# Patient Record
Sex: FEMALE | Race: WHITE | Employment: OTHER | URBAN - METROPOLITAN AREA
[De-identification: names, ages, dates, MRNs, and addresses within clinical notes are randomized per-mention and may not be internally consistent; named-entity substitution may affect disease eponyms.]

---

## 2018-07-11 ENCOUNTER — HOSPITAL ENCOUNTER (OUTPATIENT)
Dept: GENERAL RADIOLOGY | Age: 83
Discharge: HOME | End: 2018-07-11
Attending: FAMILY MEDICINE | Admitting: FAMILY MEDICINE

## 2018-07-11 DIAGNOSIS — M25.532 LEFT WRIST PAIN: ICD-10-CM

## 2018-07-20 ENCOUNTER — HOSPITAL ENCOUNTER (OUTPATIENT)
Dept: CT IMAGING | Age: 83
Discharge: HOME | End: 2018-07-20
Attending: FAMILY MEDICINE | Admitting: FAMILY MEDICINE

## 2018-07-20 ENCOUNTER — HOSPITAL ENCOUNTER (OUTPATIENT)
Dept: GENERAL RADIOLOGY | Age: 83
Discharge: HOME | End: 2018-07-20
Attending: INTERNAL MEDICINE | Admitting: INTERNAL MEDICINE

## 2018-07-20 DIAGNOSIS — R93.7 ABNORMAL FINDINGS ON DIAGNOSTIC IMAGING OF OTHER PARTS OF MUSCULOSKELETAL SYSTEM: ICD-10-CM

## 2018-07-20 DIAGNOSIS — R52 PAIN: ICD-10-CM

## 2018-07-20 LAB
ALBUMIN SERPL-MCNC: 4.3 GM/DL (ref 3.4–5)
ALP BLD-CCNC: 83 IU/L (ref 40–128)
ALT SERPL-CCNC: 18 U/L (ref 10–40)
ANION GAP SERPL CALCULATED.3IONS-SCNC: 16 MMOL/L (ref 4–16)
AST SERPL-CCNC: 22 IU/L (ref 15–37)
BASOPHILS ABSOLUTE: 0 K/CU MM
BASOPHILS RELATIVE PERCENT: 0.5 % (ref 0–1)
BILIRUB SERPL-MCNC: 0.4 MG/DL (ref 0–1)
BUN BLDV-MCNC: 19 MG/DL (ref 6–23)
CALCIUM SERPL-MCNC: 9.5 MG/DL (ref 8.3–10.6)
CHLORIDE BLD-SCNC: 101 MMOL/L (ref 99–110)
CO2: 29 MMOL/L (ref 21–32)
CREAT SERPL-MCNC: 1.1 MG/DL (ref 0.6–1.1)
DIFFERENTIAL TYPE: ABNORMAL
EOSINOPHILS ABSOLUTE: 0.1 K/CU MM
EOSINOPHILS RELATIVE PERCENT: 0.7 % (ref 0–3)
GFR AFRICAN AMERICAN: 57 ML/MIN/1.73M2
GFR NON-AFRICAN AMERICAN: 47 ML/MIN/1.73M2
GLUCOSE BLD-MCNC: 80 MG/DL (ref 70–99)
HCT VFR BLD CALC: 39.6 % (ref 37–47)
HEMOGLOBIN: 12.1 GM/DL (ref 12.5–16)
IMMATURE NEUTROPHIL %: 0.3 % (ref 0–0.43)
LYMPHOCYTES ABSOLUTE: 2.8 K/CU MM
LYMPHOCYTES RELATIVE PERCENT: 37.7 % (ref 24–44)
MCH RBC QN AUTO: 28 PG (ref 27–31)
MCHC RBC AUTO-ENTMCNC: 30.6 % (ref 32–36)
MCV RBC AUTO: 91.7 FL (ref 78–100)
MONOCYTES ABSOLUTE: 0.9 K/CU MM
MONOCYTES RELATIVE PERCENT: 11.9 % (ref 0–4)
NUCLEATED RBC %: 0 %
PDW BLD-RTO: 13.2 % (ref 11.7–14.9)
PLATELET # BLD: 198 K/CU MM (ref 140–440)
PMV BLD AUTO: 10.2 FL (ref 7.5–11.1)
POTASSIUM SERPL-SCNC: 4.3 MMOL/L (ref 3.5–5.1)
RBC # BLD: 4.32 M/CU MM (ref 4.2–5.4)
SEGMENTED NEUTROPHILS ABSOLUTE COUNT: 3.7 K/CU MM
SEGMENTED NEUTROPHILS RELATIVE PERCENT: 48.9 % (ref 36–66)
SODIUM BLD-SCNC: 146 MMOL/L (ref 135–145)
TOTAL IMMATURE NEUTOROPHIL: 0.02 K/CU MM
TOTAL NUCLEATED RBC: 0 K/CU MM
TOTAL PROTEIN: 6.9 GM/DL (ref 6.4–8.2)
TSH HIGH SENSITIVITY: 0.98 UIU/ML (ref 0.27–4.2)
WBC # BLD: 7.5 K/CU MM (ref 4–10.5)

## 2019-05-08 RX ORDER — LOSARTAN POTASSIUM 50 MG/1
50 TABLET ORAL DAILY
COMMUNITY
End: 2019-06-04

## 2019-05-08 RX ORDER — METOPROLOL TARTRATE 50 MG/1
50 TABLET, FILM COATED ORAL 2 TIMES DAILY
COMMUNITY
End: 2019-08-26 | Stop reason: SDUPTHER

## 2019-06-04 ENCOUNTER — OFFICE VISIT (OUTPATIENT)
Dept: FAMILY MEDICINE CLINIC | Age: 84
End: 2019-06-04
Payer: MEDICARE

## 2019-06-04 VITALS
OXYGEN SATURATION: 97 % | HEIGHT: 68 IN | DIASTOLIC BLOOD PRESSURE: 88 MMHG | BODY MASS INDEX: 27.74 KG/M2 | HEART RATE: 78 BPM | WEIGHT: 183 LBS | SYSTOLIC BLOOD PRESSURE: 138 MMHG | TEMPERATURE: 97.9 F

## 2019-06-04 DIAGNOSIS — R53.83 FATIGUE, UNSPECIFIED TYPE: ICD-10-CM

## 2019-06-04 DIAGNOSIS — E11.42 TYPE 2 DIABETES MELLITUS WITH DIABETIC POLYNEUROPATHY, WITHOUT LONG-TERM CURRENT USE OF INSULIN (HCC): ICD-10-CM

## 2019-06-04 DIAGNOSIS — I10 ESSENTIAL HYPERTENSION: ICD-10-CM

## 2019-06-04 DIAGNOSIS — E03.9 HYPOTHYROIDISM, UNSPECIFIED TYPE: ICD-10-CM

## 2019-06-04 DIAGNOSIS — E03.9 HYPOTHYROIDISM, UNSPECIFIED TYPE: Primary | ICD-10-CM

## 2019-06-04 DIAGNOSIS — J30.89 SEASONAL ALLERGIC RHINITIS DUE TO OTHER ALLERGIC TRIGGER: ICD-10-CM

## 2019-06-04 LAB
BASOPHILS ABSOLUTE: 0 K/UL (ref 0–0.2)
BASOPHILS RELATIVE PERCENT: 0.6 %
BILIRUBIN URINE: NEGATIVE
BLOOD, URINE: NEGATIVE
CLARITY: CLEAR
COLOR: YELLOW
EOSINOPHILS ABSOLUTE: 0.1 K/UL (ref 0–0.6)
EOSINOPHILS RELATIVE PERCENT: 1.4 %
EPITHELIAL CELLS, UA: 1 /HPF (ref 0–5)
GLUCOSE URINE: NEGATIVE MG/DL
HCT VFR BLD CALC: 37.7 % (ref 36–48)
HEMOGLOBIN: 12.4 G/DL (ref 12–16)
HYALINE CASTS: 1 /LPF (ref 0–8)
KETONES, URINE: NEGATIVE MG/DL
LEUKOCYTE ESTERASE, URINE: ABNORMAL
LYMPHOCYTES ABSOLUTE: 1.2 K/UL (ref 1–5.1)
LYMPHOCYTES RELATIVE PERCENT: 27.6 %
MCH RBC QN AUTO: 28.2 PG (ref 26–34)
MCHC RBC AUTO-ENTMCNC: 33 G/DL (ref 31–36)
MCV RBC AUTO: 85.6 FL (ref 80–100)
MICROSCOPIC EXAMINATION: YES
MONOCYTES ABSOLUTE: 0.6 K/UL (ref 0–1.3)
MONOCYTES RELATIVE PERCENT: 12.7 %
NEUTROPHILS ABSOLUTE: 2.5 K/UL (ref 1.7–7.7)
NEUTROPHILS RELATIVE PERCENT: 57.7 %
NITRITE, URINE: NEGATIVE
PDW BLD-RTO: 14.7 % (ref 12.4–15.4)
PH UA: 6 (ref 5–8)
PLATELET # BLD: 195 K/UL (ref 135–450)
PMV BLD AUTO: 8.2 FL (ref 5–10.5)
PROTEIN UA: NEGATIVE MG/DL
RBC # BLD: 4.41 M/UL (ref 4–5.2)
RBC UA: 1 /HPF (ref 0–4)
SPECIFIC GRAVITY UA: 1.02 (ref 1–1.03)
UROBILINOGEN, URINE: 0.2 E.U./DL
WBC # BLD: 4.4 K/UL (ref 4–11)
WBC UA: 3 /HPF (ref 0–5)

## 2019-06-04 PROCEDURE — 1036F TOBACCO NON-USER: CPT | Performed by: FAMILY MEDICINE

## 2019-06-04 PROCEDURE — 1090F PRES/ABSN URINE INCON ASSESS: CPT | Performed by: FAMILY MEDICINE

## 2019-06-04 PROCEDURE — 1123F ACP DISCUSS/DSCN MKR DOCD: CPT | Performed by: FAMILY MEDICINE

## 2019-06-04 PROCEDURE — G8427 DOCREV CUR MEDS BY ELIG CLIN: HCPCS | Performed by: FAMILY MEDICINE

## 2019-06-04 PROCEDURE — 4040F PNEUMOC VAC/ADMIN/RCVD: CPT | Performed by: FAMILY MEDICINE

## 2019-06-04 PROCEDURE — G8400 PT W/DXA NO RESULTS DOC: HCPCS | Performed by: FAMILY MEDICINE

## 2019-06-04 PROCEDURE — G8419 CALC BMI OUT NRM PARAM NOF/U: HCPCS | Performed by: FAMILY MEDICINE

## 2019-06-04 PROCEDURE — 99214 OFFICE O/P EST MOD 30 MIN: CPT | Performed by: FAMILY MEDICINE

## 2019-06-04 RX ORDER — LEVOTHYROXINE SODIUM 0.05 MG/1
50 TABLET ORAL DAILY
Qty: 30 TABLET | Refills: 1 | Status: SHIPPED | OUTPATIENT
Start: 2019-06-04 | End: 2019-08-22 | Stop reason: SDUPTHER

## 2019-06-04 ASSESSMENT — ENCOUNTER SYMPTOMS
RHINORRHEA: 1
SHORTNESS OF BREATH: 1
VOMITING: 0
DIARRHEA: 0
COUGH: 0
SINUS PAIN: 1
SORE THROAT: 0
SINUS PRESSURE: 1
TROUBLE SWALLOWING: 0
NAUSEA: 0

## 2019-06-04 ASSESSMENT — PATIENT HEALTH QUESTIONNAIRE - PHQ9
SUM OF ALL RESPONSES TO PHQ9 QUESTIONS 1 & 2: 2
2. FEELING DOWN, DEPRESSED OR HOPELESS: 1
SUM OF ALL RESPONSES TO PHQ QUESTIONS 1-9: 2
SUM OF ALL RESPONSES TO PHQ QUESTIONS 1-9: 2
1. LITTLE INTEREST OR PLEASURE IN DOING THINGS: 1

## 2019-06-04 NOTE — PROGRESS NOTES
6/4/2019     Jessa Bolivar is a 80 y.o. female who presents today with:  Chief Complaint   Patient presents with    Annual Exam     non fasting, pt states she has been sob x 5 days pt does have sinus pressure dry eyes she does have seasonal allergies not sure if her sob is caused by her allergies. pt states the sob comes and goes it isn't consistant pt did states she had body chills and sweats but hasn't taken her temp. synthroid is to be sent to current pharm nakul [facility]   .    /88 (Site: Left Upper Arm, Position: Sitting, Cuff Size: Medium Adult)   Pulse 78   Temp 97.9 °F (36.6 °C) (Oral)   Ht 5' 7.5\" (1.715 m)   Wt 183 lb (83 kg)   SpO2 97%   BMI 28.24 kg/m²     HPI  History was obtained from the pt. Took tylenol yesterday because she felt tired, and had sinus congestion. Intermittent SOB with exertion. She reports that she increased frequency and was sent to urology. She did not like her experience there as they wouldn't start her on medications due to her blood pressure. She reports that she has episodes of incontinence, and wears a depends, stating that all of a sudden she can just be going without even realizing it. She got tired of changing her clothes all attempts that she started wearing depends and notices that she can urinate without even realizing it. Will discuss this further with Dr. Al Quintanilla    Patient does report some increased fatigue, and we will evaluate her thyroid levels to assess if that is causing the increased fatigue. Patient also states she has issues with incontinence, and wears a depends and feels that this may be causing some of her systemic symptoms including subjective fever and chills, and increased fatigue. REVIEW OF SYMPTOMS    Review of Systems   Constitutional: Positive for chills (subjective), fatigue and fever (subjective). HENT: Positive for congestion, postnasal drip, rhinorrhea, sinus pressure, sinus pain and sneezing.  Negative for ear pain, Fear of current or ex partner: None     Emotionally abused: None     Physically abused: None     Forced sexual activity: None   Other Topics Concern    None   Social History Narrative    None        SURGICAL HISTORY  Past Surgical History:   Procedure Laterality Date    APPENDECTOMY      BLADDER SURGERY      CHOLECYSTECTOMY      COLONOSCOPY  11/4/14    normal, biopsy    EYE SURGERY      HYSTERECTOMY     Carrington L[facility]pez  2011    Dr. Nils Cruz. med - following MI       CURRENT MEDICATIONS  Current Outpatient Medications   Medication Sig Dispense Refill    levothyroxine (SYNTHROID) 50 MCG tablet Take 1 tablet by mouth Daily 30 tablet 1    metoprolol tartrate (LOPRESSOR) 50 MG tablet Take 50 mg by mouth 2 times daily      ipratropium (ATROVENT) 0.03 % nasal spray   11     No current facility-administered medications for this visit. ALLERGIES  Allergies   Allergen Reactions    Aspirin     Codeine     Elavil [Amitriptyline Hcl]     Ibuprofen     Plaquenil [Hydroxychloroquine Sulfate]     Theophyllines        PHYSICAL EXAM    Physical Exam   Constitutional: She is oriented to person, place, and time. She appears well-developed and well-nourished. No distress. HENT:   Head: Normocephalic and atraumatic. Left Ear: Tympanic membrane, external ear and ear canal normal.   Nose: Mucosal edema and rhinorrhea present. R ear exam was limited due to cerumen impaction of the R EAC   Cardiovascular: Normal rate, regular rhythm and normal heart sounds. Pulmonary/Chest: Effort normal and breath sounds normal.   Patient is speaking in full sentences. Lungs CTA bilaterally. Abdominal: Soft. Bowel sounds are normal. There is no tenderness (No suprapubic tenderness upon palpation. ). There is no CVA tenderness (No CVA tenderness bilaterally. ). Neurological: She is alert and oriented to person, place, and time. Skin: Skin is warm and dry. She is not diaphoretic.    Psychiatric: She has a normal mood and affect. Thought content normal.   Nursing note and vitals reviewed. ASSESSMENT & PLAN    1. Hypothyroidism, unspecified type  Patient is seated taking medications as prescribed and refills will be provided as necessary. Is to have lab work completed today and medication dosage may be adjusted based off of those results if necessary.  - levothyroxine (SYNTHROID) 50 MCG tablet; Take 1 tablet by mouth Daily  Dispense: 30 tablet; Refill: 1  - T4, Free; Future  - TSH without Reflex; Future    2. Type 2 diabetes mellitus with diabetic polyneuropathy, without long-term current use of insulin ([facility] [address]  Patient is seated taking medications as prescribed and refills will be provided as necessary. Is to have lab work completed today and medication dosage may be adjusted based off of those results if necessary.  - Lipid Panel; Future  - Hemoglobin A1C; Future    3. Essential hypertension  Patient is seated taking medications as prescribed and refills will be provided as necessary. Is to have lab work completed today and medication dosage may be adjusted based off of those results if necessary.  - CBC Auto Differential; Future  - Comprehensive Metabolic Panel; Future  - Lipid Panel; Future    4. Seasonal allergic rhinitis due to other allergic trigger  Patient is to use Flonase nasal spray 1-2+ for nostril daily, and was instructed to take a Claritin 10 mg 1 tablet by mouth daily for help with her allergic rhinitis symptoms. 5. Fatigue, unspecified type  Will complete a urinalysis with a urine culture to assess for possible infection as a source of her increased fatigue, subjective fever and subjective chills. Will notify patient of those results once completed, and we'll start her on an antibiotic if necessary.  - Urinalysis with Microscopic  - Urine Culture;  Future           Electronically signed by DEMIAN Mora on 6/4/2019      Please note that this chart was generated using dragon dictation software. Although every effort was made to ensure the accuracy of this automated transcription, some errors in transcription may have occurred.

## 2019-06-04 NOTE — PATIENT INSTRUCTIONS
Patient Education        loratadine  Pronunciation:  sudha AT a casey  Brand:  Alavert, Claritin, Claritin Reditab, Clear-Atadine, Dimetapp ND, ohm Allergy Relief, QlearQuil All Day & Night, Tavist ND, Shoshanaitin  What is the most important information I should know about loratadine? Follow all directions on your medicine label and package. Tell each of your healthcare providers about all your medical conditions, allergies, and all medicines you use. What is loratadine? Loratadine is an antihistamine that reduces the effects of natural chemical histamine in the body. Histamine can produce symptoms of sneezing, itching, watery eyes, and runny nose. Loratadine is used to treat sneezing, runny nose, watery eyes, hives, skin rash, itching, and other cold or allergy symptoms. Loratadine is also used to treat skin hives and itching in people with chronic skin reactions. Loratadine may also be used for purposes not listed in this medication guide. What should I discuss with my healthcare provider before taking loratadine? You should not take this medicine if you are allergic to loratadine or to desloratadine (Clarinex). Ask a doctor or pharmacist if it is safe for you to use this medicine if you have other medical conditions, especially:  · asthma;  · kidney disease; or  · liver disease. This medicine is not expected to harm an unborn baby. Tell your doctor if you are pregnant or plan to become pregnant. Loratadine can pass into breast milk and may harm a nursing baby. Tell your doctor if you are breast-feeding a baby. Some forms of loratadine may contain phenylalanine. Talk to your doctor before taking loratadine if you have phenylketonuria (PKU). Do not give this medicine to a child younger than 10years old without the advice of a doctor. How should I take loratadine? Use exactly as directed on the label, or as prescribed by your doctor. Do not use in larger or smaller amounts or for longer than recommended. Cold or allergy medicine is usually taken only for a short time until your symptoms clear up. Do not give this medicine to a child younger than 3years old. Always ask a doctor before giving a cough or cold medicine to a child. Death can occur from the misuse of cough and cold medicines in very young children. Loratadine is usually taken once per day. Follow your doctor's instructions. Do not crush, chew, or break the regular tablet. Swallow the pill whole. Measure liquid medicine with the dosing syringe provided, or with a special dose-measuring spoon or medicine cup. If you do not have a dose-measuring device, ask your pharmacist for one. The chewable tablet must be chewed before you swallow it. To take the orally disintegrating tablet (Claritin RediTab, Alavert):  · Keep the tablet in its blister pack until you are ready to take it. Open the package and peel back the foil. Do not push a tablet through the foil or you may damage the tablet. · Use dry hands to remove the tablet and place it in your mouth. · Do not swallow the tablet whole. Allow it to dissolve in your mouth without chewing. If desired, you may drink liquid to help swallow the dissolved tablet. Call your doctor if your symptoms do not improve, or if they get worse. Store at room temperature away from moisture and heat. What happens if I miss a dose? Take the missed dose as soon as you remember. Skip the missed dose if it is almost time for your next scheduled dose. Do not  take extra medicine to make up the missed dose. What happens if I overdose? Seek emergency medical attention or call the Poison Help line at 1-608.288.5172. Overdose symptoms may include headache, drowsiness, and fast or pounding heartbeat. What should I avoid while taking loratadine? Follow your doctor's instructions about any restrictions on food, beverages, or activity. What are the possible side effects of loratadine?   Get emergency medical help if you have not a substitute for, the expertise, skill, knowledge and judgment of healthcare practitioners. The absence of a warning for a given drug or drug combination in no way should be construed to indicate that the drug or drug combination is safe, effective or appropriate for any given patient. [facility] does not assume any responsibility for any aspect of healthcare administered with the aid of information [facility] provides. The information contained herein is not intended to cover all possible uses, directions, precautions, warnings, drug interactions, allergic reactions, or adverse effects. If you have questions about the drugs you are taking, check with your doctor, nurse or pharmacist.  Copyright 6772-5738 [address]. Version: 9.02. Revision date: 2/20/2015. Care instructions adapted under license by [facility] ([facility]). If you have questions about a medical condition or this instruction, always ask your healthcare professional. [place] [address] any warranty or liability for your use of this information. Patient Education        fluticasone nasal  Pronunciation:  floo TIEMILY thompson  Brand:  Flonase, Veramyst, Ronold Raspberry  What is the most important information I should know about fluticasone nasal?  Follow all directions on your medicine label and package. Tell each of your healthcare providers about all your medical conditions, allergies, and all medicines you use. What is fluticasone nasal?  Fluticasone is a steroid. It prevents the release of substances in the body that cause inflammation. Fluticasone nasal (for the nose) is used to treat nasal congestion, sneezing, runny nose, and itchy or watery eyes caused by seasonal or year-round allergies. The Ronold Raspberry brand of this medicine is for use only in adults. Veramyst may be used in children as young as 3years old. Flonase is for use in adults and children who are at least 3years old.   Fluticasone nasal may also be used for purposes not listed in this medication guide. What should I discuss with my healthcare provider before using fluticasone nasal?  You should not use fluticasone nasal if you are allergic to it. Fluticasone can weaken your immune system,  making it easier for you to get an infection or worsening an infection you already have or recently had. Tell your doctor about any illness or infection you have had within the past several weeks. To make sure fluticasone nasal is safe for you, tell your doctor if you have ever had:  · sores or ulcers inside your nose;  · injury of or surgery on your nose;  · tuberculosis or any other infection or illness;  · glaucoma or cataracts;  · liver disease;  · osteoporosis; or  · herpes simplex virus of your eyes. If you use fluticasone nasal without a prescription and you have any medical conditions, ask a doctor or pharmacist if this medicine is safe for you. Also tell your doctor if you have diabetes. Steroid medicines may increase the glucose (sugar) levels in your blood or urine. You may also need to adjust the dose of your diabetes medications. It is not known whether fluticasone nasal will harm an unborn baby. Ask a doctor before using this medicine if you are pregnant. It is not known whether fluticasone nasal passes into breast milk or if it could affect a nursing baby. Ask a doctor before using this medicine if you are breast-feeding. How should I use fluticasone nasal?  Use exactly as directed on the label, or as prescribed by your doctor. Do not use in larger or smaller amounts or for longer than recommended. Do not share this medicine with another person, even if they have the same symptoms you have. The usual dose of fluticasone nasal is 1 to 2 sprays into each nostril once or twice per day. Your dose will depend on the fluticasone brand or strength you use, and your dose may change once your symptoms improve. Follow all dosing instructions very carefully.   Jodi Parrot is not approved for use by anyone younger than 25years old. Do not use Flonase in a child younger than 3years old. Do not use Veramyst in a child younger than 3years old. Any child using fluticasone nasal should be supervised by an adult while using the nasal spray. This medicine comes with patient instructions for safe and effective use, and directions for priming the nasal spray device. Follow these directions carefully. Ask your doctor or pharmacist if you have any questions. Shake the nasal spray just before each use. If you switched to fluticasone from another steroid medicine, do not stop using the other steroid suddenly or you may have unpleasant withdrawal symptoms. Talk with your doctor about tapering your steroid dose before stopping completely. To be sure fluticasone nasal is not causing harmful effects on your nose or sinuses, your doctor may need to check your progress on a regular basis. It may take up to several days before your symptoms improve. Keep using the medication as directed and tell your doctor if your symptoms do not improve after a week of treatment. Store fluticasone nasal in an upright position at room temperature, away from moisture and heat. Throw the spray bottle away after you have used 120 sprays, even if there is still medicine left in the bottle. What happens if I miss a dose? Use the missed dose as soon as you remember. Skip the missed dose if it is almost time for your next scheduled dose. Do not use extra medicine to make up the missed dose. What happens if I overdose? Seek emergency medical attention or call the Poison Help line at 1-429.623.6065. An overdose of fluticasone nasal is not expected to produce life threatening symptoms.  However, long term use of high steroid doses can lead to symptoms such as thinning skin, easy bruising, changes in the shape or location of body fat (especially in your face, neck, back, and waist), increased acne or facial hair, menstrual problems, impotence, or loss of interest in sex. What should I avoid while using fluticasone nasal?  Avoid getting the spray in your eyes or mouth. If this does happen, rinse with water. Avoid being near people who are sick or have infections. Call your doctor for preventive treatment if you are exposed to chickenpox or measles. These conditions can be serious or even fatal in people who are using fluticasone nasal.  What are the possible side effects of fluticasone nasal?  Get emergency medical help if you have signs of an allergic reaction: hives, rash; feeling light-headed; difficult breathing; swelling of your face, lips, tongue, or throat. Call your doctor at once if you have:  · severe or ongoing nosebleeds;  · noisy breathing, runny nose, or crusting around your nostrils;  · redness, sores, or white patches in your mouth or throat;  · blurred vision, eye pain, or seeing halos around lights;  · any wound that will not heal; or  · signs of low adrenal gland hormones --flu-like symptoms, headache, depression, weakness, tiredness, diarrhea, vomiting, stomach pain, craving salty foods, and feeling light-headed. Steroid medicine can affect growth in children. Tell your doctor if your child is not growing at a normal rate while using this medicine. Common side effects may include:  · minor nosebleed, burning or itching in your nose;  · sores or white patches inside or around your nose;  · cough, trouble breathing;  · headache, back pain;  · sinus pain, sore throat, fever; or  · nausea, vomiting. This is not a complete list of side effects and others may occur. Call your doctor for medical advice about side effects. You may report side effects to FDA at 4-669-FDA-9169. What other drugs will affect fluticasone nasal?  Tell your doctor about all your current medicines and any you start or stop using, especially:  · antifungal medicine; or  · antiviral medicine to treat hepatis C or HIV/AIDS. This list is not complete. Other drugs may interact with fluticasone nasal, including prescription and over-the-counter medicines, vitamins, and herbal products. Not all possible interactions are listed in this medication guide. Where can I get more information? Your pharmacist can provide more information about fluticasone nasal.  Remember, keep this and all other medicines out of the reach of children, never share your medicines with others, and use this medication only for the indication prescribed. Every effort has been made to ensure that the information provided by [address] is accurate, up-to-date, and complete, but no guarantee is made to that effect. Drug information contained herein may be time sensitive. [facility] information has been compiled for use by healthcare practitioners and consumers in the [facility] and therefore [facility] does not warrant that uses outside of the [facility] are appropriate, unless specifically indicated otherwise. [facility]'s drug information does not endorse drugs, diagnose patients or recommend therapy. [facility]'s drug information is an informational resource designed to assist licensed healthcare practitioners in caring for their patients and/or to serve consumers viewing this service as a supplement to, and not a substitute for, the expertise, skill, knowledge and judgment of healthcare practitioners. The absence of a warning for a given drug or drug combination in no way should be construed to indicate that the drug or drug combination is safe, effective or appropriate for any given patient. [facility] does not assume any responsibility for any aspect of healthcare administered with the aid of information [facility] provides. The information contained herein is not intended to cover all possible uses, directions, precautions, warnings, drug interactions, allergic reactions, or adverse effects.  If you have questions about the drugs you are taking, check with your doctor, nurse or pharmacist.  Copyright 4623-1595 Hansa Valles. Version: 9.01. Revision date: 11/2/2017. Care instructions adapted under license by [facility] ([facility]). If you have questions about a medical condition or this instruction, always ask your healthcare professional. [address] any warranty or liability for your use of this information.

## 2019-06-05 ENCOUNTER — TELEPHONE (OUTPATIENT)
Dept: FAMILY MEDICINE CLINIC | Age: 84
End: 2019-06-05

## 2019-06-05 PROBLEM — E11.42 TYPE 2 DIABETES MELLITUS WITH DIABETIC POLYNEUROPATHY, WITHOUT LONG-TERM CURRENT USE OF INSULIN (HCC): Status: ACTIVE | Noted: 2019-06-05

## 2019-06-05 LAB
A/G RATIO: 1.6 (ref 1.1–2.2)
ALBUMIN SERPL-MCNC: 4.2 G/DL (ref 3.4–5)
ALP BLD-CCNC: 83 U/L (ref 40–129)
ALT SERPL-CCNC: 20 U/L (ref 10–40)
ANION GAP SERPL CALCULATED.3IONS-SCNC: 15 MMOL/L (ref 3–16)
AST SERPL-CCNC: 21 U/L (ref 15–37)
BILIRUB SERPL-MCNC: 0.4 MG/DL (ref 0–1)
BUN BLDV-MCNC: 20 MG/DL (ref 7–20)
CALCIUM SERPL-MCNC: 9.4 MG/DL (ref 8.3–10.6)
CHLORIDE BLD-SCNC: 103 MMOL/L (ref 99–110)
CHOLESTEROL, TOTAL: 166 MG/DL (ref 0–199)
CO2: 26 MMOL/L (ref 21–32)
CREAT SERPL-MCNC: 1.1 MG/DL (ref 0.6–1.2)
ESTIMATED AVERAGE GLUCOSE: 116.9 MG/DL
GFR AFRICAN AMERICAN: 57
GFR NON-AFRICAN AMERICAN: 47
GLOBULIN: 2.7 G/DL
GLUCOSE BLD-MCNC: 84 MG/DL (ref 70–99)
HBA1C MFR BLD: 5.7 %
HDLC SERPL-MCNC: 34 MG/DL (ref 40–60)
LDL CHOLESTEROL CALCULATED: 105 MG/DL
POTASSIUM SERPL-SCNC: 4.4 MMOL/L (ref 3.5–5.1)
SODIUM BLD-SCNC: 144 MMOL/L (ref 136–145)
T4 FREE: 0.9 NG/DL (ref 0.9–1.8)
TOTAL PROTEIN: 6.9 G/DL (ref 6.4–8.2)
TRIGL SERPL-MCNC: 133 MG/DL (ref 0–150)
TSH SERPL DL<=0.05 MIU/L-ACNC: 1 UIU/ML (ref 0.27–4.2)
VLDLC SERPL CALC-MCNC: 27 MG/DL

## 2019-06-05 NOTE — TELEPHONE ENCOUNTER
SPOKE WITH PT AT 1044 AM REGARD UA I INFORMED PT OF MESSAGE BELOW PER BUCK MARQUES PAC PT STATED WHEN WE DO GET THE RESULTS OF THE CULTURE IF SHE DOES NOT ANSWER IT IS OK TO LEAVE A MESSAGE. Electronically signed by Benji Burkett LPN on 5/9/8160 at 10:68 AM      ----- Message from Tracey Tucker [place] sent at 6/5/2019 10:02 AM EDT -----  There may be a trace of a urinary tract infection but we will wait until the culture comes back so we know we will treat it with an appropriate antibiotic and she was not having symptoms of a UTI.      Thanks, Yudith

## 2019-06-05 NOTE — TELEPHONE ENCOUNTER
SPOKE WITH PT  AM REGARD. BW RESULTS I INFORMED PT OF BW IN MESSAGE BELOW PER Karlee Harvey PAC . PT VOICED UNDERSTANDING. Electronically signed by Shahzad Parikh LPN on 9/3/8775 at 9:51 AM      ----- Message from Evelyn [place], [place] sent at 6/5/2019  9:44 AM EDT -----  Physical the patient know that all of her labs look okay. Her A1c looks great at 5.7. Her cholesterol looks okay, but should adhere to a lower cholesterol, lower fat diet. Her kidney function is stable at this time.     Thanks, Yudith

## 2019-06-06 ENCOUNTER — TELEPHONE (OUTPATIENT)
Dept: FAMILY MEDICINE CLINIC | Age: 84
End: 2019-06-06

## 2019-06-06 LAB
ORGANISM: ABNORMAL
URINE CULTURE, ROUTINE: ABNORMAL
URINE CULTURE, ROUTINE: ABNORMAL

## 2019-06-07 NOTE — TELEPHONE ENCOUNTER
Over the last time she was seen at Dr. Daniel Mora office, because if it is within the past 3 years we do not need to do a referral, and she will need to call them to make an appointment. We will  complete a referral if it has been greater than 3 years.
SPOKE WITH PT  PM REGARD MESSAGE BELOW PER BUCK MARQUES PAC PT VOICED UNDERSTANDING AND STATED SHE WOULD LIKE TO GO BACK TO  TO TRY TO RESOLVE HER SYMPT. SHE IS CURRENTLY HAVING REGARD. INCONTINENCE. Electronically signed by Ofelia Chavarria LPN on 9/8/0272 at 2:72 PM        ----- [facility] [place] sent at 6/6/2019  4:43 PM EDT -----  Nothing to treat in terms of a UTI after the culture. Can refer her back to Urology for Dr. [facility] is the only one in [place], if we do a referral it would have to be to another urologist but is out of town.     Thanks, Yudith
Spoke with pt at 1054 am, pt stated she did see Reji Worthington about two years ago she will be sure to call him and set up her appt.    Electronically signed by Anahy Montoya LPN on 5/7/7098 at 49:68 AM
General

## 2019-08-22 ENCOUNTER — TELEPHONE (OUTPATIENT)
Dept: FAMILY MEDICINE CLINIC | Age: 84
End: 2019-08-22

## 2019-08-22 DIAGNOSIS — E03.9 HYPOTHYROIDISM, UNSPECIFIED TYPE: ICD-10-CM

## 2019-08-22 RX ORDER — LEVOTHYROXINE SODIUM 0.05 MG/1
50 TABLET ORAL DAILY
Qty: 30 TABLET | Refills: 0 | Status: SHIPPED | OUTPATIENT
Start: 2019-08-22 | End: 2019-08-26 | Stop reason: SDUPTHER

## 2019-08-26 ENCOUNTER — OFFICE VISIT (OUTPATIENT)
Dept: FAMILY MEDICINE CLINIC | Age: 84
End: 2019-08-26
Payer: MEDICARE

## 2019-08-26 VITALS
HEART RATE: 106 BPM | SYSTOLIC BLOOD PRESSURE: 142 MMHG | HEIGHT: 68 IN | OXYGEN SATURATION: 97 % | DIASTOLIC BLOOD PRESSURE: 98 MMHG | BODY MASS INDEX: 28.04 KG/M2 | WEIGHT: 185 LBS

## 2019-08-26 DIAGNOSIS — E11.42 TYPE 2 DIABETES MELLITUS WITH DIABETIC POLYNEUROPATHY, WITHOUT LONG-TERM CURRENT USE OF INSULIN (HCC): Primary | ICD-10-CM

## 2019-08-26 DIAGNOSIS — I48.19 PERSISTENT ATRIAL FIBRILLATION (HCC): ICD-10-CM

## 2019-08-26 DIAGNOSIS — E03.9 HYPOTHYROIDISM, UNSPECIFIED TYPE: ICD-10-CM

## 2019-08-26 DIAGNOSIS — R10.9 BILATERAL FLANK PAIN: ICD-10-CM

## 2019-08-26 DIAGNOSIS — R55 NEAR SYNCOPE: ICD-10-CM

## 2019-08-26 PROBLEM — I48.91 A-FIB (HCC): Status: ACTIVE | Noted: 2019-08-26

## 2019-08-26 PROCEDURE — 4040F PNEUMOC VAC/ADMIN/RCVD: CPT | Performed by: FAMILY MEDICINE

## 2019-08-26 PROCEDURE — G8419 CALC BMI OUT NRM PARAM NOF/U: HCPCS | Performed by: FAMILY MEDICINE

## 2019-08-26 PROCEDURE — 1123F ACP DISCUSS/DSCN MKR DOCD: CPT | Performed by: FAMILY MEDICINE

## 2019-08-26 PROCEDURE — 99214 OFFICE O/P EST MOD 30 MIN: CPT | Performed by: FAMILY MEDICINE

## 2019-08-26 PROCEDURE — 1090F PRES/ABSN URINE INCON ASSESS: CPT | Performed by: FAMILY MEDICINE

## 2019-08-26 PROCEDURE — G8400 PT W/DXA NO RESULTS DOC: HCPCS | Performed by: FAMILY MEDICINE

## 2019-08-26 PROCEDURE — 1036F TOBACCO NON-USER: CPT | Performed by: FAMILY MEDICINE

## 2019-08-26 PROCEDURE — G8427 DOCREV CUR MEDS BY ELIG CLIN: HCPCS | Performed by: FAMILY MEDICINE

## 2019-08-26 RX ORDER — LEVOTHYROXINE SODIUM 0.05 MG/1
50 TABLET ORAL DAILY
Qty: 30 TABLET | Refills: 3 | Status: SHIPPED | OUTPATIENT
Start: 2019-08-26 | End: 2019-09-19 | Stop reason: SDUPTHER

## 2019-08-26 RX ORDER — FLUTICASONE PROPIONATE 50 MCG
2 SPRAY, SUSPENSION (ML) NASAL DAILY
COMMUNITY
End: 2022-10-07

## 2019-08-26 RX ORDER — METOPROLOL TARTRATE 50 MG/1
50 TABLET, FILM COATED ORAL 2 TIMES DAILY
Qty: 60 TABLET | Refills: 3 | Status: SHIPPED | OUTPATIENT
Start: 2019-08-26 | End: 2019-09-19 | Stop reason: SDUPTHER

## 2019-08-26 RX ORDER — DILTIAZEM HYDROCHLORIDE 120 MG/1
120 CAPSULE, COATED, EXTENDED RELEASE ORAL DAILY
Qty: 30 CAPSULE | Refills: 5 | Status: SHIPPED | OUTPATIENT
Start: 2019-08-26 | End: 2019-12-10 | Stop reason: ALTCHOICE

## 2019-08-26 ASSESSMENT — ENCOUNTER SYMPTOMS
CHEST TIGHTNESS: 0
CONSTIPATION: 0
SINUS PRESSURE: 0
COUGH: 0
DIARRHEA: 0
SORE THROAT: 0
SHORTNESS OF BREATH: 0
ABDOMINAL PAIN: 0
RHINORRHEA: 0

## 2019-08-27 DIAGNOSIS — R10.9 BILATERAL FLANK PAIN: ICD-10-CM

## 2019-08-27 LAB
EPITHELIAL CELLS, UA: NORMAL /HPF
RBC UA: NORMAL /HPF (ref 0–2)
WBC UA: NORMAL /HPF (ref 0–5)

## 2019-09-19 ENCOUNTER — TELEPHONE (OUTPATIENT)
Dept: FAMILY MEDICINE CLINIC | Age: 84
End: 2019-09-19

## 2019-09-19 DIAGNOSIS — E03.9 HYPOTHYROIDISM, UNSPECIFIED TYPE: ICD-10-CM

## 2019-09-19 RX ORDER — LEVOTHYROXINE SODIUM 0.05 MG/1
50 TABLET ORAL DAILY
Qty: 30 TABLET | Refills: 2 | Status: SHIPPED | OUTPATIENT
Start: 2019-09-19 | End: 2019-12-10 | Stop reason: SDUPTHER

## 2019-09-19 RX ORDER — METOPROLOL TARTRATE 50 MG/1
50 TABLET, FILM COATED ORAL 2 TIMES DAILY
Qty: 60 TABLET | Refills: 2 | Status: SHIPPED | OUTPATIENT
Start: 2019-09-19 | End: 2019-12-10 | Stop reason: SDUPTHER

## 2019-12-10 ENCOUNTER — OFFICE VISIT (OUTPATIENT)
Dept: FAMILY MEDICINE CLINIC | Age: 84
End: 2019-12-10
Payer: MEDICARE

## 2019-12-10 VITALS
DIASTOLIC BLOOD PRESSURE: 90 MMHG | SYSTOLIC BLOOD PRESSURE: 140 MMHG | BODY MASS INDEX: 26.48 KG/M2 | OXYGEN SATURATION: 95 % | HEIGHT: 70 IN | WEIGHT: 185 LBS | HEART RATE: 107 BPM

## 2019-12-10 DIAGNOSIS — I48.91 ATRIAL FIBRILLATION, UNSPECIFIED TYPE (HCC): ICD-10-CM

## 2019-12-10 DIAGNOSIS — M25.531 RIGHT WRIST PAIN: ICD-10-CM

## 2019-12-10 DIAGNOSIS — N32.81 OVERACTIVE BLADDER: ICD-10-CM

## 2019-12-10 DIAGNOSIS — E11.42 TYPE 2 DIABETES MELLITUS WITH DIABETIC POLYNEUROPATHY, WITHOUT LONG-TERM CURRENT USE OF INSULIN (HCC): Primary | ICD-10-CM

## 2019-12-10 DIAGNOSIS — E03.9 HYPOTHYROIDISM, UNSPECIFIED TYPE: ICD-10-CM

## 2019-12-10 DIAGNOSIS — R55 VASOVAGAL SYNCOPE: ICD-10-CM

## 2019-12-10 DIAGNOSIS — I49.9 CARDIAC ARRHYTHMIA, UNSPECIFIED CARDIAC ARRHYTHMIA TYPE: ICD-10-CM

## 2019-12-10 PROCEDURE — 99214 OFFICE O/P EST MOD 30 MIN: CPT | Performed by: FAMILY MEDICINE

## 2019-12-10 PROCEDURE — G8427 DOCREV CUR MEDS BY ELIG CLIN: HCPCS | Performed by: FAMILY MEDICINE

## 2019-12-10 PROCEDURE — G8484 FLU IMMUNIZE NO ADMIN: HCPCS | Performed by: FAMILY MEDICINE

## 2019-12-10 PROCEDURE — 1090F PRES/ABSN URINE INCON ASSESS: CPT | Performed by: FAMILY MEDICINE

## 2019-12-10 PROCEDURE — G8400 PT W/DXA NO RESULTS DOC: HCPCS | Performed by: FAMILY MEDICINE

## 2019-12-10 PROCEDURE — 1123F ACP DISCUSS/DSCN MKR DOCD: CPT | Performed by: FAMILY MEDICINE

## 2019-12-10 PROCEDURE — 1036F TOBACCO NON-USER: CPT | Performed by: FAMILY MEDICINE

## 2019-12-10 PROCEDURE — 4040F PNEUMOC VAC/ADMIN/RCVD: CPT | Performed by: FAMILY MEDICINE

## 2019-12-10 PROCEDURE — G8417 CALC BMI ABV UP PARAM F/U: HCPCS | Performed by: FAMILY MEDICINE

## 2019-12-10 RX ORDER — LEVOTHYROXINE SODIUM 0.05 MG/1
50 TABLET ORAL DAILY
Qty: 30 TABLET | Refills: 2 | Status: SHIPPED | OUTPATIENT
Start: 2019-12-10 | End: 2020-07-10

## 2019-12-10 RX ORDER — METOPROLOL TARTRATE 50 MG/1
50 TABLET, FILM COATED ORAL 2 TIMES DAILY
Qty: 60 TABLET | Refills: 2 | Status: SHIPPED | OUTPATIENT
Start: 2019-12-10 | End: 2020-09-08 | Stop reason: SDUPTHER

## 2019-12-10 ASSESSMENT — ENCOUNTER SYMPTOMS
RESPIRATORY NEGATIVE: 1
CHEST TIGHTNESS: 0
BACK PAIN: 1
COUGH: 0
WHEEZING: 0
EYES NEGATIVE: 1
ABDOMINAL PAIN: 0
SHORTNESS OF BREATH: 0

## 2019-12-12 DIAGNOSIS — E11.42 TYPE 2 DIABETES MELLITUS WITH DIABETIC POLYNEUROPATHY, WITHOUT LONG-TERM CURRENT USE OF INSULIN (HCC): ICD-10-CM

## 2019-12-12 DIAGNOSIS — E03.9 HYPOTHYROIDISM, UNSPECIFIED TYPE: ICD-10-CM

## 2019-12-12 LAB
A/G RATIO: 1.5 (ref 1.1–2.2)
ALBUMIN SERPL-MCNC: 4.5 G/DL (ref 3.4–5)
ALP BLD-CCNC: 78 U/L (ref 40–129)
ALT SERPL-CCNC: 18 U/L (ref 10–40)
ANION GAP SERPL CALCULATED.3IONS-SCNC: 15 MMOL/L (ref 3–16)
AST SERPL-CCNC: 21 U/L (ref 15–37)
BILIRUB SERPL-MCNC: 0.3 MG/DL (ref 0–1)
BUN BLDV-MCNC: 26 MG/DL (ref 7–20)
CALCIUM SERPL-MCNC: 9.6 MG/DL (ref 8.3–10.6)
CHLORIDE BLD-SCNC: 101 MMOL/L (ref 99–110)
CO2: 26 MMOL/L (ref 21–32)
CREAT SERPL-MCNC: 1.1 MG/DL (ref 0.6–1.2)
GFR AFRICAN AMERICAN: 57
GFR NON-AFRICAN AMERICAN: 47
GLOBULIN: 3 G/DL
GLUCOSE BLD-MCNC: 101 MG/DL (ref 70–99)
POTASSIUM SERPL-SCNC: 4.6 MMOL/L (ref 3.5–5.1)
SODIUM BLD-SCNC: 142 MMOL/L (ref 136–145)
T4 FREE: 1 NG/DL (ref 0.9–1.8)
TOTAL PROTEIN: 7.5 G/DL (ref 6.4–8.2)
TSH REFLEX FT4: 1.71 UIU/ML (ref 0.27–4.2)

## 2019-12-13 LAB
ESTIMATED AVERAGE GLUCOSE: 111.2 MG/DL
HBA1C MFR BLD: 5.5 %

## 2019-12-26 ENCOUNTER — HOSPITAL ENCOUNTER (OUTPATIENT)
Dept: GENERAL RADIOLOGY | Age: 84
Discharge: HOME | End: 2019-12-26
Payer: MEDICARE

## 2019-12-26 ENCOUNTER — HOSPITAL ENCOUNTER (OUTPATIENT)
Age: 84
Discharge: HOME | End: 2019-12-26
Payer: MEDICARE

## 2019-12-26 DIAGNOSIS — M25.531 RIGHT WRIST PAIN: ICD-10-CM

## 2019-12-26 PROCEDURE — 73110 X-RAY EXAM OF WRIST: CPT

## 2019-12-30 ENCOUNTER — TELEPHONE (OUTPATIENT)
Dept: FAMILY MEDICINE CLINIC | Age: 84
End: 2019-12-30

## 2020-01-31 ENCOUNTER — INITIAL CONSULT (OUTPATIENT)
Dept: CARDIOLOGY CLINIC | Age: 85
End: 2020-01-31
Payer: MEDICARE

## 2020-01-31 VITALS
HEIGHT: 68 IN | SYSTOLIC BLOOD PRESSURE: 152 MMHG | HEART RATE: 83 BPM | DIASTOLIC BLOOD PRESSURE: 84 MMHG | WEIGHT: 185.8 LBS | OXYGEN SATURATION: 98 % | BODY MASS INDEX: 28.16 KG/M2 | RESPIRATION RATE: 14 BRPM

## 2020-01-31 PROCEDURE — G8484 FLU IMMUNIZE NO ADMIN: HCPCS | Performed by: INTERNAL MEDICINE

## 2020-01-31 PROCEDURE — G8417 CALC BMI ABV UP PARAM F/U: HCPCS | Performed by: INTERNAL MEDICINE

## 2020-01-31 PROCEDURE — 1090F PRES/ABSN URINE INCON ASSESS: CPT | Performed by: INTERNAL MEDICINE

## 2020-01-31 PROCEDURE — G8427 DOCREV CUR MEDS BY ELIG CLIN: HCPCS | Performed by: INTERNAL MEDICINE

## 2020-01-31 PROCEDURE — G8400 PT W/DXA NO RESULTS DOC: HCPCS | Performed by: INTERNAL MEDICINE

## 2020-01-31 PROCEDURE — 1123F ACP DISCUSS/DSCN MKR DOCD: CPT | Performed by: INTERNAL MEDICINE

## 2020-01-31 PROCEDURE — 4040F PNEUMOC VAC/ADMIN/RCVD: CPT | Performed by: INTERNAL MEDICINE

## 2020-01-31 PROCEDURE — 99204 OFFICE O/P NEW MOD 45 MIN: CPT | Performed by: INTERNAL MEDICINE

## 2020-01-31 PROCEDURE — 93000 ELECTROCARDIOGRAM COMPLETE: CPT | Performed by: INTERNAL MEDICINE

## 2020-01-31 PROCEDURE — 1036F TOBACCO NON-USER: CPT | Performed by: INTERNAL MEDICINE

## 2020-01-31 NOTE — PROGRESS NOTES
Electrophysiology Consult Note      Reason for consultation: cardiomyopathy    Chief complaint : Shortness of breath    Referring physician: DR. Shoemaker      Primary care physician: Dale Leyden, MD      History of Present Illness:     Patient is an 80yr old female with hx of complete heart block sp pacemaker, cad, Cardiomyopathy referred by [facility] for cardiomyopathy. Patient with shortness of breath with mild to moderate exertion, which relieves with rest, gradually worsening over months, with feeling of tiredness and weakness. Patient denies palpitations, chest pain, syncope or dizziness. Patient was told that she may need another pacemaker to help her heart function    Patient reports 2 falls in last 2 years and last fall she fell on lateral side of the chest and has some pain on left lateral wall. 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  Thyroid disease     Ulcer in the past    Gastric       Surgical history :   Past Surgical History:   Procedure Laterality Date    APPENDECTOMY      BLADDER SURGERY      CHOLECYSTECTOMY      COLONOSCOPY  11/4/14    normal, biopsy    EYE SURGERY      HYSTERECTOMY     [facility]  2011    Dr. Diann Anderson. [facility] - following MI       Family history: No sudden cardiac death    Social history :  reports that she quit smoking about 43 years ago. She has never used smokeless tobacco. She reports that she does not drink alcohol or use drugs.     Allergies   Allergen Reactions    Aspirin     Codeine    

## 2020-01-31 NOTE — LETTER
Physician Signature:_______________________Date:__________Time:_________                                             [facility] ([facility]) Informed Consent for Anesthesia/Sedation, Surgery, Invasive Procedures, and other High-risk Interventions and Medication use      *This consent is applicable for 30 days following patient signature*    Procedure(s)   IChris authorize, Dr. Chintan Aguilar    and the associate(s) or assistant(s) of his/her choice, to perform the following procedure(s): Biventricular  Pacemaker Implant    I know that unexpected conditions may require additional or different procedures than those above. I authorize the above named practitioner(s) perform these as necessary and desirable. This is based on the practitioners professional judgment. The above named practitioner has discussed the above procedure(s) with me, including:  ? Potential benefits, including likelihood of success of the procedure(s) goals  ? Risks  ? Side effects, risk of death, and risk of infection  ? Any potential problems that might occur during recuperation or healing post-procedure  ? Reasonable alternatives  ? Risks of NOT performing the procedure(s)    I acknowledge that no warranty or guarantee has been made to the results the procedure(s). I consent to the above named practitioner(s) providing additional services to me as deemed reasonable and necessary, including but not limited to:    ? Use of medications for anesthesia or sedation. ? All anesthesia and sedation carry risks. My practitioner has discussed my anticipated anesthesia and/or sedation and the risks of using, risk of not using, benefits, side effects, and alternatives. ? Use of pathology  ? I authorize [facility]) to dispose of tissues, specimens or organs when pathology is complete. ? Use of radiology  ? A contrast agent may be required for radiology procedures.   My practitioner has advised me of the risks of using, risks of not using, benefits, side effects, and alternatives. ? Observers or use of photography, video/audio recording, or televising of the procedure(s). This is for medical, scientific, or educational purposes. This includes appropriate portions of my body. My identity will not be revealed. ? I consent to release of my social security number and other identifying information to [employer] (FDA), and the supplier/, if I receive tissue, a device, or implant. This is to track the tissue, device, or implant for defect, recall, infection, etc.     ? Use of blood and/or blood products, if needed, through my hospital stay. My practitioner has advised me of the risks of using, risks of not using, benefits, side effects, and alternatives. ___ I do NOT want Blood or Blood products given. (Complete separate  refusal form)    Code Status (ariana one):  ___ I do NOT HAVE a DNR order. I am a Full-code.   I will receive CPR, intubation,  chest compressions, medications, and/or other life saving measures if I have a  cardiac or respiratory arrest.    ___ I have a Do Not Resuscitate (DNR)order.   (ariana one below)  ___  I rescind my DNR for surgery and immediate post-operative period through Phase 2 recovery. This means, for that time period, I will be a Full-code and receive CPR, intubation, chest compressions, medications, and/or other life saving measures, if I have a cardiac or respiratory arrest.    ___ I WANT to keep my DNR in effect during my procedure(s) and immediate post-operative recovery period through Phase 2 recovery. (Complete separate refusal form)     This form has been fully explained to me. I understand its contents.       Patients Signature: ___________________________Date: ________  Time: ________    If patient unable to sign, has engaged the [address] [place] [address] Allergies   Allergen Reactions    Aspirin     Codeine     Elavil [Amitriptyline Hcl]     Ibuprofen     Plaquenil [Hydroxychloroquine Sulfate]     Theophyllines         o Call [facility] scheduling ([phone]) or Instant Message  o CONFIRMED WITH:__________________________PHONE      OR INSTANT MESSAGE    o PREAUTHORIZATION NUMBER:_________________ Spoke to:____________________  o From date:_________________ expiration date:____________________                    Rollen Fothergill             PATIENT INFORMATION ON PACEMAKER, ICD OR REVEAL LINQ     You will receive a monitor in the mail or at implant to do home checks on your device. You will have a site check 10 days after implant and then a 1 month office visit with device check. You will then receive a schedule in the mail to do your home checks with your monitor. These checks are scheduled every 3 months. The checks are scheduled on a Sunday and can be done at any time during the day. Your insurance is billed for these checks. You will have 2 charges. One will be for the remote check and the other is for the doctor reading the report. If you have a Reveal Linq Recorder, your insurance will be billed every 45 days for the report. This also has 2 charges, one for the remote check and   one for the doctor reading the report. This office has no idea what each patients insurance will pay for these charges as everyone has different insurance companies and different deductibles to meet. Please feel free to check with your insurance company concerning your out of pocket expense. If you have these checks done at the office, you will still have the charges for the check and one for the doctor reading the report. If any other questions concerning the devices or how the checks will work. Please call [phone] ask for ROSHAN HURTADO.

## 2020-01-31 NOTE — PROGRESS NOTES
Patient here in office and educated on 1/31/20, schedule for BIV pacemaker implant 2/27/20 @ 8am, with arrival @ 6am, @ [facility]; risk explained; and consents signed. Also copy of orders given for labs and CXR due 2/25/20 at [facility]. Instruction given to patient to :  NPO after midnight the night before procedure; call hospital at [phone] to pre-register. May take rest of morning meds of procedure. Patient voiced understanding. Copies of consent & info scanned in chart.

## 2020-02-09 ASSESSMENT — ENCOUNTER SYMPTOMS
COUGH: 0
CHEST TIGHTNESS: 0
ABDOMINAL PAIN: 0
BLOOD IN STOOL: 0
SHORTNESS OF BREATH: 1
BACK PAIN: 0
WHEEZING: 0
NAUSEA: 0
COLOR CHANGE: 0
VOMITING: 0
PHOTOPHOBIA: 0
DIARRHEA: 0
CONSTIPATION: 0
EYE PAIN: 0

## 2020-02-25 ENCOUNTER — HOSPITAL ENCOUNTER (OUTPATIENT)
Age: 85
Discharge: HOME | End: 2020-02-25
Payer: MEDICARE

## 2020-02-25 ENCOUNTER — TELEPHONE (OUTPATIENT)
Dept: CARDIOLOGY CLINIC | Age: 85
End: 2020-02-25

## 2020-02-25 ENCOUNTER — HOSPITAL ENCOUNTER (OUTPATIENT)
Dept: GENERAL RADIOLOGY | Age: 85
Discharge: HOME | End: 2020-02-25
Payer: MEDICARE

## 2020-02-25 LAB
ANION GAP SERPL CALCULATED.3IONS-SCNC: 11 MMOL/L (ref 4–16)
APTT: 32.8 SECONDS (ref 25.1–37.1)
BUN BLDV-MCNC: 25 MG/DL (ref 6–23)
CALCIUM SERPL-MCNC: 9.1 MG/DL (ref 8.3–10.6)
CHLORIDE BLD-SCNC: 102 MMOL/L (ref 99–110)
CO2: 29 MMOL/L (ref 21–32)
CREAT SERPL-MCNC: 1.2 MG/DL (ref 0.6–1.1)
GFR AFRICAN AMERICAN: 52 ML/MIN/1.73M2
GFR NON-AFRICAN AMERICAN: 43 ML/MIN/1.73M2
GLUCOSE BLD-MCNC: 97 MG/DL (ref 70–99)
HCT VFR BLD CALC: 40.7 % (ref 37–47)
HEMOGLOBIN: 12.6 GM/DL (ref 12.5–16)
INR BLD: 1.02 INDEX
MAGNESIUM: 2.2 MG/DL (ref 1.8–2.4)
MCH RBC QN AUTO: 27.7 PG (ref 27–31)
MCHC RBC AUTO-ENTMCNC: 31 % (ref 32–36)
MCV RBC AUTO: 89.5 FL (ref 78–100)
PDW BLD-RTO: 13.6 % (ref 11.7–14.9)
PHOSPHORUS: 3.8 MG/DL (ref 2.5–4.9)
PLATELET # BLD: 203 K/CU MM (ref 140–440)
PMV BLD AUTO: 9.8 FL (ref 7.5–11.1)
POTASSIUM SERPL-SCNC: 4.2 MMOL/L (ref 3.5–5.1)
PROTHROMBIN TIME: 12.3 SECONDS (ref 11.7–14.5)
RBC # BLD: 4.55 M/CU MM (ref 4.2–5.4)
SODIUM BLD-SCNC: 142 MMOL/L (ref 135–145)
WBC # BLD: 5.5 K/CU MM (ref 4–10.5)

## 2020-02-25 PROCEDURE — 36415 COLL VENOUS BLD VENIPUNCTURE: CPT

## 2020-02-25 PROCEDURE — 71046 X-RAY EXAM CHEST 2 VIEWS: CPT

## 2020-02-25 PROCEDURE — 85730 THROMBOPLASTIN TIME PARTIAL: CPT

## 2020-02-25 PROCEDURE — 80048 BASIC METABOLIC PNL TOTAL CA: CPT

## 2020-02-25 PROCEDURE — 85610 PROTHROMBIN TIME: CPT

## 2020-02-25 PROCEDURE — 85027 COMPLETE CBC AUTOMATED: CPT

## 2020-02-25 PROCEDURE — 83735 ASSAY OF MAGNESIUM: CPT

## 2020-02-25 PROCEDURE — 84100 ASSAY OF PHOSPHORUS: CPT

## 2020-02-25 NOTE — TELEPHONE ENCOUNTER
Spoke to patient regarding medications. Stated she could take them with a sip of water the morning of her procedure. Pt voiced understanding.

## 2020-02-25 NOTE — TELEPHONE ENCOUNTER
Pt is having a PPM done on 2/27/20. hosp told pt to call to see what meds she needs to stop. Please advise.

## 2020-02-27 ENCOUNTER — HOSPITAL ENCOUNTER (OUTPATIENT)
Dept: CARDIAC CATH/INVASIVE PROCEDURES | Age: 85
Discharge: HOME | End: 2020-02-28
Attending: INTERNAL MEDICINE | Admitting: INTERNAL MEDICINE
Payer: MEDICARE

## 2020-02-27 ENCOUNTER — APPOINTMENT (OUTPATIENT)
Dept: GENERAL RADIOLOGY | Age: 85
End: 2020-02-27
Attending: INTERNAL MEDICINE
Payer: MEDICARE

## 2020-02-27 PROBLEM — Z95.0 S/P BIVENTRICULAR CARDIAC PACEMAKER PROCEDURE: Status: ACTIVE | Noted: 2020-02-27

## 2020-02-27 LAB
ABO/RH: NORMAL
ANTIBODY SCREEN: NEGATIVE

## 2020-02-27 PROCEDURE — C2621 PMKR, OTHER THAN SING/DUAL: HCPCS

## 2020-02-27 PROCEDURE — C1887 CATHETER, GUIDING: HCPCS

## 2020-02-27 PROCEDURE — 86850 RBC ANTIBODY SCREEN: CPT

## 2020-02-27 PROCEDURE — C1769 GUIDE WIRE: HCPCS

## 2020-02-27 PROCEDURE — 33229 REMV&REPLC PM GEN MULT LEADS: CPT | Performed by: INTERNAL MEDICINE

## 2020-02-27 PROCEDURE — 2709999900 HC NON-CHARGEABLE SUPPLY

## 2020-02-27 PROCEDURE — 2580000003 HC RX 258: Performed by: INTERNAL MEDICINE

## 2020-02-27 PROCEDURE — 6370000000 HC RX 637 (ALT 250 FOR IP): Performed by: INTERNAL MEDICINE

## 2020-02-27 PROCEDURE — 33229 REMV&REPLC PM GEN MULT LEADS: CPT

## 2020-02-27 PROCEDURE — 71045 X-RAY EXAM CHEST 1 VIEW: CPT

## 2020-02-27 PROCEDURE — 86900 BLOOD TYPING SEROLOGIC ABO: CPT

## 2020-02-27 PROCEDURE — 6360000002 HC RX W HCPCS

## 2020-02-27 PROCEDURE — 2500000003 HC RX 250 WO HCPCS

## 2020-02-27 PROCEDURE — 33225 L VENTRIC PACING LEAD ADD-ON: CPT

## 2020-02-27 PROCEDURE — 6360000002 HC RX W HCPCS: Performed by: INTERNAL MEDICINE

## 2020-02-27 PROCEDURE — 33225 L VENTRIC PACING LEAD ADD-ON: CPT | Performed by: INTERNAL MEDICINE

## 2020-02-27 PROCEDURE — C1894 INTRO/SHEATH, NON-LASER: HCPCS

## 2020-02-27 PROCEDURE — C1900 LEAD, CORONARY VENOUS: HCPCS

## 2020-02-27 PROCEDURE — 86901 BLOOD TYPING SEROLOGIC RH(D): CPT

## 2020-02-27 PROCEDURE — 94761 N-INVAS EAR/PLS OXIMETRY MLT: CPT

## 2020-02-27 PROCEDURE — 6360000004 HC RX CONTRAST MEDICATION

## 2020-02-27 RX ORDER — FLUTICASONE PROPIONATE 50 MCG
2 SPRAY, SUSPENSION (ML) NASAL DAILY
Status: DISCONTINUED | OUTPATIENT
Start: 2020-02-27 | End: 2020-02-28 | Stop reason: HOSPADM

## 2020-02-27 RX ORDER — ACETAMINOPHEN 325 MG/1
650 TABLET ORAL EVERY 6 HOURS PRN
Status: DISCONTINUED | OUTPATIENT
Start: 2020-02-27 | End: 2020-02-28 | Stop reason: HOSPADM

## 2020-02-27 RX ORDER — CEFAZOLIN SODIUM 1 G/50ML
1 INJECTION, SOLUTION INTRAVENOUS EVERY 8 HOURS
Status: COMPLETED | OUTPATIENT
Start: 2020-02-27 | End: 2020-02-28

## 2020-02-27 RX ORDER — SODIUM CHLORIDE 0.9 % (FLUSH) 0.9 %
10 SYRINGE (ML) INJECTION PRN
Status: DISCONTINUED | OUTPATIENT
Start: 2020-02-27 | End: 2020-02-28 | Stop reason: HOSPADM

## 2020-02-27 RX ORDER — METOPROLOL TARTRATE 50 MG/1
50 TABLET, FILM COATED ORAL 2 TIMES DAILY
Status: DISCONTINUED | OUTPATIENT
Start: 2020-02-27 | End: 2020-02-28 | Stop reason: HOSPADM

## 2020-02-27 RX ORDER — SODIUM CHLORIDE 0.9 % (FLUSH) 0.9 %
10 SYRINGE (ML) INJECTION EVERY 12 HOURS SCHEDULED
Status: DISCONTINUED | OUTPATIENT
Start: 2020-02-27 | End: 2020-02-28 | Stop reason: HOSPADM

## 2020-02-27 RX ORDER — LEVOTHYROXINE SODIUM 0.05 MG/1
50 TABLET ORAL DAILY
Status: DISCONTINUED | OUTPATIENT
Start: 2020-02-28 | End: 2020-02-28 | Stop reason: HOSPADM

## 2020-02-27 RX ADMIN — SODIUM CHLORIDE, PRESERVATIVE FREE 10 ML: 5 INJECTION INTRAVENOUS at 20:27

## 2020-02-27 RX ADMIN — CEFAZOLIN SODIUM 1 G: 1 INJECTION, SOLUTION INTRAVENOUS at 17:11

## 2020-02-27 RX ADMIN — METOPROLOL TARTRATE 50 MG: 50 TABLET, FILM COATED ORAL at 20:27

## 2020-02-27 RX ADMIN — SODIUM CHLORIDE, PRESERVATIVE FREE 10 ML: 5 INJECTION INTRAVENOUS at 14:36

## 2020-02-27 RX ADMIN — ACETAMINOPHEN 650 MG: 325 TABLET ORAL at 17:01

## 2020-02-27 ASSESSMENT — PAIN SCALES - GENERAL
PAINLEVEL_OUTOF10: 0
PAINLEVEL_OUTOF10: 7
PAINLEVEL_OUTOF10: 4

## 2020-02-27 NOTE — FLOWSHEET NOTE
Patient admitted to room 3013 from the cath lab. Pacer per Dr Dave Flanagan. Patient alert and oriented. No c/o pain. Skin assessment complete. No skin issues noted.

## 2020-02-27 NOTE — H&P
tobacco. She reports that she does not drink alcohol or use drugs. Allergies   Allergen Reactions    Aspirin     Codeine     Elavil [Amitriptyline Hcl]     Ibuprofen     Plaquenil [Hydroxychloroquine Sulfate]     Theophyllines        No current facility-administered medications on file prior to encounter. Current Outpatient Medications on File Prior to Encounter   Medication Sig Dispense Refill    Acetaminophen (TYLENOL ARTHRITIS PAIN PO) Take by mouth      levothyroxine (SYNTHROID) 50 MCG tablet Take 1 tablet by mouth Daily 30 tablet 2    metoprolol tartrate (LOPRESSOR) 50 MG tablet Take 1 tablet by mouth 2 times daily 60 tablet 2    fluticasone (FLONASE) 50 MCG/ACT nasal spray 2 sprays by Each Nostril route daily         Review of Systems:   Review of Systems   Constitutional: Positive for fatigue. Negative for activity change, chills and fever. HENT: Negative for congestion, ear pain and tinnitus. Eyes: Negative for photophobia, pain and visual disturbance. Respiratory: Positive for shortness of breath. Negative for cough, chest tightness and wheezing. Cardiovascular: Negative for chest pain, palpitations and leg swelling. Gastrointestinal: Negative for abdominal pain, blood in stool, constipation, diarrhea, nausea and vomiting. Endocrine: Negative for cold intolerance and heat intolerance. Genitourinary: Negative for dysuria, flank pain and hematuria. Musculoskeletal: Positive for arthralgias. Negative for back pain, myalgias and neck stiffness. Skin: Negative for color change and rash. Allergic/Immunologic: Negative for food allergies. Neurological: Negative for dizziness, light-headedness, numbness and headaches. Hematological: Does not bruise/bleed easily. Psychiatric/Behavioral: Negative for agitation, behavioral problems and confusion.            Examination:      Vitals:    02/27/20 0649   BP: (!) 151/99   Pulse: 68   Resp: 26   Temp: 98.8 °F (37.1 °C) TempSrc: Temporal   Weight: 185 lb (83.9 kg)   Height: 5' 8\" (1.727 m)        Body mass index is 28.13 kg/m². Physical Exam  Constitutional:       Appearance: She is well-developed. HENT:      Head: Normocephalic and atraumatic. Nose: No congestion. Mouth/Throat:      Mouth: Mucous membranes are moist.   Eyes:      Conjunctiva/sclera: Conjunctivae normal.      Pupils: Pupils are equal, round, and reactive to light. Neck:      Musculoskeletal: Normal range of motion. Thyroid: No thyromegaly. Vascular: No JVD. Cardiovascular:      Rate and Rhythm: Normal rate and regular rhythm. Heart sounds: Normal heart sounds. No murmur. No friction rub. Pulmonary:      Effort: Pulmonary effort is normal. No respiratory distress. Breath sounds: Normal breath sounds. No stridor. No wheezing. Abdominal:      General: Bowel sounds are normal. There is no distension. Palpations: Abdomen is soft. Tenderness: There is no abdominal tenderness. Musculoskeletal: Normal range of motion. General: No tenderness. Skin:     General: Skin is warm and dry. Findings: No erythema. Neurological:      General: No focal deficit present. Mental Status: She is alert and oriented to person, place, and time. Cranial Nerves: No cranial nerve deficit.    Psychiatric:         Behavior: Behavior normal.               CBC:   Lab Results   Component Value Date    WBC 5.5 02/25/2020    HGB 12.6 02/25/2020    HCT 40.7 02/25/2020     02/25/2020     Lipids:  Lab Results   Component Value Date    CHOL 166 06/04/2019    TRIG 133 06/04/2019    HDL 34 (L) 06/04/2019    LDLCALC 105 (H) 06/04/2019    LDLDIRECT 83 01/09/2011     PT/INR:   Lab Results   Component Value Date    INR 1.02 02/25/2020        BMP:    Lab Results   Component Value Date     02/25/2020    K 4.2 02/25/2020     02/25/2020    CO2 29 02/25/2020    BUN 25 (H) 02/25/2020     CMP:   Lab Results

## 2020-02-28 ENCOUNTER — TELEPHONE (OUTPATIENT)
Dept: FAMILY MEDICINE CLINIC | Age: 85
End: 2020-02-28

## 2020-02-28 ENCOUNTER — TELEPHONE (OUTPATIENT)
Dept: CARDIOLOGY CLINIC | Age: 85
End: 2020-02-28

## 2020-02-28 VITALS
DIASTOLIC BLOOD PRESSURE: 87 MMHG | TEMPERATURE: 98.1 F | OXYGEN SATURATION: 98 % | WEIGHT: 182.6 LBS | HEART RATE: 64 BPM | SYSTOLIC BLOOD PRESSURE: 179 MMHG | HEIGHT: 68 IN | RESPIRATION RATE: 16 BRPM | BODY MASS INDEX: 27.68 KG/M2

## 2020-02-28 PROCEDURE — 93280 PM DEVICE PROGR EVAL DUAL: CPT | Performed by: INTERNAL MEDICINE

## 2020-02-28 PROCEDURE — 6360000002 HC RX W HCPCS: Performed by: INTERNAL MEDICINE

## 2020-02-28 PROCEDURE — 94761 N-INVAS EAR/PLS OXIMETRY MLT: CPT

## 2020-02-28 PROCEDURE — 6370000000 HC RX 637 (ALT 250 FOR IP): Performed by: INTERNAL MEDICINE

## 2020-02-28 PROCEDURE — 2580000003 HC RX 258: Performed by: INTERNAL MEDICINE

## 2020-02-28 PROCEDURE — 99217 PR OBSERVATION CARE DISCHARGE MANAGEMENT: CPT | Performed by: NURSE PRACTITIONER

## 2020-02-28 RX ORDER — LISINOPRIL 5 MG/1
5 TABLET ORAL DAILY
Qty: 30 TABLET | Refills: 0 | Status: SHIPPED
Start: 2020-02-28 | End: 2020-02-28 | Stop reason: HOSPADM

## 2020-02-28 RX ORDER — TRAMADOL HYDROCHLORIDE 50 MG/1
50 TABLET ORAL EVERY 4 HOURS PRN
Qty: 15 TABLET | Refills: 0 | Status: SHIPPED | OUTPATIENT
Start: 2020-02-28 | End: 2020-03-04

## 2020-02-28 RX ORDER — LISINOPRIL 5 MG/1
5 TABLET ORAL DAILY
Status: DISCONTINUED | OUTPATIENT
Start: 2020-02-28 | End: 2020-02-28 | Stop reason: HOSPADM

## 2020-02-28 RX ADMIN — FLUTICASONE PROPIONATE 2 SPRAY: 50 SPRAY, METERED NASAL at 08:53

## 2020-02-28 RX ADMIN — SODIUM CHLORIDE, PRESERVATIVE FREE 10 ML: 5 INJECTION INTRAVENOUS at 08:53

## 2020-02-28 RX ADMIN — CEFAZOLIN SODIUM 1 G: 1 INJECTION, SOLUTION INTRAVENOUS at 00:28

## 2020-02-28 RX ADMIN — ACETAMINOPHEN 650 MG: 325 TABLET ORAL at 00:28

## 2020-02-28 RX ADMIN — LEVOTHYROXINE SODIUM 50 MCG: 0.05 TABLET ORAL at 06:43

## 2020-02-28 RX ADMIN — METOPROLOL TARTRATE 50 MG: 50 TABLET, FILM COATED ORAL at 08:53

## 2020-02-28 RX ADMIN — ACETAMINOPHEN 650 MG: 325 TABLET ORAL at 10:45

## 2020-02-28 ASSESSMENT — PAIN SCALES - GENERAL
PAINLEVEL_OUTOF10: 2
PAINLEVEL_OUTOF10: 6
PAINLEVEL_OUTOF10: 3
PAINLEVEL_OUTOF10: 4

## 2020-02-28 NOTE — DISCHARGE SUMMARY
Refills: 2      fluticasone (FLONASE) 50 MCG/ACT nasal spray 2 sprays by Each Nostril route daily             Consults:  none    Discharge Exam:  BP (!) 179/87   Pulse 64   Temp 98.1 °F (36.7 °C) (Oral)   Resp 16   Ht 5' 8\" (1.727 m)   Wt 182 lb 9.6 oz (82.8 kg)   SpO2 98%   BMI 27.76 kg/m²   General appearance: alert, appears stated age and cooperative  Head: Normocephalic, without obvious abnormality, atraumatic  Lungs: clear to auscultation bilaterally  Heart: regular rate and rhythm, S1, S2 normal, no murmur, click, rub or gallop  Extremities: extremities normal, atraumatic, no cyanosis or edema  Pulses: 2+ and symmetric  Skin: Skin color, texture, turgor normal. No rashes or lesions. Left upper chest dressing clean dry intact. No redness. Minimal swelling. No bruising  Neurologic: Grossly normal    Disposition:   home    Signed:  Yvonne Andres  2/28/2020, 10:59 AM     Device Assessment:      The device is Medtronic pacemaker - Dual Chamber chamber      MRI Compatible : yes    Device interrogation was performed. Mode: AAIr --- DDDr     Sensing is normal. Impedence is normal.  Threshold is normal.     There has not been interval changes. Estimated battery life is new     The underlying rhythm is atrial fibrillation, VS.    Atrial Arrhythmia : atrial fibrillation    Non sustained VT episodes : No    Sustained VT episodes : No    The patient is PARTIAL pacemaker dependent.           Interpreted by    Shea Wisdom M.D

## 2020-02-28 NOTE — PLAN OF CARE
Problem: Cardiac:  Goal: Ability to maintain vital signs within normal range will improve  Description  Ability to maintain vital signs within normal range will improve  Outcome: Ongoing  Goal: Cardiovascular alteration will improve  Description  Cardiovascular alteration will improve  Outcome: Ongoing  Goal: Hemodynamic stability will improve  Description  Hemodynamic stability will improve  Outcome: Ongoing  Goal: Ability to maintain an adequate cardiac output will improve  Description  Ability to maintain an adequate cardiac output will improve  Outcome: Ongoing     Problem: SAFETY  Goal: Free from accidental physical injury  Outcome: Ongoing  Goal: Free from intentional harm  Outcome: Ongoing     Problem: DAILY CARE  Goal: Daily care needs are met  Outcome: Ongoing     Problem: PAIN  Goal: Patient's pain/discomfort is manageable  Outcome: Ongoing     Problem: SKIN INTEGRITY  Goal: Skin integrity is maintained or improved  Outcome: Ongoing     Problem: KNOWLEDGE DEFICIT  Goal: Patient/S.O. demonstrates understanding of disease process, treatment plan, medications, and discharge instructions. Outcome: Ongoing     Problem: DISCHARGE BARRIERS  Goal: Patient's continuum of care needs are met  Outcome: Ongoing     Problem: Pain:  Description  Pain management should include both nonpharmacologic and pharmacologic interventions.   Goal: Pain level will decrease  Description  Pain level will decrease  Outcome: Ongoing  Goal: Control of acute pain  Description  Control of acute pain  Outcome: Ongoing  Goal: Control of chronic pain  Description  Control of chronic pain  Outcome: Ongoing     Problem: Falls - Risk of:  Goal: Will remain free from falls  Description  Will remain free from falls  Outcome: Ongoing  Goal: Absence of physical injury  Description  Absence of physical injury  Outcome: Ongoing     Problem: Discharge Planning:  Goal: Discharged to appropriate level of care  Description  Discharged to appropriate level of care  Outcome: Ongoing     Problem:  Activity:  Goal: Capacity to carry out activities will improve  Description  Capacity to carry out activities will improve  Outcome: Ongoing  Goal: Will verbalize the importance of balancing activity with adequate rest periods  Description  Will verbalize the importance of balancing activity with adequate rest periods  Outcome: Ongoing     Problem: Coping:  Goal: Verbalizations of decreased anxiety will decrease  Description  Verbalizations of decreased anxiety will decrease  Outcome: Ongoing     Problem: Fluid Volume:  Goal: Risk for excess fluid volume will decrease  Description  Risk for excess fluid volume will decrease  Outcome: Ongoing  Goal: Maintenance of adequate hydration will improve  Description  Maintenance of adequate hydration will improve  Outcome: Ongoing  Goal: Will show no signs and symptoms of electrolyte imbalance  Description  Will show no signs and symptoms of electrolyte imbalance  Outcome: Ongoing     Problem: Health Behavior:  Goal: Ability to manage health-related needs will improve  Description  Ability to manage health-related needs will improve  Outcome: Ongoing  Goal: Ability to seek appropriate health care will improve  Description  Ability to seek appropriate health care will improve  Outcome: Ongoing     Problem: Nutritional:  Goal: Maintenance of adequate nutrition will improve  Description  Maintenance of adequate nutrition will improve  Outcome: Ongoing     Problem: Physical Regulation:  Goal: Complications related to the disease process, condition or treatment will be avoided or minimized  Description  Complications related to the disease process, condition or treatment will be avoided or minimized  Outcome: Ongoing     Problem: Respiratory:  Goal: Ability to maintain adequate ventilation will improve  Description  Ability to maintain adequate ventilation will improve  Outcome: Ongoing  Goal: Respiratory status will

## 2020-02-28 NOTE — CARE COORDINATION
CM reviewed chart and saw pt. Pt lives at home alone in 1 story home. Pt states that she is independent. However, with left arm restristion will have difficulties w/ ADL's. Pt states that she does have family, but they live aways away. Pt also expressed medications changes and how this will effect her VS.  Pt states she has had [address] [place] [address] Rd is the and would like to have them again. Pt declined Right to Information. CM notified JORDYN Blum liaison for [address] [place] [address] Rd requesting [address] for VS, monitoring of incision site and ADL's. Dr. Kristan Canchola notified. Pt states d/t syncopal espisode, her DrCarmen Has placed her on driving restrictions. Pt states her family and friends could help some. CM educated on senior citizen resources. Pt verbalized understanding and received copy of resources. CM with con't to monitor. CM faxed H&P, facesheet sheet, order [address], and progress notes to [address] [place] [address] Rd and notified of d/c.

## 2020-02-28 NOTE — PLAN OF CARE
Problem: Cardiac:  Goal: Ability to maintain vital signs within normal range will improve  Description  Ability to maintain vital signs within normal range will improve  2/28/2020 0849 by Vin Galan LPN  Outcome: Ongoing  2/28/2020 0207 by Nicola Saravia RN  Outcome: Ongoing  Goal: Cardiovascular alteration will improve  Description  Cardiovascular alteration will improve  2/28/2020 0849 by Vin Galan LPN  Outcome: Ongoing  2/28/2020 0207 by Nicola Saravia RN  Outcome: Ongoing  Goal: Hemodynamic stability will improve  Description  Hemodynamic stability will improve  2/28/2020 0849 by Vin Galan LPN  Outcome: Ongoing  2/28/2020 0207 by Nicola Saravia RN  Outcome: Ongoing  Goal: Ability to maintain an adequate cardiac output will improve  Description  Ability to maintain an adequate cardiac output will improve  2/28/2020 0849 by Vin Galan LPN  Outcome: Ongoing  2/28/2020 0207 by Nicola Saravia RN  Outcome: Ongoing     Problem: SAFETY  Goal: Free from accidental physical injury  2/28/2020 0849 by Vin Galan LPN  Outcome: Ongoing  2/28/2020 0207 by Nicola Saravia RN  Outcome: Ongoing  Goal: Free from intentional harm  2/28/2020 0849 by Vin Galan LPN  Outcome: Ongoing  2/28/2020 0207 by Nicola Saravia RN  Outcome: Ongoing     Problem: DAILY CARE  Goal: Daily care needs are met  2/28/2020 0849 by Vin Galan LPN  Outcome: Ongoing  2/28/2020 0207 by Nicola Saravia RN  Outcome: Ongoing     Problem: PAIN  Goal: Patient's pain/discomfort is manageable  2/28/2020 0849 by Vin Galan LPN  Outcome: Ongoing  2/28/2020 0207 by Nicola Saravia RN  Outcome: Ongoing     Problem: SKIN INTEGRITY  Goal: Skin integrity is maintained or improved  2/28/2020 0849 by Vin Galan LPN  Outcome: Ongoing  2/28/2020 0207 by Nicola Saravia RN  Outcome: Ongoing     Problem: KNOWLEDGE DEFICIT  Goal: Patient/S.O. demonstrates understanding of disease process, treatment plan, medications, and disease process, condition or treatment will be avoided or minimized  Description  Complications related to the disease process, condition or treatment will be avoided or minimized  2/28/2020 0849 by Lincoln Norris LPN  Outcome: Ongoing  2/28/2020 0207 by Angella Terry RN  Outcome: Ongoing     Problem: Respiratory:  Goal: Ability to maintain adequate ventilation will improve  Description  Ability to maintain adequate ventilation will improve  2/28/2020 0849 by Lincoln Norris LPN  Outcome: Ongoing  2/28/2020 0207 by Angella Terry RN  Outcome: Ongoing  Goal: Respiratory status will improve  Description  Respiratory status will improve  2/28/2020 0849 by Lincoln Norris LPN  Outcome: Ongoing  2/28/2020 0207 by Angella Terry RN  Outcome: Ongoing

## 2020-03-09 ENCOUNTER — NURSE ONLY (OUTPATIENT)
Dept: CARDIOLOGY CLINIC | Age: 85
End: 2020-03-09

## 2020-03-09 VITALS — TEMPERATURE: 98.4 F

## 2020-03-09 PROCEDURE — 99024 POSTOP FOLLOW-UP VISIT: CPT | Performed by: INTERNAL MEDICINE

## 2020-03-10 ENCOUNTER — TELEPHONE (OUTPATIENT)
Dept: FAMILY MEDICINE CLINIC | Age: 85
End: 2020-03-10

## 2020-03-23 ENCOUNTER — TELEPHONE (OUTPATIENT)
Dept: FAMILY MEDICINE CLINIC | Age: 85
End: 2020-03-23

## 2020-03-24 ENCOUNTER — APPOINTMENT (OUTPATIENT)
Dept: CT IMAGING | Age: 85
End: 2020-03-24
Payer: MEDICARE

## 2020-03-24 ENCOUNTER — HOSPITAL ENCOUNTER (EMERGENCY)
Age: 85
Discharge: HOME | End: 2020-03-24
Attending: EMERGENCY MEDICINE
Payer: MEDICARE

## 2020-03-24 ENCOUNTER — APPOINTMENT (OUTPATIENT)
Dept: GENERAL RADIOLOGY | Age: 85
End: 2020-03-24
Payer: MEDICARE

## 2020-03-24 VITALS
HEIGHT: 68 IN | RESPIRATION RATE: 18 BRPM | DIASTOLIC BLOOD PRESSURE: 97 MMHG | BODY MASS INDEX: 27.58 KG/M2 | HEART RATE: 67 BPM | WEIGHT: 182 LBS | SYSTOLIC BLOOD PRESSURE: 202 MMHG | TEMPERATURE: 98.3 F | OXYGEN SATURATION: 98 %

## 2020-03-24 LAB
ALBUMIN SERPL-MCNC: 3.9 GM/DL (ref 3.4–5)
ALP BLD-CCNC: 77 IU/L (ref 40–128)
ALT SERPL-CCNC: 12 U/L (ref 10–40)
ANION GAP SERPL CALCULATED.3IONS-SCNC: 11 MMOL/L (ref 4–16)
AST SERPL-CCNC: 14 IU/L (ref 15–37)
BACTERIA: NEGATIVE /HPF
BASOPHILS ABSOLUTE: 0 K/CU MM
BASOPHILS RELATIVE PERCENT: 0.7 % (ref 0–1)
BILIRUB SERPL-MCNC: 0.2 MG/DL (ref 0–1)
BILIRUBIN URINE: NEGATIVE MG/DL
BLOOD, URINE: ABNORMAL
BUN BLDV-MCNC: 22 MG/DL (ref 6–23)
CALCIUM SERPL-MCNC: 9.2 MG/DL (ref 8.3–10.6)
CHLORIDE BLD-SCNC: 104 MMOL/L (ref 99–110)
CLARITY: CLEAR
CO2: 27 MMOL/L (ref 21–32)
COLOR: ABNORMAL
CREAT SERPL-MCNC: 1 MG/DL (ref 0.6–1.1)
DIFFERENTIAL TYPE: ABNORMAL
EOSINOPHILS ABSOLUTE: 0 K/CU MM
EOSINOPHILS RELATIVE PERCENT: 0.7 % (ref 0–3)
GFR AFRICAN AMERICAN: >60 ML/MIN/1.73M2
GFR NON-AFRICAN AMERICAN: 53 ML/MIN/1.73M2
GLUCOSE BLD-MCNC: 125 MG/DL (ref 70–99)
GLUCOSE, URINE: NEGATIVE MG/DL
HCT VFR BLD CALC: 38.1 % (ref 37–47)
HEMOGLOBIN: 11.9 GM/DL (ref 12.5–16)
IMMATURE NEUTROPHIL %: 0.4 % (ref 0–0.43)
KETONES, URINE: NEGATIVE MG/DL
LEUKOCYTE ESTERASE, URINE: NEGATIVE
LIPASE: 45 IU/L (ref 13–60)
LYMPHOCYTES ABSOLUTE: 1.5 K/CU MM
LYMPHOCYTES RELATIVE PERCENT: 25.6 % (ref 24–44)
MCH RBC QN AUTO: 27.8 PG (ref 27–31)
MCHC RBC AUTO-ENTMCNC: 31.2 % (ref 32–36)
MCV RBC AUTO: 89 FL (ref 78–100)
MONOCYTES ABSOLUTE: 0.7 K/CU MM
MONOCYTES RELATIVE PERCENT: 11.7 % (ref 0–4)
NITRITE URINE, QUANTITATIVE: NEGATIVE
NUCLEATED RBC %: 0 %
PDW BLD-RTO: 13.5 % (ref 11.7–14.9)
PH, URINE: 6 (ref 5–8)
PLATELET # BLD: 183 K/CU MM (ref 140–440)
PMV BLD AUTO: 9.8 FL (ref 7.5–11.1)
POTASSIUM SERPL-SCNC: 3.9 MMOL/L (ref 3.5–5.1)
PRO-BNP: 1310 PG/ML
PROTEIN UA: NEGATIVE MG/DL
RBC # BLD: 4.28 M/CU MM (ref 4.2–5.4)
RBC URINE: <1 /HPF (ref 0–6)
SEGMENTED NEUTROPHILS ABSOLUTE COUNT: 3.5 K/CU MM
SEGMENTED NEUTROPHILS RELATIVE PERCENT: 60.9 % (ref 36–66)
SODIUM BLD-SCNC: 142 MMOL/L (ref 135–145)
SPECIFIC GRAVITY UA: 1 (ref 1–1.03)
TOTAL IMMATURE NEUTOROPHIL: 0.02 K/CU MM
TOTAL NUCLEATED RBC: 0 K/CU MM
TOTAL PROTEIN: 6.6 GM/DL (ref 6.4–8.2)
TRICHOMONAS: ABNORMAL /HPF
TROPONIN T: <0.01 NG/ML
UROBILINOGEN, URINE: NORMAL MG/DL (ref 0.2–1)
WBC # BLD: 5.7 K/CU MM (ref 4–10.5)
WBC UA: 2 /HPF (ref 0–5)

## 2020-03-24 PROCEDURE — 71045 X-RAY EXAM CHEST 1 VIEW: CPT

## 2020-03-24 PROCEDURE — 99285 EMERGENCY DEPT VISIT HI MDM: CPT

## 2020-03-24 PROCEDURE — 85025 COMPLETE CBC W/AUTO DIFF WBC: CPT

## 2020-03-24 PROCEDURE — 6370000000 HC RX 637 (ALT 250 FOR IP): Performed by: PHYSICIAN ASSISTANT

## 2020-03-24 PROCEDURE — 83690 ASSAY OF LIPASE: CPT

## 2020-03-24 PROCEDURE — 93005 ELECTROCARDIOGRAM TRACING: CPT | Performed by: PHYSICIAN ASSISTANT

## 2020-03-24 PROCEDURE — 84484 ASSAY OF TROPONIN QUANT: CPT

## 2020-03-24 PROCEDURE — 36415 COLL VENOUS BLD VENIPUNCTURE: CPT

## 2020-03-24 PROCEDURE — 80053 COMPREHEN METABOLIC PANEL: CPT

## 2020-03-24 PROCEDURE — 83880 ASSAY OF NATRIURETIC PEPTIDE: CPT

## 2020-03-24 PROCEDURE — 81001 URINALYSIS AUTO W/SCOPE: CPT

## 2020-03-24 PROCEDURE — 70450 CT HEAD/BRAIN W/O DYE: CPT

## 2020-03-24 RX ORDER — LISINOPRIL 10 MG/1
10 TABLET ORAL ONCE
Status: COMPLETED | OUTPATIENT
Start: 2020-03-24 | End: 2020-03-24

## 2020-03-24 RX ORDER — METOPROLOL TARTRATE 50 MG/1
50 TABLET, FILM COATED ORAL ONCE
Status: DISCONTINUED | OUTPATIENT
Start: 2020-03-24 | End: 2020-03-24

## 2020-03-24 RX ADMIN — LISINOPRIL 10 MG: 10 TABLET ORAL at 20:08

## 2020-03-24 NOTE — ED PROVIDER NOTES
Triage Chief Complaint:   Loss of Consciousness    [place]:  Today in the ED I had the pleasure of caring for Mercedes Caldwell who is a 80 y.o. female that presents  Today to the ED for syncope. Pt woke up today feeling well, showered ate breakfast and felt baseline all day. Pt endorses being easily fatigue ever since her pacemaker placement 02/27/2020 placed by Dr. Marcela Diaz. Pt states she has had a small cough/sneeze for about 2 days off and on. Pt states she went and sat down in a josiane and she was going to read a magazine, and all of a sudden she felt tired, wanted to take a nap. She closed her eyes, she states when she woke up she was scared \"I didn't know what was going on\". She denies pain, she states she felt a bit lightheaded. She then got up went to the kitchen got a glass of water. She called her daughter who called ems to bring her here. 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  Thyroid disease     Ulcer in the past    Gastric     Past Surgical History:   Procedure Laterality Date    APPENDECTOMY      BLADDER SURGERY      CHOLECYSTECTOMY      COLONOSCOPY  11/4/14    normal, biopsy    EYE SURGERY      HYSTERECTOMY      PACEMAKER PLACEMENT Left 02/20/2020    bivi pacer upgrade-Medtronic Percepta Quad CRT-P MRI Carter Ojeda  2011    Dr. Monique Quevedo. [facility] - following MI     History reviewed.  No pertinent family contours are stable. A left pacemaker is noted. The lungs are clear. There is no focal consolidation, pleural effusion or pneumothorax evident. Blunting of the left costophrenic angle is unchanged and likely related to pleural scarring. Multilevel osteophyte formation is present within the thoracic spine. Stable appearance of the chest without acute cardiopulmonary process identified. Xr Chest Portable    Result Date: 2/27/2020  EXAMINATION: ONE XRAY VIEW OF THE CHEST 2/27/2020 10:39 am COMPARISON: 02/25/2020 HISTORY: ORDERING SYSTEM PROVIDED HISTORY: Pacemaker placement and rule out pneumothorax TECHNOLOGIST PROVIDED HISTORY: Reason for exam:->Pacemaker placement and rule out pneumothorax Reason for Exam: Pacemaker placement and rule out pneumothorax Acuity: Unknown Type of Exam: Unknown FINDINGS: Pacemaker wires. Heart size at the upper limits of normal.  No pleural effusion or pneumothorax. No consolidations. No pneumothorax. MDM:   Hemodynamically stable neurologically intact patient presents today to the emergency department complaining of feeling right when she woke up. She is no cardiac/pulmonary/neurologic related complaints here in the emergency department her work-up is essentially negative here including negative CBC metabolic panel troponin EKG CT scan of head chest x-ray, other. Patient is noted to be a bit hypertensive here in the ED she did not take her evening dose of her antihypertensive medications. Did provide her with dose of lisinopril here in the emergency department. Patient is adamant that she wishes to go home. I do believe this to be reasonable as her work-up is negative here. And her symptoms/presentation is very vague, non-concerning.      I did manage patient in conjunction with attending physician Dr. Julio Cesar Toth      BP (!) 202/97   Pulse 67   Temp 98.3 °F (36.8 °C) (Oral)   Resp 18   Ht 5' 8\" (1.727 m)   Wt 182 lb (82.6 kg)   SpO2 98%   BMI 27.67

## 2020-03-25 PROCEDURE — 93010 ELECTROCARDIOGRAM REPORT: CPT | Performed by: INTERNAL MEDICINE

## 2020-03-25 NOTE — ED PROVIDER NOTES
tingling I do not see signs of stroke or trauma. Patient wants to go home she states she has some back pain but it is chronic. This is not new. She does have arthritis she states. Patient was to go home and take Tylenol she has good pulses throughout she has no pulsatile mass she has no weakness anywhere numbness or tingling her facial nerves are intact, cranial nerves intact patient otherwise well-appearing will discharge home given return precautions follow-up information unclear what her symptoms are she states she fell asleep for about 15 minutes and woke up feeling funny. She had no headache that woke her up etc.  She denies other question concerns patient discharged. 12 lead EKG per my interpretation:  Normal Sinus Rhythm 74 Paced  Axis is   Left axis deviation  QTc is  488  There is specific T wave changes appreciated. Inverted T wave AVL, I  There is no specific ST wave changes appreciated. Prior EKG to compare with was not available       All diagnostic, treatment, and disposition decisions were made by myself in conjunction with the Advanced Practice Provider. For all further details of the patient's emergency department visit, please see the Advanced Practice Provider's documentation.        Marina Del Castillo DO  03/24/20 [id number]

## 2020-03-31 LAB
EKG ATRIAL RATE: 74 BPM
EKG DIAGNOSIS: NORMAL
EKG P AXIS: 79 DEGREES
EKG P-R INTERVAL: 124 MS
EKG Q-T INTERVAL: 440 MS
EKG QRS DURATION: 148 MS
EKG QTC CALCULATION (BAZETT): 488 MS
EKG R AXIS: -62 DEGREES
EKG T AXIS: 109 DEGREES
EKG VENTRICULAR RATE: 74 BPM

## 2020-04-09 ENCOUNTER — TELEPHONE (OUTPATIENT)
Dept: CARDIOLOGY CLINIC | Age: 85
End: 2020-04-09

## 2020-04-09 NOTE — TELEPHONE ENCOUNTER
Attempted to call patient back no answer. No VM, phone just rang. Will try patient back later. * Patient was to discontinue Lisinopril after discharge from [facility], also patient may lay on left side now.

## 2020-04-09 NOTE — TELEPHONE ENCOUNTER
Pt calling to see if she needs to continue lisinipril. If she does , she will need a new script. also she had pacer put in 2/27/20 and she wants to see when she can start sleeping on her left side. Her appt was moved from 4/10 to 4/23 per pts request;pt is sick. Please call pt and advise.

## 2020-06-08 ENCOUNTER — VIRTUAL VISIT (OUTPATIENT)
Dept: FAMILY MEDICINE CLINIC | Age: 85
End: 2020-06-08
Payer: MEDICARE

## 2020-06-08 ENCOUNTER — OFFICE VISIT (OUTPATIENT)
Dept: FAMILY MEDICINE CLINIC | Age: 85
End: 2020-06-08
Payer: MEDICARE

## 2020-06-08 VITALS
OXYGEN SATURATION: 95 % | HEART RATE: 74 BPM | SYSTOLIC BLOOD PRESSURE: 118 MMHG | WEIGHT: 184.4 LBS | HEIGHT: 70 IN | BODY MASS INDEX: 26.4 KG/M2 | TEMPERATURE: 98.1 F | DIASTOLIC BLOOD PRESSURE: 72 MMHG

## 2020-06-08 VITALS
SYSTOLIC BLOOD PRESSURE: 118 MMHG | DIASTOLIC BLOOD PRESSURE: 72 MMHG | BODY MASS INDEX: 26.39 KG/M2 | HEIGHT: 70 IN | WEIGHT: 184.3 LBS

## 2020-06-08 PROBLEM — R55 SYNCOPE: Status: RESOLVED | Noted: 2019-12-10 | Resolved: 2020-06-08

## 2020-06-08 PROBLEM — N32.81 OAB (OVERACTIVE BLADDER): Chronic | Status: ACTIVE | Noted: 2020-06-08

## 2020-06-08 PROCEDURE — G0438 PPPS, INITIAL VISIT: HCPCS | Performed by: FAMILY MEDICINE

## 2020-06-08 PROCEDURE — G8400 PT W/DXA NO RESULTS DOC: HCPCS | Performed by: FAMILY MEDICINE

## 2020-06-08 PROCEDURE — G8417 CALC BMI ABV UP PARAM F/U: HCPCS | Performed by: FAMILY MEDICINE

## 2020-06-08 PROCEDURE — 4040F PNEUMOC VAC/ADMIN/RCVD: CPT | Performed by: FAMILY MEDICINE

## 2020-06-08 PROCEDURE — 83036 HEMOGLOBIN GLYCOSYLATED A1C: CPT | Performed by: FAMILY MEDICINE

## 2020-06-08 PROCEDURE — 1036F TOBACCO NON-USER: CPT | Performed by: FAMILY MEDICINE

## 2020-06-08 PROCEDURE — 99214 OFFICE O/P EST MOD 30 MIN: CPT | Performed by: FAMILY MEDICINE

## 2020-06-08 PROCEDURE — 1123F ACP DISCUSS/DSCN MKR DOCD: CPT | Performed by: FAMILY MEDICINE

## 2020-06-08 PROCEDURE — G8427 DOCREV CUR MEDS BY ELIG CLIN: HCPCS | Performed by: FAMILY MEDICINE

## 2020-06-08 PROCEDURE — 1090F PRES/ABSN URINE INCON ASSESS: CPT | Performed by: FAMILY MEDICINE

## 2020-06-08 ASSESSMENT — ENCOUNTER SYMPTOMS
SHORTNESS OF BREATH: 0
DIARRHEA: 0
COUGH: 0
RHINORRHEA: 0
SORE THROAT: 0
BACK PAIN: 1

## 2020-06-08 ASSESSMENT — PATIENT HEALTH QUESTIONNAIRE - PHQ9
SUM OF ALL RESPONSES TO PHQ QUESTIONS 1-9: 0
SUM OF ALL RESPONSES TO PHQ9 QUESTIONS 1 & 2: 0
2. FEELING DOWN, DEPRESSED OR HOPELESS: 0
1. LITTLE INTEREST OR PLEASURE IN DOING THINGS: 0
SUM OF ALL RESPONSES TO PHQ QUESTIONS 1-9: 0

## 2020-06-08 ASSESSMENT — LIFESTYLE VARIABLES: HOW OFTEN DO YOU HAVE A DRINK CONTAINING ALCOHOL: 0

## 2020-06-08 NOTE — PROGRESS NOTES
sore throat. Chronic nasal congestion  Has been using Flonase in the past but was not using it correctly  There is no purulent nasal drainage   Eyes:        Has a history of macular degeneration and possibly other retinal disease  She is lost a lot of vision in her left eye  She still allowed to drive she is under the care of an ophthalmologist no recent change in her vision   Respiratory: Negative for cough and shortness of breath. Cardiovascular: Negative for chest pain and palpitations. Patient has a permanent pacemaker  She just had a revision of this within the past 3 months  History of paroxysmal atrial fibrillation but not on anticoagulation now  She follows up with 2 cardiologist   Gastrointestinal: Negative for diarrhea. Endocrine:        Type 2 diabetes  She is not on any treatment but diet  She does not check her blood sugar at home  Hypothyroidism on appropriate replacement   Genitourinary:        Long history of overactive bladder  She had multiple bladder procedures  Continues to have symptoms  She is not interested in going back to urology   Musculoskeletal: Positive for arthralgias, back pain and myalgias. Patient has a longstanding history of fibromyalgia  Was tentatively diagnosed with lupus by a rheumatologist  She did not tolerate any of the interventional treatments and is not on steroids or any treatment at this time   Neurological: Negative for dizziness, syncope and light-headedness. Psychiatric/Behavioral: Positive for dysphoric mood and sleep disturbance. The patient is nervous/anxious.          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  Thyroid disease     Ulcer in the past    Gastric       FAMILY HISTORY  No family history on file. SOCIAL HISTORY  Social History     Socioeconomic History    Marital status:       Spouse name: None    Number of children: None    Years of education: None    Highest education level: None   Occupational History    None   Social Needs    Financial resource strain: None    Food insecurity     Worry: None     Inability: None    Transportation needs     Medical: None     Non-medical: None   Tobacco Use    Smoking status: Former Smoker     Last attempt to quit: 1977     Years since quittin.4    Smokeless tobacco: Never Used   Substance and Sexual Activity    Alcohol use: No    Drug use: No    Sexual activity: None   Lifestyle    Physical activity     Days per week: None     Minutes per session: None    Stress: None   Relationships    Social connections     Talks on phone: None     Gets together: None     Attends Baptism service: None     Active member of club or organization: None     Attends meetings of clubs or organizations: None     Relationship status: None    Intimate partner violence     Fear of current or ex partner: None     Emotionally abused: None     Physically abused: None     Forced sexual activity: None   Other Topics Concern    None   Social History Narrative    None        SURGICAL HISTORY  Past Surgical History:   Procedure Laterality Date    APPENDECTOMY      BLADDER SURGERY      CHOLECYSTECTOMY      COLONOSCOPY  14    normal, biopsy    EYE SURGERY      HYSTERECTOMY      PACEMAKER PLACEMENT Left 2020    bivi pacer upgrade-Medtronic Percepta Quad CRT-P MRI SureScan     VENTRICULAR CARDIAC PACEMAKER spell of weakness but nothing significant was found    ASSESSMENT and PLAN    Markie Barragan was seen today for fatigue, nasal congestion, shortness of breath, insomnia and medication problem. Diagnoses and all orders for this visit:    Atrial fibrillation, unspecified type ([address]    Post-menopausal    Need for prophylactic vaccination and inoculation against varicella  -     zoster recombinant adjuvanted vaccine [facility]) 50 MCG/0.5ML SUSR injection; Inject 0.5 mLs into the muscle once for 1 dose 50 MCG IM then repeat 2-6 months. S/P biventricular cardiac pacemaker procedure    Type 2 diabetes mellitus with diabetic polyneuropathy, without long-term current use of insulin (HCC)  -     POCT glycosylated hemoglobin (Hb A1C)    OAB (overactive bladder)    Moderate episode of recurrent major depressive disorder (HCC)    Lupus ([facility] [address]    Hypothyroidism, unspecified type    Allergic rhinitis due to pollen, unspecified seasonality      The Flonase on a daily basis  Continue her same medications  Should be noted she is no longer on lisinopril      Return in about 3 months (around 9/8/2020). Electronically signed by Keli Aleman MD on 6/8/2020    Please note that this chart was generated using dragon dictation software. Although every effort was made to ensure the accuracy of this automated transcription, some errors in transcription may have occurred.

## 2020-06-08 NOTE — PROGRESS NOTES
Medicare Annual Wellness Visit  Name: Kathi Escalona Date: 2020   MRN: [id number] Sex: Female   Age: 80 y.o. Ethnicity: Non-/Non    : 1935 Race: Francine Berrios is here for Medicare AWV    Screenings for behavioral, psychosocial and functional/safety risks, and cognitive dysfunction are all negative except as indicated below. These results, as well as other patient data from the [address] form, are documented in Flowsheets linked to this Encounter. Allergies   Allergen Reactions    Amitriptyline     Aspirin     Codeine     Elavil [Amitriptyline Hcl]     Hydroxychloroquine     Ibuprofen     Plaquenil [Hydroxychloroquine Sulfate]     Theophyllines        Prior to Visit Medications    Medication Sig Taking? Authorizing Provider   zoster recombinant adjuvanted vaccine [facility]) 50 MCG/0.5ML SUSR injection Inject 0.5 mLs into the muscle once for 1 dose 50 MCG IM then repeat 2-6 months. Yes Molina Martinez MD   Acetaminophen (TYLENOL ARTHRITIS PAIN PO) Take by mouth Yes Historical Provider, MD   levothyroxine (SYNTHROID) 50 MCG tablet Take 1 tablet by mouth Daily Yes Molina Martinez MD   metoprolol tartrate (LOPRESSOR) 50 MG tablet Take 1 tablet by mouth 2 times daily Yes Molina Martinez MD   fluticasone (FLONASE) 50 MCG/ACT nasal spray 2 sprays by Each Nostril route daily Yes Historical Provider, MD       Past Medical History:   Diagnosis Date    [facility]fib ([facility] [address]     Arthritis     CAD (coronary artery disease)     Eye abnormality     Left Eye - Hx torn Retina and Cyst; 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     Thyroid disease     Ulcer in the past    Gastric       Past Surgical History:   Procedure Laterality Date    APPENDECTOMY      schedule for the next 5-10 years is provided to the patient in written form: see Patient Instructions/AVS.    Monet Medina LPN, 0/4/9551, performed the documented evaluation under the direct supervision of the attending physician. Alan Conley is a 80 y.o. female being evaluated by a Virtual Visit (audio visit) encounter to address concerns as mentioned above. A caregiver was present when appropriate. Due to this being a TeleHealth encounter (During YVQFW-40 public [facility] emergency), evaluation of the following organ systems was limited: Vitals/Constitutional/EENT/Resp/CV/GI//MS/Neuro/Skin/Heme-Lymph-Imm. Pursuant to the emergency declaration under the [address] [place] [address] authority and the [employer] Act, this Virtual Visit was conducted with patient's (and/or legal guardian's) consent, to reduce the patient's risk of exposure to COVID-19 and provide necessary medical care. The patient (and/or legal guardian) has also been advised to contact this office for worsening conditions or problems, and seek emergency medical treatment and/or call 911 if deemed necessary. Patient identification was verified at the start of the visit: Yes    Total time spent for this encounter: 15 minutes    Services were provided through audio synchronous discussion virtually to substitute for in-person clinic visit. Patient and provider were located at their individual homes. This encounter was performed under Jonathan gabriel MDs, direct supervision, 6/8/2020.    --Janette Norton LPN on 2/1/3239 at 8:23 PM    An electronic signature was used to authenticate this note.

## 2020-07-10 RX ORDER — LEVOTHYROXINE SODIUM 0.05 MG/1
TABLET ORAL
Qty: 30 TABLET | Refills: 1 | Status: SHIPPED | OUTPATIENT
Start: 2020-07-10 | End: 2020-09-16 | Stop reason: SDUPTHER

## 2020-09-08 ENCOUNTER — OFFICE VISIT (OUTPATIENT)
Dept: FAMILY MEDICINE CLINIC | Age: 85
End: 2020-09-08
Payer: MEDICARE

## 2020-09-08 VITALS
BODY MASS INDEX: 26 KG/M2 | SYSTOLIC BLOOD PRESSURE: 124 MMHG | HEIGHT: 70 IN | DIASTOLIC BLOOD PRESSURE: 70 MMHG | TEMPERATURE: 97 F | OXYGEN SATURATION: 98 % | HEART RATE: 74 BPM | WEIGHT: 181.6 LBS

## 2020-09-08 PROBLEM — R06.02 SOB (SHORTNESS OF BREATH): Status: ACTIVE | Noted: 2020-09-08

## 2020-09-08 PROBLEM — E03.8 OTHER SPECIFIED HYPOTHYROIDISM: Chronic | Status: ACTIVE | Noted: 2020-09-08

## 2020-09-08 PROCEDURE — G8400 PT W/DXA NO RESULTS DOC: HCPCS | Performed by: FAMILY MEDICINE

## 2020-09-08 PROCEDURE — G8427 DOCREV CUR MEDS BY ELIG CLIN: HCPCS | Performed by: FAMILY MEDICINE

## 2020-09-08 PROCEDURE — 1123F ACP DISCUSS/DSCN MKR DOCD: CPT | Performed by: FAMILY MEDICINE

## 2020-09-08 PROCEDURE — 1090F PRES/ABSN URINE INCON ASSESS: CPT | Performed by: FAMILY MEDICINE

## 2020-09-08 PROCEDURE — 1036F TOBACCO NON-USER: CPT | Performed by: FAMILY MEDICINE

## 2020-09-08 PROCEDURE — G8417 CALC BMI ABV UP PARAM F/U: HCPCS | Performed by: FAMILY MEDICINE

## 2020-09-08 PROCEDURE — 99214 OFFICE O/P EST MOD 30 MIN: CPT | Performed by: FAMILY MEDICINE

## 2020-09-08 PROCEDURE — 4040F PNEUMOC VAC/ADMIN/RCVD: CPT | Performed by: FAMILY MEDICINE

## 2020-09-08 RX ORDER — METOPROLOL TARTRATE 50 MG/1
50 TABLET, FILM COATED ORAL 2 TIMES DAILY
Qty: 180 TABLET | Refills: 1 | Status: SHIPPED | OUTPATIENT
Start: 2020-09-08 | End: 2021-07-06 | Stop reason: SDUPTHER

## 2020-09-08 ASSESSMENT — ENCOUNTER SYMPTOMS
COUGH: 0
SHORTNESS OF BREATH: 1
SORE THROAT: 0

## 2020-09-08 NOTE — PROGRESS NOTES
complaints  Some of it is overactive bladder she also has some incontinence  She has had an exhaustive urologic work-up   Musculoskeletal:        Still complaining about pain in her lower leg and some swelling where she had a fall about 3 months ago and had a large hematoma with which is now resolved   Neurological: Negative for dizziness. Psychiatric/Behavioral: Positive for dysphoric mood. 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  Thyroid disease     Ulcer in the past    Gastric       FAMILY HISTORY  History reviewed. No pertinent family history. SOCIAL HISTORY  Social History     Socioeconomic History    Marital status:       Spouse name: None    Number of children: None    Years of education: None    Highest education level: None   Occupational History    None   Social Needs    Financial resource strain: None    Food insecurity     Worry: None     Inability: None    Transportation needs     Medical: None     Non-medical: None   Tobacco Use    Smoking status: Former Smoker     Last attempt to quit: 1977     Years since quittin.7    Smokeless tobacco: Never Used   Substance and Sexual Activity    Alcohol use: No    Drug use: No    Sexual activity: None   Lifestyle    Physical activity     Days per week: None     Minutes per session: None    Stress: None   Relationships    Social connections     Talks on
minimal assistance

## 2020-09-18 RX ORDER — LEVOTHYROXINE SODIUM 0.05 MG/1
TABLET ORAL
Qty: 30 TABLET | Refills: 1 | Status: SHIPPED | OUTPATIENT
Start: 2020-09-18 | End: 2021-02-26 | Stop reason: SDUPTHER

## 2021-01-04 ENCOUNTER — OFFICE VISIT (OUTPATIENT)
Dept: FAMILY MEDICINE CLINIC | Age: 86
End: 2021-01-04
Payer: MEDICARE

## 2021-01-04 VITALS
BODY MASS INDEX: 27.29 KG/M2 | DIASTOLIC BLOOD PRESSURE: 88 MMHG | WEIGHT: 190.6 LBS | OXYGEN SATURATION: 97 % | HEIGHT: 70 IN | HEART RATE: 86 BPM | SYSTOLIC BLOOD PRESSURE: 148 MMHG

## 2021-01-04 DIAGNOSIS — E03.8 OTHER SPECIFIED HYPOTHYROIDISM: Chronic | ICD-10-CM

## 2021-01-04 DIAGNOSIS — M17.12 PRIMARY OSTEOARTHRITIS OF LEFT KNEE: ICD-10-CM

## 2021-01-04 DIAGNOSIS — R06.02 SOB (SHORTNESS OF BREATH): ICD-10-CM

## 2021-01-04 DIAGNOSIS — Z95.0 S/P BIVENTRICULAR CARDIAC PACEMAKER PROCEDURE: ICD-10-CM

## 2021-01-04 DIAGNOSIS — M32.9 LUPUS (HCC): ICD-10-CM

## 2021-01-04 DIAGNOSIS — I48.91 ATRIAL FIBRILLATION, UNSPECIFIED TYPE (HCC): ICD-10-CM

## 2021-01-04 DIAGNOSIS — M19.90 ARTHRITIS: ICD-10-CM

## 2021-01-04 DIAGNOSIS — N32.81 OAB (OVERACTIVE BLADDER): Chronic | ICD-10-CM

## 2021-01-04 DIAGNOSIS — E11.42 TYPE 2 DIABETES MELLITUS WITH DIABETIC POLYNEUROPATHY, WITHOUT LONG-TERM CURRENT USE OF INSULIN (HCC): Primary | ICD-10-CM

## 2021-01-04 PROCEDURE — 99214 OFFICE O/P EST MOD 30 MIN: CPT | Performed by: FAMILY MEDICINE

## 2021-01-04 PROCEDURE — 1036F TOBACCO NON-USER: CPT | Performed by: FAMILY MEDICINE

## 2021-01-04 PROCEDURE — 1090F PRES/ABSN URINE INCON ASSESS: CPT | Performed by: FAMILY MEDICINE

## 2021-01-04 PROCEDURE — G8484 FLU IMMUNIZE NO ADMIN: HCPCS | Performed by: FAMILY MEDICINE

## 2021-01-04 PROCEDURE — 1123F ACP DISCUSS/DSCN MKR DOCD: CPT | Performed by: FAMILY MEDICINE

## 2021-01-04 PROCEDURE — 4040F PNEUMOC VAC/ADMIN/RCVD: CPT | Performed by: FAMILY MEDICINE

## 2021-01-04 PROCEDURE — G8417 CALC BMI ABV UP PARAM F/U: HCPCS | Performed by: FAMILY MEDICINE

## 2021-01-04 PROCEDURE — G8400 PT W/DXA NO RESULTS DOC: HCPCS | Performed by: FAMILY MEDICINE

## 2021-01-04 PROCEDURE — 20610 DRAIN/INJ JOINT/BURSA W/O US: CPT | Performed by: FAMILY MEDICINE

## 2021-01-04 PROCEDURE — G8427 DOCREV CUR MEDS BY ELIG CLIN: HCPCS | Performed by: FAMILY MEDICINE

## 2021-01-04 RX ORDER — METHYLPREDNISOLONE ACETATE 40 MG/ML
40 INJECTION, SUSPENSION INTRA-ARTICULAR; INTRALESIONAL; INTRAMUSCULAR; SOFT TISSUE ONCE
Status: COMPLETED | OUTPATIENT
Start: 2021-01-04 | End: 2021-01-04

## 2021-01-04 RX ORDER — ALBUTEROL SULFATE 90 UG/1
2 AEROSOL, METERED RESPIRATORY (INHALATION) 4 TIMES DAILY PRN
Qty: 3 INHALER | Refills: 1 | Status: SHIPPED | OUTPATIENT
Start: 2021-01-04 | End: 2022-10-24 | Stop reason: SDUPTHER

## 2021-01-04 RX ADMIN — METHYLPREDNISOLONE ACETATE 40 MG: 40 INJECTION, SUSPENSION INTRA-ARTICULAR; INTRALESIONAL; INTRAMUSCULAR; SOFT TISSUE at 15:59

## 2021-01-04 ASSESSMENT — PATIENT HEALTH QUESTIONNAIRE - PHQ9
2. FEELING DOWN, DEPRESSED OR HOPELESS: 0
SUM OF ALL RESPONSES TO PHQ QUESTIONS 1-9: 0
SUM OF ALL RESPONSES TO PHQ QUESTIONS 1-9: 0

## 2021-01-04 NOTE — PROGRESS NOTES
2021     Jimena Austin      Chief Complaint   Patient presents with    3 Month Follow-Up    Shortness of Breath     States she hasn't felt right since her pacer was placed Fe.    Other     Bladder leakage is worse, has to wear a pad     Insomnia     Goes days without sleeping       HPI      Deacon So is a 80 y.o. female who presents today with the followin. Type 2 diabetes mellitus with diabetic polyneuropathy, without long-term current use of insulin ([address]    2. OAB (overactive bladder)    3. Atrial fibrillation, unspecified type ([address]    4. Other specified hypothyroidism    5. S/P biventricular cardiac pacemaker procedure    6. SOB (shortness of breath)    7. Arthritis    8. Primary osteoarthritis of left knee    9.  Lupus ([address]      Deacon So has a litany of complaints  She still very distressed over her almost complete urinary incontinence  She is exhausted urologic treatment here in [facility] she does not drive very far she has transportation problems  Told her to consider to [place] but right now that would not be feasible  REVIEW OF SYMPTOMS    Review of Systems   Respiratory:        Shortness of breath and occasional spells of wheezing  Smoked in the distant past   Cardiovascular:        Recent redo of a pacemaker  She is complaining about itching and pain at the site of the incision but there is been no drainage or redness   Endocrine:        Type 2 diabetes which is under excellent control   Musculoskeletal:        Had a fall recently injured her right lower leg  She is complained about ongoing pain and swelling in her left knee  She had a previous resection of the medial meniscus   Allergic/Immunologic:        Patient was told in the past she had rheumatoid arthritis and/or lupus  She wants to be retested   Psychiatric/Behavioral:        Ongoing issues with insomnia anxiety depression unchanged       PAST MEDICAL HISTORY  Past Medical History:   Diagnosis Date    A-fib [facility])     Arthritis  CAD (coronary artery disease)     Eye abnormality     Left Eye - Hx torn Retina and Cyst; 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  Thyroid disease     Ulcer in the past    Gastric       FAMILY HISTORY  History reviewed. No pertinent family history. SOCIAL HISTORY  Social History     Socioeconomic History    Marital status:       Spouse name: None    Number of children: None    Years of education: None    Highest education level: None   Occupational History    None   Social Needs    Financial resource strain: None    Food insecurity     Worry: None     Inability: None    Transportation needs     Medical: None     Non-medical: None   Tobacco Use    Smoking status: Former Smoker     Quit date: 1977     Years since quittin.0    Smokeless tobacco: Never Used   Substance and Sexual Activity    Alcohol use: No    Drug use: No    Sexual activity: None   Lifestyle    Physical activity     Days per week: None     Minutes per session: None    Stress: None   Relationships    Social connections     Talks on phone: None     Gets together: None     Attends [employer] service: None     Active member of club or organization: None     Attends meetings of clubs or organizations: None     Relationship status: None    Intimate partner violence     Fear of current or ex partner: None     Emotionally abused: None     Physically abused: None     Forced sexual activity: None   Other Topics Concern    None   Social History Narrative    None        SURGICAL HISTORY  Past Surgical History:   Procedure Laterality Date    APPENDECTOMY      BLADDER SURGERY      CHOLECYSTECTOMY      COLONOSCOPY  14    normal, biopsy    EYE SURGERY      HYSTERECTOMY      PACEMAKER PLACEMENT Left 02/20/2020    bivi pacer upgrade-Medtronic Percepta Quad CRT-P MRI Carter Florez Maik  2011    Dr. Buddy Vargas. Ahmed - following MI       CURRENT MEDICATIONS  Current Outpatient Medications   Medication Sig Dispense Refill    albuterol sulfate HFA (VENTOLIN HFA) 108 (90 Base) MCG/ACT inhaler Inhale 2 puffs into the lungs 4 times daily as needed for Wheezing 3 Inhaler 1    levothyroxine (SYNTHROID) 50 MCG tablet TAKE ONE TABLET BY MOUTH DAILY 30 tablet 1    metoprolol tartrate (LOPRESSOR) 50 MG tablet Take 1 tablet by mouth 2 times daily 180 tablet 1    Acetaminophen (TYLENOL ARTHRITIS PAIN PO) Take by mouth      fluticasone (FLONASE) 50 MCG/ACT nasal spray 2 sprays by Each Nostril route daily       Current Facility-Administered Medications   Medication Dose Route Frequency Provider Last Rate Last Admin    methylPREDNISolone acetate (DEPO-MEDROL) injection 40 mg  40 mg Intramuscular Once Almita Louie MD           ALLERGIES  Allergies   Allergen Reactions    Amitriptyline     Aspirin     Codeine     Elavil [Amitriptyline Hcl]     Hydroxychloroquine     Ibuprofen     Plaquenil [Hydroxychloroquine Sulfate]     Theophyllines        PHYSICAL EXAM    BP (!) 148/88 (Site: Left Upper Arm, Position: Sitting, Cuff Size: Medium Adult)   Pulse 86   Ht 5' 10\" (1.778 m)   Wt 190 lb 9.6 oz (86.5 kg)   SpO2 97%   BMI 27.35 kg/m²     Physical Exam  Vitals signs and nursing note reviewed. Constitutional:       Appearance: Normal appearance. Eyes:      Conjunctiva/sclera: Conjunctivae normal.   Cardiovascular:      Rate and Rhythm: Normal rate. Pulmonary:      Effort: Pulmonary effort is normal.   Skin:     Comments: Pacemaker incision looks good the pacer is in correct position   Neurological:      General: No focal deficit present. Mental Status: She is alert.    Psychiatric:         Mood and Affect: Mood normal.         ASSESSMENT and PLAN    Andra Scales was seen today for 3

## 2021-01-05 LAB
ANTI-NUCLEAR ANTIBODY (ANA): NEGATIVE
SEDIMENTATION RATE, ERYTHROCYTE: 13 MM/HR (ref 0–30)

## 2021-02-24 DIAGNOSIS — E03.9 HYPOTHYROIDISM, UNSPECIFIED TYPE: ICD-10-CM

## 2021-02-26 RX ORDER — LEVOTHYROXINE SODIUM 0.05 MG/1
TABLET ORAL
Qty: 30 TABLET | Refills: 1 | Status: SHIPPED | OUTPATIENT
Start: 2021-02-26 | End: 2021-07-06 | Stop reason: SDUPTHER

## 2021-04-05 ENCOUNTER — OFFICE VISIT (OUTPATIENT)
Dept: FAMILY MEDICINE CLINIC | Age: 86
End: 2021-04-05
Payer: MEDICARE

## 2021-04-05 VITALS
WEIGHT: 187.9 LBS | HEART RATE: 117 BPM | HEIGHT: 70 IN | OXYGEN SATURATION: 97 % | TEMPERATURE: 97.2 F | BODY MASS INDEX: 26.9 KG/M2 | SYSTOLIC BLOOD PRESSURE: 140 MMHG | DIASTOLIC BLOOD PRESSURE: 84 MMHG

## 2021-04-05 DIAGNOSIS — M12.522: ICD-10-CM

## 2021-04-05 DIAGNOSIS — R25.2 LEG CRAMPS: ICD-10-CM

## 2021-04-05 DIAGNOSIS — E11.42 TYPE 2 DIABETES MELLITUS WITH DIABETIC POLYNEUROPATHY, WITHOUT LONG-TERM CURRENT USE OF INSULIN (HCC): Primary | ICD-10-CM

## 2021-04-05 DIAGNOSIS — E03.8 OTHER SPECIFIED HYPOTHYROIDISM: Chronic | ICD-10-CM

## 2021-04-05 DIAGNOSIS — M79.7 FIBROMYALGIA: ICD-10-CM

## 2021-04-05 DIAGNOSIS — F33.1 MODERATE EPISODE OF RECURRENT MAJOR DEPRESSIVE DISORDER (HCC): Chronic | ICD-10-CM

## 2021-04-05 PROBLEM — F33.9 EPISODE OF RECURRENT MAJOR DEPRESSIVE DISORDER (HCC): Chronic | Status: ACTIVE | Noted: 2021-04-05

## 2021-04-05 LAB
A/G RATIO: 1.3 (ref 1.1–2.2)
ALBUMIN SERPL-MCNC: 4.1 G/DL (ref 3.4–5)
ALP BLD-CCNC: 83 U/L (ref 40–129)
ALT SERPL-CCNC: 21 U/L (ref 10–40)
ANION GAP SERPL CALCULATED.3IONS-SCNC: 11 MMOL/L (ref 3–16)
AST SERPL-CCNC: 26 U/L (ref 15–37)
BILIRUB SERPL-MCNC: 0.3 MG/DL (ref 0–1)
BUN BLDV-MCNC: 22 MG/DL (ref 7–20)
CALCIUM SERPL-MCNC: 8.9 MG/DL (ref 8.3–10.6)
CHLORIDE BLD-SCNC: 103 MMOL/L (ref 99–110)
CO2: 27 MMOL/L (ref 21–32)
CREAT SERPL-MCNC: 1.1 MG/DL (ref 0.6–1.2)
GFR AFRICAN AMERICAN: 57
GFR NON-AFRICAN AMERICAN: 47
GLOBULIN: 3.2 G/DL
GLUCOSE BLD-MCNC: 104 MG/DL (ref 70–99)
MAGNESIUM: 2.3 MG/DL (ref 1.8–2.4)
POTASSIUM SERPL-SCNC: 3.8 MMOL/L (ref 3.5–5.1)
SODIUM BLD-SCNC: 141 MMOL/L (ref 136–145)
T4 FREE: 1 NG/DL (ref 0.9–1.8)
TOTAL PROTEIN: 7.3 G/DL (ref 6.4–8.2)
TSH SERPL DL<=0.05 MIU/L-ACNC: 1.13 UIU/ML (ref 0.27–4.2)

## 2021-04-05 PROCEDURE — 1123F ACP DISCUSS/DSCN MKR DOCD: CPT | Performed by: FAMILY MEDICINE

## 2021-04-05 PROCEDURE — 4040F PNEUMOC VAC/ADMIN/RCVD: CPT | Performed by: FAMILY MEDICINE

## 2021-04-05 PROCEDURE — 99214 OFFICE O/P EST MOD 30 MIN: CPT | Performed by: FAMILY MEDICINE

## 2021-04-05 PROCEDURE — 1036F TOBACCO NON-USER: CPT | Performed by: FAMILY MEDICINE

## 2021-04-05 PROCEDURE — 1090F PRES/ABSN URINE INCON ASSESS: CPT | Performed by: FAMILY MEDICINE

## 2021-04-05 PROCEDURE — G8427 DOCREV CUR MEDS BY ELIG CLIN: HCPCS | Performed by: FAMILY MEDICINE

## 2021-04-05 PROCEDURE — G8417 CALC BMI ABV UP PARAM F/U: HCPCS | Performed by: FAMILY MEDICINE

## 2021-04-05 RX ORDER — DULOXETIN HYDROCHLORIDE 30 MG/1
30 CAPSULE, DELAYED RELEASE ORAL DAILY
Qty: 30 CAPSULE | Refills: 0 | Status: SHIPPED | OUTPATIENT
Start: 2021-04-05 | End: 2021-07-13 | Stop reason: SDUPTHER

## 2021-04-05 NOTE — PROGRESS NOTES
2021     Cooper Anderson      Chief Complaint   Patient presents with    3 Month Follow-Up     pt states that she is getting shortness of breath. She said her left elbow swells and the pain goes up her left arm.  Toe Pain     pt has been having alot of cramps in her toes . She said she is walking with a cane around the house . She said she has a hard time getting herself out of the car .  Fall     pt said she has had several falls and can not get herself up when she falls        HPI      Richmond Adair is a 80 y.o. female who presents today with the followin. Type 2 diabetes mellitus with diabetic polyneuropathy, without long-term current use of insulin ([facility]    2. Fibromyalgia    3. Moderate episode of recurrent major depressive disorder ([facility]    4. Leg cramps    5. Other specified hypothyroidism    6. Traumatic arthropathy, left elbow    Has a multitude of complaints  She just generally feels bad  She multiple medical problems  She is suffering from chronic depression she has a lot of family issues  She would one time was told she had lupus or rheumatoid arthritis but recent labs did not support that diagnosis  It is known that she has fairly significant fibromyalgia    REVIEW OF SYMPTOMS    Review of Systems   Cardiovascular:        Sees cardiology for previous history of atrial fibrillation she has a pacemaker she is on a beta-blocker   Musculoskeletal: Positive for arthralgias and myalgias. Complains of various sites of pain most of it is muscular its in her legs is in her back shoulders she had an injury to her left elbow and is had some swelling over the olecranon  Was about a year ago but was never x-rayed   Psychiatric/Behavioral: Positive for dysphoric mood and sleep disturbance. The patient is nervous/anxious.         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  Thyroid disease     Ulcer in the past    Gastric       FAMILY HISTORY  No family history on file. SOCIAL HISTORY  Social History     Socioeconomic History    Marital status:       Spouse name: None    Number of children: None    Years of education: None    Highest education level: None   Occupational History    None   Social Needs    Financial resource strain: None    Food insecurity     Worry: None     Inability: None    Transportation needs     Medical: None     Non-medical: None   Tobacco Use    Smoking status: Former Smoker     Quit date: 1977     Years since quittin.2    Smokeless tobacco: Never Used   Substance and Sexual Activity    Alcohol use: No    Drug use: No    Sexual activity: None   Lifestyle    Physical activity     Days per week: None     Minutes per session: None    Stress: None   Relationships    Social connections     Talks on phone: None     Gets together: None     Attends Congregational service: None     Active member of club or organization: None     Attends meetings of clubs or organizations: None     Relationship status: None    Intimate partner violence     Fear of current or ex partner: None     Emotionally abused: None     Physically abused: None     Forced sexual activity: None   Other Topics Concern    None   Social History Narrative    None        SURGICAL HISTORY  Past Surgical History:   Procedure Laterality Date    APPENDECTOMY      BLADDER SURGERY      CHOLECYSTECTOMY      COLONOSCOPY  14    normal, biopsy    EYE SURGERY      HYSTERECTOMY      PACEMAKER PLACEMENT Left 2020    bivi pacer upgrade-Medtronic Percepta Quad CRT-P MRI SureScan     VENTRICULAR CARDIAC PACEMAKER Omid Ryan. Ahmed - following MI       CURRENT MEDICATIONS  Current Outpatient Medications   Medication Sig Dispense Refill    DULoxetine (CYMBALTA) 30 MG extended release capsule Take 1 capsule by mouth daily 30 capsule 0    levothyroxine (SYNTHROID) 50 MCG tablet TAKE ONE TABLET BY MOUTH DAILY 30 tablet 1    metoprolol tartrate (LOPRESSOR) 50 MG tablet Take 1 tablet by mouth 2 times daily 180 tablet 1    Acetaminophen (TYLENOL ARTHRITIS PAIN PO) Take by mouth      fluticasone (FLONASE) 50 MCG/ACT nasal spray 2 sprays by Each Nostril route daily      albuterol sulfate HFA (VENTOLIN HFA) 108 (90 Base) MCG/ACT inhaler Inhale 2 puffs into the lungs 4 times daily as needed for Wheezing (Patient not taking: Reported on 4/5/2021) 3 Inhaler 1     No current facility-administered medications for this visit. ALLERGIES  Allergies   Allergen Reactions    Amitriptyline     Aspirin     Codeine     Elavil [Amitriptyline Hcl]     Hydroxychloroquine     Ibuprofen     Plaquenil [Hydroxychloroquine Sulfate]     Theophyllines        PHYSICAL EXAM    BP (!) 140/84   Pulse 117   Temp 97.2 °F (36.2 °C)   Ht 5' 10\" (1.778 m)   Wt 187 lb 14.4 oz (85.2 kg)   SpO2 97%   BMI 26.96 kg/m²     Physical Exam  Vitals signs and nursing note reviewed. Constitutional:       Appearance: Normal appearance. Cardiovascular:      Rate and Rhythm: Normal rate. Pulmonary:      Effort: Pulmonary effort is normal.   Musculoskeletal:      Comments: Nontender fluctuant swelling over the left olecranon   Neurological:      General: No focal deficit present. Reviewed immunizations  Reviewed most recent lab      ASSESSMENT and PLAN    Terrence Spann was seen today for 3 month follow-up, toe pain and fall.     Diagnoses and all orders for this visit:    Type 2 diabetes mellitus with diabetic polyneuropathy, without long-term current use of insulin (HCC)    Fibromyalgia    Moderate episode of recurrent major depressive disorder (HCC)    Leg cramps  -     Comprehensive Metabolic Panel; Future  -     Magnesium; Future    Other specified hypothyroidism  -     TSH without Reflex; Future  -     T4, Free; Future    Traumatic arthropathy, left elbow  -     XR ELBOW LEFT (MIN 3 VIEWS); Future    Other orders  -     DULoxetine (CYMBALTA) 30 MG extended release capsule; Take 1 capsule by mouth daily    I will start the patient on duloxetine 30 mg daily for both depression and fibromyalgia  Get an x-ray of the left elbow  Check labs as she was having some leg cramps    Return in about 3 months (around 7/5/2021). Electronically signed by Bridger Gustafson MD on 4/5/2021    Please note that this chart was generated using dragon dictation software. Although every effort was made to ensure the accuracy of this automated transcription, some errors in transcription may have occurred.

## 2021-04-06 ENCOUNTER — NURSE ONLY (OUTPATIENT)
Dept: FAMILY MEDICINE CLINIC | Age: 86
End: 2021-04-06
Payer: MEDICARE

## 2021-04-06 DIAGNOSIS — I48.91 ATRIAL FIBRILLATION, UNSPECIFIED TYPE (HCC): Primary | ICD-10-CM

## 2021-04-06 DIAGNOSIS — R00.0 TACHYCARDIA: Primary | ICD-10-CM

## 2021-04-06 PROCEDURE — 93000 ELECTROCARDIOGRAM COMPLETE: CPT | Performed by: FAMILY MEDICINE

## 2021-07-06 ENCOUNTER — OFFICE VISIT (OUTPATIENT)
Dept: FAMILY MEDICINE CLINIC | Age: 86
End: 2021-07-06
Payer: MEDICARE

## 2021-07-06 VITALS
OXYGEN SATURATION: 97 % | DIASTOLIC BLOOD PRESSURE: 82 MMHG | BODY MASS INDEX: 27.39 KG/M2 | HEIGHT: 70 IN | SYSTOLIC BLOOD PRESSURE: 130 MMHG | HEART RATE: 63 BPM | WEIGHT: 191.3 LBS

## 2021-07-06 DIAGNOSIS — J30.2 SEASONAL ALLERGIES: ICD-10-CM

## 2021-07-06 DIAGNOSIS — E11.42 TYPE 2 DIABETES MELLITUS WITH DIABETIC POLYNEUROPATHY, WITHOUT LONG-TERM CURRENT USE OF INSULIN (HCC): ICD-10-CM

## 2021-07-06 DIAGNOSIS — E03.9 HYPOTHYROIDISM, UNSPECIFIED TYPE: Primary | ICD-10-CM

## 2021-07-06 DIAGNOSIS — I42.8 OTHER CARDIOMYOPATHIES (HCC): ICD-10-CM

## 2021-07-06 DIAGNOSIS — I48.91 ATRIAL FIBRILLATION, UNSPECIFIED TYPE (HCC): ICD-10-CM

## 2021-07-06 DIAGNOSIS — M25.562 CHRONIC PAIN OF BOTH KNEES: ICD-10-CM

## 2021-07-06 DIAGNOSIS — M54.50 ACUTE MIDLINE LOW BACK PAIN WITHOUT SCIATICA: ICD-10-CM

## 2021-07-06 DIAGNOSIS — M25.561 CHRONIC PAIN OF BOTH KNEES: ICD-10-CM

## 2021-07-06 DIAGNOSIS — Z23 NEED FOR VACCINATION AGAINST STREPTOCOCCUS PNEUMONIAE: ICD-10-CM

## 2021-07-06 DIAGNOSIS — G89.29 CHRONIC PAIN OF BOTH KNEES: ICD-10-CM

## 2021-07-06 DIAGNOSIS — M79.7 FIBROMYALGIA: ICD-10-CM

## 2021-07-06 DIAGNOSIS — F33.1 MODERATE EPISODE OF RECURRENT MAJOR DEPRESSIVE DISORDER (HCC): ICD-10-CM

## 2021-07-06 PROCEDURE — 99214 OFFICE O/P EST MOD 30 MIN: CPT | Performed by: PHYSICIAN ASSISTANT

## 2021-07-06 PROCEDURE — G0009 ADMIN PNEUMOCOCCAL VACCINE: HCPCS | Performed by: PHYSICIAN ASSISTANT

## 2021-07-06 PROCEDURE — G8417 CALC BMI ABV UP PARAM F/U: HCPCS | Performed by: PHYSICIAN ASSISTANT

## 2021-07-06 PROCEDURE — 83036 HEMOGLOBIN GLYCOSYLATED A1C: CPT | Performed by: PHYSICIAN ASSISTANT

## 2021-07-06 PROCEDURE — 1090F PRES/ABSN URINE INCON ASSESS: CPT | Performed by: PHYSICIAN ASSISTANT

## 2021-07-06 PROCEDURE — 90670 PCV13 VACCINE IM: CPT | Performed by: PHYSICIAN ASSISTANT

## 2021-07-06 PROCEDURE — 1036F TOBACCO NON-USER: CPT | Performed by: PHYSICIAN ASSISTANT

## 2021-07-06 PROCEDURE — 4040F PNEUMOC VAC/ADMIN/RCVD: CPT | Performed by: PHYSICIAN ASSISTANT

## 2021-07-06 PROCEDURE — 1123F ACP DISCUSS/DSCN MKR DOCD: CPT | Performed by: PHYSICIAN ASSISTANT

## 2021-07-06 PROCEDURE — G8427 DOCREV CUR MEDS BY ELIG CLIN: HCPCS | Performed by: PHYSICIAN ASSISTANT

## 2021-07-06 RX ORDER — LEVOTHYROXINE SODIUM 0.05 MG/1
50 TABLET ORAL
Qty: 90 TABLET | Refills: 1 | Status: SHIPPED | OUTPATIENT
Start: 2021-07-06 | End: 2022-04-18 | Stop reason: SDUPTHER

## 2021-07-06 RX ORDER — METOPROLOL TARTRATE 50 MG/1
50 TABLET, FILM COATED ORAL 2 TIMES DAILY
Qty: 180 TABLET | Refills: 1 | Status: SHIPPED | OUTPATIENT
Start: 2021-07-06 | End: 2022-04-18 | Stop reason: SDUPTHER

## 2021-07-06 NOTE — PROGRESS NOTES
2021    Hunter Ram    Chief Complaint   Patient presents with    3 Month Follow-Up     pt states that she was here last time for her elbow and left leg. Pt states that she fell recently off the ladder and hurt her middle and lower back .  Other     pt states that she has been a little forgertful lately and SOB sometimes        HPI  History obtained from the patient. Winston is a 80 y.o. female who presents today for 3 month follow up and multiple complaints. Type 2 DM - Patient is not currently on medication for this. She cannot remember when her last A1C was. Fibromyalgia - Patient states she has not been taking Cymbalta. MDD - Patient states that her  passed away in 2006 and then three months later, her daughter , and then her grandson committed suicide. Her son also passed away 3 years ago. Patient notes that sometimes she cannot fall asleep until 2-3am.     Hypothyroid - Compliant with Levothyroxine 50 mcg daily    A Fib - Patient follows with cardiology, Dr Vimal Franklin. Compliant with Metoprolol 50 mg BID    Recent Falls - Patient was cleaning on a two step ladder, and she fell down from the first step. She has been having pain in her back since. Patient fell on a different occasion, injuring her left lower leg. States she has bilateral knee pain. She takes Tylenol without relief. She cannot tolerate NSAIDs due to history of gastric ulcers. She uses a cane at home. Seasonal Allergies - Patient complains of \"I feel like I'm always on the edge of getting a sinus infection. \" States she has Flonase at home but doesn't take this regular. Health Maintenance - Patient's care gaps include pneumonia vaccine, AWV, Shingles vaccine, COVID vaccine. REVIEW OF SYMPTOMS  Review of Systems   Constitutional: Negative for chills and fever. Respiratory: Negative for cough and shortness of breath. Gastrointestinal: Negative for diarrhea and vomiting.      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  Thyroid disease     Ulcer in the past    Gastric       FAMILY HISTORY  No family history on file. SOCIAL HISTORY  Social History     Socioeconomic History    Marital status:      Spouse name: None    Number of children: None    Years of education: None    Highest education level: None   Occupational History    None   Tobacco Use    Smoking status: Former Smoker     Quit date: 1977     Years since quittin.5    Smokeless tobacco: Never Used   Substance and Sexual Activity    Alcohol use: No    Drug use: No    Sexual activity: None   Other Topics Concern    None   Social History Narrative    None     Social Determinants of Health     Financial Resource Strain:     Difficulty of Paying Living Expenses:    Food Insecurity:     Worried About Running Out of Food in the Last Year:     Ran Out of Food in the Last Year:    Transportation Needs:     Lack of Transportation (Medical):      Lack of Transportation (Non-Medical):    Physical Activity:     Days of Exercise per Week:     Minutes of Exercise per Session:    Stress:     Feeling of Stress :    Social Connections:     Frequency of Communication with Friends and Family:     Frequency of Social Gatherings with Friends and Family:     Attends Mosque Services:     Active Member of Clubs or Organizations:     Attends Club or Organization Meetings:     Marital Status:    Intimate Partner Violence:     Fear of Current or Ex-Partner:     Emotionally Abused:     Physically Abused:     Sexually Abused: SURGICAL HISTORY  Past Surgical History:   Procedure Laterality Date    APPENDECTOMY      BLADDER SURGERY      CHOLECYSTECTOMY      COLONOSCOPY  11/4/14    normal, biopsy    EYE SURGERY      HYSTERECTOMY      PACEMAKER PLACEMENT Left 02/20/2020    bivi pacer upgrade-Medtronic Percepta Quad CRT-P MRI SureSebastián Carl  2011    Dr. Curtis Lunsford. Ahmed - following MI       CURRENT MEDICATIONS  Current Outpatient Medications   Medication Sig Dispense Refill    metoprolol tartrate (LOPRESSOR) 50 MG tablet Take 1 tablet by mouth 2 times daily 180 tablet 1    levothyroxine (SYNTHROID) 50 MCG tablet Take 1 tablet by mouth every morning (before breakfast) TAKE ONE TABLET BY MOUTH DAILY 90 tablet 1    DULoxetine (CYMBALTA) 30 MG extended release capsule Take 1 capsule by mouth daily 30 capsule 0    Acetaminophen (TYLENOL ARTHRITIS PAIN PO) Take by mouth      fluticasone (FLONASE) 50 MCG/ACT nasal spray 2 sprays by Each Nostril route daily      albuterol sulfate HFA (VENTOLIN HFA) 108 (90 Base) MCG/ACT inhaler Inhale 2 puffs into the lungs 4 times daily as needed for Wheezing (Patient not taking: Reported on 4/5/2021) 3 Inhaler 1     No current facility-administered medications for this visit.        ALLERGIES  Allergies   Allergen Reactions    Amitriptyline     Aspirin     Codeine     Elavil [Amitriptyline Hcl]     Hydroxychloroquine     Ibuprofen     Plaquenil [Hydroxychloroquine Sulfate]     Theophyllines        RECENT LABS    Lab Results   Component Value Date    LABA1C 5.5 12/12/2019     Lab Results   Component Value Date    .2 12/12/2019       Lab Results   Component Value Date    CHOL 166 06/04/2019    CHOL 116 01/09/2011     Lab Results   Component Value Date    LDLCALC 105 (H) 06/04/2019       Lab Results   Component Value Date    WBC 5.7 03/24/2020    HGB 11.9 (L) 03/24/2020    HCT 38.1 03/24/2020    MCV 89.0 03/24/2020     03/24/2020       PHYSICAL EXAM  /82   Pulse 63   Ht 5' 10\" (1.778 m)   Wt 191 lb 4.8 oz (86.8 kg)   SpO2 97%   BMI 27.45 kg/m²     Physical Exam  Constitutional:       Appearance: Normal appearance. HENT:      Head: Normocephalic and atraumatic. Eyes:      Comments: EOM grossly intact. Cardiovascular:      Rate and Rhythm: Normal rate and regular rhythm. Heart sounds: No murmur heard. No friction rub. No gallop. Pulmonary:      Effort: Pulmonary effort is normal.      Breath sounds: Normal breath sounds. No wheezing, rhonchi or rales. Skin:     General: Skin is warm and dry. Neurological:      Mental Status: She is alert and oriented to person, place, and time. Comments: Cranial nerves II-XII grossly intact   Psychiatric:         Mood and Affect: Mood normal.         Behavior: Behavior normal.         ASSESSMENT & PLAN  1. Hypothyroidism, unspecified type  Well-controlled. Refilled medication.  - levothyroxine (SYNTHROID) 50 MCG tablet; Take 1 tablet by mouth every morning (before breakfast) TAKE ONE TABLET BY MOUTH DAILY  Dispense: 90 tablet; Refill: 1    2. Need for vaccination against Streptococcus pneumoniae  Gabbie GONZALES, administered this today in the office. Instructed the patient to get her Pneumovax 23 in 8 weeks. She can schedule a nursing visit for this. - PREVNAR 13 IM (Pneumococcal conjugate vaccine 13-valent)    3. Atrial fibrillation, unspecified type [facility])  Patient follows with cardiology. Well-controlled. Refilled medication.  - metoprolol tartrate (LOPRESSOR) 50 MG tablet; Take 1 tablet by mouth 2 times daily  Dispense: 180 tablet; Refill: 1    4. Type 2 diabetes mellitus with diabetic polyneuropathy, without long-term current use of insulin ([facility])  A1c is 5.6%. No intervention needed. - POCT glycosylated hemoglobin (Hb A1C)    5. Fibromyalgia  Well-controlled. Patient does not think she has been taking her Cymbalta, but has been doing okay without this.   Does note various aches and pains, though these may be osteoarthritis related. Will check x-rays and advise based on findings. 6. Moderate episode of recurrent major depressive disorder ([address]  Patient has not been taking Cymbalta but feels she has been doing fine without this. 7. Chronic pain of both knees  Will check knee x-rays and advise based on findings. - XR KNEE LEFT (3 VIEWS); Future  - XR KNEE RIGHT (3 VIEWS); Future    8. Acute midline low back pain without sciatica  We will check spine x-rays and advise based on findings. - XR LUMBAR SPINE (2-3 VIEWS); Future  - XR THORACIC SPINE (3 VIEWS); Future    9. Other cardiomyopathies [facility])  Patient follows with cardiology. Well-controlled. 10. Seasonal allergies  Recommended taking Flonase daily. May add Claritin OTC daily and Mucinex as needed. Return in about 3 months (around 10/6/2021).             Electronically signed by Ni Griffin PA-C on 7/6/2021

## 2021-07-13 ENCOUNTER — OFFICE VISIT (OUTPATIENT)
Dept: FAMILY MEDICINE CLINIC | Age: 86
End: 2021-07-13
Payer: MEDICARE

## 2021-07-13 VITALS
HEART RATE: 92 BPM | HEIGHT: 70 IN | DIASTOLIC BLOOD PRESSURE: 70 MMHG | BODY MASS INDEX: 26.92 KG/M2 | OXYGEN SATURATION: 93 % | SYSTOLIC BLOOD PRESSURE: 128 MMHG | RESPIRATION RATE: 16 BRPM | WEIGHT: 188 LBS

## 2021-07-13 DIAGNOSIS — L03.114 CELLULITIS OF LEFT UPPER EXTREMITY: Primary | ICD-10-CM

## 2021-07-13 DIAGNOSIS — M79.7 FIBROMYALGIA: ICD-10-CM

## 2021-07-13 PROCEDURE — 99213 OFFICE O/P EST LOW 20 MIN: CPT | Performed by: STUDENT IN AN ORGANIZED HEALTH CARE EDUCATION/TRAINING PROGRAM

## 2021-07-13 RX ORDER — SULFAMETHOXAZOLE AND TRIMETHOPRIM 800; 160 MG/1; MG/1
1 TABLET ORAL 2 TIMES DAILY
Qty: 14 TABLET | Refills: 0 | Status: SHIPPED | OUTPATIENT
Start: 2021-07-13 | End: 2021-07-20

## 2021-07-13 RX ORDER — DULOXETIN HYDROCHLORIDE 30 MG/1
30 CAPSULE, DELAYED RELEASE ORAL DAILY
Qty: 30 CAPSULE | Refills: 2 | Status: SHIPPED | OUTPATIENT
Start: 2021-07-13 | End: 2022-10-07 | Stop reason: SDUPTHER

## 2021-07-13 RX ORDER — PREDNISONE 20 MG/1
20 TABLET ORAL DAILY
Qty: 5 TABLET | Refills: 0 | Status: SHIPPED | OUTPATIENT
Start: 2021-07-13 | End: 2021-07-18

## 2021-07-13 RX ORDER — DULOXETIN HYDROCHLORIDE 30 MG/1
30 CAPSULE, DELAYED RELEASE ORAL DAILY
Qty: 30 CAPSULE | Refills: 0 | Status: SHIPPED | OUTPATIENT
Start: 2021-07-13 | End: 2021-07-13

## 2021-07-13 ASSESSMENT — ENCOUNTER SYMPTOMS
SHORTNESS OF BREATH: 0
SORE THROAT: 0
WHEEZING: 0
NAUSEA: 0
ABDOMINAL PAIN: 0

## 2021-07-13 NOTE — PROGRESS NOTES
7/13/2021    [facility]    Chief Complaint   Patient presents with    Rash     Presenting for red rash. Warm to touch at times. It has started to spread. She had Prevnar 13 on 07/6/21. HPI  History was obtained from patient. Abdirahman Spears is a 80 y.o. female with a PMHx as listed below who presents today for reaction to prevnar 13. Patient was seen in office 7/6/21 was given vaccination left upper arm. Since then swelling started 7/8/21 since starting it has been slowly decreasing in size. No history of MRSA  Last Tdap in 2014. Patient denies any reactions to immunizations in the past.   Cat at home denies any scratches      1. Cellulitis of left upper extremity    2. Fibromyalgia             REVIEW OF SYMPTOMS    Review of Systems   Constitutional: Negative for chills, fatigue and fever. HENT: Negative for congestion and sore throat. Respiratory: Negative for shortness of breath and wheezing. Cardiovascular: Negative for chest pain and palpitations. Gastrointestinal: Negative for abdominal pain and nausea. Genitourinary: Negative for frequency and urgency. Skin: Positive for rash (left upper arm). Neurological: Negative for light-headedness. 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  Thyroid disease     Ulcer in the past    Gastric       FAMILY HISTORY  History reviewed. No pertinent family history. SOCIAL HISTORY  Social History     Socioeconomic History    Marital status:       Spouse name: None    Number of children: None    Years of education: None    Highest education level: None   Occupational History    None   Tobacco Use    Smoking status: Former Smoker     Quit date: 1977     Years since quittin.5    Smokeless tobacco: Never Used   Substance and Sexual Activity    Alcohol use: No    Drug use: No    Sexual activity: None   Other Topics Concern    None   Social History Narrative    None     Social Determinants of Health     Financial Resource Strain:     Difficulty of Paying Living Expenses:    Food Insecurity:     Worried About Running Out of Food in the Last Year:     Ran Out of Food in the Last Year:    Transportation Needs:     Lack of Transportation (Medical):  Lack of Transportation (Non-Medical):    Physical Activity:     Days of Exercise per Week:     Minutes of Exercise per Session:    Stress:     Feeling of Stress :    Social Connections:     Frequency of Communication with Friends and Family:     Frequency of Social Gatherings with Friends and Family:     Attends Holiness Services:     Active Member of Clubs or Organizations:     Attends Club or Organization Meetings:     Marital Status:    Intimate Partner Violence:     Fear of Current or Ex-Partner:     Emotionally Abused:     Physically Abused:     Sexually Abused:         SURGICAL HISTORY  Past Surgical History:   Procedure Laterality Date    APPENDECTOMY      BLADDER SURGERY      CHOLECYSTECTOMY      COLONOSCOPY  14    normal, biopsy    EYE SURGERY      HYSTERECTOMY      PACEMAKER PLACEMENT Left 2020    bivi pacer upgrade-Medtronic Percepta Quad CRT-P MRI Natalie Ames      Dr. Lyla Mercado.  med - following MI                 CURRENT MEDICATIONS  Current Outpatient Medications   Medication Sig Dispense Refill    sulfamethoxazole-trimethoprim (BACTRIM DS;SEPTRA DS) 800-160 MG per tablet Take 1 tablet by mouth 2 times daily for 7 days 14 tablet 0    predniSONE (DELTASONE) 20 MG tablet Take 1 tablet by mouth daily for 5 days 5 tablet 0    DULoxetine (CYMBALTA) 30 MG extended release capsule Take 1 capsule by mouth daily 30 capsule 2    metoprolol tartrate (LOPRESSOR) 50 MG tablet Take 1 tablet by mouth 2 times daily 180 tablet 1    levothyroxine (SYNTHROID) 50 MCG tablet Take 1 tablet by mouth every morning (before breakfast) TAKE ONE TABLET BY MOUTH DAILY 90 tablet 1    Acetaminophen (TYLENOL ARTHRITIS PAIN PO) Take by mouth      fluticasone (FLONASE) 50 MCG/ACT nasal spray 2 sprays by Each Nostril route daily      albuterol sulfate HFA (VENTOLIN HFA) 108 (90 Base) MCG/ACT inhaler Inhale 2 puffs into the lungs 4 times daily as needed for Wheezing (Patient not taking: Reported on 4/5/2021) 3 Inhaler 1     No current facility-administered medications for this visit. ALLERGIES  Allergies   Allergen Reactions    Amitriptyline     Aspirin     Codeine     Elavil [Amitriptyline Hcl]     Hydroxychloroquine     Ibuprofen     Plaquenil [Hydroxychloroquine Sulfate]     Theophyllines        PHYSICAL EXAM    /70   Pulse 92   Resp 16   Ht 5' 10\" (1.778 m)   Wt 188 lb (85.3 kg)   SpO2 93%   BMI 26.98 kg/m²     Physical Exam  Constitutional:       Appearance: Normal appearance. HENT:      Head: Normocephalic and atraumatic. Eyes:      Extraocular Movements: Extraocular movements intact. Pupils: Pupils are equal, round, and reactive to light. Cardiovascular:      Rate and Rhythm: Normal rate and regular rhythm. Pulses: Normal pulses. Heart sounds: No murmur heard. No friction rub. No gallop. Pulmonary:      Effort: Pulmonary effort is normal.      Breath sounds: Normal breath sounds. Skin:     General: Skin is warm and dry. Comments: Large macular rash upper arm, warmth   Neurological:      General: No focal deficit present. Mental Status: She is alert.    Psychiatric:         Mood and Affect: Mood normal.         Behavior: Behavior normal.          Media Information ASSESSMENT & PLAN    1. Cellulitis of left upper extremity  -start bactrim, will hold on Tdap given reaction with Prevnar  - sulfamethoxazole-trimethoprim (BACTRIM DS;SEPTRA DS) 800-160 MG per tablet; Take 1 tablet by mouth 2 times daily for 7 days  Dispense: 14 tablet; Refill: 0  - predniSONE (DELTASONE) 20 MG tablet; Take 1 tablet by mouth daily for 5 days  Dispense: 5 tablet; Refill: 0    2. Fibromyalgia  -stable  - DULoxetine (CYMBALTA) 30 MG extended release capsule; Take 1 capsule by mouth daily  Dispense: 30 capsule; Refill: 2      Return for as scheduled.          Electronically signed by Render Osler, DO on 7/13/2021

## 2021-07-20 LAB — DIABETIC RETINOPATHY: NEGATIVE

## 2021-09-17 ENCOUNTER — APPOINTMENT (OUTPATIENT)
Dept: CT IMAGING | Age: 86
End: 2021-09-17
Payer: MEDICARE

## 2021-09-17 ENCOUNTER — APPOINTMENT (OUTPATIENT)
Dept: GENERAL RADIOLOGY | Age: 86
End: 2021-09-17
Payer: MEDICARE

## 2021-09-17 ENCOUNTER — HOSPITAL ENCOUNTER (OUTPATIENT)
Age: 86
Setting detail: OBSERVATION
Discharge: HOME | End: 2021-09-20
Attending: EMERGENCY MEDICINE | Admitting: INTERNAL MEDICINE
Payer: MEDICARE

## 2021-09-17 DIAGNOSIS — R55 SYNCOPE AND COLLAPSE: Primary | ICD-10-CM

## 2021-09-17 DIAGNOSIS — R06.02 SHORTNESS OF BREATH: ICD-10-CM

## 2021-09-17 DIAGNOSIS — R91.1 PULMONARY NODULE: ICD-10-CM

## 2021-09-17 LAB
ALBUMIN SERPL-MCNC: 4.2 GM/DL (ref 3.4–5)
ALP BLD-CCNC: 87 IU/L (ref 40–129)
ALT SERPL-CCNC: 15 U/L (ref 10–40)
ANION GAP SERPL CALCULATED.3IONS-SCNC: 13 MMOL/L (ref 4–16)
AST SERPL-CCNC: 18 IU/L (ref 15–37)
BASOPHILS ABSOLUTE: 0 K/CU MM
BASOPHILS RELATIVE PERCENT: 0.5 % (ref 0–1)
BILIRUB SERPL-MCNC: 0.3 MG/DL (ref 0–1)
BUN BLDV-MCNC: 19 MG/DL (ref 6–23)
CALCIUM SERPL-MCNC: 8.5 MG/DL (ref 8.3–10.6)
CHLORIDE BLD-SCNC: 104 MMOL/L (ref 99–110)
CO2: 26 MMOL/L (ref 21–32)
CREAT SERPL-MCNC: 1 MG/DL (ref 0.6–1.1)
D DIMER: 275 NG/ML(DDU)
DIFFERENTIAL TYPE: ABNORMAL
EOSINOPHILS ABSOLUTE: 0 K/CU MM
EOSINOPHILS RELATIVE PERCENT: 0.3 % (ref 0–3)
GFR AFRICAN AMERICAN: >60 ML/MIN/1.73M2
GFR NON-AFRICAN AMERICAN: 53 ML/MIN/1.73M2
GLUCOSE BLD-MCNC: 120 MG/DL (ref 70–99)
HCT VFR BLD CALC: 38.6 % (ref 37–47)
HEMOGLOBIN: 12 GM/DL (ref 12.5–16)
IMMATURE NEUTROPHIL %: 0.3 % (ref 0–0.43)
LYMPHOCYTES ABSOLUTE: 1.3 K/CU MM
LYMPHOCYTES RELATIVE PERCENT: 20.2 % (ref 24–44)
MAGNESIUM: 2 MG/DL (ref 1.8–2.4)
MCH RBC QN AUTO: 27.9 PG (ref 27–31)
MCHC RBC AUTO-ENTMCNC: 31.1 % (ref 32–36)
MCV RBC AUTO: 89.8 FL (ref 78–100)
MONOCYTES ABSOLUTE: 0.7 K/CU MM
MONOCYTES RELATIVE PERCENT: 11.1 % (ref 0–4)
NUCLEATED RBC %: 0 %
PDW BLD-RTO: 13.4 % (ref 11.7–14.9)
PLATELET # BLD: 205 K/CU MM (ref 140–440)
PMV BLD AUTO: 9.9 FL (ref 7.5–11.1)
POTASSIUM SERPL-SCNC: 3.5 MMOL/L (ref 3.5–5.1)
PRO-BNP: 1158 PG/ML
RBC # BLD: 4.3 M/CU MM (ref 4.2–5.4)
SARS-COV-2, NAAT: NOT DETECTED
SEGMENTED NEUTROPHILS ABSOLUTE COUNT: 4.2 K/CU MM
SEGMENTED NEUTROPHILS RELATIVE PERCENT: 67.6 % (ref 36–66)
SODIUM BLD-SCNC: 143 MMOL/L (ref 135–145)
SOURCE: NORMAL
TOTAL IMMATURE NEUTOROPHIL: 0.02 K/CU MM
TOTAL NUCLEATED RBC: 0 K/CU MM
TOTAL PROTEIN: 6.2 GM/DL (ref 6.4–8.2)
TROPONIN T: <0.01 NG/ML
WBC # BLD: 6.3 K/CU MM (ref 4–10.5)

## 2021-09-17 PROCEDURE — 71045 X-RAY EXAM CHEST 1 VIEW: CPT

## 2021-09-17 PROCEDURE — 80053 COMPREHEN METABOLIC PANEL: CPT

## 2021-09-17 PROCEDURE — 70450 CT HEAD/BRAIN W/O DYE: CPT

## 2021-09-17 PROCEDURE — 83880 ASSAY OF NATRIURETIC PEPTIDE: CPT

## 2021-09-17 PROCEDURE — 99284 EMERGENCY DEPT VISIT MOD MDM: CPT

## 2021-09-17 PROCEDURE — 87635 SARS-COV-2 COVID-19 AMP PRB: CPT

## 2021-09-17 PROCEDURE — 72125 CT NECK SPINE W/O DYE: CPT

## 2021-09-17 PROCEDURE — 84484 ASSAY OF TROPONIN QUANT: CPT

## 2021-09-17 PROCEDURE — 85025 COMPLETE CBC W/AUTO DIFF WBC: CPT

## 2021-09-17 PROCEDURE — 6360000004 HC RX CONTRAST MEDICATION: Performed by: EMERGENCY MEDICINE

## 2021-09-17 PROCEDURE — 93005 ELECTROCARDIOGRAM TRACING: CPT | Performed by: EMERGENCY MEDICINE

## 2021-09-17 PROCEDURE — 83735 ASSAY OF MAGNESIUM: CPT

## 2021-09-17 PROCEDURE — 71275 CT ANGIOGRAPHY CHEST: CPT

## 2021-09-17 PROCEDURE — 85379 FIBRIN DEGRADATION QUANT: CPT

## 2021-09-17 RX ADMIN — IOPAMIDOL 80 ML: 755 INJECTION, SOLUTION INTRAVENOUS at 20:47

## 2021-09-17 ASSESSMENT — HEART SCORE: ECG: 0

## 2021-09-17 NOTE — ED PROVIDER NOTES
EMERGENCY DEPARTMENT ENCOUNTER    Patient: Comer Goltz  MRN: [id number]  : 1935  Date of Evaluation: 2021  ED Provider:  Batsheva Jewell MD    CHIEF COMPLAINT  Chief Complaint   Patient presents with    Shortness of Breath       HPI  Comer Goltz is a 80 y.o. female who presents for evaluation after reported syncopal episode which occurred shortly before arrival to the emergency department. She had taken her cat to the vet's office and was outside of this office when she supposedly passed out and was found by bystanders outside. It is believed that she fell to the ground but she denies any pain or any known injuries from this fall. She was reported to have not been unconscious for very long and there were no reports of convulsions. She denies any chest pain or palpitations at any time. She does report she has been having some shortness of breath and intermittent central chest pressure and fatigue for the last several weeks which has been worsening since that time. Does seem to worsen with exertion but has had some at rest as well. Denies any coughing or wheezing. Denies fever. Denies any other associated symptoms or complaints or concerns.       REVIEW OF SYSTEMS    Constitutional: negative for fever, chills  Neurological: negative for HA, focal weakness, loss of sensation  Ophthalmic: negative for vision change  ENT: negative for congestion, rhinorrhea  Cardiovascular: negative for chest pain, palpitations, edema  Respiratory: negative for cough, wheezing  GI: negative for abdominal pain, nausea, vomiting, diarrhea, constipation  : negative for dysuria, hematuria  Musculoskeletal: negative for myalgias, decreased ROM, joint swelling  Dermatological: negative for rash, wounds  Heme: Negative for bleeding, bruising      PAST MEDICAL HISTORY  Past Medical History:   Diagnosis Date    A[facility]fib ([facility] [address]     Arthritis     CAD (coronary artery disease)     Eye abnormality     Left Eye - Hx torn Retina and Cyst; 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  Thyroid disease     Ulcer in the past    Gastric       CURRENT MEDICATIONS  [unfilled]    ALLERGIES  Allergies   Allergen Reactions    Amitriptyline     Aspirin     Codeine     Elavil [Amitriptyline Hcl]     Hydroxychloroquine     Ibuprofen     Plaquenil [Hydroxychloroquine Sulfate]     Theophyllines        SURGICAL HISTORY  Past Surgical History:   Procedure Laterality Date    APPENDECTOMY      BLADDER SURGERY      CHOLECYSTECTOMY      COLONOSCOPY  14    normal, biopsy    EYE SURGERY      HYSTERECTOMY      PACEMAKER PLACEMENT Left 2020    bivi pacer upgrade-Medtronic Percepta Quad CRT-P MRI [facility] [place]      Dr. Moisés Adame. [facility] - following MI       FAMILY HISTORY  History reviewed. No pertinent family history. SOCIAL HISTORY  Social History     Socioeconomic History    Marital status:      Spouse name: None    Number of children: None    Years of education: None    Highest education level: None   Occupational History    None   Tobacco Use    Smoking status: Former Smoker     Quit date: 1977     Years since quittin.7    Smokeless tobacco: Never Used   Substance and Sexual Activity    Alcohol use: No    Drug use: No    Sexual activity: None   Other Topics Concern    None   Social History Narrative    None     Social Determinants of Health     Financial Resource Strain:     Difficulty of Paying Living Expenses:    Food Insecurity:     Worried About Running Out of Food in the Last Year:     Ran Out of Food in the Last Year:    Transportation Needs:     Lack of Transportation (Medical):      Lack of Transportation (Non-Medical):    Physical Activity:  Days of Exercise per Week:     Minutes of Exercise per Session:    Stress:     Feeling of Stress :    Social Connections:     Frequency of Communication with Friends and Family:     Frequency of Social Gatherings with Friends and Family:     Attends Tenriism Services:     Active Member of Clubs or Organizations:     Attends Club or Organization Meetings:     Marital Status:    Intimate Partner Violence:     Fear of Current or Ex-Partner:     Emotionally Abused:     Physically Abused:     Sexually Abused:          **Past medical, family and social histories, and nursing notes reviewed and verified by me**      PHYSICAL EXAM  VITAL SIGNS:   ED Triage Vitals   Enc Vitals Group      BP       Pulse       Resp       Temp       Temp src       SpO2       Weight       Height       Head Circumference       Peak Flow       Pain Score       Pain Loc       Pain Edu? Excl. in [address]? Vitals during ED course were reviewed and are as charted. Constitutional: Minimal distress, Non-toxic appearance  Eyes: Conjunctiva normal, No discharge, PERRL, EOMI  HENT: Normocephalic, Atraumatic, posterior oropharynx is nonerythematous and without exudate, uvula is midline, oropharynx moist  Neck: Supple, no stridor, no grossly visible or palpable masses  Cardiovascular: Regular rate and rhythm, No murmurs, No rubs, No gallops, no JVD  Pulmonary/Chest: Normal breath sounds, No respiratory distress or accessory muscle use, No wheezing, crackles or rhonchi. Abdomen: Soft, nondistended and nonrigid, No tenderness or peritoneal signs, No masses, normal bowel sounds  Back: No midline point tenderness, No paraspinous muscle tenderness.  No CVA tenderness  Extremities: No gross deformities, no edema, no tenderness  Neurologic: GCS 15, cranial nerves II through XII grossly intact as tested, normal motor function, Normal sensory function, No focal deficits  Skin: Warm, Dry, No erythema, No rash, No cyanosis, No mottling  Lymphatic: No lymphadenopathy in the following location(s): cervical  Psychiatric: Alert and oriented x3, Affect normal          EKG Interpretation    Interpreted by emergency department physician    Rhythm: Ventricular paced rhythm  Rate: normal  Axis: left  Ectopy: none  Conduction: normal  ST Segments: normal  T Waves: normal  Q Waves: none    Clinical Impression: Ventricular paced rhythm        RADIOLOGY/PROCEDURES/LABS/MEDICATIONS ADMINISTERED:    I have reviewed and interpreted all of the currently available lab results from this visit (if applicable):  Results for orders placed or performed during the hospital encounter of 09/17/21   COVID-19, Rapid    Specimen: Nasopharyngeal   Result Value Ref Range    Source THROAT     SARS-CoV-2, NAAT NOT DETECTED NOT DETECTED   CBC Auto Differential   Result Value Ref Range    WBC 6.3 4.0 - 10.5 K/CU MM    RBC 4.30 4.2 - 5.4 M/CU MM    Hemoglobin 12.0 (L) 12.5 - 16.0 GM/DL    Hematocrit 38.6 37 - 47 %    MCV 89.8 78 - 100 FL    MCH 27.9 27 - 31 PG    MCHC 31.1 (L) 32.0 - 36.0 %    RDW 13.4 11.7 - 14.9 %    Platelets 768 825 - 997 K/CU MM    MPV 9.9 7.5 - 11.1 FL    Differential Type AUTOMATED DIFFERENTIAL     Segs Relative 67.6 (H) 36 - 66 %    Lymphocytes % 20.2 (L) 24 - 44 %    Monocytes % 11.1 (H) 0 - 4 %    Eosinophils % 0.3 0 - 3 %    Basophils % 0.5 0 - 1 %    Segs Absolute 4.2 K/CU MM    Lymphocytes Absolute 1.3 K/CU MM    Monocytes Absolute 0.7 K/CU MM    Eosinophils Absolute 0.0 K/CU MM    Basophils Absolute 0.0 K/CU MM    Nucleated RBC % 0.0 %    Total Nucleated RBC 0.0 K/CU MM    Total Immature Neutrophil 0.02 K/CU MM    Immature Neutrophil % 0.3 0 - 0.43 %   Comprehensive Metabolic Panel w/ Reflex to MG   Result Value Ref Range    Sodium 143 135 - 145 MMOL/L    Potassium 3.5 3.5 - 5.1 MMOL/L    Chloride 104 99 - 110 mMol/L    CO2 26 21 - 32 MMOL/L    BUN 19 6 - 23 MG/DL    CREATININE 1.0 0.6 - 1.1 MG/DL    Glucose 120 (H) 70 - 99 MG/DL    Calcium 8.5 8.3 - 10.6 MG/DL    Albumin 4.2 3.4 - 5.0 GM/DL    Total Protein 6.2 (L) 6.4 - 8.2 GM/DL    Total Bilirubin 0.3 0.0 - 1.0 MG/DL    ALT 15 10 - 40 U/L    AST 18 15 - 37 IU/L    Alkaline Phosphatase 87 40 - 129 IU/L    GFR Non- 53 (L) >60 mL/min/1.73m2    GFR African American >60 >60 mL/min/1.73m2    Anion Gap 13 4 - 16   Troponin   Result Value Ref Range    Troponin T <0.010 <0.01 NG/ML   Brain Natriuretic Peptide   Result Value Ref Range    Pro-BNP 1,158 (H) <300 PG/ML   D-Dimer, Quantitative   Result Value Ref Range    D-Dimer, Quant 275 (H) <230 NG/mL(DDU)   Magnesium   Result Value Ref Range    Magnesium 2.0 1.8 - 2.4 mg/dl          ABNORMAL LABS:  Labs Reviewed   CBC WITH AUTO DIFFERENTIAL - Abnormal; Notable for the following components:       Result Value    Hemoglobin 12.0 (*)     MCHC 31.1 (*)     Segs Relative 67.6 (*)     Lymphocytes % 20.2 (*)     Monocytes % 11.1 (*)     All other components within normal limits   COMPREHENSIVE METABOLIC PANEL W/ REFLEX TO MG FOR LOW K - Abnormal; Notable for the following components:    Glucose 120 (*)     Total Protein 6.2 (*)     GFR Non- 53 (*)     All other components within normal limits   BRAIN NATRIURETIC PEPTIDE - Abnormal; Notable for the following components:    Pro-BNP 1,158 (*)     All other components within normal limits   D-DIMER, QUANTITATIVE - Abnormal; Notable for the following components:    D-Dimer, Quant 275 (*)     All other components within normal limits   COVID-19, RAPID   TROPONIN   MAGNESIUM         IMAGING STUDIES ORDERED:  XR CHEST PORTABLE  CT HEAD WO CONTRAST  CT CERVICAL SPINE WO CONTRAST  CTA PULMONARY W CONTRAST  CHECK TEMPERATURE  TELEMETRY MONITORING  ED NURSING COMMUNICATION  IP CONSULT TO HOSPITALIST    I have personally viewed the imaging studies. The radiologist interpretation is:   CTA PULMONARY W CONTRAST   Final Result   No evidence of central to segmental pulmonary emboli.       Borderline size of the main pulmonary artery. Right lower lobe nodule 4 mm in size. No routine follow-up imaging is   recommended. These guidelines do not apply to immunocompromised patients and   patients with cancer. Follow up in patients with significant comorbidities as   clinically warranted. For lung cancer screening, adhere to Lung-RADS   guidelines. Reference: Radiology. 2017; 284(1):228-43. CT CERVICAL SPINE WO CONTRAST   Final Result   No acute abnormality of the cervical spine. CT HEAD WO CONTRAST   Final Result   No acute intracranial abnormality. Chronic microvascular disease. XR CHEST PORTABLE   Final Result   Graphic evidence of acute pulmonary abnormality seen. Overall, no   significant changes from the prior study are identified. MEDICATIONS ADMINISTERED:  Medications   iopamidol (ISOVUE-370) 76 % injection 80 mL (80 mLs IntraVENous Given 9/17/21 2047)         COURSE & MEDICAL DECISION MAKING  Last vitals: BP (!) 208/94   Pulse 63   Resp 19   [id number]     80year-old female with syncopal episode has been having intermittent chest pressure and shortness of breath for the last 2 weeks. No evidence for dysrhythmia or ectopy here in the emergency department. No evidence for acute MI at present time however cannot exclude possibility of acute coronary syndrome. Differential diagnoses considered included, but were not limited to, acute bronchospasm, allergic reaction/anaphylaxis, acute respiratory infection, pneumonia, acute coronary syndrome, dysrhythmia, congestive heart failure, non-CHF-related pulmonary edema, pneumothorax, pulmonary embolism, and anemia. She does have a pacemaker and I have requested that this be interrogated. This is still pending. Additional workup and treatment in the ED as documented above. Pt will be admitted for further evaluation and treatment. I discussed the case with Dr. Miladis Urbina, who agreed to admit the patient.  Plan discussed with pt and/or family who expressed understanding and agreement with plan. Admitted in stable condition. Clinical Impression:  1. Syncope and collapse    2. Shortness of breath        Disposition referral (if applicable):  No follow-up provider specified. Disposition medications (if applicable):  New Prescriptions    No medications on file       ED Provider Disposition Time  DISPOSITION            Electronically signed by: Nancy Gonzales M.D., 9/17/2021 11:14 PM      This dictation was created with voice recognition software. While attempts have been made to review the dictation as it is transcribed, on occasion the spoken word can be misinterpreted by the technology leading to omissions or inappropriate words, phrases or sentences.         Hannah Rogers MD  09/18/21 [id number]

## 2021-09-18 ENCOUNTER — APPOINTMENT (OUTPATIENT)
Dept: ULTRASOUND IMAGING | Age: 86
End: 2021-09-18
Payer: MEDICARE

## 2021-09-18 PROBLEM — R55 SYNCOPE AND COLLAPSE: Status: ACTIVE | Noted: 2021-09-18

## 2021-09-18 LAB
TROPONIN T: <0.01 NG/ML
TROPONIN T: <0.01 NG/ML

## 2021-09-18 PROCEDURE — 6370000000 HC RX 637 (ALT 250 FOR IP): Performed by: INTERNAL MEDICINE

## 2021-09-18 PROCEDURE — G0378 HOSPITAL OBSERVATION PER HR: HCPCS

## 2021-09-18 PROCEDURE — 6360000002 HC RX W HCPCS: Performed by: INTERNAL MEDICINE

## 2021-09-18 PROCEDURE — 6370000000 HC RX 637 (ALT 250 FOR IP): Performed by: NURSE PRACTITIONER

## 2021-09-18 PROCEDURE — 96372 THER/PROPH/DIAG INJ SC/IM: CPT

## 2021-09-18 PROCEDURE — 84484 ASSAY OF TROPONIN QUANT: CPT

## 2021-09-18 PROCEDURE — 2580000003 HC RX 258: Performed by: INTERNAL MEDICINE

## 2021-09-18 PROCEDURE — 36415 COLL VENOUS BLD VENIPUNCTURE: CPT

## 2021-09-18 PROCEDURE — 93880 EXTRACRANIAL BILAT STUDY: CPT

## 2021-09-18 RX ORDER — ACETAMINOPHEN 650 MG/1
650 SUPPOSITORY RECTAL EVERY 6 HOURS PRN
Status: DISCONTINUED | OUTPATIENT
Start: 2021-09-18 | End: 2021-09-20 | Stop reason: HOSPADM

## 2021-09-18 RX ORDER — ONDANSETRON 4 MG/1
4 TABLET, ORALLY DISINTEGRATING ORAL EVERY 8 HOURS PRN
Status: DISCONTINUED | OUTPATIENT
Start: 2021-09-18 | End: 2021-09-20 | Stop reason: HOSPADM

## 2021-09-18 RX ORDER — METOPROLOL TARTRATE 50 MG/1
50 TABLET, FILM COATED ORAL 2 TIMES DAILY
Status: DISCONTINUED | OUTPATIENT
Start: 2021-09-18 | End: 2021-09-18

## 2021-09-18 RX ORDER — LEVOTHYROXINE SODIUM 0.05 MG/1
50 TABLET ORAL
Status: DISCONTINUED | OUTPATIENT
Start: 2021-09-18 | End: 2021-09-20 | Stop reason: HOSPADM

## 2021-09-18 RX ORDER — SODIUM CHLORIDE 9 MG/ML
25 INJECTION, SOLUTION INTRAVENOUS PRN
Status: DISCONTINUED | OUTPATIENT
Start: 2021-09-18 | End: 2021-09-20 | Stop reason: HOSPADM

## 2021-09-18 RX ORDER — ACETAMINOPHEN 325 MG/1
650 TABLET ORAL EVERY 6 HOURS PRN
Status: DISCONTINUED | OUTPATIENT
Start: 2021-09-18 | End: 2021-09-20 | Stop reason: HOSPADM

## 2021-09-18 RX ORDER — AMLODIPINE BESYLATE 5 MG/1
5 TABLET ORAL DAILY
Status: DISCONTINUED | OUTPATIENT
Start: 2021-09-18 | End: 2021-09-20 | Stop reason: HOSPADM

## 2021-09-18 RX ORDER — LISINOPRIL 5 MG/1
5 TABLET ORAL DAILY
Status: DISCONTINUED | OUTPATIENT
Start: 2021-09-18 | End: 2021-09-20 | Stop reason: HOSPADM

## 2021-09-18 RX ORDER — ALBUTEROL SULFATE 90 UG/1
2 AEROSOL, METERED RESPIRATORY (INHALATION) 4 TIMES DAILY PRN
Status: DISCONTINUED | OUTPATIENT
Start: 2021-09-18 | End: 2021-09-20 | Stop reason: HOSPADM

## 2021-09-18 RX ORDER — SODIUM CHLORIDE 0.9 % (FLUSH) 0.9 %
5-40 SYRINGE (ML) INJECTION PRN
Status: DISCONTINUED | OUTPATIENT
Start: 2021-09-18 | End: 2021-09-20 | Stop reason: HOSPADM

## 2021-09-18 RX ORDER — FLUTICASONE PROPIONATE 50 MCG
2 SPRAY, SUSPENSION (ML) NASAL DAILY
Status: DISCONTINUED | OUTPATIENT
Start: 2021-09-18 | End: 2021-09-20 | Stop reason: HOSPADM

## 2021-09-18 RX ORDER — CARVEDILOL 25 MG/1
25 TABLET ORAL 2 TIMES DAILY WITH MEALS
Status: DISCONTINUED | OUTPATIENT
Start: 2021-09-18 | End: 2021-09-20 | Stop reason: HOSPADM

## 2021-09-18 RX ORDER — POLYETHYLENE GLYCOL 3350 17 G/17G
17 POWDER, FOR SOLUTION ORAL DAILY PRN
Status: DISCONTINUED | OUTPATIENT
Start: 2021-09-18 | End: 2021-09-20 | Stop reason: HOSPADM

## 2021-09-18 RX ORDER — SODIUM CHLORIDE 0.9 % (FLUSH) 0.9 %
5-40 SYRINGE (ML) INJECTION EVERY 12 HOURS SCHEDULED
Status: DISCONTINUED | OUTPATIENT
Start: 2021-09-18 | End: 2021-09-20 | Stop reason: HOSPADM

## 2021-09-18 RX ORDER — ONDANSETRON 2 MG/ML
4 INJECTION INTRAMUSCULAR; INTRAVENOUS EVERY 6 HOURS PRN
Status: DISCONTINUED | OUTPATIENT
Start: 2021-09-18 | End: 2021-09-20 | Stop reason: HOSPADM

## 2021-09-18 RX ADMIN — METOPROLOL TARTRATE 50 MG: 50 TABLET, FILM COATED ORAL at 11:24

## 2021-09-18 RX ADMIN — SODIUM CHLORIDE, PRESERVATIVE FREE 10 ML: 5 INJECTION INTRAVENOUS at 20:24

## 2021-09-18 RX ADMIN — FLUTICASONE PROPIONATE 2 SPRAY: 50 SPRAY, METERED NASAL at 11:24

## 2021-09-18 RX ADMIN — CARVEDILOL 25 MG: 25 TABLET, FILM COATED ORAL at 17:44

## 2021-09-18 RX ADMIN — ENOXAPARIN SODIUM 40 MG: 40 INJECTION SUBCUTANEOUS at 11:27

## 2021-09-18 RX ADMIN — LEVOTHYROXINE SODIUM 50 MCG: 0.05 TABLET ORAL at 11:23

## 2021-09-18 RX ADMIN — ENOXAPARIN SODIUM 40 MG: 40 INJECTION SUBCUTANEOUS at 11:24

## 2021-09-18 RX ADMIN — AMLODIPINE BESYLATE 5 MG: 5 TABLET ORAL at 11:25

## 2021-09-18 RX ADMIN — ACETAMINOPHEN 650 MG: 325 TABLET ORAL at 20:25

## 2021-09-18 RX ADMIN — METOPROLOL TARTRATE 50 MG: 50 TABLET, FILM COATED ORAL at 01:11

## 2021-09-18 RX ADMIN — LISINOPRIL 5 MG: 5 TABLET ORAL at 11:24

## 2021-09-18 ASSESSMENT — PAIN DESCRIPTION - LOCATION: LOCATION: BACK

## 2021-09-18 ASSESSMENT — PAIN SCALES - GENERAL
PAINLEVEL_OUTOF10: 0
PAINLEVEL_OUTOF10: 5
PAINLEVEL_OUTOF10: 7
PAINLEVEL_OUTOF10: 7

## 2021-09-18 ASSESSMENT — PAIN DESCRIPTION - ORIENTATION: ORIENTATION: MID

## 2021-09-18 NOTE — CONSULTS
[address], [address] [facility]                                  CONSULTATION    PATIENT NAME: Rocio Drew                      :        1935  MED REC NO:   [id number]                          ROOM:       07  ACCOUNT NO:   [de-identified]                           ADMIT DATE: 2021  PROVIDER:     Elieser Castaneda    CONSULT DATE:  2021    REASON FOR CONSULT:  Syncope. HISTORY OF PRESENT ILLNESS:  This is an 80-year-old female who was  brought to the emergency department by EMS for syncope. She was at her  veterans office waiting out in the sun when this episode happened. She  had no history of syncope in the past.  She does have a permanent  pacemaker for heart block, dual chamber BiV ICD. She denies any sort of  seizure activity. She is unclear on how long she was out. No trauma  during the syncopal episode. Prior to this episode, the patient has  been feeling increased fatigue at home, complaining of fatigue and  slight chest pressure. No complaints of any nausea or vomiting. No  complaints of any sort of dizziness prior, have been increased in number  of falls at home due to weakness and fatigue, but not due to dizziness  or syncope. Last echo showed EF 54%, concentric left ventricular hypertrophy,  mild-to-moderate mitral regurgitation, mild aortic regurgitation. Last  stress test in  showed mild reversible outer wall myocardial  ischemia, artifact decreased sensitivity of this, and now jeopardized  myocardium is very small. No intervention was done at that time. PAST MEDICAL HISTORY:  The patient has prior medical history of AFib,  not on any anticoagulation due to intolerance and frequent falls. Arthritis, CAD, fibromyalgia, GERD, thyroid, gastric ulcers.     PAST SURGICAL HISTORY:  Appendectomy, bladder surgery, cholecystectomy,  eye surgery, hysterectomy, pacemaker insertion, BiV pacemaker Medtronic. SOCIAL HISTORY:  Former smoker. Does not use any alcohol. Does not use  any illicit drugs. FAMILY HISTORY:  She has family history of hypertension on her mother  and father side. ALLERGIES:  Allergies to AMITRIPTYLINE, ASPIRIN, CODEINE, IBUPROFEN,  PLAQUENIL, THEOPHYLLINE. HOME MEDICATIONS:  Cymbalta, Synthroid, Lopressor, albuterol, Flonase. PHYSICAL EXAMINATION:  GENERAL:  The patient is awake and alert, answering questions  appropriately, not in any acute distress. VITAL SIGNS:  Hypertensive, blood pressure 192/109. Pulse of 72, 80  paced. Respirations are 18. Afebrile, 98.2. Satting 94% on room air. HEENT:  Head is normocephalic, atraumatic. Pupils equal and reactive to  light. CHEST:  Equal in expansion. LUNGS:  Clear to auscultation. No wheezes or rhonchi are noted. HEART:  Rate and rhythm regular. ABDOMEN:  Soft, nontender. Bowel sounds are present. No  hepatosplenomegaly or guarding noted. EXTREMITIES:  No cyanosis or clubbing noted. NEUROLOGIC:  Cranial nerves II through XII are grossly intact. LABORATORY DATA:  Troponin have all been negative. ProBNP elevated at  1158. Potassium 3.5. Creatinine is at 1. Hemoglobin is at 12.0. D-dimer is 275. CTA of the chest showed no pulmonary embolism. CT of the head showed no  acute intracranial abnormalities. CT of the cervical spine showed no  acute abnormalities of the cervical spine. IMPRESSION:  This is an 51-year-old female who presents for syncopal  episode with a history of BiV pacemaker. We will interrogate pacemaker. Has history of AFib. We will also check echo to look out LV function. We will get ultrasound of carotid for a syncopal workup. She is very  hypertensive. We will add Norvasc to help with hypertension. Troponin  have all been negative. She does complain of on and off chest pressure  over the last several weeks and fatigue.   We may look out doing stress  test in the future. We will talk to her again about starting  anticoagulation for AFib. This plan was discussed with Dr. Yann Queen.       Agree with above   Ashley Srivastava    D: 09/18/2021 8:23:14       T: 09/18/2021 11:55:45     [id number]  Job#: [id number]     Doc#: [id number]    CC:

## 2021-09-18 NOTE — H&P
HISTORY AND PHYSICAL  (Hospitalist, Internal Medicine)  IDENTIFYING INFORMATION   PATIENT:  Comer Goltz  MRN:  [id number]  ADMIT DATE: 9/17/2021  TIME OF EVALUATION: 9/18/2021 12:19 AM    CHIEF COMPLAINT     Passed out  HISTORY OF PRESENT ILLNESS   Piersonr Goltz is a 80 y.o. female admitted for syncope. Patient states that she had syncopized outside the vets office about 3:30 PM.  She was taking her cat to the vet at 4 PM.  Patient denies any tongue biting, urinary continence. Patient has no history of seizures. She states that it may have lasted for maybe couple minutes. She was somewhat aware, however somewhat not, she does not recall exactly what happened. Patient denies any chest pain prior to or afterwards, denies any palpitations, had some shortness of breath, did complain of some chest pressure. Patient complains of chest pressure has been intermittent on and off for few minutes lasting for several weeks. Patient denies any wheezing, nausea, vomiting, fevers, diarrhea, or other complaints. 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  Thyroid disease     Ulcer in the past    Gastric     Past Surgical History:   Procedure Laterality Date    APPENDECTOMY      BLADDER SURGERY      CHOLECYSTECTOMY      COLONOSCOPY  11/4/14    normal, biopsy    EYE SURGERY      HYSTERECTOMY      PACEMAKER PLACEMENT Left 02/20/2020    bivi pacer upgrade-Medtronic Percepta Quad CRT-P MRI Carter Ram  2011    Dr. Humphrey Burk.  [facility] - following MI     History reviewed. No pertinent family history. Family Hx of HTN  Family Hx as reviewed above, otherwise non-contributory  Social History     Socioeconomic History    Marital status:      Spouse name: None    Number of children: None    Years of education: None    Highest education level: None   Occupational History    None   Tobacco Use    Smoking status: Former Smoker     Quit date: 1977     Years since quittin.7    Smokeless tobacco: Never Used   Substance and Sexual Activity    Alcohol use: No    Drug use: No    Sexual activity: None   Other Topics Concern    None   Social History Narrative    None     Social Determinants of Health     Financial Resource Strain:     Difficulty of Paying Living Expenses:    Food Insecurity:     Worried About Running Out of Food in the Last Year:     Ran Out of Food in the Last Year:    Transportation Needs:     Lack of Transportation (Medical):  Lack of Transportation (Non-Medical):    Physical Activity:     Days of Exercise per Week:     Minutes of Exercise per Session:    Stress:     Feeling of Stress :    Social Connections:     Frequency of Communication with Friends and Family:     Frequency of Social Gatherings with Friends and Family:     Attends Orthodoxy Services:     Active Member of Clubs or Organizations:     Attends Club or Organization Meetings:     Marital Status:    Intimate Partner Violence:     Fear of Current or Ex-Partner:     Emotionally Abused:     Physically Abused:     Sexually Abused:        MEDICATIONS   Medications Prior to Admission  Not in a hospital admission. Current Medications  No current facility-administered medications for this encounter.      Current Outpatient Medications   Medication Sig Dispense Refill    DULoxetine (CYMBALTA) 30 MG extended release capsule Take 1 capsule by mouth daily 30 capsule 2    metoprolol tartrate (LOPRESSOR) 50 MG tablet Take 1 tablet by mouth 2 times daily 180 tablet 1    levothyroxine (SYNTHROID) 50 MCG tablet Take 1 tablet by mouth every morning (before breakfast) TAKE ONE TABLET BY MOUTH DAILY 90 tablet 1    albuterol sulfate HFA (VENTOLIN HFA) 108 (90 Base) MCG/ACT inhaler Inhale 2 puffs into the lungs 4 times daily as needed for Wheezing (Patient not taking: Reported on 4/5/2021) 3 Inhaler 1    Acetaminophen (TYLENOL ARTHRITIS PAIN PO) Take by mouth      fluticasone (FLONASE) 50 MCG/ACT nasal spray 2 sprays by Each Nostril route daily           Allergies  Allergies   Allergen Reactions    Amitriptyline     Aspirin     Codeine     Elavil [Amitriptyline Hcl]     Hydroxychloroquine     Ibuprofen     Plaquenil [Hydroxychloroquine Sulfate]     Theophyllines        REVIEW OF SYSTEMS   Within above limitations. 14 point review of systems reviewed. Pertinent positive or negative as per HPI or otherwise negative per 14 point systems review. PHYSICAL EXAM     Wt Readings from Last 3 Encounters:   07/13/21 188 lb (85.3 kg)   07/06/21 191 lb 4.8 oz (86.8 kg)   04/05/21 187 lb 14.4 oz (85.2 kg)       Blood pressure (!) 208/94, pulse 63, resp. rate 19, SpO2 96 %, not currently breastfeeding. General - AAO x 3  Psych - Appropriate affect/speech. No agitation  Eyes - Eye lids intact. No scleral icterus  ENT - Lips wnl. External ear clear/dry/intact. No thyromegaly on inspection  Neuro - No gross peripheral or central neuro deficits on inspection  Heart - Sinus. RRR. S1 and S2 present. No added HS/murmurs appreciated. No elevated JVD appreciated  Lung - Adequate air entry b/l, No crackles/wheezes appreciated  GI - Soft. No guarding/rigidity. No hepatosplenomegaly/ascites. BS+   - No CVA/suprapubic tenderness or palpable bladder distension  Skin - Intact. No rash/petechiae/ecchymosis. Warm extremities  MSK - Joints with normal ROM.  No joint swellings    Lines/Drains/Airways/Wounds:  [unfilled]    LABS AND IMAGING   CBC  [unfilled]    Last 3 Hemoglobin  Lab Results   Component Value Date    HGB 12.0 09/17/2021    HGB 11.9 03/24/2020    HGB 12.6 02/25/2020     Last 3 WBC/ANC  Lab Results   Component Value Date    WBC 6.3 09/17/2021    WBC 5.7 03/24/2020    WBC 5.5 02/25/2020     No components found for: GRNLOCTYABS  Last 3 Platelets  No results found for: PLATELET  Chemistry  [unfilled]  [unfilled]  Lab Results   Component Value Date     12/11/2014     12/27/2013     11/25/2013     Coagulation Studies  Lab Results   Component Value Date    INR 1.02 02/25/2020     Liver Function Studies  Lab Results   Component Value Date    ALT 15 09/17/2021    AST 18 09/17/2021    ALKPHOS 87 09/17/2021       Recent Imaging        Relevant labs and imaging reviewed    ASSESSMENT AND PLAN   Everett Mariscal is a 80 y.o. female p/w    Syncope  - CT with no acute findings  - anticoagulations, on none  - tele  - EKG with no heart blocks  - orthostatic BP  - cardiology consult  - ECHO    Hypothyroidism  - c/w synthroid    H/o afib s/p pacemaker  - not on AC  - metoprolol    Fibromyalgia  CAD  GERD    States she only takes two medications, one for BP and one for her thyroid    Case d/w ED provider    DVT ppx: lovenox  Code status: full    Uyen, Internal Medicine  9/18/2021 at 12:19 AM

## 2021-09-18 NOTE — ED NOTES
Handoff to [facility].     ED Charge JORDYN Nguyen notified pacer need interrogated     Rachele Mars RN  09/17/21 [id number]

## 2021-09-18 NOTE — ED NOTES
Pt reports she was waiting at the vet with for her cat and she started to feel funny. Pt denied CP or SOB at this time.  Pt states she had some weird pain in her back/side- not like gallbladder pain but similar      Candie Palma RN  09/17/21 2016

## 2021-09-18 NOTE — CONSULTS
Dictation # [id number]    Syncopal episode at vet's office  Was able lower herself before passing out  Hx of CHB with BiV PPM insert  Will interrogate PM this AM  Will get carotid duplex and echo  Hx of PAF, on metoprolol but no AC. Per office notes she didn't tolerate well  Will discuss with her about this again  HTN.  Will add Norvasc in efforts to better control BP    Echo 9/2020  EF 54%, Concentrated LVH, Normal LVEDP  Mild-moderate MR, Mild AR, Impaired relaxation diastolic dysfunction     BIV pacer check normal function PAFIB noted  She has confusion   No ian events for syncope  Carotid negative   CTA chest negative   Possible dementia -suggest neuro   She has old leads, device is OK-for MRI-MRI not compatible  HTN adjust meds  Change coreg from lopressor for better BP  She is on ACEI and Norvasc  Check ortho BP      Electronically signed by Jacinta Garrison MD on 9/18/21 at 2:12 PM EDT

## 2021-09-18 NOTE — ED NOTES
Bed: ED-17  Expected date:   Expected time:   Means of arrival:   Comments:  RM 6     Oleksandr Finney RN  09/17/21 9508

## 2021-09-18 NOTE — ED NOTES
Report received from [facility], Care of pt transferred at this time.      Jonnathan Juan RN  09/18/21 2760

## 2021-09-18 NOTE — CONSULTS
Will dictate full note  Patient came with syncope  She has a dual chamber pacer  Will check pacer in am  Patient follows in office  She is hypertensive     Electronically signed by Patrick Delgadillo MD on 9/18/21 at 1:36 AM EDT

## 2021-09-18 NOTE — PROGRESS NOTES
Hospitalist Progress Note      Name:  Dasha Domingo /Age/Sex: 1935  (80 y.o. female)   MRN & CSN:  [id number] & [id number] Admission Date/Time: 2021  5:34 PM   Location:  [address] [place] [address]-A PCP: Lani Lester, [facility] Day: 2    Assessment and Plan:   Dasha Domingo is a 80 y.o. female p/w     Syncope  - CT with no acute findings  - Carotid US completed today with no stenosis noted  - EKG with no heart blocks  - Orthostatic BP  - Echo f/u  - Pacer interrogation today  - Cardiology following, appreciate recs    Uncontrolled Hypertension  - Continue Norvasc  - ACE added this AM  - Will monitor closely    ? Dementia  - Reports staggering gait recently as well; d/w Cardiology this AM  - Cannot undergo MRI as pacer not compatible  - Will c/s Neurology for further recs, appreciated     Hypothyroidism  - c/w synthroid     H/o afib s/p pacemaker  - Not on AC  - Continue Metoprolol  - Pacer interrogation today     Fibromyalgia  CAD  GERD    Diet ADULT DIET; Regular; Low Sodium (2 gm)   DVT Prophylaxis [] Lovenox, []  Heparin, [] SCDs, [] Ambulation   GI Prophylaxis [] PPI,  [] H2 Blocker,  [] Carafate,  [] Diet/Tube Feeds   Code Status Full Code   Disposition Patient requires continued admission due to    MDM [] Low, [] Moderate,[]  High  Patient's risk as above due to      History of Present Illness:     Tired this morning; no chills; denies any chest pain or SOB prior to her syncopal event yesterday    Objective:   No intake or output data in the 24 hours ending 21 1201   Vitals:   Vitals:    21 1136   BP: (!) 174/115   Pulse: 89   Resp:    Temp:    SpO2:      Physical Exam:   GEN Awake female, sitting upright in bed in no apparent distress. Appears given age. EYES Pupils are equally round. No scleral erythema, discharge, or conjunctivitis. HENT Mucous membranes are moist. Oral pharynx without exudates, no evidence of thrush. NECK Supple, no apparent thyromegaly or masses.   RESP Clear to auscultation, no wheezes, rales or rhonchi  CARDIO/VASC S1/S2 auscultated. Regular rate without appreciable murmurs, rubs, or gallops  GI Abdomen is soft without significant tenderness, masses, or guarding. Bowel sounds are normoactive. Rectal exam deferred.  No costovertebral angle tenderness. Normal appearing external genitalia. Rodriguez catheter is not present. HEME/LYMPH No petechiae or ecchymoses. MSK No gross joint deformities. SKIN Normal coloration, warm, dry. NEURO Cranial nerves appear grossly intact, normal speech  PSYCH Awake, alert, oriented x 4. Affect appropriate.     Medications:   Medications:    metoprolol tartrate  50 mg Oral BID    levothyroxine  50 mcg Oral QAM AC    fluticasone  2 spray Each Nostril Daily    sodium chloride flush  5-40 mL IntraVENous 2 times per day    enoxaparin  40 mg SubCUTAneous Daily    amLODIPine  5 mg Oral Daily    lisinopril  5 mg Oral Daily      Infusions:    sodium chloride       PRN Meds: albuterol sulfate HFA, 2 puff, 4x Daily PRN  sodium chloride flush, 5-40 mL, PRN  sodium chloride, 25 mL, PRN  ondansetron, 4 mg, Q8H PRN   Or  ondansetron, 4 mg, Q6H PRN  polyethylene glycol, 17 g, Daily PRN  acetaminophen, 650 mg, Q6H PRN   Or  acetaminophen, 650 mg, Q6H PRN        Electronically signed by Rafael Dias MD on 9/18/2021 at 12:01 PM

## 2021-09-19 PROCEDURE — 6360000002 HC RX W HCPCS: Performed by: INTERNAL MEDICINE

## 2021-09-19 PROCEDURE — 2580000003 HC RX 258: Performed by: INTERNAL MEDICINE

## 2021-09-19 PROCEDURE — G0378 HOSPITAL OBSERVATION PER HR: HCPCS

## 2021-09-19 PROCEDURE — 94761 N-INVAS EAR/PLS OXIMETRY MLT: CPT

## 2021-09-19 PROCEDURE — 6370000000 HC RX 637 (ALT 250 FOR IP): Performed by: NURSE PRACTITIONER

## 2021-09-19 PROCEDURE — 99204 OFFICE O/P NEW MOD 45 MIN: CPT | Performed by: STUDENT IN AN ORGANIZED HEALTH CARE EDUCATION/TRAINING PROGRAM

## 2021-09-19 PROCEDURE — 96372 THER/PROPH/DIAG INJ SC/IM: CPT

## 2021-09-19 PROCEDURE — 6370000000 HC RX 637 (ALT 250 FOR IP): Performed by: INTERNAL MEDICINE

## 2021-09-19 RX ADMIN — SODIUM CHLORIDE, PRESERVATIVE FREE 10 ML: 5 INJECTION INTRAVENOUS at 21:53

## 2021-09-19 RX ADMIN — LISINOPRIL 5 MG: 5 TABLET ORAL at 11:11

## 2021-09-19 RX ADMIN — ACETAMINOPHEN 650 MG: 325 TABLET ORAL at 22:18

## 2021-09-19 RX ADMIN — LEVOTHYROXINE SODIUM 50 MCG: 0.05 TABLET ORAL at 06:30

## 2021-09-19 RX ADMIN — SODIUM CHLORIDE, PRESERVATIVE FREE 10 ML: 5 INJECTION INTRAVENOUS at 11:12

## 2021-09-19 RX ADMIN — FLUTICASONE PROPIONATE 2 SPRAY: 50 SPRAY, METERED NASAL at 11:11

## 2021-09-19 RX ADMIN — ENOXAPARIN SODIUM 40 MG: 40 INJECTION SUBCUTANEOUS at 11:11

## 2021-09-19 RX ADMIN — AMLODIPINE BESYLATE 5 MG: 5 TABLET ORAL at 11:11

## 2021-09-19 RX ADMIN — CARVEDILOL 25 MG: 25 TABLET, FILM COATED ORAL at 17:39

## 2021-09-19 RX ADMIN — RIVAROXABAN 15 MG: 15 TABLET, FILM COATED ORAL at 17:39

## 2021-09-19 RX ADMIN — CARVEDILOL 25 MG: 25 TABLET, FILM COATED ORAL at 11:11

## 2021-09-19 ASSESSMENT — PAIN SCALES - GENERAL: PAINLEVEL_OUTOF10: 5

## 2021-09-19 NOTE — PROGRESS NOTES
Hospitalist Progress Note      Name:  Contreras Meeks /Age/Sex: 1935  (80 y.o. female)   MRN & CSN:  [id number] & [id number] Admission Date/Time: 2021  5:34 PM   Location:  [address] [place] [address]-A PCP: Aleisha Craft, [facility] Day: 3    Assessment and Plan:   Contreras Meeks is a 80 y.o. female p/w     Syncope  - ? Vasovagal after more discussion today; felt hot and sweaty prior to episode; was also anxious at vet per patient and from prior days event  - CT with no acute findings  - Carotid US completed with no stenosis noted  - EKG with no heart blocks  - Orthostatic BP  - Echo f/u; likely complete tomorrow  - Pacer interrogation with no events  - Cardiology following, appreciate recs    Uncontrolled Hypertension  - Continue Norvasc  - ACE added this AM  - Will monitor closely    ? Dementia  - No evidence of dementia per Neuro recs with MMSE 30/30   - Neurology following, appreciate recs    Recent falls  - B12 and folate pending  - Likely component of neuropathy after Neurology eval today  - Appreciate Neurology recs     Hypothyroidism  - c/w synthroid  - TSH/Free T4 ordered     H/o afib s/p pacemaker  - Not on AC  - Continue Metoprolol  - Pacer interrogation today     Fibromyalgia  CAD  GERD    Reason for Continued Admission: Patient very worried about going home given syncopal episode; encouraged ambulation today and plan for d/c in AM    Diet ADULT DIET; Regular;  Low Sodium (2 gm)   DVT Prophylaxis [] Lovenox, []  Heparin, [] SCDs, [] Ambulation   GI Prophylaxis [] PPI,  [] H2 Blocker,  [] Carafate,  [] Diet/Tube Feeds   Code Status Full Code   Disposition Patient requires continued admission due to    MDM [] Low, [] Moderate,[]  High  Patient's risk as above due to      History of Present Illness:     Tired but appears improved; no further episodes of syncope; no nausea or vomiting    Objective:   No intake or output data in the 24 hours ending 21 1121   Vitals:   Vitals:    21 1117 BP: 116/62   Pulse: 71   Resp:    Temp: 97.7 °F (36.5 °C)   SpO2: 94%     Physical Exam:   GEN Awake female, sitting upright in bed in no apparent distress. Appears given age. EYES Pupils are equally round. No scleral erythema, discharge, or conjunctivitis. HENT Mucous membranes are moist. Oral pharynx without exudates, no evidence of thrush. NECK Supple, no apparent thyromegaly or masses. RESP Clear to auscultation, no wheezes, rales or rhonchi  CARDIO/VASC S1/S2 auscultated. Regular rate without appreciable murmurs, rubs, or gallops  GI Abdomen is soft without significant tenderness, masses, or guarding. Bowel sounds are normoactive. Rectal exam deferred.  No costovertebral angle tenderness. Normal appearing external genitalia. Rodriguez catheter is not present. HEME/LYMPH No petechiae or ecchymoses. MSK No gross joint deformities. SKIN Normal coloration, warm, dry. NEURO Cranial nerves appear grossly intact, normal speech  PSYCH Awake, alert, oriented x 4. Affect appropriate.     Medications:   Medications:    levothyroxine  50 mcg Oral QAM AC    fluticasone  2 spray Each Nostril Daily    sodium chloride flush  5-40 mL IntraVENous 2 times per day    enoxaparin  40 mg SubCUTAneous Daily    amLODIPine  5 mg Oral Daily    lisinopril  5 mg Oral Daily    carvedilol  25 mg Oral BID WC      Infusions:    sodium chloride       PRN Meds: albuterol sulfate HFA, 2 puff, 4x Daily PRN  sodium chloride flush, 5-40 mL, PRN  sodium chloride, 25 mL, PRN  ondansetron, 4 mg, Q8H PRN   Or  ondansetron, 4 mg, Q6H PRN  polyethylene glycol, 17 g, Daily PRN  acetaminophen, 650 mg, Q6H PRN   Or  acetaminophen, 650 mg, Q6H PRN        Electronically signed by Luis Carlos Bundy MD on 9/19/2021 at 11:21 AM

## 2021-09-19 NOTE — PROGRESS NOTES
Daily Progress Note    Awake and alert  Denies any lightheaded, or dizziness  Carotids negative  Echo pending  BP and HR well controlled  Encouraged to ambulate in the [place]  She remains in paced rhythm rate is stable  Neuro note reviewed   Possible vaso -vagal syncope , not bradycardia related   Hx of PAFIB -concern for her fall, but try on [facility] for now for afib   Echo pending   Ok to d/c home from cardiac stand, echo as outpatient     Syncopal episode    No episodes since time at [facility] office    Carotid duplex negative    CT head, no acute findings    Pacer interrogated and showed no events    Denies any dizziness or lightheadedness. Possible vasovagal response to the heat. HTN    BP looking better today    ACE added this AM.    Continue Norvasc and Metoprolol    Hx of PAF    Not on AC d/t falls    Noted on interrogation    Continue with BB      Echo 9/2020  EF 54%, Concentrated LVH, Normal LVEDP  Mild-moderate MR, Mild AR, Impaired relaxation diastolic dysfunction     Impression   The right internal carotid artery demonstrates 0-50% stenosis.       The left internal carotid artery demonstrates 0-50% stenosis.       Bilateral vertebral arteries are patent with flow in the normal direction. PAST MEDICAL HISTORY:  The patient has prior medical history of AFib,  not on any anticoagulation due to intolerance and frequent falls. Arthritis, CAD, fibromyalgia, GERD, thyroid, gastric ulcers.     PAST SURGICAL HISTORY:  Appendectomy, bladder surgery, cholecystectomy,  eye surgery, hysterectomy, pacemaker insertion, BiV pacemaker [employer].     SOCIAL HISTORY:  Former smoker. Does not use any alcohol.   Does not use  any illicit drugs.     FAMILY HISTORY:  She has family history of hypertension on her mother  and father side.     ALLERGIES:  Allergies to AMITRIPTYLINE, ASPIRIN, CODEINE, IBUPROFEN,  PLAQUENIL, THEOPHYLLINE.     HOME MEDICATIONS:  Cymbalta, Synthroid, Lopressor, albuterol, Flonase. Objective:   /62   Pulse 71   Temp 97.7 °F (36.5 °C)   Resp 18   SpO2 94%   No intake or output data in the 24 hours ending 09/19/21 1206    Medications:   Scheduled Meds:   levothyroxine  50 mcg Oral QAM AC    fluticasone  2 spray Each Nostril Daily    sodium chloride flush  5-40 mL IntraVENous 2 times per day    enoxaparin  40 mg SubCUTAneous Daily    amLODIPine  5 mg Oral Daily    lisinopril  5 mg Oral Daily    carvedilol  25 mg Oral BID WC      Infusions:   sodium chloride        PRN Meds:  albuterol sulfate HFA, sodium chloride flush, sodium chloride, ondansetron **OR** ondansetron, polyethylene glycol, acetaminophen **OR** acetaminophen       Physical Exam:  Vitals:    09/19/21 1117   BP: 116/62   Pulse: 71   Resp:    Temp: 97.7 °F (36.5 °C)   SpO2: 94%        General: AAO, NAD  Chest: Nontender  Cardiac: First and Second Heart Sounds are Normal, No Murmurs or Gallops noted  Lungs:Clear to auscultation and percussion. Abdomen: Soft, NT, ND, +BS  Extremities: No clubbing, no edema  Vascular:  Equal 2+ peripheral pulses. Lab Data:  CBC:   Recent Labs     09/17/21  1813   WBC 6.3   HGB 12.0*   HCT 38.6   MCV 89.8        BMP:   Recent Labs     09/17/21  1813      K 3.5      CO2 26   BUN 19   CREATININE 1.0     LIVER PROFILE:   Recent Labs     09/17/21  1813   AST 18   ALT 15   BILITOT 0.3   ALKPHOS 87     PT/INR: No results for input(s): PROTIME, INR in the last 72 hours. APTT: No results for input(s): APTT in the last 72 hours. BNP:  No results for input(s): BNP in the last 72 hours.       Assessment:  Patient Active Problem List    Diagnosis Date Noted    Syncope and collapse 09/18/2021    Other cardiomyopathies ([facility] 07/06/2021    Fibromyalgia 04/05/2021    Episode of recurrent major depressive disorder ([facility] 04/05/2021    SOB (shortness of breath) 09/08/2020    Other specified hypothyroidism 09/08/2020    OAB (overactive bladder) 06/08/2020    S/P biventricular cardiac pacemaker procedure 02/27/2020    A-fib ([facility] 08/26/2019    Type 2 diabetes mellitus with diabetic polyneuropathy, without long-term current use of insulin ([facility] 06/05/2019       Electronically signed by TYLER Coleman CNP on 9/19/2021 at 12:06 PM    .Electronically signed by Shireen Iyer MD on 9/19/21 at 1:28 PM EDT

## 2021-09-20 VITALS
HEART RATE: 65 BPM | RESPIRATION RATE: 16 BRPM | SYSTOLIC BLOOD PRESSURE: 163 MMHG | TEMPERATURE: 98.3 F | OXYGEN SATURATION: 93 % | BODY MASS INDEX: 25.62 KG/M2 | DIASTOLIC BLOOD PRESSURE: 59 MMHG | WEIGHT: 178.57 LBS

## 2021-09-20 LAB
EKG ATRIAL RATE: 61 BPM
EKG DIAGNOSIS: NORMAL
EKG Q-T INTERVAL: 460 MS
EKG QRS DURATION: 144 MS
EKG QTC CALCULATION (BAZETT): 474 MS
EKG R AXIS: -58 DEGREES
EKG T AXIS: 112 DEGREES
EKG VENTRICULAR RATE: 64 BPM
FOLATE: 17.9 NG/ML (ref 3.1–17.5)
LV EF: 53 %
LVEF MODALITY: NORMAL
T4 FREE: 0.92 NG/DL (ref 0.9–1.8)
TSH HIGH SENSITIVITY: 1.81 UIU/ML (ref 0.27–4.2)
VITAMIN B-12: 390.9 PG/ML (ref 211–911)

## 2021-09-20 PROCEDURE — 93010 ELECTROCARDIOGRAM REPORT: CPT | Performed by: INTERNAL MEDICINE

## 2021-09-20 PROCEDURE — G0378 HOSPITAL OBSERVATION PER HR: HCPCS

## 2021-09-20 PROCEDURE — 6370000000 HC RX 637 (ALT 250 FOR IP): Performed by: INTERNAL MEDICINE

## 2021-09-20 PROCEDURE — 6370000000 HC RX 637 (ALT 250 FOR IP): Performed by: NURSE PRACTITIONER

## 2021-09-20 PROCEDURE — 84439 ASSAY OF FREE THYROXINE: CPT

## 2021-09-20 PROCEDURE — 82746 ASSAY OF FOLIC ACID SERUM: CPT

## 2021-09-20 PROCEDURE — 2580000003 HC RX 258: Performed by: INTERNAL MEDICINE

## 2021-09-20 PROCEDURE — 93306 TTE W/DOPPLER COMPLETE: CPT

## 2021-09-20 PROCEDURE — 84443 ASSAY THYROID STIM HORMONE: CPT

## 2021-09-20 PROCEDURE — 36415 COLL VENOUS BLD VENIPUNCTURE: CPT

## 2021-09-20 PROCEDURE — 82607 VITAMIN B-12: CPT

## 2021-09-20 RX ORDER — AMLODIPINE BESYLATE 5 MG/1
5 TABLET ORAL DAILY
Qty: 30 TABLET | Refills: 3 | Status: SHIPPED | OUTPATIENT
Start: 2021-09-21 | End: 2022-10-07 | Stop reason: SDUPTHER

## 2021-09-20 RX ORDER — LISINOPRIL 5 MG/1
5 TABLET ORAL DAILY
Qty: 30 TABLET | Refills: 3 | Status: SHIPPED | OUTPATIENT
Start: 2021-09-21 | End: 2022-10-07 | Stop reason: SDUPTHER

## 2021-09-20 RX ADMIN — LISINOPRIL 5 MG: 5 TABLET ORAL at 08:38

## 2021-09-20 RX ADMIN — AMLODIPINE BESYLATE 5 MG: 5 TABLET ORAL at 08:38

## 2021-09-20 RX ADMIN — LEVOTHYROXINE SODIUM 50 MCG: 0.05 TABLET ORAL at 06:23

## 2021-09-20 RX ADMIN — SODIUM CHLORIDE, PRESERVATIVE FREE 10 ML: 5 INJECTION INTRAVENOUS at 08:39

## 2021-09-20 RX ADMIN — CARVEDILOL 25 MG: 25 TABLET, FILM COATED ORAL at 08:38

## 2021-09-20 NOTE — PROGRESS NOTES
Daily Progress Note     awake alert feeling better  No chest pain, v paced rhythm noted  Ok for home from cardiac stand  Will review echo   Vaso-vagal syncope  Hx of afib on [facility] watch for falls and bleeding  Possible early dementia  F/u in office in few weeks      Echo 9/2020  EF 54%, Concentrated LVH, Normal LVEDP  Mild-moderate MR, Mild AR, Impaired relaxation diastolic dysfunction      Impression   The right internal carotid artery demonstrates 0-50% stenosis.       The left internal carotid artery demonstrates 0-50% stenosis.       Bilateral vertebral arteries are patent with flow in the normal direction.            PAST MEDICAL HISTORY:  The patient has prior medical history of AFib,  not on any anticoagulation due to intolerance and frequent falls. Arthritis, CAD, fibromyalgia, GERD, thyroid, gastric ulcers.     PAST SURGICAL HISTORY:  Appendectomy, bladder surgery, cholecystectomy,  eye surgery, hysterectomy, pacemaker insertion, BiV pacemaker [employer].     SOCIAL HISTORY:  Former smoker.  Does not use any alcohol.  Does not use  any illicit drugs.     FAMILY HISTORY: Angélica Momin has family history of hypertension on her mother  and father side.       Objective:   BP (!) 163/59   Pulse 65   Temp 98.3 °F (36.8 °C) (Oral)   Resp 16   Wt 178 lb 9.2 oz (81 kg)   SpO2 93%   BMI 25.62 kg/m²     Intake/Output Summary (Last 24 hours) at 9/20/2021 1021  Last data filed at 9/19/2021 2220  Gross per 24 hour   Intake 60 ml   Output --   Net 60 ml       Medications:   Scheduled Meds:   rivaroxaban  15 mg Oral Daily    levothyroxine  50 mcg Oral QAM AC    fluticasone  2 spray Each Nostril Daily    sodium chloride flush  5-40 mL IntraVENous 2 times per day    amLODIPine  5 mg Oral Daily    lisinopril  5 mg Oral Daily    carvedilol  25 mg Oral BID WC      Infusions:   sodium chloride        PRN Meds:  albuterol sulfate HFA, sodium chloride flush, sodium chloride, ondansetron **OR** ondansetron, polyethylene glycol, acetaminophen **OR** acetaminophen       Physical Exam:  Vitals:    09/20/21 0815   BP: (!) 163/59   Pulse: 65   Resp: 16   Temp: 98.3 °F (36.8 °C)   SpO2: 93%        General: awake alert   Chest: Nontender  Cardiac: afib and paced   Lungs:Clear to auscultation and percussion. Abdomen: Soft, NT, ND, +BS  Extremities: no edema   Vascular:  Equal 2+ peripheral pulses. Lab Data:  CBC:   Recent Labs     09/17/21 1813   WBC 6.3   HGB 12.0*   HCT 38.6   MCV 89.8        BMP:   Recent Labs     09/17/21 1813      K 3.5      CO2 26   BUN 19   CREATININE 1.0     LIVER PROFILE:   Recent Labs     09/17/21 1813   AST 18   ALT 15   BILITOT 0.3   ALKPHOS 87     PT/INR: No results for input(s): PROTIME, INR in the last 72 hours. APTT: No results for input(s): APTT in the last 72 hours. BNP:  No results for input(s): BNP in the last 72 hours.       Assessment:  Patient Active Problem List    Diagnosis Date Noted    Syncope and collapse 09/18/2021    Other cardiomyopathies ([facility] 07/06/2021    Fibromyalgia 04/05/2021    Episode of recurrent major depressive disorder ([facility] 04/05/2021    SOB (shortness of breath) 09/08/2020    Other specified hypothyroidism 09/08/2020    OAB (overactive bladder) 06/08/2020    S/P biventricular cardiac pacemaker procedure 02/27/2020    A-fib ([facility] 08/26/2019    Type 2 diabetes mellitus with diabetic polyneuropathy, without long-term current use of insulin ([facility] 06/05/2019       Sayda Branch MD, MD 9/20/2021 10:21 AM

## 2021-09-20 NOTE — DISCHARGE SUMMARY
Physician Discharge Summary     Patient ID:  Dasha Domingo  [id number]  22 y.o.  1935    Admit date: 9/17/2021    Discharge date and time: 09/20/2021     Admitting Physician: Chris Ulrich MD     Discharge Physician: César Sarabia CNP    Admission Diagnoses: Syncope and collapse [R55]  Shortness of breath [R06.02]  Pulmonary nodule [R91.1]    Discharge Diagnoses:   Vasovagal syncope  Shortness of breath  Pulmonary nodule  Chronic atrial fibrillation  Hypertension    Admission Condition: fair    Discharged Condition: good    Indication for Admission: Syncope    Hospital Course:   Vincent Sample a 86 y.o. female p/w syncopal episode. Patient was at veterinary office with her cat. She experienced one episode of syncope when she was told that her vegetarian bill. will be $800. She is admitted for further evaluation. CT of head has no acute findings. EKG showed paced rhythm with no heart blocks. Pacer interrogation showed no malignant arrhythmia. Orthostatic BP was negative. Carotid ultrasound showed no stenosis. Cardiology was consulted, vasovagal syncope is the likely cause. Patient has history of atrial fibrillation but not on anticoagulation, rivaroxaban was added. On the discharge day, patient vital signs were stable, labs were unremarkable, no further syncopal episodes since admission. She is stable for discharge. She will follow up with PCP, cardiology, as well as neurology as scheduled.     Consults: cardiology and neurology    Significant Diagnostic Studies: CT of head, carotid Doppler, EKG. Outstanding Order Results     Date and Time Order Name Status Description    9/20/2021  8:11 AM Echocardiogram complete 2D with doppler with color In process           Treatments: Medical management.     Discharge Exam:  BP (!) 163/59   Pulse 65   Temp 98.3 °F (36.8 °C) (Oral)   Resp 16   Wt 178 lb 9.2 oz (81 kg)   SpO2 93%   BMI 25.62 kg/m²     General Appearance:    Alert, cooperative, no Ventolin HFA  Inhale 2 puffs into the lungs 4 times daily as needed for Wheezing     DULoxetine 30 MG extended release capsule  Commonly known as: CYMBALTA  Take 1 capsule by mouth daily     fluticasone 50 MCG/ACT nasal spray  Commonly known as: FLONASE     levothyroxine 50 MCG tablet  Commonly known as: SYNTHROID  Take 1 tablet by mouth every morning (before breakfast) TAKE ONE TABLET BY MOUTH DAILY     metoprolol tartrate 50 MG tablet  Commonly known as: LOPRESSOR  Take 1 tablet by mouth 2 times daily     TYLENOL ARTHRITIS PAIN PO           Where to Get Your Medications      You can get these medications from any pharmacy    Bring a paper prescription for each of these medications  · amLODIPine 5 MG tablet  · lisinopril 5 MG tablet  · rivaroxaban 15 MG Tabs tablet           Disposition: home    Patient Instructions:   [unfilled]  Activity: activity as tolerated  Diet: cardiac diet  Wound Care: none needed    Follow-up with PCP in 1 week. Follow-up with neurology in 4 weeks.     Signed:  TYLER Shaver CNP  9/20/2021  10:27 AM

## 2021-09-20 NOTE — CARE COORDINATION
Reviewed chart and spoke with pt about discharge needs/plans. Pt lives alone, PTA she was totally independent. She has a friend who will assist her with transportation as needed. She has a PCP and insurance that covers medications. She is wanting a new PCP, will give her a list of local PCPs taking new patients. Pt denies any other needs at this time.

## 2021-09-21 ENCOUNTER — CARE COORDINATION (OUTPATIENT)
Dept: CASE MANAGEMENT | Age: 86
End: 2021-09-21

## 2021-09-21 NOTE — CARE COORDINATION
Care Transitions Outreach Attempt    Call within 2 business days of discharge: Yes      Attempted to reach patient for transitions of care follow up. Unable to reach patient. 1st attempt to reach for Care Transition discharge call unsuccessful. HIPAA compliant message left requesting call back. CTN to reattempt    Patient: Khadra Drought Patient : 1935 MRN: [id number]    Last Discharge [address] [address]       Complaint Diagnosis Description Type Department Provider    21 Shortness of Breath Syncope and collapse . .. ED to Hosp-Admission (Discharged) (ADMITTED) Daniela Maxwell MD; Maeve Jay,... Was this an external facility discharge?  No Discharge Facility: NA    Noted following upcoming appointments from discharge chart review:   [facility] follow up appointment(s):     Future Appointments   Date Time Provider Gerry Esquivel   10/5/2021  1:30 PM Kelvin Erwin PA-C [address] [place] [address] [phone]

## 2021-09-22 ENCOUNTER — TELEPHONE (OUTPATIENT)
Dept: FAMILY MEDICINE CLINIC | Age: 86
End: 2021-09-22

## 2021-09-23 ENCOUNTER — CARE COORDINATION (OUTPATIENT)
Dept: CASE MANAGEMENT | Age: 86
End: 2021-09-23

## 2021-09-23 NOTE — CARE COORDINATION
[facility] Transitions Initial Follow Up Call    Call within 2 business days of discharge: Yes    Patient: Jerson Giles Patient : 1935   MRN: <[id number]>  Reason for Admission: syncope  Discharge Date: 21 RARS: No data recorded    Last Discharge [facility]       Complaint Diagnosis Description Type Department Provider    21 Shortness of Breath Syncope and collapse . .. ED to Hosp-Admission (Discharged) (ADMITTED) Lino Ann MD; Osbaldo Jurado,... Spoke with: Zeyad Kingston    Non-face-to-face services provided:  Obtained and reviewed discharge summary and/or continuity of care documents  Education of patient/family/caregiver/guardian to support self-management-   Assessment and support for treatment adherence and medication management-      Care Transitions 24 Hour Call    Do you have any ongoing symptoms?: No  Do you have a copy of your discharge instructions?: Yes  Do you have all of your prescriptions and are they filled?: Yes  Have you been contacted by a [address]?: No  Have you scheduled your follow up appointment?: Yes  Were you discharged with any Home Care or Post Acute Services: No  Care Transitions Interventions         Care Transition Nurse contacted the patient by telephone to perform post discharge assessment. Call within 2 business days of discharge: Yes. Verified name and  with patient as identifiers. Provided introduction to self, and explanation of the CTN/ACM role, and reason for call due to risk factors for infection and/or exposure to COVID-19. Symptoms reviewed with patient who verbalized the following symptoms: no new symptoms and no worsening symptoms. Due to no new or worsening symptoms encounter was not routed to provider for escalation. Discussed follow-up appointments. If no appointment was previously scheduled, appointment scheduling offered: scheduled.   [facility] follow up appointment(s):   Future Appointments   Date Time Provider Gerry Esquivel   9/27/2021  4:00 PM Teri Magaña [address] [place] [facility]   10/5/2021  1:30 PM NICOLA Ren       Educated patient about risk for severe COVID-19 due to risk factors according to CDC guidelines. CTN reviewed discharge instructions, medical action plan and red flag symptoms with the patient who verbalized understanding. Discussed COVID vaccination status: No. Education provided on COVID-19 vaccination as appropriate. Discussed exposure protocols and quarantine with CDC Guidelines. Patient was given an opportunity to verbalize any questions and concerns and agrees to contact CTN or health care provider for questions related to their healthcare. Reviewed and educated patient on any new and changed medications related to discharge diagnosis     CTN provided contact information. Plan for follow-up call in 3-5 days based on severity of symptoms and risk factors. States she's doing alright. Denies CP, palpitations, lightheadedness, dizziness, syncope or falls. States she has occasional SOB. Meds reviewed and she reports taking as prescribed. She has a follow up appt this afternoon. Denies questions or concerns at this time.      Follow Up  Future Appointments   Date Time Provider Gerry Esquivel   9/27/2021  4:00 PM Teri Magaña [address] [place] [facility]   10/5/2021  1:30 PM NICOLA Ren

## 2021-10-14 ENCOUNTER — CARE COORDINATION (OUTPATIENT)
Dept: CASE MANAGEMENT | Age: 86
End: 2021-10-14

## 2021-10-14 NOTE — CARE COORDINATION
Care Transitions Outreach Attempt    Call within 2 business days of discharge: Yes   Attempted to reach patient for transitions of care follow up. Unable to reach  Left HIPPA Compliant VM. Hazeline Games  LPN, [facility]  PH: [phone]   patient. Patient: Curtistine Po Patient : 1935 MRN: <[id number]>    [facility]       Complaint Diagnosis Description Type Department Provider    21 Shortness of Breath Syncope and collapse . .. ED to Hosp-Admission (Discharged) (ADMITTED) Shana Sinha MD; Micky Kang,... Was this an external facility discharge?  No Discharge Facility:     Noted following upcoming appointments from discharge chart review:   [facility] follow up appointment(s):   Future Appointments   Date Time Provider Gerry Esquivel   10/28/2021 10:30 AM Tatiana Shields DO [address] [place] [address] [place]   2021  4:00 PM Milton Willoughby MD [facility]     Non-[facility] follow up appointment(s):

## 2021-10-28 ENCOUNTER — OFFICE VISIT (OUTPATIENT)
Dept: NEUROLOGY | Age: 86
End: 2021-10-28
Payer: MEDICARE

## 2021-10-28 VITALS
SYSTOLIC BLOOD PRESSURE: 142 MMHG | DIASTOLIC BLOOD PRESSURE: 82 MMHG | BODY MASS INDEX: 27.13 KG/M2 | HEART RATE: 81 BPM | OXYGEN SATURATION: 96 % | HEIGHT: 68 IN | WEIGHT: 179 LBS

## 2021-10-28 DIAGNOSIS — R55 VASOVAGAL SYNCOPE: ICD-10-CM

## 2021-10-28 DIAGNOSIS — G31.1 SENILE DEGENERATION OF BRAIN (HCC): Primary | ICD-10-CM

## 2021-10-28 DIAGNOSIS — F32.9 MAJOR DEPRESSIVE DISORDER WITH CURRENT ACTIVE EPISODE, UNSPECIFIED DEPRESSION EPISODE SEVERITY, UNSPECIFIED WHETHER RECURRENT: ICD-10-CM

## 2021-10-28 PROCEDURE — 99214 OFFICE O/P EST MOD 30 MIN: CPT | Performed by: STUDENT IN AN ORGANIZED HEALTH CARE EDUCATION/TRAINING PROGRAM

## 2021-10-28 RX ORDER — MEMANTINE HYDROCHLORIDE 5 MG/1
TABLET ORAL
Qty: 42 TABLET | Refills: 0 | Status: SHIPPED | OUTPATIENT
Start: 2021-10-28 | End: 2022-10-07

## 2021-10-28 RX ORDER — MEMANTINE HYDROCHLORIDE 10 MG/1
10 TABLET ORAL 2 TIMES DAILY
Qty: 60 TABLET | Refills: 5 | Status: SHIPPED | OUTPATIENT
Start: 2021-10-28 | End: 2022-10-07 | Stop reason: SDUPTHER

## 2021-10-28 NOTE — PATIENT INSTRUCTIONS
Ask cardiology about being on an anticoagulant for atrial fibrillation. No reason from a neurologic standpoint that you cant be on this medication.      Antidepressant medications that may also benefit headache: duloxetine, elavil

## 2021-10-28 NOTE — PROGRESS NOTES
10/28/21    Ernesto Calix  1935    Chief Complaint   Patient presents with    Follow-Up from Hospital     syncope       History of Present Illness  Akiko Shelby is a 80 y.o. female presenting today hospital follow-up for syncope. Akiko Shelby would also like to discuss memory loss today. She was seen at [facility] on 9/19/2021 for an episode of syncope suspected to be a vasovagal syncope. She had presented to her veterinary clinic to  her cat when she was told that she had a $800 veterinary bill. She subsequently became lightheaded and passed out. Her work-up inpatient was unremarkable. Included a CT head and CT cervical spine which were unremarkable. Thyroid, CBC, CMP labs were unremarkable. Vitamin B12 low normal at 390. She has had no further episodes of loss of consciousness. She does describe dyspnea. She recently stopped taking Xarelto at the recommendations of her cardiologist. Three years ago she had an episode of orthostatic syncope. Reports to me at that on that episode she had bent down to grab something and upon standing up she had loss of consciousness. Regarding her memory, she is accompanied by her daughter today who reports that there have been significant issues with short-term memory. They admit that she will frequently ask the same questions over and over. They deny any concerns regarding keeping a clean house or performing hygiene tasks. She has not taken medications inappropriately. She has not had any difficulties with managing her finances, however, has had some difficulties with cooking as she will misplace food while she is cooking. She has gotten lost while driving. Kt Napier reports a history of depression for which she had been on Paxil for some time. She has been off antidepressant x 1 year. She admits that she feels her depression is contributing to memory concerns. She doesn't want to come out of the house often times.   Her daughter reports that she will find her in the house looking at old photos tearful. She has had significant number of family losses in the past year. She also deals with a drug addicted grandchild on a regular basis which has been very stressful for her. Current Outpatient Medications   Medication Sig Dispense Refill    memantine (NAMENDA) 5 MG tablet Take (1) 5 mg tab QD x7 days, then 1 BID x7 days; then 2 tabs Q am-1 tab pm x7 days then change to 10mg RX 42 tablet 0    memantine (NAMENDA) 10 MG tablet Take 1 tablet by mouth 2 times daily 60 tablet 5    lisinopril (PRINIVIL;ZESTRIL) 5 MG tablet Take 1 tablet by mouth daily 30 tablet 3    amLODIPine (NORVASC) 5 MG tablet Take 1 tablet by mouth daily 30 tablet 3    metoprolol tartrate (LOPRESSOR) 50 MG tablet Take 1 tablet by mouth 2 times daily 180 tablet 1    levothyroxine (SYNTHROID) 50 MCG tablet Take 1 tablet by mouth every morning (before breakfast) TAKE ONE TABLET BY MOUTH DAILY 90 tablet 1    albuterol sulfate HFA (VENTOLIN HFA) 108 (90 Base) MCG/ACT inhaler Inhale 2 puffs into the lungs 4 times daily as needed for Wheezing 3 Inhaler 1    Acetaminophen (TYLENOL ARTHRITIS PAIN PO) Take by mouth      fluticasone (FLONASE) 50 MCG/ACT nasal spray 2 sprays by Each Nostril route daily      rivaroxaban (XARELTO) 15 MG TABS tablet Take 1 tablet by mouth daily (Patient not taking: Reported on 10/28/2021) 30 tablet 3    DULoxetine (CYMBALTA) 30 MG extended release capsule Take 1 capsule by mouth daily (Patient not taking: Reported on 10/28/2021) 30 capsule 2     No current facility-administered medications for this visit. Physical Exam:  Also present during visit: daughter.     Mental Status   Orientation: oriented to person, oriented to place, oriented to problem and oriented to time    Mood/affectappropriate mood and appropriate affect   Memory/Other: recent memory intact, remote memory intact, fund of knowledge intact, attention span normal and concentration normal  Language  Language: (normal) language, no dysarthria, (normal) articulation and no dysphasia/aphasia  Cranial Nerves   Eyes: pupils normal size and reactive to light and visual fields appear full   CN III, IV, VI : extraocular muscle strength normal, normal pursuit, no nystagmus and no ptosis   Facial Motor: normal facial motor   CN XII: tongue protrudes midline  Motor/Coordination Exam   Power: motor strength appears intact throughout, no arm drift and normal tone   Coordination: normal finger-to-nose, forearm rotation intact and rapid alternating movement normal  Gait and Stance   Gait/Posture: station normal, casual gait normal, ambulates independently , tiptoe normal and steady in Romberg's position with eyes open and closed    27 Point MMSE Screen:   Orientation (Time):    Orientation (Place):    Learning 3 Objects: 2/3   Attention:    Recall 3 Objects: 2/3   Namin/2   Repitition:    3-Step Command: 1/3   TOTAL:       BP (!) 142/82 (Site: Left Upper Arm, Position: Sitting, Cuff Size: Large Adult)   Pulse 81   Ht 5' 8\" (1.727 m)   Wt 179 lb (81.2 kg)   SpO2 96%   BMI 27.22 kg/m²     Assessment and Plan     Diagnosis Orders   1. Senile degeneration of brain (HCC)  memantine (NAMENDA) 5 MG tablet    memantine (NAMENDA) 10 MG tablet    MRI BRAIN WO CONTRAST   2. Major depressive disorder with current active episode, unspecified depression episode severity, unspecified whether recurrent     3. Vasovagal syncope        Joni Siddiqui was seen today in hospital follow-up for syncopal event. I spent time today reviewing the event in detail and continue to feel that this likely represents vasovagal syncope. In support of this, she does report 1 prior episode that sounded most consistent with orthostatic syncope. She does have cardiac disease for which she has a pacemaker so I do feel this is most likely the culprit. There are no features that would be supportive of seizure as the etiology. She will continue to follow with cardiology. I have asked that she discuss anticoagulant use further with them as from a neurologic standpoint there is no reason she should not be a candidate for this medication if indicated for atrial fibrillation. Xiomara Mcrae would also like to discuss memory concerns with me today. 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 I also spent time reviewing her most recent imaging including a CT head as well as lab work from her hospital stay. I discussed that I would like to obtain an MRI brain looking for possible structural etiologies to memory loss. She is agreeable to this today. I additionally discussed with her that I think she would be a good candidate to be initiated on memory medication such as Namenda as Aricept is contraindicated given her cardiac history. We discussed appropriate titration of this medication today. I did discuss with Xiomara Mcrae that I currently do not feel she should be driving given she has gotten lost. What is slightly challenging is that she also has a superimposed untreated depression that does seem to be rather profound. I spent time discussing this with them at length and how untreated depression can often cause an exacerbation of memory difficulties. She is going to address this further with her primary care provider. I discussed with them that I am hopeful that should her depression get under better control, her memory concerns will improve. She and her granddaughter were very responsive to this conversation. We additionally discussed nonpharmacologic interventions including staying active cognitively, socially, and physically to help slow down the progression of memory loss. At the end of our meeting Xiomara Mcrae endorses chronic headache.  I discussed that we will follow up with this at our next appointment so more dedicated time could be spent. Medications prescribed for the patient were discussed in detail. This included a discussion of the potential risks vs the potential benefits of the medications. The patient was given time to ask questions and these were answered to the best of my ability. The patient appeared to understand the information provided. Thank you for allowing us to participate in the care of your patient. Please do not hesitate to contact us with questions. Return in about 4 months (around 2/28/2022) for follow up with TOI.     Anisha Monge DO

## 2021-11-01 ENCOUNTER — TELEPHONE (OUTPATIENT)
Dept: NEUROLOGY | Age: 86
End: 2021-11-01

## 2021-11-01 NOTE — TELEPHONE ENCOUNTER
Received a fax from [facility] stating patient has a pacemaker and they are unable to preform a MRI at their facility. Naomi Dominic did also call in and verify she does in fact have a pacemaker. Please advise on what to do next.

## 2021-11-02 NOTE — TELEPHONE ENCOUNTER
Spoke to Winston and let her know we will forgo the MRI. She asked how to take her Namenda and I told her follow the instructions on the bottle and when that runs out go to the 10 mg RX. She expressed understanding and will call if she has any further questions or side effects from the medication.

## 2021-11-02 NOTE — TELEPHONE ENCOUNTER
Hello,     She recently had a CT scan of the brain which I did review. If the pacemaker is not MRI compatible then we will have to forgo obtaining it. Thank  you!     Tobias Grace

## 2021-11-23 ENCOUNTER — OFFICE VISIT (OUTPATIENT)
Dept: FAMILY MEDICINE CLINIC | Age: 86
End: 2021-11-23
Payer: MEDICARE

## 2021-11-23 VITALS
HEART RATE: 74 BPM | DIASTOLIC BLOOD PRESSURE: 86 MMHG | OXYGEN SATURATION: 96 % | HEIGHT: 68 IN | BODY MASS INDEX: 27.89 KG/M2 | WEIGHT: 184 LBS | SYSTOLIC BLOOD PRESSURE: 130 MMHG

## 2021-11-23 DIAGNOSIS — E11.42 TYPE 2 DIABETES MELLITUS WITH DIABETIC POLYNEUROPATHY, WITHOUT LONG-TERM CURRENT USE OF INSULIN (HCC): ICD-10-CM

## 2021-11-23 DIAGNOSIS — R55 SYNCOPE AND COLLAPSE: ICD-10-CM

## 2021-11-23 DIAGNOSIS — I48.91 ATRIAL FIBRILLATION, UNSPECIFIED TYPE (HCC): ICD-10-CM

## 2021-11-23 DIAGNOSIS — L50.9 URTICARIA: Primary | ICD-10-CM

## 2021-11-23 DIAGNOSIS — Z95.0 S/P BIVENTRICULAR CARDIAC PACEMAKER PROCEDURE: ICD-10-CM

## 2021-11-23 PROCEDURE — G8417 CALC BMI ABV UP PARAM F/U: HCPCS | Performed by: FAMILY MEDICINE

## 2021-11-23 PROCEDURE — 1090F PRES/ABSN URINE INCON ASSESS: CPT | Performed by: FAMILY MEDICINE

## 2021-11-23 PROCEDURE — G8427 DOCREV CUR MEDS BY ELIG CLIN: HCPCS | Performed by: FAMILY MEDICINE

## 2021-11-23 PROCEDURE — 1123F ACP DISCUSS/DSCN MKR DOCD: CPT | Performed by: FAMILY MEDICINE

## 2021-11-23 PROCEDURE — 4040F PNEUMOC VAC/ADMIN/RCVD: CPT | Performed by: FAMILY MEDICINE

## 2021-11-23 PROCEDURE — 99214 OFFICE O/P EST MOD 30 MIN: CPT | Performed by: FAMILY MEDICINE

## 2021-11-23 PROCEDURE — G8484 FLU IMMUNIZE NO ADMIN: HCPCS | Performed by: FAMILY MEDICINE

## 2021-11-23 PROCEDURE — 1036F TOBACCO NON-USER: CPT | Performed by: FAMILY MEDICINE

## 2021-11-23 NOTE — PROGRESS NOTES
2021     [facility]      Chief Complaint   Patient presents with   [facility]     routine office visit, 3 month    Urticaria     pt reports hives and does not know what is causing it, itching present    Other     pt reports reaction to p13 vaccine, given 21       LYRIC      Winston is a 80 y.o. female who presents today with the followin. Urticaria    2. Type 2 diabetes mellitus with diabetic polyneuropathy, without long-term current use of insulin ([address]    3. Atrial fibrillation, unspecified type ([address]    4. S/P biventricular cardiac pacemaker procedure    5.  Syncope and collapse    Any issues today  The patient was in the hospital since I last saw her for a syncopal spell he was thought to be vasovagal  Cardiology told her to go back on Xarelto 15 mg daily because she has had intermittent and chronic atrial fibrillation she is not taking it  She was seen by neurologist they started her on Namenda she not taking that    REVIEW OF SYMPTOMS    Review of Systems   Cardiovascular:        History of atrial fibrillation has a permanent pacemaker much confusion about her medicine she has been on amlodipine metoprolol lisinopril she was unclear which she was taking I think from her history she is probably just on the metoprolol   Endocrine:        Type II diabetic currently controlled just on diet   Skin:        Urticaria present for about a month  She has not tried any medication for it  She does not have any wheezing or signs of anaphylaxis  She has not had any definite changes in medicines  She did react to a Prevnar 13 shot within the past couple months with a local reaction   Psychiatric/Behavioral:        Long history of depression anxiety  She is failed on multiple trials of antidepressant medicines and anxiety medicine she is intolerant to all of these       PAST MEDICAL HISTORY  Past Medical History:   Diagnosis Date    A-fib [facility])     Arthritis     CAD (coronary artery disease)     Eye file    Physically Abused: Not on file    Sexually Abused: Not on file   Housing Stability:     Unable to Pay for Housing in the Last Year: Not on file    Number of Places Lived in the Last Year: Not on file    Unstable Housing in the Last Year: Not on file        SURGICAL HISTORY  Past Surgical History:   Procedure Laterality Date    APPENDECTOMY      [address] COLONOSCOPY  11/4/14    normal, biopsy    EYE SURGERY      HYSTERECTOMY      PACEMAKER PLACEMENT Left 02/20/2020    bivi pacer upgrade-Medtronic Percepta Quad CRT-P MRI Channie Feeling  2011    Dr. Jaci Recinos.  [facility] - following MI       CURRENT MEDICATIONS  Current Outpatient Medications   Medication Sig Dispense Refill    rivaroxaban (XARELTO) 15 MG TABS tablet Take 1 tablet by mouth daily 30 tablet 3    metoprolol tartrate (LOPRESSOR) 50 MG tablet Take 1 tablet by mouth 2 times daily 180 tablet 1    levothyroxine (SYNTHROID) 50 MCG tablet Take 1 tablet by mouth every morning (before breakfast) TAKE ONE TABLET BY MOUTH DAILY 90 tablet 1    Acetaminophen (TYLENOL ARTHRITIS PAIN PO) Take by mouth      fluticasone (FLONASE) 50 MCG/ACT nasal spray 2 sprays by Each Nostril route daily      memantine (NAMENDA) 5 MG tablet Take (1) 5 mg tab QD x7 days, then 1 BID x7 days; then 2 tabs Q am-1 tab pm x7 days then change to 10mg RX (Patient not taking: Reported on 11/23/2021) 42 tablet 0    memantine (NAMENDA) 10 MG tablet Take 1 tablet by mouth 2 times daily (Patient not taking: Reported on 11/23/2021) 60 tablet 5    lisinopril (PRINIVIL;ZESTRIL) 5 MG tablet Take 1 tablet by mouth daily (Patient not taking: Reported on 11/23/2021) 30 tablet 3    amLODIPine (NORVASC) 5 MG tablet Take 1 tablet by mouth daily (Patient not taking: Reported on 11/23/2021) 30 tablet 3    DULoxetine (CYMBALTA) 30 MG extended release capsule Take 1 capsule by mouth daily (Patient not taking: Reported on 10/28/2021) 30 capsule 2    albuterol sulfate HFA (VENTOLIN HFA) 108 (90 Base) MCG/ACT inhaler Inhale 2 puffs into the lungs 4 times daily as needed for Wheezing (Patient not taking: Reported on 11/23/2021) 3 Inhaler 1     No current facility-administered medications for this visit. ALLERGIES  Allergies   Allergen Reactions    Amitriptyline     Aspirin     Codeine     Elavil [Amitriptyline Hcl]     Hydroxychloroquine     Ibuprofen     Plaquenil [Hydroxychloroquine Sulfate]     Prevnar 13 [Pneumococcal 13-Kristal Conj Vacc]     Theophyllines        PHYSICAL EXAM    /86   Pulse 74   Ht 5' 8\" (1.727 m)   Wt 184 lb (83.5 kg)   SpO2 96%   BMI 27.98 kg/m²     Physical Exam  Vitals and nursing note reviewed. Constitutional:       Appearance: Normal appearance. Cardiovascular:      Rate and Rhythm: Normal rate. Pulmonary:      Effort: Pulmonary effort is normal.   Skin:     Comments: 1 urticarial lesion seen on her left arm     Reviewed the hospital report  Referred neurology consult  Try to reconcile medications  Reviewed immunization      ASSESSMENT and Divina Cash was seen today for check-up, urticaria and other. Diagnoses and all orders for this visit:    Urticaria    Type 2 diabetes mellitus with diabetic polyneuropathy, without long-term current use of insulin ([facility])    Atrial fibrillation, unspecified type ([address]    S/P biventricular cardiac pacemaker procedure    Syncope and collapse    Other orders  -     rivaroxaban (XARELTO) 15 MG TABS tablet; Take 1 tablet by mouth daily    The cause of the urticaria is unclear  I told her to get some over-the-counter Zyrtec 10 mg daily and try that  She is to check on what medicine she is on at home tomorrow I will have nursing call the cardiology office and see if they say she should be on Xarelto    Recommend Covid vaccine starting now      Return in about 3 months (around 2/23/2022).           Electronically signed by Alessandra Young MD on 11/23/2021    Please note that this chart was generated using dragon dictation software. Although every effort was made to ensure the accuracy of this automated transcription, some errors in transcription may have occurred.

## 2021-11-24 ENCOUNTER — TELEPHONE (OUTPATIENT)
Dept: FAMILY MEDICINE CLINIC | Age: 86
End: 2021-11-24

## 2021-11-24 NOTE — TELEPHONE ENCOUNTER
Returned call and spoke with Virginie Mccall. Patient was contacted by [facility] office. No further action needed.

## 2022-04-13 ENCOUNTER — TELEPHONE (OUTPATIENT)
Dept: FAMILY MEDICINE CLINIC | Age: 87
End: 2022-04-13

## 2022-04-18 ENCOUNTER — OFFICE VISIT (OUTPATIENT)
Dept: FAMILY MEDICINE CLINIC | Age: 87
End: 2022-04-18
Payer: MEDICARE

## 2022-04-18 VITALS
SYSTOLIC BLOOD PRESSURE: 128 MMHG | BODY MASS INDEX: 28.34 KG/M2 | DIASTOLIC BLOOD PRESSURE: 76 MMHG | HEIGHT: 68 IN | OXYGEN SATURATION: 97 % | WEIGHT: 187 LBS | HEART RATE: 67 BPM

## 2022-04-18 DIAGNOSIS — F33.1 MODERATE EPISODE OF RECURRENT MAJOR DEPRESSIVE DISORDER (HCC): ICD-10-CM

## 2022-04-18 DIAGNOSIS — I42.8 OTHER CARDIOMYOPATHIES (HCC): ICD-10-CM

## 2022-04-18 DIAGNOSIS — Z95.0 S/P BIVENTRICULAR CARDIAC PACEMAKER PROCEDURE: Primary | ICD-10-CM

## 2022-04-18 DIAGNOSIS — E11.42 TYPE 2 DIABETES MELLITUS WITH DIABETIC POLYNEUROPATHY, WITHOUT LONG-TERM CURRENT USE OF INSULIN (HCC): ICD-10-CM

## 2022-04-18 DIAGNOSIS — E03.9 HYPOTHYROIDISM, UNSPECIFIED TYPE: ICD-10-CM

## 2022-04-18 DIAGNOSIS — E03.8 OTHER SPECIFIED HYPOTHYROIDISM: Chronic | ICD-10-CM

## 2022-04-18 DIAGNOSIS — I48.91 ATRIAL FIBRILLATION, UNSPECIFIED TYPE (HCC): ICD-10-CM

## 2022-04-18 LAB
A/G RATIO: 1.8 (ref 1.1–2.2)
ALBUMIN SERPL-MCNC: 4.4 G/DL (ref 3.4–5)
ALP BLD-CCNC: 83 U/L (ref 40–129)
ALT SERPL-CCNC: 16 U/L (ref 10–40)
ANION GAP SERPL CALCULATED.3IONS-SCNC: 13 MMOL/L (ref 3–16)
AST SERPL-CCNC: 17 U/L (ref 15–37)
BILIRUB SERPL-MCNC: 0.4 MG/DL (ref 0–1)
BUN BLDV-MCNC: 26 MG/DL (ref 7–20)
CALCIUM SERPL-MCNC: 9 MG/DL (ref 8.3–10.6)
CHLORIDE BLD-SCNC: 103 MMOL/L (ref 99–110)
CO2: 26 MMOL/L (ref 21–32)
CREAT SERPL-MCNC: 1.1 MG/DL (ref 0.6–1.2)
GFR AFRICAN AMERICAN: 57
GFR NON-AFRICAN AMERICAN: 47
GLUCOSE BLD-MCNC: 102 MG/DL (ref 70–99)
POTASSIUM SERPL-SCNC: 4.3 MMOL/L (ref 3.5–5.1)
SODIUM BLD-SCNC: 142 MMOL/L (ref 136–145)
T4 FREE: 0.8 NG/DL (ref 0.9–1.8)
TOTAL PROTEIN: 6.8 G/DL (ref 6.4–8.2)
TSH SERPL DL<=0.05 MIU/L-ACNC: 3.14 UIU/ML (ref 0.27–4.2)

## 2022-04-18 PROCEDURE — 99213 OFFICE O/P EST LOW 20 MIN: CPT | Performed by: FAMILY MEDICINE

## 2022-04-18 PROCEDURE — 1036F TOBACCO NON-USER: CPT | Performed by: FAMILY MEDICINE

## 2022-04-18 PROCEDURE — 4040F PNEUMOC VAC/ADMIN/RCVD: CPT | Performed by: FAMILY MEDICINE

## 2022-04-18 PROCEDURE — G8427 DOCREV CUR MEDS BY ELIG CLIN: HCPCS | Performed by: FAMILY MEDICINE

## 2022-04-18 PROCEDURE — G8417 CALC BMI ABV UP PARAM F/U: HCPCS | Performed by: FAMILY MEDICINE

## 2022-04-18 PROCEDURE — 1123F ACP DISCUSS/DSCN MKR DOCD: CPT | Performed by: FAMILY MEDICINE

## 2022-04-18 PROCEDURE — 1090F PRES/ABSN URINE INCON ASSESS: CPT | Performed by: FAMILY MEDICINE

## 2022-04-18 RX ORDER — METOPROLOL TARTRATE 50 MG/1
50 TABLET, FILM COATED ORAL 2 TIMES DAILY
Qty: 180 TABLET | Refills: 1 | Status: SHIPPED | OUTPATIENT
Start: 2022-04-18 | End: 2022-09-26 | Stop reason: SDUPTHER

## 2022-04-18 RX ORDER — LEVOTHYROXINE SODIUM 0.05 MG/1
50 TABLET ORAL
Qty: 90 TABLET | Refills: 1 | Status: SHIPPED | OUTPATIENT
Start: 2022-04-18 | End: 2022-10-07 | Stop reason: SDUPTHER

## 2022-04-18 RX ORDER — PAROXETINE 10 MG/1
10 TABLET, FILM COATED ORAL DAILY
Qty: 30 TABLET | Refills: 3 | Status: SHIPPED | OUTPATIENT
Start: 2022-04-18 | End: 2022-10-07 | Stop reason: SDUPTHER

## 2022-04-18 ASSESSMENT — PATIENT HEALTH QUESTIONNAIRE - PHQ9
8. MOVING OR SPEAKING SO SLOWLY THAT OTHER PEOPLE COULD HAVE NOTICED. OR THE OPPOSITE, BEING SO FIGETY OR RESTLESS THAT YOU HAVE BEEN MOVING AROUND A LOT MORE THAN USUAL: 1
6. FEELING BAD ABOUT YOURSELF - OR THAT YOU ARE A FAILURE OR HAVE LET YOURSELF OR YOUR FAMILY DOWN: 0
5. POOR APPETITE OR OVEREATING: 0
10. IF YOU CHECKED OFF ANY PROBLEMS, HOW DIFFICULT HAVE THESE PROBLEMS MADE IT FOR YOU TO DO YOUR WORK, TAKE CARE OF THINGS AT HOME, OR GET ALONG WITH OTHER PEOPLE: 2
4. FEELING TIRED OR HAVING LITTLE ENERGY: 2
2. FEELING DOWN, DEPRESSED OR HOPELESS: 3
SUM OF ALL RESPONSES TO PHQ QUESTIONS 1-9: 11
1. LITTLE INTEREST OR PLEASURE IN DOING THINGS: 2
SUM OF ALL RESPONSES TO PHQ QUESTIONS 1-9: 11
9. THOUGHTS THAT YOU WOULD BE BETTER OFF DEAD, OR OF HURTING YOURSELF: 0
SUM OF ALL RESPONSES TO PHQ QUESTIONS 1-9: 11
3. TROUBLE FALLING OR STAYING ASLEEP: 3
SUM OF ALL RESPONSES TO PHQ9 QUESTIONS 1 & 2: 5
7. TROUBLE CONCENTRATING ON THINGS, SUCH AS READING THE NEWSPAPER OR WATCHING TELEVISION: 0
SUM OF ALL RESPONSES TO PHQ QUESTIONS 1-9: 11

## 2022-04-18 NOTE — PROGRESS NOTES
2022     Garry Dimas      Chief Complaint   Patient presents with    Shortness of Breath     pt reports increase in SOB    Other     pt reports a narinder feeling around her pacemaker, would like this checked out       HPI      Si Sandifer is a 80 y.o. female who presents today with the followin. S/P biventricular cardiac pacemaker procedure    2. Atrial fibrillation, unspecified type ([address]    3. Hypothyroidism, unspecified type    4. Other cardiomyopathies ([address]    5. Moderate episode of recurrent major depressive disorder ([address]    6. Type 2 diabetes mellitus with diabetic polyneuropathy, without long-term current use of insulin (HCC)    7. Other specified hypothyroidism    Fortunately she has a new friend that is helping out with her and she seems much happier than prior  She has virtually no family support she lives by herself she is chronically depressed she is asking where she could go back on Paxil which has helped her in the past    REVIEW OF SYMPTOMS    Review of Systems   Cardiovascular:        Patient has a history of A. 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  Thyroid disease     Ulcer in the past    Gastric       FAMILY HISTORY  No family history on file. SOCIAL HISTORY  Social History     Socioeconomic History    Marital status:       Spouse name: None    Number of children: None    Years of education: None    Highest education level: None   Occupational History    None   Tobacco Use    Smoking status: Former Smoker     Quit date: 1977     Years since quittin.3    Smokeless tobacco: Never Used   Substance and Sexual Activity    Alcohol use: No    Drug use: No    Sexual activity: None   Other Topics Concern    None   Social History Narrative    None     Social Determinants of Health     Financial Resource Strain:     Difficulty of Paying Living Expenses: Not on file   Food Insecurity:     Worried About Running Out of Food in the Last Year: Not on file    Will of Food in the Last Year: Not on file   Transportation Needs:     Lack of Transportation (Medical): Not on file    Lack of Transportation (Non-Medical):  Not on file   Physical Activity:     Days of Exercise per Week: Not on file    Minutes of Exercise per Session: Not on file   Stress:     Feeling of Stress : Not on file   Social Connections:     Frequency of Communication with Friends and Family: Not on file    Frequency of Social Gatherings with Friends and Family: Not on file    Attends [employer] Services: Not on file    Active Member of [address] [place] [address] or Organizations: Not on file    Attends Club or Organization Meetings: Not on file    Marital Status: Not on file   Intimate Partner Violence:     Fear of Current or Ex-Partner: Not on file    Emotionally Abused: Not on file    Physically Abused: Not on file    Sexually Abused: Not on file   Housing Stability:     Unable to Pay for Housing in the Last Year: Not on file    Number of [place] in the Last Year: Not on file    Unstable Housing in the Last Year: Not on file        SURGICAL HISTORY  Past Surgical History:   Procedure Laterality Date    APPENDECTOMY      BLADDER SURGERY      CHOLECYSTECTOMY      COLONOSCOPY  14    normal, biopsy   [address] Left 02/20/2020    bivi pacer upgrade-Medtr[facility]cepta Quad [facility]    Dr. Jannie Rosas. Ahmed - following MI       CURRENT MEDICATIONS  Current Outpatient Medications   Medication Sig Dispense Refill    rivaroxaban (XARELTO) 15 MG TABS tablet Take 1 tablet by mouth daily 90 tablet 1    metoprolol tartrate (LOPRESSOR) 50 MG tablet Take 1 tablet by mouth 2 times daily 180 tablet 1    levothyroxine (SYNTHROID) 50 MCG tablet Take 1 tablet by mouth every morning (before breakfast) TAKE ONE TABLET BY MOUTH DAILY 90 tablet 1    PARoxetine (PAXIL) 10 MG tablet Take 1 tablet by mouth daily 30 tablet 3    fluticasone (FLONASE) 50 MCG/ACT nasal spray 2 sprays by Each Nostril route daily      memantine (NAMENDA) 5 MG tablet Take (1) 5 mg tab QD x7 days, then 1 BID x7 days; then 2 tabs Q am-1 tab pm x7 days then change to 10mg RX (Patient not taking: Reported on 11/23/2021) 42 tablet 0    memantine (NAMENDA) 10 MG tablet Take 1 tablet by mouth 2 times daily (Patient not taking: Reported on 11/23/2021) 60 tablet 5    lisinopril (PRINIVIL;ZESTRIL) 5 MG tablet Take 1 tablet by mouth daily 30 tablet 3    amLODIPine (NORVASC) 5 MG tablet Take 1 tablet by mouth daily (Patient not taking: Reported on 11/23/2021) 30 tablet 3    DULoxetine (CYMBALTA) 30 MG extended release capsule Take 1 capsule by mouth daily (Patient not taking: Reported on 10/28/2021) 30 capsule 2    albuterol sulfate HFA (VENTOLIN HFA) 108 (90 Base) MCG/ACT inhaler Inhale 2 puffs into the lungs 4 times daily as needed for Wheezing (Patient not taking: Reported on 11/23/2021) 3 Inhaler 1    Acetaminophen (TYLENOL ARTHRITIS PAIN PO) Take by mouth       No current facility-administered medications for this visit.        ALLERGIES  Allergies   Allergen Reactions    Amitriptyline     Aspirin     Codeine     Elavil [Amitriptyline Hcl]     Hydroxychloroquine     Ibuprofen     Plaquenil [Hydroxychloroquine Sulfate]     Prevnar 13 [Pneumococcal 13-Kristal Conj Vacc]     Theophyllines        PHYSICAL EXAM    /76   Pulse 67   Ht 5' 8\" (1.727 m)   Wt 187 lb (84.8 kg)   SpO2 97%   BMI 28.43 kg/m²     Physical Exam  Vitals and nursing note reviewed. Constitutional:       Appearance: Normal appearance. Cardiovascular:      Rate and Rhythm: Normal rate. Pulmonary:      Effort: Pulmonary effort is normal.     Immunizations reviewed  Most recent labs reviewed      ASSESSMENT and Cherelle Spivey was seen today for shortness of breath and other. Diagnoses and all orders for this visit:    S/P biventricular cardiac pacemaker procedure    Atrial fibrillation, unspecified type (HCC)  -     metoprolol tartrate (LOPRESSOR) 50 MG tablet; Take 1 tablet by mouth 2 times daily    Hypothyroidism, unspecified type  -     levothyroxine (SYNTHROID) 50 MCG tablet; Take 1 tablet by mouth every morning (before breakfast) TAKE ONE TABLET BY MOUTH DAILY  -     T4, Free; Future  -     TSH; Future    Other cardiomyopathies ([facility]    Moderate episode of recurrent major depressive disorder ([facility]    Type 2 diabetes mellitus with diabetic polyneuropathy, without long-term current use of insulin ([facility]  -     Comprehensive Metabolic Panel; Future  -     Hemoglobin A1C; Future    Other specified hypothyroidism    Other orders  -     rivaroxaban (XARELTO) 15 MG TABS tablet; Take 1 tablet by mouth daily  -     PARoxetine (PAXIL) 10 MG tablet; Take 1 tablet by mouth daily    Getting updated labs today including A1c  Start Paxil 10 mg daily    Return in about 6 months (around 10/18/2022) for hhradi. Electronically signed by Negro Kang MD on 4/18/2022    Please note that this chart was generated using dragon dictation software. Although every effort was made to ensure the accuracy of this automated transcription, some errors in transcription may have occurred.

## 2022-04-19 LAB
ESTIMATED AVERAGE GLUCOSE: 108.3 MG/DL
HBA1C MFR BLD: 5.4 %

## 2022-06-16 ENCOUNTER — TELEMEDICINE (OUTPATIENT)
Dept: FAMILY MEDICINE CLINIC | Age: 87
End: 2022-06-16
Payer: MEDICARE

## 2022-06-16 DIAGNOSIS — Z00.00 MEDICARE ANNUAL WELLNESS VISIT, SUBSEQUENT: Primary | ICD-10-CM

## 2022-06-16 PROCEDURE — 1124F ACP DISCUSS-NO DSCNMKR DOCD: CPT | Performed by: STUDENT IN AN ORGANIZED HEALTH CARE EDUCATION/TRAINING PROGRAM

## 2022-06-16 PROCEDURE — G0439 PPPS, SUBSEQ VISIT: HCPCS | Performed by: STUDENT IN AN ORGANIZED HEALTH CARE EDUCATION/TRAINING PROGRAM

## 2022-06-16 SDOH — ECONOMIC STABILITY: FOOD INSECURITY: WITHIN THE PAST 12 MONTHS, YOU WORRIED THAT YOUR FOOD WOULD RUN OUT BEFORE YOU GOT MONEY TO BUY MORE.: NEVER TRUE

## 2022-06-16 SDOH — ECONOMIC STABILITY: FOOD INSECURITY: WITHIN THE PAST 12 MONTHS, THE FOOD YOU BOUGHT JUST DIDN'T LAST AND YOU DIDN'T HAVE MONEY TO GET MORE.: NEVER TRUE

## 2022-06-16 ASSESSMENT — PATIENT HEALTH QUESTIONNAIRE - PHQ9
10. IF YOU CHECKED OFF ANY PROBLEMS, HOW DIFFICULT HAVE THESE PROBLEMS MADE IT FOR YOU TO DO YOUR WORK, TAKE CARE OF THINGS AT HOME, OR GET ALONG WITH OTHER PEOPLE: 0
5. POOR APPETITE OR OVEREATING: 0
1. LITTLE INTEREST OR PLEASURE IN DOING THINGS: 0
SUM OF ALL RESPONSES TO PHQ QUESTIONS 1-9: 1
4. FEELING TIRED OR HAVING LITTLE ENERGY: 0
7. TROUBLE CONCENTRATING ON THINGS, SUCH AS READING THE NEWSPAPER OR WATCHING TELEVISION: 0
SUM OF ALL RESPONSES TO PHQ QUESTIONS 1-9: 1
8. MOVING OR SPEAKING SO SLOWLY THAT OTHER PEOPLE COULD HAVE NOTICED. OR THE OPPOSITE, BEING SO FIGETY OR RESTLESS THAT YOU HAVE BEEN MOVING AROUND A LOT MORE THAN USUAL: 0
SUM OF ALL RESPONSES TO PHQ9 QUESTIONS 1 & 2: 1
SUM OF ALL RESPONSES TO PHQ QUESTIONS 1-9: 1
SUM OF ALL RESPONSES TO PHQ QUESTIONS 1-9: 1
9. THOUGHTS THAT YOU WOULD BE BETTER OFF DEAD, OR OF HURTING YOURSELF: 0
3. TROUBLE FALLING OR STAYING ASLEEP: 0
2. FEELING DOWN, DEPRESSED OR HOPELESS: 1
6. FEELING BAD ABOUT YOURSELF - OR THAT YOU ARE A FAILURE OR HAVE LET YOURSELF OR YOUR FAMILY DOWN: 0

## 2022-06-16 ASSESSMENT — LIFESTYLE VARIABLES: HOW OFTEN DO YOU HAVE A DRINK CONTAINING ALCOHOL: NEVER

## 2022-06-16 ASSESSMENT — SOCIAL DETERMINANTS OF HEALTH (SDOH): HOW HARD IS IT FOR YOU TO PAY FOR THE VERY BASICS LIKE FOOD, HOUSING, MEDICAL CARE, AND HEATING?: NOT HARD AT ALL

## 2022-06-16 NOTE — PROGRESS NOTES
Medicare Annual Wellness Visit    Radha Justice is here for Medicare AWV    Assessment & Plan   Medicare annual wellness visit, subsequent      Recommendations for Preventive Services Due: see orders and patient instructions/AVS.  Recommended screening schedule for the next 5-10 years is provided to the patient in written form: see Patient Instructions/AVS.     No follow-ups on file. Subjective       Patient's complete Health Risk Assessment and screening values have been reviewed and are found in Flowsheets. The following problems were reviewed today and where indicated follow up appointments were made and/or referrals ordered. Positive Risk Factor Screenings with Interventions:    Fall Risk:  Do you feel unsteady or are you worried about falling? : (!) yes (pt uses a cane when ambulating. he is taking safety measures to prevent future falls)  2 or more falls in past year?: (!) yes  Fall with injury in past year?: no     Fall Risk Interventions:    · Home safety tips provided    Cognitive:   Words recalled: 2 Words Recalled  Total Score Interpretation: Abnormal Mini-Cog  Did the patient refuse to take the cognition test?: No    Cognitive Impairment Interventions:  · Patient declines any further evaluation/treatment for cognitive impairment           General Health and ACP:  General  In general, how would you say your health is?: Fair  In the past 7 days, have you experienced any of the following: New or Increased Pain, New or Increased Fatigue, Loneliness, Social Isolation, Stress or Anger?: No  Do you get the social and emotional support that you need?: Yes  Do you have a Living Will?: Yes    Advance Directives     Power of  Living Will ACP-Advance Directive ACP-Power of     Not on File Not on File Not on File Not on File      General Health Risk Interventions:  · No Living Will: ACP documents already completed- patient asked to provide copy to the office              Objective Patient-Reported Vitals  No data recorded     Unable to obtain three vitals signs due to patient not having the equipment for BP and temp. Allergies   Allergen Reactions    Amitriptyline     Aspirin     Codeine     Elavil [Amitriptyline Hcl]     Hydroxychloroquine     Ibuprofen     Plaquenil [Hydroxychloroquine Sulfate]     Prevnar 13 [Pneumococcal 13-Kristal Conj Vacc]     Theophyllines      Prior to Visit Medications    Medication Sig Taking?  Authorizing Provider   rivaroxaban (XARELTO) 15 MG TABS tablet Take 1 tablet by mouth daily Yes Cherylene Collet, MD   metoprolol tartrate (LOPRESSOR) 50 MG tablet Take 1 tablet by mouth 2 times daily Yes Cherylene Collet, MD   levothyroxine (SYNTHROID) 50 MCG tablet Take 1 tablet by mouth every morning (before breakfast) TAKE ONE TABLET BY MOUTH DAILY Yes Cherylene Collet, MD   lisinopril (PRINIVIL;ZESTRIL) 5 MG tablet Take 1 tablet by mouth daily Yes TYLER Guy CNP   Acetaminophen (TYLENOL ARTHRITIS PAIN PO) Take by mouth Yes Historical Provider, MD   fluticasone (FLONASE) 50 MCG/ACT nasal spray 2 sprays by Each Nostril route daily Yes Historical Provider, MD   PARoxetine (PAXIL) 10 MG tablet Take 1 tablet by mouth daily  Patient not taking: Reported on 6/16/2022  Cherylene Collet, MD   memantine (NAMENDA) 5 MG tablet Take (1) 5 mg tab QD x7 days, then 1 BID x7 days; then 2 tabs Q am-1 tab pm x7 days then change to 10mg RX  Patient not taking: Reported on 11/23/2021  Warren Carmen,    memantine (NAMENDA) 10 MG tablet Take 1 tablet by mouth 2 times daily  Patient not taking: Reported on 11/23/2021  Warren Carmen, DO   amLODIPine (NORVASC) 5 MG tablet Take 1 tablet by mouth daily  Patient not taking: Reported on 11/23/2021  TYLER Guy CNP   DULoxetine (CYMBALTA) 30 MG extended release capsule Take 1 capsule by mouth daily  Patient not taking: Reported on 10/28/2021  Mauricio Gerard,    albuterol sulfate HFA (VENTOLIN HFA) 108 (90 Base) MCG/ACT inhaler Inhale 2 puffs into the lungs 4 times daily as needed for Wheezing  Patient not taking: Reported on 11/23/2021  Silvestre Barrios MD       [facility] (Including outside providers/suppliers regularly involved in providing care):   Patient Care Team:  Suzanne Armstrong PA-C as PCP - General (Physician Assistant)  Suzanne Armstrong PA-C as PCP - [facility]  Anthony Green MD as Consulting Physician (Hematology and Oncology)     Reviewed and updated this visit:  Tobacco  Allergies  Meds  Med Hx  Surg Hx  Soc Hx  Fam Hx           Gearline Lee, was evaluated through a synchronous (real-time) audio-video encounter. The patient (or guardian if applicable) is aware that this is a billable service, which includes applicable co-pays. This Virtual Visit was conducted with patient's (and/or legal guardian's) consent. The visit was conducted pursuant to the emergency declaration under the [address] [place] [address], [address] [place] [address] authority and the [employer] and [employer] General Act. Patient identification was verified, and a caregiver was present when appropriate. The patient was located at Home: [address] [address]. Provider was [facility] [address] [place] [address]. [place] [address]. Total time spent for this encounter: Not billed by time    --Suzie Elaine LPN on 9/09/7745 at 8:50 PM    An electronic signature was used to authenticate this note. Grace Zepeda LPN, 8/40/1858, performed the documented evaluation under the direct supervision of the attending physician. This encounter was performed under Tez gabriel DOs, direct supervision, 6/16/2022.

## 2022-06-16 NOTE — PATIENT INSTRUCTIONS
Personalized Preventive Plan for Ellen Fenton - 6/16/2022  Medicare offers a range of preventive health benefits. Some of the tests and screenings are paid in full while other may be subject to a deductible, co-insurance, and/or copay. Some of these benefits include a comprehensive review of your medical history including lifestyle, illnesses that may run in your family, and various assessments and screenings as appropriate. After reviewing your medical record and screening and assessments performed today your provider may have ordered immunizations, labs, imaging, and/or referrals for you. A list of these orders (if applicable) as well as your Preventive Care list are included within your After Visit Summary for your review. Other Preventive Recommendations:    · A preventive eye exam performed by an eye specialist is recommended every 1-2 years to screen for glaucoma; cataracts, macular degeneration, and other eye disorders. · A preventive dental visit is recommended every 6 months. · Try to get at least 150 minutes of exercise per week or 10,000 steps per day on a pedometer . · Order or download the FREE \"Exercise & Physical Activity: Your Everyday Guide\" from [employer]. Call 4-987.415.4720 or search [employer] online. · You need 1155-5125 mg of calcium and 4590-7967 IU of vitamin D per day. It is possible to meet your calcium requirement with diet alone, but a vitamin D supplement is usually necessary to meet this goal.  · When exposed to the sun, use a sunscreen that protects against both UVA and UVB radiation with an SPF of 30 or greater. Reapply every 2 to 3 hours or after sweating, drying off with a towel, or swimming. · Always wear a seat belt when traveling in a car. Always wear a helmet when riding a bicycle or motorcycle.

## 2022-09-26 ENCOUNTER — TELEPHONE (OUTPATIENT)
Dept: FAMILY MEDICINE CLINIC | Age: 87
End: 2022-09-26

## 2022-09-26 DIAGNOSIS — I48.91 ATRIAL FIBRILLATION, UNSPECIFIED TYPE (HCC): ICD-10-CM

## 2022-09-26 RX ORDER — METOPROLOL TARTRATE 50 MG/1
50 TABLET, FILM COATED ORAL 2 TIMES DAILY
Qty: 180 TABLET | Refills: 1 | Status: SHIPPED | OUTPATIENT
Start: 2022-09-26 | End: 2022-10-07 | Stop reason: SDUPTHER

## 2022-09-26 NOTE — TELEPHONE ENCOUNTER
Refill needed. FYI:  Chepe Márquez was calling in for refill on BP Med. For some reason she was told she needs an appt. For refill. She was seen in June by Dr. Brenda Castaneda. Dizziness at times over last 2-3 weeks. This is also about how long she has been out of BP meds. She does state some mild SOB at times. She was in no distress or SOB at time of call. I made her appt. First available 10/07/2022. I advised if any chest pain or SOB returns to go to ER. [facility].

## 2022-10-07 ENCOUNTER — HOSPITAL ENCOUNTER (INPATIENT)
Age: 87
LOS: 2 days | Discharge: NURSING, REHAB OR LONG-TERM CARE | DRG: 312 | End: 2022-10-10
Attending: STUDENT IN AN ORGANIZED HEALTH CARE EDUCATION/TRAINING PROGRAM | Admitting: STUDENT IN AN ORGANIZED HEALTH CARE EDUCATION/TRAINING PROGRAM
Payer: MEDICARE

## 2022-10-07 ENCOUNTER — APPOINTMENT (OUTPATIENT)
Dept: GENERAL RADIOLOGY | Age: 87
DRG: 312 | End: 2022-10-07
Payer: MEDICARE

## 2022-10-07 ENCOUNTER — APPOINTMENT (OUTPATIENT)
Dept: CT IMAGING | Age: 87
DRG: 312 | End: 2022-10-07
Payer: MEDICARE

## 2022-10-07 ENCOUNTER — OFFICE VISIT (OUTPATIENT)
Dept: FAMILY MEDICINE CLINIC | Age: 87
End: 2022-10-07
Payer: MEDICARE

## 2022-10-07 VITALS
OXYGEN SATURATION: 98 % | DIASTOLIC BLOOD PRESSURE: 94 MMHG | HEART RATE: 95 BPM | BODY MASS INDEX: 25.91 KG/M2 | SYSTOLIC BLOOD PRESSURE: 154 MMHG | WEIGHT: 171 LBS | HEIGHT: 68 IN

## 2022-10-07 DIAGNOSIS — M79.7 FIBROMYALGIA: ICD-10-CM

## 2022-10-07 DIAGNOSIS — E03.8 OTHER SPECIFIED HYPOTHYROIDISM: Chronic | ICD-10-CM

## 2022-10-07 DIAGNOSIS — E87.6 HYPOKALEMIA: ICD-10-CM

## 2022-10-07 DIAGNOSIS — R29.6 FREQUENT FALLS: ICD-10-CM

## 2022-10-07 DIAGNOSIS — Z91.14 NON COMPLIANCE W MEDICATION REGIMEN: ICD-10-CM

## 2022-10-07 DIAGNOSIS — I48.91 ATRIAL FIBRILLATION, UNSPECIFIED TYPE (HCC): Primary | ICD-10-CM

## 2022-10-07 DIAGNOSIS — N18.30 STAGE 3 CHRONIC KIDNEY DISEASE, UNSPECIFIED WHETHER STAGE 3A OR 3B CKD (HCC): ICD-10-CM

## 2022-10-07 DIAGNOSIS — E11.42 TYPE 2 DIABETES MELLITUS WITH DIABETIC POLYNEUROPATHY, WITHOUT LONG-TERM CURRENT USE OF INSULIN (HCC): ICD-10-CM

## 2022-10-07 DIAGNOSIS — M25.571 ACUTE RIGHT ANKLE PAIN: ICD-10-CM

## 2022-10-07 DIAGNOSIS — I16.0 HYPERTENSIVE URGENCY: ICD-10-CM

## 2022-10-07 DIAGNOSIS — N17.9 AKI (ACUTE KIDNEY INJURY) (HCC): ICD-10-CM

## 2022-10-07 DIAGNOSIS — G31.1 SENILE DEGENERATION OF BRAIN (HCC): ICD-10-CM

## 2022-10-07 DIAGNOSIS — E03.9 HYPOTHYROIDISM, UNSPECIFIED TYPE: ICD-10-CM
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
## 2022-10-07 DIAGNOSIS — F33.1 MODERATE EPISODE OF RECURRENT MAJOR DEPRESSIVE DISORDER (HCC): Chronic | ICD-10-CM

## 2022-10-07 DIAGNOSIS — R55 SYNCOPE AND COLLAPSE: Primary | ICD-10-CM

## 2022-10-07 PROBLEM — M32.9 LUPUS (HCC): Status: ACTIVE | Noted: 2022-10-07

## 2022-10-07 LAB
ALBUMIN SERPL-MCNC: 4.8 GM/DL (ref 3.4–5)
ALP BLD-CCNC: 108 IU/L (ref 40–129)
ALT SERPL-CCNC: 17 U/L (ref 10–40)
ANION GAP SERPL CALCULATED.3IONS-SCNC: 13 MMOL/L (ref 4–16)
AST SERPL-CCNC: 22 IU/L (ref 15–37)
BASOPHILS ABSOLUTE: 0 K/CU MM
BASOPHILS RELATIVE PERCENT: 0.4 % (ref 0–1)
BILIRUB SERPL-MCNC: 0.7 MG/DL (ref 0–1)
BUN BLDV-MCNC: 25 MG/DL (ref 6–23)
CALCIUM SERPL-MCNC: 9.5 MG/DL (ref 8.3–10.6)
CHLORIDE BLD-SCNC: 100 MMOL/L (ref 99–110)
CO2: 27 MMOL/L (ref 21–32)
CREAT SERPL-MCNC: 1.5 MG/DL (ref 0.6–1.1)
D DIMER: 1604 NG/ML(DDU)
DIFFERENTIAL TYPE: ABNORMAL
EOSINOPHILS ABSOLUTE: 0 K/CU MM
EOSINOPHILS RELATIVE PERCENT: 0 % (ref 0–3)
GFR AFRICAN AMERICAN: 40 ML/MIN/1.73M2
GFR NON-AFRICAN AMERICAN: 33 ML/MIN/1.73M2
GLUCOSE BLD-MCNC: 112 MG/DL (ref 70–99)
HCT VFR BLD CALC: 43.1 % (ref 37–47)
HEMOGLOBIN: 13.8 GM/DL (ref 12.5–16)
IMMATURE NEUTROPHIL %: 0.2 % (ref 0–0.43)
LYMPHOCYTES ABSOLUTE: 1.2 K/CU MM
LYMPHOCYTES RELATIVE PERCENT: 12.1 % (ref 24–44)
MCH RBC QN AUTO: 27.3 PG (ref 27–31)
MCHC RBC AUTO-ENTMCNC: 32 % (ref 32–36)
MCV RBC AUTO: 85.3 FL (ref 78–100)
MONOCYTES ABSOLUTE: 0.8 K/CU MM
MONOCYTES RELATIVE PERCENT: 7.8 % (ref 0–4)
NUCLEATED RBC %: 0 %
PDW BLD-RTO: 13.9 % (ref 11.7–14.9)
PLATELET # BLD: 213 K/CU MM (ref 140–440)
PMV BLD AUTO: 10.2 FL (ref 7.5–11.1)
POTASSIUM SERPL-SCNC: 2.9 MMOL/L (ref 3.5–5.1)
PRO-BNP: 1271 PG/ML
RBC # BLD: 5.05 M/CU MM (ref 4.2–5.4)
SARS-COV-2, NAAT: NOT DETECTED
SEGMENTED NEUTROPHILS ABSOLUTE COUNT: 7.7 K/CU MM
SEGMENTED NEUTROPHILS RELATIVE PERCENT: 79.5 % (ref 36–66)
SODIUM BLD-SCNC: 140 MMOL/L (ref 135–145)
SOURCE: NORMAL
TOTAL IMMATURE NEUTOROPHIL: 0.02 K/CU MM
TOTAL NUCLEATED RBC: 0 K/CU MM
TOTAL PROTEIN: 7.9 GM/DL (ref 6.4–8.2)
TROPONIN T: <0.01 NG/ML
WBC # BLD: 9.6 K/CU MM (ref 4–10.5)

## 2022-10-07 PROCEDURE — 99285 EMERGENCY DEPT VISIT HI MDM: CPT

## 2022-10-07 PROCEDURE — G8484 FLU IMMUNIZE NO ADMIN: HCPCS | Performed by: FAMILY MEDICINE

## 2022-10-07 PROCEDURE — 85025 COMPLETE CBC W/AUTO DIFF WBC: CPT

## 2022-10-07 PROCEDURE — 2580000003 HC RX 258: Performed by: PHYSICIAN ASSISTANT

## 2022-10-07 PROCEDURE — 3044F HG A1C LEVEL LT 7.0%: CPT | Performed by: FAMILY MEDICINE

## 2022-10-07 PROCEDURE — 83880 ASSAY OF NATRIURETIC PEPTIDE: CPT

## 2022-10-07 PROCEDURE — 71275 CT ANGIOGRAPHY CHEST: CPT

## 2022-10-07 PROCEDURE — 93005 ELECTROCARDIOGRAM TRACING: CPT | Performed by: PHYSICIAN ASSISTANT

## 2022-10-07 PROCEDURE — G8417 CALC BMI ABV UP PARAM F/U: HCPCS | Performed by: FAMILY MEDICINE

## 2022-10-07 PROCEDURE — 71045 X-RAY EXAM CHEST 1 VIEW: CPT

## 2022-10-07 PROCEDURE — 80053 COMPREHEN METABOLIC PANEL: CPT

## 2022-10-07 PROCEDURE — G8427 DOCREV CUR MEDS BY ELIG CLIN: HCPCS | Performed by: FAMILY MEDICINE

## 2022-10-07 PROCEDURE — 85379 FIBRIN DEGRADATION QUANT: CPT

## 2022-10-07 PROCEDURE — 99214 OFFICE O/P EST MOD 30 MIN: CPT | Performed by: FAMILY MEDICINE

## 2022-10-07 PROCEDURE — 6360000004 HC RX CONTRAST MEDICATION: Performed by: PHYSICIAN ASSISTANT

## 2022-10-07 PROCEDURE — 96360 HYDRATION IV INFUSION INIT: CPT

## 2022-10-07 PROCEDURE — 84484 ASSAY OF TROPONIN QUANT: CPT

## 2022-10-07 PROCEDURE — 6370000000 HC RX 637 (ALT 250 FOR IP): Performed by: PHYSICIAN ASSISTANT

## 2022-10-07 PROCEDURE — 87635 SARS-COV-2 COVID-19 AMP PRB: CPT

## 2022-10-07 PROCEDURE — 1036F TOBACCO NON-USER: CPT | Performed by: FAMILY MEDICINE

## 2022-10-07 PROCEDURE — 1090F PRES/ABSN URINE INCON ASSESS: CPT | Performed by: FAMILY MEDICINE

## 2022-10-07 PROCEDURE — 72125 CT NECK SPINE W/O DYE: CPT

## 2022-10-07 PROCEDURE — 1124F ACP DISCUSS-NO DSCNMKR DOCD: CPT | Performed by: FAMILY MEDICINE

## 2022-10-07 PROCEDURE — 73610 X-RAY EXAM OF ANKLE: CPT

## 2022-10-07 PROCEDURE — 70450 CT HEAD/BRAIN W/O DYE: CPT

## 2022-10-07 RX ORDER — METOPROLOL TARTRATE 50 MG/1
50 TABLET, FILM COATED ORAL 2 TIMES DAILY
Qty: 180 TABLET | Refills: 1 | Status: ON HOLD
Start: 2022-10-07 | End: 2022-10-19 | Stop reason: HOSPADM

## 2022-10-07 RX ORDER — POTASSIUM CHLORIDE 750 MG/1
40 TABLET, FILM COATED, EXTENDED RELEASE ORAL ONCE
Status: COMPLETED | OUTPATIENT
Start: 2022-10-07 | End: 2022-10-07

## 2022-10-07 RX ORDER — DULOXETIN HYDROCHLORIDE 30 MG/1
30 CAPSULE, DELAYED RELEASE ORAL DAILY
Qty: 30 CAPSULE | Refills: 2 | Status: ON HOLD
Start: 2022-10-07 | End: 2022-10-19 | Stop reason: HOSPADM

## 2022-10-07 RX ORDER — 0.9 % SODIUM CHLORIDE 0.9 %
1000 INTRAVENOUS SOLUTION INTRAVENOUS ONCE
Status: COMPLETED | OUTPATIENT
Start: 2022-10-07 | End: 2022-10-07

## 2022-10-07 RX ORDER — AMLODIPINE BESYLATE 5 MG/1
5 TABLET ORAL DAILY
Qty: 30 TABLET | Refills: 3 | Status: ON HOLD
Start: 2022-10-07 | End: 2022-10-19 | Stop reason: HOSPADM

## 2022-10-07 RX ORDER — LISINOPRIL 5 MG/1
5 TABLET ORAL DAILY
Qty: 30 TABLET | Refills: 3 | Status: ON HOLD
Start: 2022-10-07 | End: 2022-10-19 | Stop reason: HOSPADM

## 2022-10-07 RX ORDER — LEVOTHYROXINE SODIUM 0.05 MG/1
50 TABLET ORAL
Qty: 90 TABLET | Refills: 1 | Status: SHIPPED | OUTPATIENT
Start: 2022-10-07 | End: 2023-04-05

## 2022-10-07 RX ORDER — PAROXETINE 10 MG/1
10 TABLET, FILM COATED ORAL DAILY
Qty: 30 TABLET | Refills: 3 | Status: ON HOLD
Start: 2022-10-07 | End: 2022-10-19 | Stop reason: HOSPADM

## 2022-10-07 RX ORDER — MEMANTINE HYDROCHLORIDE 10 MG/1
10 TABLET ORAL 2 TIMES DAILY
Qty: 60 TABLET | Refills: 5 | Status: ON HOLD
Start: 2022-10-07 | End: 2022-10-19 | Stop reason: HOSPADM

## 2022-10-07 RX ADMIN — SODIUM CHLORIDE 1000 ML: 9 INJECTION, SOLUTION INTRAVENOUS at 21:49

## 2022-10-07 RX ADMIN — IOPAMIDOL 80 ML: 755 INJECTION, SOLUTION INTRAVENOUS at 21:44

## 2022-10-07 RX ADMIN — POTASSIUM CHLORIDE 40 MEQ: 750 TABLET, FILM COATED, EXTENDED RELEASE ORAL at 21:48

## 2022-10-07 ASSESSMENT — ENCOUNTER SYMPTOMS
ABDOMINAL PAIN: 0
NAUSEA: 0
WHEEZING: 0
EYE PAIN: 0
SHORTNESS OF BREATH: 0
TROUBLE SWALLOWING: 0
CHEST TIGHTNESS: 0
BLOOD IN STOOL: 0
VOMITING: 0
DIARRHEA: 0

## 2022-10-07 ASSESSMENT — PAIN - FUNCTIONAL ASSESSMENT: PAIN_FUNCTIONAL_ASSESSMENT: NONE - DENIES PAIN

## 2022-10-07 NOTE — ED NOTES
Portable xray done at bedside without complications. Family and staff removed per protocol for radiation safety.         Radha Garcia RN  10/07/22 0152

## 2022-10-07 NOTE — PROGRESS NOTES
10/7/2022    Bulmaro Orozco    Chief Complaint   Patient presents with    Dizziness     Off an on for about a month. Shortness of Breath    Fatigue    Fall     X3 in last month       HPI  History was obtained from the patient. Teddy Dodd is a 80 y.o. female who presents today with follow-up on diabetes mellitus type 2, atrial fibs, shortness of breath, history of cardiomyopathy, probable dementia, and advancing age. Patient states she has not been taking her medicines may be taking too she is not even sure the name of them she lives alone she has been falling weight is down since last visit. She denies loss of consciousness or dizziness before fall. She definitely does not seem as though she should be living alone she is depressed &uncertain of her meds and has impaired memory and orientation. Very little family support. Zaida Irizarry REVIEW OF SYMPTOMS    Review of Systems   Constitutional:  Positive for unexpected weight change. Negative for activity change and fatigue. History of recent falls   HENT:  Negative for congestion, hearing loss, mouth sores and trouble swallowing. Eyes:  Negative for pain and visual disturbance. Respiratory:  Negative for chest tightness, shortness of breath and wheezing. Cardiovascular:  Negative for chest pain and palpitations. Gastrointestinal:  Negative for abdominal pain, blood in stool, diarrhea, nausea and vomiting. Endocrine: Negative for polydipsia and polyuria. Genitourinary:  Negative for dysuria, frequency and urgency. Musculoskeletal:  Negative for arthralgias, gait problem and neck stiffness. Skin:  Negative for rash. Allergic/Immunologic: Negative for environmental allergies. Neurological:  Positive for light-headedness. Negative for dizziness, seizures, speech difficulty and weakness. History of possible dementia   Hematological:  Does not bruise/bleed easily. Psychiatric/Behavioral:  Positive for confusion and dysphoric mood.  Negative for agitation, hallucinations and suicidal ideas. The patient is not nervous/anxious. 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 Thyroid disease     Ulcer in the past    Gastric       FAMILY HISTORY  Family History   Problem Relation Age of Onset    Cancer Mother     Cancer Father     Heart Disease Father     Cancer Sister     Cancer Brother        SOCIAL HISTORY  Social History     Socioeconomic History    Marital status:       Spouse name: None    Number of children: None    Years of education: None    Highest education level: None   Tobacco Use    Smoking status: Former     Types: Cigarettes     Quit date: 1977     Years since quittin.7    Smokeless tobacco: Never   Substance and Sexual Activity    Alcohol use: No    Drug use: No     Social Determinants of Health     Financial Resource Strain: Low Risk     Difficulty of Paying Living Expenses: Not hard at all   Food Insecurity: No Food Insecurity    Worried About [facility] in the Last Year: Never true    [facility] in the Last Year: Never true   Physical Activity: Insufficiently Active    Days of Exercise per Week: 3 days    Minutes of Exercise per Session: 30 min        SURGICAL HISTORY  Past Surgical History:   Procedure Laterality Date    APPENDECTOMY      BLADDER SURGERY      CHOLECYSTECTOMY      COLONOSCOPY  14    normal, biopsy    EYE SURGERY      HYSTERECTOMY (CERVIX STATUS UNKNOWN)      PACEMAKER PLACEMENT Left 2020    bivi pacer upgrade-Medtronic Percepta Quad CRT-P MRI [facility] INSERTION   Dr. Gloira Gardenr - following MI                 CURRENT MEDICATIONS  Current Outpatient Medications   Medication Sig Dispense Refill    PARoxetine (PAXIL) 10 MG tablet Take 1 tablet by mouth daily 30 tablet 3    levothyroxine (SYNTHROID) 50 MCG tablet Take 1 tablet by mouth every morning (before breakfast) TAKE ONE TABLET BY MOUTH DAILY 90 tablet 1    rivaroxaban (XARELTO) 15 MG TABS tablet Take 1 tablet by mouth daily 90 tablet 1    memantine (NAMENDA) 10 MG tablet Take 1 tablet by mouth 2 times daily 60 tablet 5    lisinopril (PRINIVIL;ZESTRIL) 5 MG tablet Take 1 tablet by mouth daily 30 tablet 3    DULoxetine (CYMBALTA) 30 MG extended release capsule Take 1 capsule by mouth daily 30 capsule 2    amLODIPine (NORVASC) 5 MG tablet Take 1 tablet by mouth daily 30 tablet 3    metoprolol tartrate (LOPRESSOR) 50 MG tablet Take 1 tablet by mouth 2 times daily 180 tablet 1    albuterol sulfate HFA (VENTOLIN HFA) 108 (90 Base) MCG/ACT inhaler Inhale 2 puffs into the lungs 4 times daily as needed for Wheezing 3 Inhaler 1    Acetaminophen (TYLENOL ARTHRITIS PAIN PO) Take by mouth       No current facility-administered medications for this visit. ALLERGIES  Allergies   Allergen Reactions    Amitriptyline     Aspirin     Codeine     Elavil [Amitriptyline Hcl]     Hydroxychloroquine     Ibuprofen     Plaquenil [Hydroxychloroquine Sulfate]     Prevnar 13 [Pneumococcal 13-Kristal Conj Vacc]     Theophyllines        PHYSICAL EXAM    BP (!) 154/94   Pulse 95   Ht 5' 8\" (1.727 m)   Wt 171 lb (77.6 kg)   SpO2 98%   BMI 26.00 kg/m²     Physical Exam  Constitutional:       General: She is not in acute distress. Appearance: Normal appearance. She is not ill-appearing. HENT:      Nose: Nose normal.      Mouth/Throat:      Mouth: Mucous membranes are moist.   Pulmonary:      Effort: No respiratory distress. Breath sounds: No wheezing, rhonchi or rales. Musculoskeletal:         General: No deformity.       Cervical back: No rigidity. Lymphadenopathy:      Cervical: No cervical adenopathy. Neurological:      General: No focal deficit present. Mental Status: She is alert. Mental status is at baseline. She is disoriented. Cranial Nerves: No cranial nerve deficit. Motor: No weakness. Gait: Gait normal.      Comments: Poor memory   Psychiatric:         Thought Content: Thought content normal.       ASSESSMENT & PLAN     Diagnosis Orders   1. Atrial fibrillation, unspecified type (HCC)  CBC    Comprehensive Metabolic Panel    metoprolol tartrate (LOPRESSOR) 50 MG tablet    [facility]      2. Senile degeneration of brain (HCC)  memantine (NAMENDA) 10 MG tablet    [facility]      3. Stage 3 chronic kidney disease, unspecified whether stage 3a or 3b CKD ([facility]        4. Type 2 diabetes mellitus with diabetic polyneuropathy, without long-term current use of insulin ([facility]        5. Moderate episode of recurrent major depressive disorder ([facility]        6. Other specified hypothyroidism        7. Fibromyalgia  DULoxetine (CYMBALTA) 30 MG extended release capsule      8. Hypothyroidism, unspecified type  levothyroxine (SYNTHROID) 50 MCG tablet    TSH    T4, Free      9. Fall, initial encounter  [place]      At this point we will try to clarify her meds and get her back on all of her meds. We will check CBC, CMP, free T4, TSH and see if we can have home care do an evaluation. Do not think that she should be living alone at this point. Although I am not sure she has any alternative without any family support at this point. Return in about 6 weeks (around 11/18/2022), or if symptoms worsen or fail to improve.          Electronically signed by Shea Olivas MD on 10/7/2022

## 2022-10-07 NOTE — ED PROVIDER NOTES
Emergency [address] EMERGENCY DEPARTMENT    Patient: Zainab Gil  MRN: [id number]  : 1935  Date of Evaluation: 10/7/2022  ED Provider: Roxann Ulloa PA-C    Chief Complaint       Chief Complaint   Patient presents with    Loss of Consciousness       Dock Loveless is a 80 y.o. female who presents to the emergency department after a syncopal episode. Patient states she was at [facility] and the next thing she knew, she was waking up on the ground. Per EMS, syncopal episode was witnessed and bystander was able to catch her and lower her to the ground. Patient does not recall feeling dizzy/lightheaded prior to passing out. She states this is her second episode of syncope recently and that she has also had multiple falls in the last month or so. She admits she hasn't been taking all of her medications as she should. She has been feeling fatigued. She actually saw Dr. Dheeraj Adams earlier today and told him all of this and she states he gave her \"a whole list\" of medications to fill. Patient denies any head injury. She does complain of right ankle pain that has developed since the syncopal episode--she is unsure if she injured it or her shoes were just too tight. Denies any chest pain, palpitations, sob. Denies n/v/d. She does state she only ate a small bowl of oatmeal and a piece of toast this morning, so she wonders if she passed out because she was hungry. ROS     CONSTITUTIONAL:  Denies fever. EYES:  Denies visual changes. HEAD:  Denies headache. ENT:  Denies earache, nasal congestion, sore throat. NECK:  Denies neck pain. RESPIRATORY:  Denies any shortness of breath. CARDIOVASCULAR:  Denies chest pain. GI:  Denies nausea or vomiting. :  Denies urinary symptoms. MUSCULOSKELETAL:  Denies extremity pain or swelling. BACK:  Denies back pain. INTEGUMENT:  Denies skin changes.   LYMPHATIC:  Denies lymphadenopathy. NEUROLOGIC:  Denies any numbness/tingling.  + syncope. PSYCHIATRIC:  Denies SI/HI. 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 Thyroid disease     Ulcer in the past    Gastric     Past Surgical History:   Procedure Laterality Date    APPENDECTOMY      BLADDER SURGERY      CHOLECYSTECTOMY      COLONOSCOPY  14    normal, biopsy    EYE SURGERY      HYSTERECTOMY (CERVIX STATUS UNKNOWN)      PACEMAKER PLACEMENT Left 2020    bivi pacer upgrade-Medtronic Percepta Quad CRT-P MRI [place]      Dr. Lela Aguiar. [facility] - following MI     Social History     Socioeconomic History    Marital status:       Spouse name: None    Number of children: None    Years of education: None    Highest education level: None   Tobacco Use    Smoking status: Former     Types: Cigarettes     Quit date: 1977     Years since quittin.7    Smokeless tobacco: Never   Substance and Sexual Activity    Alcohol use: No    Drug use: No     Social Determinants of Health     Financial Resource Strain: Low Risk     Difficulty of Paying Living Expenses: Not hard at all   Food Insecurity: No Food Insecurity    Worried About Running Out of Food in the Last Year: Never true    Ran Out of Food in the Last Year: Never true   Physical Activity: Insufficiently Active    Days of Exercise per Week: 3 days    Minutes of Exercise per Session: 30 min       Medications/Allergies     Previous Medications    ACETAMINOPHEN (TYLENOL ARTHRITIS PAIN PO)    Take by mouth    ALBUTEROL SULFATE HFA (VENTOLIN HFA) 108 (90 BASE) MCG/ACT INHALER    Inhale 2 puffs into the lungs 4 times daily as needed for Wheezing    AMLODIPINE (NORVASC) 5 MG TABLET    Take 1 tablet by mouth daily    DULOXETINE (CYMBALTA) 30 MG EXTENDED RELEASE CAPSULE    Take 1 capsule by mouth daily    LEVOTHYROXINE (SYNTHROID) 50 MCG TABLET    Take 1 tablet by mouth every morning (before breakfast) TAKE ONE TABLET BY MOUTH DAILY    LISINOPRIL (PRINIVIL;ZESTRIL) 5 MG TABLET    Take 1 tablet by mouth daily    MEMANTINE (NAMENDA) 10 MG TABLET    Take 1 tablet by mouth 2 times daily    METOPROLOL TARTRATE (LOPRESSOR) 50 MG TABLET    Take 1 tablet by mouth 2 times daily    PAROXETINE (PAXIL) 10 MG TABLET    Take 1 tablet by mouth daily    RIVAROXABAN (XARELTO) 15 MG TABS TABLET    Take 1 tablet by mouth daily     Allergies   Allergen Reactions    Amitriptyline     Aspirin     Codeine     Elavil [Amitriptyline Hcl]     Hydroxychloroquine     Ibuprofen     Plaquenil [Hydroxychloroquine Sulfate]     Prevnar 13 [Pneumococcal 13-Kristal Conj Vacc]     Theophyllines         Physical Exam       ED Triage Vitals   BP Temp Temp Source Heart Rate Resp SpO2 Height Weight   10/07/22 1835 10/07/22 1832 10/07/22 1832 10/07/22 1832 10/07/22 1835 10/07/22 1835 10/07/22 1835 10/07/22 1835   (!) 171/110 97.9 °F (36.6 °C) Oral 75 17 97 % 5' 7\" (1.702 m) 181 lb (82.1 kg)     GENERAL APPEARANCE:  Well-developed, well-nourished, no acute distress. HEAD:  NC/AT. EYES:  Sclera anicteric. ENT:  Ears, nose, mouth normal.     NECK:  Supple. CARDIO:  RRR. LUNGS:   CTAB. Respirations unlabored. ABDOMEN:  Soft, non-distended, non-tender. BS active. BACK:  No midline thoracic or lumbar spinal tenderness. EXTREMITIES:  No acute deformities. SKIN:  Warm and dry. NEUROLOGICAL:  Alert and oriented. PSYCHIATRIC:  Normal mood.      Diagnostics     Labs:  Results for orders placed or performed during the hospital encounter of 10/07/22   COVID-19, Rapid    Specimen: Nasopharyngeal   Result Value Ref Range    Source UNKNOWN     SARS-CoV-2, NAAT NOT DETECTED NOT DETECTED   CBC with Auto Differential   Result Value Ref Range    WBC 9.6 4.0 - 10.5 K/CU MM    RBC 5.05 4.2 - 5.4 M/CU MM    Hemoglobin 13.8 12.5 - 16.0 GM/DL    Hematocrit 43.1 37 - 47 %    MCV 85.3 78 - 100 FL    MCH 27.3 27 - 31 PG    MCHC 32.0 32.0 - 36.0 %    RDW 13.9 11.7 - 14.9 %    Platelets 539 916 - 591 K/CU MM    MPV 10.2 7.5 - 11.1 FL    Differential Type AUTOMATED DIFFERENTIAL     Segs Relative 79.5 (H) 36 - 66 %    Lymphocytes % 12.1 (L) 24 - 44 %    Monocytes % 7.8 (H) 0 - 4 %    Eosinophils % 0.0 0 - 3 %    Basophils % 0.4 0 - 1 %    Segs Absolute 7.7 K/CU MM    Lymphocytes Absolute 1.2 K/CU MM    Monocytes Absolute 0.8 K/CU MM    Eosinophils Absolute 0.0 K/CU MM    Basophils Absolute 0.0 K/CU MM    Nucleated RBC % 0.0 %    Total Nucleated RBC 0.0 K/CU MM    Total Immature Neutrophil 0.02 K/CU MM    Immature Neutrophil % 0.2 0 - 0.43 %   CMP   Result Value Ref Range    Sodium 140 135 - 145 MMOL/L    Potassium 2.9 (LL) 3.5 - 5.1 MMOL/L    Chloride 100 99 - 110 mMol/L    CO2 27 21 - 32 MMOL/L    BUN 25 (H) 6 - 23 MG/DL    Creatinine 1.5 (H) 0.6 - 1.1 MG/DL    Glucose 112 (H) 70 - 99 MG/DL    Calcium 9.5 8.3 - 10.6 MG/DL    Albumin 4.8 3.4 - 5.0 GM/DL    Total Protein 7.9 6.4 - 8.2 GM/DL    Total Bilirubin 0.7 0.0 - 1.0 MG/DL    ALT 17 10 - 40 U/L    AST 22 15 - 37 IU/L    Alkaline Phosphatase 108 40 - 129 IU/L    GFR Non- 33 (L) >60 mL/min/1.73m2    GFR  40 (L) >60 mL/min/1.73m2    Anion Gap 13 4 - 16   Troponin   Result Value Ref Range    Troponin T <0.010 <0.01 NG/ML   Urinalysis with Reflex to Culture    Specimen: Urine   Result Value Ref Range    Color, UA YELLOW YELLOW    Clarity, UA CLEAR CLEAR    Glucose, Urine NEGATIVE NEGATIVE MG/DL    Bilirubin Urine NEGATIVE NEGATIVE MG/DL    Ketones, Urine NEGATIVE NEGATIVE MG/DL    Specific Gravity, UA 1.010 1.001 - 1.035    Blood, Urine TRACE (A) NEGATIVE pH, Urine 7.0 5.0 - 8.0    Protein, UA NEGATIVE NEGATIVE MG/DL    Urobilinogen, Urine 0.2 0.2 - 1.0 MG/DL    Nitrite Urine, Quantitative NEGATIVE NEGATIVE    Leukocyte Esterase, Urine NEGATIVE NEGATIVE    RBC, UA 1 0 - 6 /HPF    WBC, UA 3 0 - 5 /HPF    Bacteria, UA RARE (A) NEGATIVE /HPF    Squam Epithel, UA <1 /HPF    Trichomonas, UA NONE SEEN NONE SEEN /HPF   Brain Natriuretic Peptide   Result Value Ref Range    Pro-BNP 1,271 (H) <300 PG/ML   D-Dimer, Quantitative   Result Value Ref Range    D-Dimer, Quant 1604 (H) <230 NG/mL(DDU)   EKG 12 Lead   Result Value Ref Range    Ventricular Rate 75 BPM    Atrial Rate 75 BPM    P-R Interval 108 ms    QRS Duration 160 ms    Q-T Interval 458 ms    QTc Calculation (Bazett) 511 ms    P Axis 76 degrees    R Axis -63 degrees    T Axis 121 degrees    Diagnosis       Sinus rhythm with short TN with occasional premature ventricular complexes  Left axis deviation  Left ventricular hypertrophy with QRS widening and repolarization abnormality  Cannot rule out Anterior infarct , age undetermined  Abnormal ECG  When compared with ECG of 17-SEP-2021 17:49,  Sinus rhythm has replaced Electronic ventricular pacemaker         Radiographs:  XR ANKLE RIGHT (MIN 3 VIEWS)    Result Date: 10/7/2022  EXAMINATION: THREE XRAY VIEWS OF THE RIGHT ANKLE 10/7/2022 7:25 pm COMPARISON: None. HISTORY: ORDERING SYSTEM PROVIDED HISTORY: syncope, now with pain TECHNOLOGIST PROVIDED HISTORY: Reason for exam:->syncope, now with pain Reason for Exam: right ankle pain Additional signs and symptoms: syncope / loc FINDINGS: No significant bone, joint or soft tissue abnormality. No evidence of an acute fracture or dislocation. Ankle mortise is symmetric. No evidence of an acute injury. CT Head W/O Contrast    Addendum Date: 10/7/2022    ADDENDUM: Sagittal and coronal images were reviewed. The report is unchanged.      Result Date: 10/7/2022  EXAMINATION: CT OF THE HEAD WITHOUT CONTRAST  10/7/2022 7:44 pm TECHNIQUE: CT of the head was performed without the administration of intravenous contrast. Automated exposure control, iterative reconstruction, and/or weight based adjustment of the mA/kV was utilized to reduce the radiation dose to as low as reasonably achievable. COMPARISON: 09/17/2021 HISTORY: ORDERING SYSTEM PROVIDED HISTORY: syncope TECHNOLOGIST PROVIDED HISTORY: Reason for exam:->syncope Has a \"code stroke\" or \"stroke alert\" been called? ->No Decision Support Exception - unselect if not a suspected or confirmed emergency medical condition->Emergency Medical Condition (MA) Reason for Exam: syncope FINDINGS: BRAIN/VENTRICLES: There is generalized age-appropriate atrophy. There is moderate to severe patchy periventricular and subcortical white matter low attenuation that is nonspecific but most consistent with chronic small vessel ischemia. There is no evidence of acute hemorrhage, mass or extra-axial fluid collection. ORBITS: Stable postsurgical changes. The visualized portion of the orbits demonstrate no acute abnormality. SINUSES: The visualized paranasal sinuses and mastoid air cells demonstrate no acute abnormality. SOFT TISSUES/SKULL:  No acute abnormality of the visualized skull or soft tissues. No acute intracranial abnormality. Generalized atrophy and chronic small vessel ischemic white matter disease. CT CSpine W/O Contrast    Result Date: 10/7/2022  EXAMINATION: CT OF THE CERVICAL SPINE WITHOUT CONTRAST 10/7/2022 7:44 pm TECHNIQUE: CT of the cervical spine was performed without the administration of intravenous contrast. Multiplanar reformatted images are provided for review. Automated exposure control, iterative reconstruction, and/or weight based adjustment of the mA/kV was utilized to reduce the radiation dose to as low as reasonably achievable.  COMPARISON: 09/17/2021 HISTORY: ORDERING SYSTEM PROVIDED HISTORY: syncope TECHNOLOGIST PROVIDED HISTORY: Reason for exam:->syncope Decision Support Exception - unselect if not a suspected or confirmed emergency medical condition->Emergency Medical Condition (MA) Reason for Exam: syncope FINDINGS: BONES/ALIGNMENT: Cervical vertebral body heights and alignment are normal. Facet joints are normally aligned. There is no acute fracture or traumatic malalignment. DEGENERATIVE CHANGES: There are multilevel degenerative changes in the cervical spine similar to the prior study. SOFT TISSUES: There is no prevertebral soft tissue swelling. Multilevel degenerative changes with no acute fracture or traumatic malalignment. XR CHEST PORTABLE    Result Date: 10/7/2022  EXAMINATION: ONE XRAY VIEW OF THE CHEST 10/7/2022 6:25 pm COMPARISON: 09/17/2021. HISTORY: ORDERING SYSTEM PROVIDED HISTORY: syncope TECHNOLOGIST PROVIDED HISTORY: Reason for exam:->syncope Reason for Exam: syncope Additional signs and symptoms: LOC FINDINGS: There is no confluent consolidation or effusion. Lung volumes are large. A sequential lead pacemaker appears unchanged. The cardiomediastinal silhouette and pulmonary vessels appear normal.     No acute findings. Large lung volumes. CTA PULMONARY W CONTRAST    Result Date: 10/7/2022  EXAMINATION: CTA OF THE CHEST 10/7/2022 9:44 pm TECHNIQUE: CTA of the chest was performed after the administration of intravenous contrast.  Multiplanar reformatted images are provided for review. MIP images are provided for review. Automated exposure control, iterative reconstruction, and/or weight based adjustment of the mA/kV was utilized to reduce the radiation dose to as low as reasonably achievable.  COMPARISON: 09/17/2021 HISTORY: ORDERING SYSTEM PROVIDED HISTORY: +ddimer, syncope TECHNOLOGIST PROVIDED HISTORY: Reason for exam:->+ddimer, syncope Decision Support Exception - unselect if not a suspected or confirmed emergency medical condition->Emergency Medical Condition (MA) Reason for Exam: +ddimer, syncope Additional signs and symptoms: no Relevant Medical/Surgical History: 80 ml isovue 370 used FINDINGS: Pulmonary Arteries: Pulmonary arteries are adequately opacified for evaluation. No evidence of intraluminal filling defect to suggest pulmonary embolism. Central pulmonary arteries are upper limits of normal in caliber. Mediastinum: No evidence of mediastinal lymphadenopathy. The heart is mildly enlarged. There is a left subclavian cardiac pacemaker. The heart and pericardium demonstrate no acute abnormality. There is no acute abnormality of the thoracic aorta. There is calcified plaque in the aortic arch and descending thoracic aorta. Lungs/pleura: The lungs are without acute process. No focal consolidation or pulmonary edema. No evidence of pleural effusion or pneumothorax. Upper Abdomen: There multiple surgical clips in the right quadrant status post cholecystectomy. Soft Tissues/Bones: No acute bone or soft tissue abnormality. No evidence of pulmonary embolism or acute pulmonary abnormality. Procedures/EKG:   Please see attending physician's note for interpretation. ED Course and MDM   -  Patient seen and evaluated in the emergency department. -  Triage and nursing notes reviewed and incorporated. -  Old chart records reviewed and incorporated. -  Supervising physician was Dr. Blanca Olivares. Patient was seen independently. -  Work-up included:  see above  -  ED medications:  NS, Kdur, Nitro gtt, Norvasc  -  Results discussed with patient. She is hypokalemic with an YASSINE. No UTI. COVID is negative. CXR and XR right ankle negative. Her d-dimer was elevated so CTA chest obtained which is non-acute. CT head and c-spine non-acute. I did have Case Management meet with patient and discuss placement vs [facility]. Patient initially adamant that she \"doesn't need any of that\" when I spoke with her about her results.   She wanted to get home to her cat but after further discussion decided she was nervous that she could pass out again and be alone at home. She ultimately was agreeable to admission for further management. I spoke with Dr. Ladan Woody, hospitalist, who will see and admit. In light of current events, I did utilize appropriate PPE (including N95 and surgical face mask, safety glasses, and gloves, as recommended by the health facility/national standard best practice, during my bedside interactions with the patient. Final Impression      1. Syncope and collapse    2. Hypokalemia    3. YASSINE (acute kidney injury) ([facility] [address]    4. Non compliance w medication regimen    5. Frequent falls    6. Acute right ankle pain    7.  Hypertensive urgency          DISPOSITION Admitted 10/08/2022 03:05:35 AM      Julieta Henriquez PA-C  [address]  10/08/22 [id number]

## 2022-10-07 NOTE — ED TRIAGE NOTES
Patient was brought in by squad after being witnessed having a syncopal episode. Patient did not hit the ground and was caught by bystanders before doing so. Patient was reported to have been unconscious for 20 minutes.

## 2022-10-07 NOTE — CARE COORDINATION
CM consult per Julieta PETERSEN to initiate discharge planning. CM review of pt chart pt has PCP, Dr Heaven Mtz, Medicare insurance and VA benefits. Pt was seen at Dr Altagracia Weir office today, documented that pt lives alone, not taking medications correctly stated to  she is not been taking her meds, not even sure what she takes, pt is losing wt, and falling, appeared depressed and impaired memory and orientation. Concerns for her living alone. Pt comes to ER via EMS for syncopal episode. [address] ER provider. CM visited pt after waiting on her to come back from the bathroom, pt ambulated per self w/o difficulty, stand by assist along with her. . After seeing pt ambulate, and pt is independent ADL's, drove self to Dr and to store after. Pt has a syncopal episode from the store. CM djiscussed with pt possible discharging home with HHC, RN to assist with setting up medications and educating on what medications she takes and what they are for, pt has expressed to Dr Sj Nelson today that she did not know her medications and not taking them correctly. Dr [facility] updated pt med list today. Pt has not picked them up yet. Also Home PT/OT due to pt having some falls, may benefit from some balance and strength training. Spoke with [employer] who plans to admit pt for obs due to age and syncope today. CM to follow for discharge planning.  MICHAELRN/HUNTER

## 2022-10-08 ENCOUNTER — APPOINTMENT (OUTPATIENT)
Dept: ULTRASOUND IMAGING | Age: 87
DRG: 312 | End: 2022-10-08
Payer: MEDICARE

## 2022-10-08 ENCOUNTER — APPOINTMENT (OUTPATIENT)
Dept: GENERAL RADIOLOGY | Age: 87
DRG: 312 | End: 2022-10-08
Payer: MEDICARE

## 2022-10-08 PROBLEM — I16.1 HYPERTENSIVE EMERGENCY: Status: ACTIVE | Noted: 2022-10-08

## 2022-10-08 LAB
AMMONIA: 20 UMOL/L (ref 11–51)
ANION GAP SERPL CALCULATED.3IONS-SCNC: 12 MMOL/L (ref 4–16)
BACTERIA: ABNORMAL /HPF
BILIRUBIN URINE: NEGATIVE MG/DL
BLOOD, URINE: ABNORMAL
BUN BLDV-MCNC: 22 MG/DL (ref 6–23)
CALCIUM SERPL-MCNC: 8.3 MG/DL (ref 8.3–10.6)
CHLORIDE BLD-SCNC: 101 MMOL/L (ref 99–110)
CLARITY: CLEAR
CO2: 25 MMOL/L (ref 21–32)
COLOR: YELLOW
CREAT SERPL-MCNC: 1 MG/DL (ref 0.6–1.1)
EKG ATRIAL RATE: 75 BPM
EKG DIAGNOSIS: NORMAL
EKG P AXIS: 76 DEGREES
EKG P-R INTERVAL: 108 MS
EKG Q-T INTERVAL: 458 MS
EKG QRS DURATION: 160 MS
EKG QTC CALCULATION (BAZETT): 511 MS
EKG R AXIS: -63 DEGREES
EKG T AXIS: 121 DEGREES
EKG VENTRICULAR RATE: 75 BPM
GFR AFRICAN AMERICAN: >60 ML/MIN/1.73M2
GFR NON-AFRICAN AMERICAN: 52 ML/MIN/1.73M2
GLUCOSE BLD-MCNC: 139 MG/DL (ref 70–99)
GLUCOSE, URINE: NEGATIVE MG/DL
KETONES, URINE: NEGATIVE MG/DL
LEUKOCYTE ESTERASE, URINE: NEGATIVE
NITRITE URINE, QUANTITATIVE: NEGATIVE
PH, URINE: 7 (ref 5–8)
POTASSIUM SERPL-SCNC: 3.8 MMOL/L (ref 3.5–5.1)
PROTEIN UA: NEGATIVE MG/DL
RBC URINE: 1 /HPF (ref 0–6)
SODIUM BLD-SCNC: 138 MMOL/L (ref 135–145)
SPECIFIC GRAVITY UA: 1.01 (ref 1–1.03)
SQUAMOUS EPITHELIAL: <1 /HPF
TRICHOMONAS: ABNORMAL /HPF
TROPONIN T: <0.01 NG/ML
TSH HIGH SENSITIVITY: 1.19 UIU/ML (ref 0.27–4.2)
UROBILINOGEN, URINE: 0.2 MG/DL (ref 0.2–1)
VITAMIN B-12: 492.4 PG/ML (ref 211–911)
WBC UA: 3 /HPF (ref 0–5)

## 2022-10-08 PROCEDURE — 97535 SELF CARE MNGMENT TRAINING: CPT

## 2022-10-08 PROCEDURE — 97166 OT EVAL MOD COMPLEX 45 MIN: CPT

## 2022-10-08 PROCEDURE — 6370000000 HC RX 637 (ALT 250 FOR IP): Performed by: PHYSICIAN ASSISTANT

## 2022-10-08 PROCEDURE — 36415 COLL VENOUS BLD VENIPUNCTURE: CPT

## 2022-10-08 PROCEDURE — 2500000003 HC RX 250 WO HCPCS: Performed by: STUDENT IN AN ORGANIZED HEALTH CARE EDUCATION/TRAINING PROGRAM

## 2022-10-08 PROCEDURE — 6370000000 HC RX 637 (ALT 250 FOR IP): Performed by: STUDENT IN AN ORGANIZED HEALTH CARE EDUCATION/TRAINING PROGRAM

## 2022-10-08 PROCEDURE — 2580000003 HC RX 258: Performed by: STUDENT IN AN ORGANIZED HEALTH CARE EDUCATION/TRAINING PROGRAM

## 2022-10-08 PROCEDURE — 84484 ASSAY OF TROPONIN QUANT: CPT

## 2022-10-08 PROCEDURE — 84443 ASSAY THYROID STIM HORMONE: CPT

## 2022-10-08 PROCEDURE — 82140 ASSAY OF AMMONIA: CPT

## 2022-10-08 PROCEDURE — 94761 N-INVAS EAR/PLS OXIMETRY MLT: CPT

## 2022-10-08 PROCEDURE — 93010 ELECTROCARDIOGRAM REPORT: CPT | Performed by: INTERNAL MEDICINE

## 2022-10-08 PROCEDURE — 73590 X-RAY EXAM OF LOWER LEG: CPT

## 2022-10-08 PROCEDURE — 82607 VITAMIN B-12: CPT

## 2022-10-08 PROCEDURE — 81003 URINALYSIS AUTO W/O SCOPE: CPT

## 2022-10-08 PROCEDURE — 73562 X-RAY EXAM OF KNEE 3: CPT

## 2022-10-08 PROCEDURE — 2140000000 HC CCU INTERMEDIATE R&B

## 2022-10-08 PROCEDURE — 93971 EXTREMITY STUDY: CPT

## 2022-10-08 PROCEDURE — 97163 PT EVAL HIGH COMPLEX 45 MIN: CPT

## 2022-10-08 PROCEDURE — 97530 THERAPEUTIC ACTIVITIES: CPT

## 2022-10-08 PROCEDURE — 80048 BASIC METABOLIC PNL TOTAL CA: CPT

## 2022-10-08 PROCEDURE — 97116 GAIT TRAINING THERAPY: CPT

## 2022-10-08 RX ORDER — AMLODIPINE BESYLATE 5 MG/1
5 TABLET ORAL DAILY
Status: DISCONTINUED | OUTPATIENT
Start: 2022-10-08 | End: 2022-10-10

## 2022-10-08 RX ORDER — MEMANTINE HYDROCHLORIDE 10 MG/1
10 TABLET ORAL 2 TIMES DAILY
Status: DISCONTINUED | OUTPATIENT
Start: 2022-10-08 | End: 2022-10-10 | Stop reason: HOSPADM

## 2022-10-08 RX ORDER — POTASSIUM CHLORIDE 750 MG/1
40 TABLET, FILM COATED, EXTENDED RELEASE ORAL ONCE
Status: COMPLETED | OUTPATIENT
Start: 2022-10-08 | End: 2022-10-08

## 2022-10-08 RX ORDER — AMLODIPINE BESYLATE 5 MG/1
10 TABLET ORAL ONCE
Status: COMPLETED | OUTPATIENT
Start: 2022-10-08 | End: 2022-10-08

## 2022-10-08 RX ORDER — SODIUM CHLORIDE 0.9 % (FLUSH) 0.9 %
5-40 SYRINGE (ML) INJECTION PRN
Status: DISCONTINUED | OUTPATIENT
Start: 2022-10-08 | End: 2022-10-10 | Stop reason: HOSPADM

## 2022-10-08 RX ORDER — POLYETHYLENE GLYCOL 3350 17 G/17G
17 POWDER, FOR SOLUTION ORAL DAILY PRN
Status: DISCONTINUED | OUTPATIENT
Start: 2022-10-08 | End: 2022-10-10 | Stop reason: HOSPADM

## 2022-10-08 RX ORDER — CARVEDILOL 6.25 MG/1
12.5 TABLET ORAL 2 TIMES DAILY WITH MEALS
Status: DISCONTINUED | OUTPATIENT
Start: 2022-10-08 | End: 2022-10-10

## 2022-10-08 RX ORDER — NITROGLYCERIN 20 MG/100ML
5-200 INJECTION INTRAVENOUS CONTINUOUS
Status: DISCONTINUED | OUTPATIENT
Start: 2022-10-08 | End: 2022-10-10

## 2022-10-08 RX ORDER — ACETAMINOPHEN 325 MG/1
650 TABLET ORAL EVERY 6 HOURS PRN
Status: DISCONTINUED | OUTPATIENT
Start: 2022-10-08 | End: 2022-10-10 | Stop reason: HOSPADM

## 2022-10-08 RX ORDER — ALBUTEROL SULFATE 90 UG/1
2 AEROSOL, METERED RESPIRATORY (INHALATION) 4 TIMES DAILY PRN
Status: DISCONTINUED | OUTPATIENT
Start: 2022-10-08 | End: 2022-10-10 | Stop reason: HOSPADM

## 2022-10-08 RX ORDER — ACETAMINOPHEN 650 MG/1
650 SUPPOSITORY RECTAL EVERY 6 HOURS PRN
Status: DISCONTINUED | OUTPATIENT
Start: 2022-10-08 | End: 2022-10-10 | Stop reason: HOSPADM

## 2022-10-08 RX ORDER — SODIUM CHLORIDE 0.9 % (FLUSH) 0.9 %
5-40 SYRINGE (ML) INJECTION EVERY 12 HOURS SCHEDULED
Status: DISCONTINUED | OUTPATIENT
Start: 2022-10-08 | End: 2022-10-10 | Stop reason: HOSPADM

## 2022-10-08 RX ORDER — LEVOTHYROXINE SODIUM 0.05 MG/1
50 TABLET ORAL
Status: DISCONTINUED | OUTPATIENT
Start: 2022-10-08 | End: 2022-10-10 | Stop reason: HOSPADM

## 2022-10-08 RX ORDER — METOPROLOL TARTRATE 50 MG/1
50 TABLET, FILM COATED ORAL 2 TIMES DAILY
Status: DISCONTINUED | OUTPATIENT
Start: 2022-10-08 | End: 2022-10-08

## 2022-10-08 RX ORDER — ONDANSETRON 2 MG/ML
4 INJECTION INTRAMUSCULAR; INTRAVENOUS EVERY 6 HOURS PRN
Status: DISCONTINUED | OUTPATIENT
Start: 2022-10-08 | End: 2022-10-10 | Stop reason: HOSPADM

## 2022-10-08 RX ORDER — SODIUM CHLORIDE 9 MG/ML
INJECTION, SOLUTION INTRAVENOUS PRN
Status: DISCONTINUED | OUTPATIENT
Start: 2022-10-08 | End: 2022-10-10 | Stop reason: HOSPADM

## 2022-10-08 RX ORDER — DULOXETIN HYDROCHLORIDE 30 MG/1
30 CAPSULE, DELAYED RELEASE ORAL DAILY
Status: DISCONTINUED | OUTPATIENT
Start: 2022-10-08 | End: 2022-10-10 | Stop reason: HOSPADM

## 2022-10-08 RX ORDER — ONDANSETRON 4 MG/1
4 TABLET, ORALLY DISINTEGRATING ORAL EVERY 8 HOURS PRN
Status: DISCONTINUED | OUTPATIENT
Start: 2022-10-08 | End: 2022-10-10 | Stop reason: HOSPADM

## 2022-10-08 RX ADMIN — CARVEDILOL 12.5 MG: 6.25 TABLET, FILM COATED ORAL at 16:41

## 2022-10-08 RX ADMIN — ACETAMINOPHEN 650 MG: 325 TABLET ORAL at 16:41

## 2022-10-08 RX ADMIN — MEMANTINE 10 MG: 10 TABLET ORAL at 19:49

## 2022-10-08 RX ADMIN — SODIUM CHLORIDE, PRESERVATIVE FREE 10 ML: 5 INJECTION INTRAVENOUS at 19:49

## 2022-10-08 RX ADMIN — POTASSIUM CHLORIDE 40 MEQ: 750 TABLET, FILM COATED, EXTENDED RELEASE ORAL at 04:13

## 2022-10-08 RX ADMIN — MEMANTINE 10 MG: 10 TABLET ORAL at 09:09

## 2022-10-08 RX ADMIN — LEVOTHYROXINE SODIUM 50 MCG: 0.05 TABLET ORAL at 06:12

## 2022-10-08 RX ADMIN — NITROGLYCERIN 5 MCG/MIN: 20 INJECTION INTRAVENOUS at 04:48

## 2022-10-08 RX ADMIN — ACETAMINOPHEN 650 MG: 325 TABLET ORAL at 06:32

## 2022-10-08 RX ADMIN — AMLODIPINE BESYLATE 5 MG: 5 TABLET ORAL at 09:09

## 2022-10-08 RX ADMIN — CARVEDILOL 12.5 MG: 6.25 TABLET, FILM COATED ORAL at 09:09

## 2022-10-08 RX ADMIN — DULOXETINE 30 MG: 30 CAPSULE, DELAYED RELEASE ORAL at 09:10

## 2022-10-08 RX ADMIN — RIVAROXABAN 15 MG: 15 TABLET, FILM COATED ORAL at 09:10

## 2022-10-08 RX ADMIN — SODIUM CHLORIDE, PRESERVATIVE FREE 10 ML: 5 INJECTION INTRAVENOUS at 09:18

## 2022-10-08 RX ADMIN — AMLODIPINE BESYLATE 10 MG: 5 TABLET ORAL at 03:06

## 2022-10-08 ASSESSMENT — PAIN DESCRIPTION - DESCRIPTORS: DESCRIPTORS: ACHING

## 2022-10-08 ASSESSMENT — PAIN SCALES - GENERAL
PAINLEVEL_OUTOF10: 6
PAINLEVEL_OUTOF10: 3
PAINLEVEL_OUTOF10: 0
PAINLEVEL_OUTOF10: 8

## 2022-10-08 ASSESSMENT — PAIN DESCRIPTION - ORIENTATION
ORIENTATION: RIGHT
ORIENTATION: RIGHT

## 2022-10-08 ASSESSMENT — PAIN DESCRIPTION - LOCATION
LOCATION: KNEE
LOCATION: KNEE

## 2022-10-08 NOTE — ED PROVIDER NOTES
12 lead EKG per my interpretation:  Normal Sinus Rhythm 75 PVC's  [place] is   Left axis deviation  QTc is   511  There is specific T wave changes appreciated. Inverted T wave 1 aVL V2  There is no specific ST wave changes appreciated. Prior EKG to compare with was available and similar to previous.        Ismael Qureshi DO  10/07/22 2044

## 2022-10-08 NOTE — PROGRESS NOTES
V2.0  [facility] [place]      Name:  Tosha Griffiths /Age/Sex: 1935  (80 y.o. female)   MRN & CSN:  [id number] & [id number] Encounter Date/Time: 10/8/2022 10:24 AM EDT    Location:  [facility]4/1329-T PCP: Leesa Campa, [facility] Day: 2    Assessment and Plan:   Tosha Griffiths is a 80 y.o. female       Syncope-work-up in progress    Right fibula fracture-orthopedics consult    S/P BiV pacer - per cardiology check today    Echo Monday if still here    PT/OT ordered      Diet ADULT DIET; Regular; 5 carb choices (75 gm/meal); Low Fat/Low Chol/High Fiber/ARIANA; Low Sodium (2 gm)   DVT Prophylaxis [] Lovenox, []  Heparin, [] SCDs, [] Ambulation,  [] Eliquis, [x] Xarelto  [] Coumadin   Code Status Full Code   Disposition From: Home  Expected Disposition: To be determined  Estimated Date of Discharge: In about 2 days  Patient requires continued admission due to syncope evaluation, and fibula fracture management   Surrogate Decision Maker/ POA N/A. Is listed as the only contact in the chart     Subjective:     Chief Complaint: Loss of Consciousness       Tosha Griffiths is a 80 y.o. female who presents with the following:    ED triage note:    Patient was brought in by squad after being witnessed having a syncopal episode. Patient did not hit the ground and was caught by bystanders before doing so. Patient was reported to have been unconscious for 20 minutes. ED provider notes:      Tosha Griffiths is a 80 y.o. female who presents to the emergency department after a syncopal episode. Patient states she was at [place] and the next thing she knew, she was waking up on the ground. Per EMS, syncopal episode was witnessed and bystander was able to catch her and lower her to the ground. Patient does not recall feeling dizzy/lightheaded prior to passing out. She states this is her second episode of syncope recently and that she has also had multiple falls in the last month or so.   She admits she hasn't been taking all of her medications as she should. She has been feeling fatigued. She actually saw Dr. Shannan Cornelius earlier today and told him all of this and she states he gave her \"a whole list\" of medications to fill. Patient denies any head injury. She does complain of right ankle pain that has developed since the syncopal episode--she is unsure if she injured it or her shoes were just too tight. Denies any chest pain, palpitations, sob. Denies n/v/d. She does state she only ate a small bowl of oatmeal and a piece of toast this morning, so she wonders if she passed out because she was hungry. ED medications:  NS, Kdur, Nitro gtt, Norvasc  -  Results discussed with patient. She is hypokalemic with an YASSINE. No UTI. COVID is negative. CXR and XR right ankle negative. Her d-dimer was elevated so CTA chest obtained which is non-acute. CT head and c-spine non-acute. I did have Case Management meet with patient and discuss placement vs [facility]. Patient initially adamant that she \"doesn't need any of that\" when I spoke with her about her results. She wanted to get home to her cat but after further discussion decided she was nervous that she could pass out again and be alone at home. She ultimately was agreeable to admission for further management. Final Impression       1. Syncope and collapse    2. Hypokalemia    3. YASSINE (acute kidney injury) ([address]    4. Non compliance w medication regimen    5. Frequent falls    6. Acute right ankle pain    7. Hypertensive urgency         October 8-was sitting up in the chair today. Upon my second visit around 7 PM she stated her right leg was hurting. Review of Systems:    Review of Systems    No vomiting or bleeding noted    Objective:      Intake/Output Summary (Last 24 hours) at 10/8/2022 1024  Last data filed at 10/8/2022 0918  Gross per 24 hour   Intake 10 ml   Output --   Net 10 ml        Vitals:   Vitals:    10/08/22 0909   BP: 117/68   Pulse: 74   Resp: Temp:    SpO2:        Physical Exam:     General: NAD  Respiratory effort nonlabored the chest  Skin: warm, dry        Medications:   Medications:    DULoxetine  30 mg Oral Daily    levothyroxine  50 mcg Oral QAM AC    memantine  10 mg Oral BID    rivaroxaban  15 mg Oral Daily    sodium chloride flush  5-40 mL IntraVENous 2 times per day    amLODIPine  5 mg Oral Daily    carvedilol  12.5 mg Oral BID WC      Infusions:    nitroGLYCERIN Stopped (10/08/22 0914)    sodium chloride       PRN Meds: albuterol sulfate HFA, 2 puff, 4x Daily PRN  sodium chloride flush, 5-40 mL, PRN  sodium chloride, , PRN  ondansetron, 4 mg, Q8H PRN   Or  ondansetron, 4 mg, Q6H PRN  polyethylene glycol, 17 g, Daily PRN  acetaminophen, 650 mg, Q6H PRN   Or  acetaminophen, 650 mg, Q6H PRN        Labs      Recent Results (from the past 24 hour(s))   CBC with Auto Differential    Collection Time: 10/07/22  8:25 PM   Result Value Ref Range    WBC 9.6 4.0 - 10.5 K/CU MM    RBC 5.05 4.2 - 5.4 M/CU MM    Hemoglobin 13.8 12.5 - 16.0 GM/DL    Hematocrit 43.1 37 - 47 %    MCV 85.3 78 - 100 FL    MCH 27.3 27 - 31 PG    MCHC 32.0 32.0 - 36.0 %    RDW 13.9 11.7 - 14.9 %    Platelets 398 028 - 277 K/CU MM    MPV 10.2 7.5 - 11.1 FL    Differential Type AUTOMATED DIFFERENTIAL     Segs Relative 79.5 (H) 36 - 66 %    Lymphocytes % 12.1 (L) 24 - 44 %    Monocytes % 7.8 (H) 0 - 4 %    Eosinophils % 0.0 0 - 3 %    Basophils % 0.4 0 - 1 %    Segs Absolute 7.7 K/CU MM    Lymphocytes Absolute 1.2 K/CU MM    Monocytes Absolute 0.8 K/CU MM    Eosinophils Absolute 0.0 K/CU MM    Basophils Absolute 0.0 K/CU MM    Nucleated RBC % 0.0 %    Total Nucleated RBC 0.0 K/CU MM    Total Immature Neutrophil 0.02 K/CU MM    Immature Neutrophil % 0.2 0 - 0.43 %   CMP    Collection Time: 10/07/22  8:25 PM   Result Value Ref Range    Sodium 140 135 - 145 MMOL/L    Potassium 2.9 (LL) 3.5 - 5.1 MMOL/L    Chloride 100 99 - 110 mMol/L    CO2 27 21 - 32 MMOL/L    BUN 25 (H) 6 - 23 MG/DL Creatinine 1.5 (H) 0.6 - 1.1 MG/DL    Glucose 112 (H) 70 - 99 MG/DL    Calcium 9.5 8.3 - 10.6 MG/DL    Albumin 4.8 3.4 - 5.0 GM/DL    Total Protein 7.9 6.4 - 8.2 GM/DL    Total Bilirubin 0.7 0.0 - 1.0 MG/DL    ALT 17 10 - 40 U/L    AST 22 15 - 37 IU/L    Alkaline Phosphatase 108 40 - 129 IU/L    GFR Non- 33 (L) >60 mL/min/1.73m2    GFR  40 (L) >60 mL/min/1.73m2    Anion Gap 13 4 - 16   Troponin    Collection Time: 10/07/22  8:25 PM   Result Value Ref Range    Troponin T <0.010 <0.01 NG/ML   COVID-19, Rapid    Collection Time: 10/07/22  8:25 PM    Specimen: Nasopharyngeal   Result Value Ref Range    Source UNKNOWN     SARS-CoV-2, NAAT NOT DETECTED NOT DETECTED   Brain Natriuretic Peptide    Collection Time: 10/07/22  8:25 PM   Result Value Ref Range    Pro-BNP 1,271 (H) <300 PG/ML   D-Dimer, Quantitative    Collection Time: 10/07/22  8:25 PM   Result Value Ref Range    D-Dimer, Quant 1604 (H) <230 NG/mL(DDU)   EKG 12 Lead    Collection Time: 10/07/22  8:37 PM   Result Value Ref Range    Ventricular Rate 75 BPM    Atrial Rate 75 BPM    P-R Interval 108 ms    QRS Duration 160 ms    Q-T Interval 458 ms    QTc Calculation (Bazett) 511 ms    P Axis 76 degrees    R Axis -63 degrees    T Axis 121 degrees    Diagnosis       Sinus rhythm with short SD with occasional premature ventricular complexes  Left axis deviation  Left ventricular hypertrophy with QRS widening and repolarization abnormality  Cannot rule out Anterior infarct , age undetermined  Abnormal ECG  When compared with ECG of 17-SEP-2021 17:49,  Sinus rhythm has replaced Electronic ventricular pacemaker     Urinalysis with Reflex to Culture    Collection Time: 10/08/22 12:27 AM    Specimen: Urine   Result Value Ref Range    Color, UA YELLOW YELLOW    Clarity, UA CLEAR CLEAR    Glucose, Urine NEGATIVE NEGATIVE MG/DL    Bilirubin Urine NEGATIVE NEGATIVE MG/DL    Ketones, Urine NEGATIVE NEGATIVE MG/DL    Specific Gravity, UA 1.010 1.001 - 1.035    Blood, Urine TRACE (A) NEGATIVE    pH, Urine 7.0 5.0 - 8.0    Protein, UA NEGATIVE NEGATIVE MG/DL    Urobilinogen, Urine 0.2 0.2 - 1.0 MG/DL    Nitrite Urine, Quantitative NEGATIVE NEGATIVE    Leukocyte Esterase, Urine NEGATIVE NEGATIVE    RBC, UA 1 0 - 6 /HPF    WBC, UA 3 0 - 5 /HPF    Bacteria, UA RARE (A) NEGATIVE /HPF    Squam Epithel, UA <1 /HPF    Trichomonas, UA NONE SEEN NONE SEEN /HPF        Imaging/Diagnostics Last 24 Hours   XR ANKLE RIGHT (MIN 3 VIEWS)    Result Date: 10/7/2022  EXAMINATION: THREE XRAY VIEWS OF THE RIGHT ANKLE 10/7/2022 7:25 pm COMPARISON: None. HISTORY: ORDERING SYSTEM PROVIDED HISTORY: syncope, now with pain TECHNOLOGIST PROVIDED HISTORY: Reason for exam:->syncope, now with pain Reason for Exam: right ankle pain Additional signs and symptoms: syncope / loc FINDINGS: No significant bone, joint or soft tissue abnormality. No evidence of an acute fracture or dislocation. Ankle mortise is symmetric. No evidence of an acute injury. CT Head W/O Contrast    Addendum Date: 10/7/2022    ADDENDUM: Sagittal and coronal images were reviewed. The report is unchanged. Result Date: 10/7/2022  EXAMINATION: CT OF THE HEAD WITHOUT CONTRAST  10/7/2022 7:44 pm TECHNIQUE: CT of the head was performed without the administration of intravenous contrast. Automated exposure control, iterative reconstruction, and/or weight based adjustment of the mA/kV was utilized to reduce the radiation dose to as low as reasonably achievable. COMPARISON: 09/17/2021 HISTORY: ORDERING SYSTEM PROVIDED HISTORY: syncope TECHNOLOGIST PROVIDED HISTORY: Reason for exam:->syncope Has a \"code stroke\" or \"stroke alert\" been called? ->No Decision Support Exception - unselect if not a suspected or confirmed emergency medical condition->Emergency Medical Condition (MA) Reason for Exam: syncope FINDINGS: BRAIN/VENTRICLES: There is generalized age-appropriate atrophy.   There is moderate to severe patchy periventricular and subcortical white matter low attenuation that is nonspecific but most consistent with chronic small vessel ischemia. There is no evidence of acute hemorrhage, mass or extra-axial fluid collection. ORBITS: Stable postsurgical changes. The visualized portion of the orbits demonstrate no acute abnormality. SINUSES: The visualized paranasal sinuses and mastoid air cells demonstrate no acute abnormality. SOFT TISSUES/SKULL:  No acute abnormality of the visualized skull or soft tissues. No acute intracranial abnormality. Generalized atrophy and chronic small vessel ischemic white matter disease. CT CSpine W/O Contrast    Result Date: 10/7/2022  EXAMINATION: CT OF THE CERVICAL SPINE WITHOUT CONTRAST 10/7/2022 7:44 pm TECHNIQUE: CT of the cervical spine was performed without the administration of intravenous contrast. Multiplanar reformatted images are provided for review. Automated exposure control, iterative reconstruction, and/or weight based adjustment of the mA/kV was utilized to reduce the radiation dose to as low as reasonably achievable. COMPARISON: 09/17/2021 HISTORY: ORDERING SYSTEM PROVIDED HISTORY: syncope TECHNOLOGIST PROVIDED HISTORY: Reason for exam:->syncope Decision Support Exception - unselect if not a suspected or confirmed emergency medical condition->Emergency Medical Condition (MA) Reason for Exam: syncope FINDINGS: BONES/ALIGNMENT: Cervical vertebral body heights and alignment are normal. Facet joints are normally aligned. There is no acute fracture or traumatic malalignment. DEGENERATIVE CHANGES: There are multilevel degenerative changes in the cervical spine similar to the prior study. SOFT TISSUES: There is no prevertebral soft tissue swelling. Multilevel degenerative changes with no acute fracture or traumatic malalignment. XR CHEST PORTABLE    Result Date: 10/7/2022  EXAMINATION: ONE XRAY VIEW OF THE CHEST 10/7/2022 6:25 pm COMPARISON: 09/17/2021.  HISTORY: ORDERING SYSTEM PROVIDED HISTORY: syncope TECHNOLOGIST PROVIDED HISTORY: Reason for exam:->syncope Reason for Exam: syncope Additional signs and symptoms: LOC FINDINGS: There is no confluent consolidation or effusion. Lung volumes are large. A sequential lead pacemaker appears unchanged. The cardiomediastinal silhouette and pulmonary vessels appear normal.     No acute findings. Large lung volumes. CTA PULMONARY W CONTRAST    Result Date: 10/7/2022  EXAMINATION: CTA OF THE CHEST 10/7/2022 9:44 pm TECHNIQUE: CTA of the chest was performed after the administration of intravenous contrast.  Multiplanar reformatted images are provided for review. MIP images are provided for review. Automated exposure control, iterative reconstruction, and/or weight based adjustment of the mA/kV was utilized to reduce the radiation dose to as low as reasonably achievable. COMPARISON: 09/17/2021 HISTORY: ORDERING SYSTEM PROVIDED HISTORY: +ddimer, syncope TECHNOLOGIST PROVIDED HISTORY: Reason for exam:->+ddimer, syncope Decision Support Exception - unselect if not a suspected or confirmed emergency medical condition->Emergency Medical Condition (MA) Reason for Exam: +ddimer, syncope Additional signs and symptoms: no Relevant Medical/Surgical History: 80 ml isovue 370 used FINDINGS: Pulmonary Arteries: Pulmonary arteries are adequately opacified for evaluation. No evidence of intraluminal filling defect to suggest pulmonary embolism. Central pulmonary arteries are upper limits of normal in caliber. Mediastinum: No evidence of mediastinal lymphadenopathy. The heart is mildly enlarged. There is a left subclavian cardiac pacemaker. The heart and pericardium demonstrate no acute abnormality. There is no acute abnormality of the thoracic aorta. There is calcified plaque in the aortic arch and descending thoracic aorta. Lungs/pleura: The lungs are without acute process. No focal consolidation or pulmonary edema.   No evidence of

## 2022-10-08 NOTE — CONSULTS
[address] [place] [address], [address] [facility]                                  CONSULTATION    PATIENT NAME: Randall Chand                      :        1935  MED REC NO:   [id number]                          ROOM:       3104  ACCOUNT NO:   [de-identified]                           ADMIT DATE: 10/07/2022  PROVIDER:     DEMIAN Maxwell    CONSULT DATE:  10/08/2022    CHIEF COMPLAINT:  Syncope, hypertensive urgency. HISTORY OF PRESENT ILLNESS:  An 49-year-old female that we follow at the  office with a history of complete heart block, status post ICD and  upgrade to a biventricular pacemaker. She has a history of PAF, CAD  status post PCI in the past.  The patient was last seen at the office  about 3 months ago. At that time, she was having issues with blood  pressure as well. It is documented that she is very noncompliant with  her medications and that she had not been taking her medications  regularly. Apparently, she went and saw her PCP yesterday. Blood  pressure there was in the 250 range, systolic. She was feeling well at  that time, so it does not appear like anything was done. She went to  [facility] thereafter, was talking to someone that she ran into while she  was there, and next thing she remembers is she was waking up on the  ground. Denies any prodrome, dizziness. No warning for the syncope at  all. Per the report, she was out for approximately 20 minutes before  she came back to. The squad was called, her blood pressure was checked,  and systolic blood pressure was up in the 200 range. When she got to  the ER, blood work overall appears fairly benign. Her potassium was low  at 2.9, but her troponin was negative. Her BNP is mildly elevated. CBC  and LFTs appear to be within normal limits. She denies any tobacco,  alcohol or illicit drug use.   Per the notes, she was not taking any of  her medications for the last 2 weeks.  She tells me that she was taking  her blood pressure medications, just not her Synthroid. I am not sure  if she knows what she is supposed to be taking home. However, she does  not seem to remember the medications that she was on previously. At  this time, she is sitting up in bed, is fairly comfortable overall. She  is on a nitro drip due to her hypertension. PAST MEDICAL HISTORY:  PAF, CAD status post PCI in 2011, complete heart  block status post pacemaker and upgrade to a biventricular pacemaker in  2011, GERD, fibromyalgia, hypothyroidism. PAST SURGICAL HISTORY:  PCI to the posterior lateral branch placed in  2011, appendectomy, bladder surgery, cholecystectomy, multiple  endoscopies, hysterectomy, pacemaker placement and then a BiV pacer  upgrade, Medtronic device placed in 2020. ALLERGIES:  AMITRIPTYLINE, ASPIRIN, CODEINE, ELAVIL, HYDROXYCHLOROQUINE,  IBUPROFEN, PREVNAR 13 VACCINE, and THEOPHYLLINE. FAMILY HISTORY:  Reviewed and noncontributory. SOCIAL HISTORY:  She is a former smoker that quit in 1977. Per the  notes, does not drink any alcohol. HOME MEDICATIONS:  She was on Paxil, Synthroid, Xarelto 50 mg daily,  Namenda, lisinopril 5 mg daily, Cymbalta, Norvasc 5 mg daily, metoprolol  50 mg b.i.d., albuterol, acetaminophen. REVIEW OF SYSTEMS:  10-point review of systems is reviewed and is  negative unless noted in the HPI. PHYSICAL EXAMINATION:  GENERAL:  Awake, alert female, sitting up in bed, in no acute distress. HEENT:  Head:  Atraumatic, normocephalic. Eyes:  No scleral erythema,  discharge, or conjunctivitis noted. NECK:  Trachea is midline. No apparent mass. CARDIAC:  Regular rate and rhythm. No murmurs, rubs or gallops  auscultated. LUNGS:  Clear to auscultation bilaterally. Good rise and fall of chest.  ABDOMEN:  Soft, nontender. MUSCULOSKELETAL:  No obvious joint deformities. SKIN:  No erythema or ecchymosis noted.   NEUROLOGIC:  Alert and oriented x4.  PSYCH:  Normal mood and affect. IMAGING:  Chest x-ray was done on admission that showed no acute  findings. A right ankle x-ray was done that showed no acute bony  injury. CT cervical spine was done that also was nonacute. CT head was  done that showed no acute intracranial abnormalities or small vessel  ischemic disease. CTA chest was done due to an elevated D-dimer that  did not show any evidence of PE. Her last echo was done on 09/20/2021,  EF was 50-55% with mild-to-moderate left ventricular hypertrophy, trace  aortic regurgitation. Last stress test was done at the office on  05/23/2022, it was negative for any ischemia. EF was in the 60% range. Her last [facility] was done at the office to check her pacemaker in  05/2022, 1.6% PAF burden, she is paced ventricularly 98% of the time,  short nonsustained VT runs. Last carotid was done last year, it was  negative. Last heart cath was done in 2011. She had no significant CAD  other than 100% occlusion in the posterior lateral branch which was  stented at that time. LABORATORIES:  Troponin is negative x1, is ordered to be checked again  this morning. LFTs within normal limits. BMP shows a low potassium at  2.9. This has already been replaced. We will check it again this  morning. Creatinine is elevated at 1.5. Her baseline is about 1.1. CBC is within normal limits. Urine is benign overall. IMPRESSION:  An 60-year-old female that comes in with syncope and  hypertensive emergency. We will check her pacemaker this morning and  follow up on the results; recent check was fairly stable overall. Her  blood pressure is very high. I am not sure that she is taking her  medications at home. We will adjust her p.o. medications today and wean  her off of the nitro drip. Likely would benefit from an echocardiogram  as she has not had this in about a year.   She did have a recent stress  test that was negative and she had a carotid ultrasound that was done  last year that was negative as well. Unlikely that this is carotid  disease as there was no prodrome. Further treatment dictated by  hospital course. DEMIAN Heck    D: 10/08/2022 8:22:23       T: 10/08/2022 8:25:32     [email or web address]  Job#: [id number]     Doc#: [id number]    CC:   Joleen Buerger, MD

## 2022-10-08 NOTE — PROGRESS NOTES
Physician Progress Note      PATIENT:               Jenn Cool  CSN #:                  [id number]  :                       1935  ADMIT DATE:       10/7/2022 6:34 PM  [address] [place] DATE:  RESPONDING  PROVIDER #:        Allen Stone MD          QUERY TEXT:    Hospitalists,    Patient admitted with syncope and HTN emergency. . If possible, please document   in progress notes and discharge summary after study the etiology of the   syncope: The medical record reflects the following:  Risk Factors: HTN emergency  Clinical Indicators: HTN with noncompliance of meds documented, PPM check   showed 1 episode of atrial fibrillation on  day of syncopal episode 10/7/22  Treatment: labs, imaging, Cardiology consult, PPM check    Thank you,  Juliana Bartlett RN CDS  [phone]  Options provided:  -- Syncope due to HTN urgency  -- Syncope due to atrial fib  -- Syncope due to YASSINE  -- Syncope due to ##please specify, please specify. -- Other - I will add my own diagnosis  -- Disagree - Not applicable / Not valid  -- Disagree - Clinically unable to determine / Unknown  -- Refer to Clinical Documentation Reviewer    PROVIDER RESPONSE TEXT:    It is possible that she may have had syncope due to hypertensive urgency. Work-up is in progress. Please refer to cardiology note for further details   regarding the work-up.     Query created by: Rebeca Garza on 10/8/2022 12:53 PM      Electronically signed by:  Allen Stone MD 10/8/2022 3:05 PM

## 2022-10-08 NOTE — ED NOTES
ED TO INPATIENT SBAR HANDOFF    Patient Name: Poly Palacio   :  1935  80 y.o. MRN:  [id number]  Preferred Name  Santana Handler  ED Room #:  ED14/ED-14  Caregiver Present no   Restraints no   Sitter no   Sepsis Risk Score Sepsis Risk Score: 1.02    Situation  Code Status: Prior No additional code details. Allergies: Amitriptyline, Aspirin, Codeine, Elavil [amitriptyline hcl], Hydroxychloroquine, Ibuprofen, Plaquenil [hydroxychloroquine sulfate], Prevnar 13 [pneumococcal 13-naun conj vacc], and Theophyllines  Weight: Patient Vitals for the past 96 hrs (Last 3 readings):   Weight   10/07/22 2034 181 lb (82.1 kg)   10/07/22 1835 181 lb (82.1 kg)     Arrived from: home  Chief Complaint:   Chief Complaint   Patient presents with    Loss of Consciousness     Hospital Problem/Diagnosis:  Active Problems:    * No active hospital problems. *  Resolved Problems:    * No resolved hospital problems. *    Imaging:   CTA PULMONARY W CONTRAST   Final Result   No evidence of pulmonary embolism or acute pulmonary abnormality. CT Head W/O Contrast   Final Result   Addendum (preliminary) 1    ADDENDUM:   Sagittal and coronal images were reviewed. The report is unchanged. Final   No acute intracranial abnormality. Generalized atrophy and chronic small vessel ischemic white matter disease. CT CSpine W/O Contrast   Final Result   Multilevel degenerative changes with no acute fracture or traumatic   malalignment. XR ANKLE RIGHT (MIN 3 VIEWS)   Final Result   No evidence of an acute injury. XR CHEST PORTABLE   Final Result   No acute findings. Large lung volumes.            Abnormal labs:   Abnormal Labs Reviewed   CBC WITH AUTO DIFFERENTIAL - Abnormal; Notable for the following components:       Result Value    Segs Relative 79.5 (*)     Lymphocytes % 12.1 (*)     Monocytes % 7.8 (*)     All other components within normal limits   COMPREHENSIVE METABOLIC PANEL - Abnormal; Notable for the following components:    Potassium 2.9 (*)     BUN 25 (*)     Creatinine 1.5 (*)     Glucose 112 (*)     GFR Non- 33 (*)     GFR  40 (*)     All other components within normal limits   URINALYSIS WITH REFLEX TO CULTURE - Abnormal; Notable for the following components:    Blood, Urine TRACE (*)     Bacteria, UA RARE (*)     All other components within normal limits   BRAIN NATRIURETIC PEPTIDE - Abnormal; Notable for the following components:    Pro-BNP 1,271 (*)     All other components within normal limits   D-DIMER, QUANTITATIVE - Abnormal; Notable for the following components:    D-Dimer, Quant 1604 (*)     All other components within normal limits     Critical values: no     Abnormal Assessment Findings:      Background  Hospital admissions in last 30 days?  no   History:   Past Medical History:   Diagnosis Date    A-fib (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 2011    No Chest Pains - MI with collapse and pacemaker insertion.     Thyroid disease     Ulcer in the past    Gastric       Assessment    Vitals/MEWS: MEWS Score: 2  Level of Consciousness: Alert (0)   Vitals:    10/08/22 0001 10/08/22 0032 10/08/22 0104 10/08/22 0221   BP: (!) 165/84 (!) 182/75 (!) 175/92 (!) 209/91   Pulse: 64 62 63 69   Resp: 22 19 15 13   Temp:    98 °F (36.7 °C)   TempSrc:       SpO2: 97% 95% 98% 97%   Weight:       Height:         FiO2 (%):    O2 Flow Rate:      Cardiac Rhythm:    Pain Assessment: 0/10 [x] Verbal [] Margette Ligas Scale  Pain Scale: Pain Assessment  Pain Assessment: None - Denies Pain  Last documented pain score (0-10 scale)    Last documented pain medication administered:    Mental Status: oriented and alert  C-SSRS: Risk of Suicide: No Risk  Bedside swallow:    Jose Manuel Coma Scale (GCS): [place] Coma Scale  Eye Opening: Spontaneous  Best Verbal Response: Oriented  Best Motor Response: Obeys commands  Jose Manuel Coma Scale Score: 15  Active LDA's:   Peripheral IV 10/07/22 Right Antecubital (Active)     PO Status: Regular  Pertinent or High Risk Medications/Drips: no   o If Yes, please provide details:    Pending Blood Product Administration: no     Blood Cultures: see ED pt care timeline or ED Event log    Recommendation    Pending orders    Plan for Discharge (if known): home      Additional Comments: Pt alert and oriented. Pt slightly hard of hearing. Pt able to ambulate with stand by assist. Pt has purwik in place. Pt has not been taking any of her home medications. Med list was updated based on what MD wanted her to be on at her appointment this week. The meds were all prescribed but she hasn't had the ability to pick them up from the pharmacy yet.         If any further questions, please call Sending RN at [phone]    Electronically signed by: Electronically signed by Shanda Yang RN on 10/8/2022 at 2:26 AM       Shanda Yang RN  10/08/22 5405

## 2022-10-08 NOTE — ED NOTES
Pt transported to and from CT without complications at this time.         Radha Garcia RN  10/07/22 2000

## 2022-10-08 NOTE — CONSULTS
[facility]-[id number]  Syncope- with no prodome  HTN urgency  Currently on nitro drip- will adjust PO meds for BP and wean off today- discussed with RN  S/p BiV-PPM will check device today  Recent stress neg. Orthostatics neg. Would repeat echo if still here on Monday  Carotid last year was neg. Thanks for consult    PPM check-BiV-PPM normal function- one episode of AF yesterday, rate controlled.

## 2022-10-08 NOTE — PROGRESS NOTES
Physical Therapy  Facility/Department: [address] [place] [address]  Physical Therapy Initial Assessment    Name: Melva French  : 1935  MRN: [id number]  Date of Service: 10/8/2022    Discharge Recommendations:  IP Rehab              Assessment   Assessment: Pt is an 80 y.o. female with medical history, surgical history, co-morbidities, and personal factors including A-fib ([facility] Arthritis, CAD (coronary artery disease), Eye abnormality, Fibromyalgia, GERD (gastroesophageal reflux disease), Hx of Doppler echocardiogram, MI (myocardial infarction) ([facility] Thyroid disease, Ulcer, Cholecystectomy; Bladder surgery; Eye surgery; Hysterectomy; Appendectomy; Ventricular cardiac pacer insertion (); Colonoscopy (14); and pacemaker placement (Left, 2020) with admission for Syncope and collapse, Hypokalemia, YASSINE (acute kidney injury), Non compliance w medication regimen, Frequent falls, Acute right ankle pain, and Hypertensive urgency. Prior to admission, pt was independent with functional mobility and ADLs. Examination of body systems reveals decreased strength, decreased balance, decreased aerobic capacity, and decreased independence with functional mobility.          Therapy Prognosis: Good  Decision Making: High Complexity  Clinical Presentation: unpredictable characteristics  Requires PT Follow-Up: Yes  Activity Tolerance  Activity Tolerance: Patient tolerated evaluation without incident     Plan   Physcial Therapy Plan  General Plan: 3-5 times per week  Current Treatment Recommendations: Strengthening, ROM, Balance training, Functional mobility training, Transfer training, ADL/Self-care training, IADL training, Cognitive/Perceptual training, Endurance training, Safety education & training, Patient/Caregiver education & training, Gait training, Stair training, Equipment evaluation, education, & procurement, Therapeutic activities, Neuromuscular re-education, Pain management, Positioning, Home exercise program  Safety Devices  Type of Devices: All fall risk precautions in place, Patient at risk for falls, Call light within reach, Left in chair, Chair alarm in place, Gait belt, Nurse notified     Restrictions  Restrictions/Precautions  Restrictions/Precautions: General Precautions, Fall Risk     Subjective   General  Chart Reviewed: Yes  Patient assessed for rehabilitation services?: Yes  Family / Caregiver Present: No  Follows Commands: Within Functional Limits  Subjective  Subjective: pain: R knee; 5/10         Social/Functional History  Social/Functional History  Lives With: Alone  Type of Home: House  Home Layout: Two level  Home Access: Stairs to enter with rails  Entrance Stairs - Number of Steps: 3  Entrance Stairs - Rails: Both  Bathroom Shower/Tub: Tub/Shower unit  Bathroom Toilet: Standard  Bathroom Accessibility: Accessible  Home Equipment: Cane  Has the patient had two or more falls in the past year or any fall with injury in the past year?: Yes  ADL Assistance: Independent  Homemaking Assistance: Independent  Homemaking Responsibilities: Yes  Ambulation Assistance: Independent  Transfer Assistance: Independent  Active : Yes  Mode of Transportation: Car    Vision/Hearing  Vision  Vision: Within Functional Limits  Hearing  Hearing: Within functional limits      Cognition   Orientation  Overall Orientation Status: Within Functional Limits  Cognition  Overall Cognitive Status: Exceptions  Arousal/Alertness: Appropriate responses to stimuli  Following Commands:  Follows all commands without difficulty  Attention Span: Appears intact  Safety Judgement: Decreased awareness of need for safety;Decreased awareness of need for assistance  Problem Solving: Decreased awareness of errors  Insights: Decreased awareness of deficits  Initiation: Requires cues for some  Sequencing: Requires cues for some     Objective           Gross Assessment  Sensation: Intact (BLEs)        Strength RLE  Comment: knee extension: 3+/5, ankle DF: 4/5  Strength LLE  Comment: knee extension: 4-/5, ankle DF: 4/5              Transfers  Sit to Stand: Minimal Assistance (with verbal cues to push through chair and avoid pulling on walker)  Stand to Sit: Minimal Assistance (with verbal cues to feel chair against back of legs, reach back, and sit slowly)  Ambulation  Surface: Level tile  Device: Rolling Walker  Assistance: Minimal assistance  Quality of Gait: decreased gait speed, decreased step length bilaterally, decreased stance time on RLE, antalgic, unsteady  Distance: 20 feet + 15 feet  Comments: with verbal and tactile cues for BLE placement, walker placement, and sequence throughout ambulation; with verbal and tactile cues to maintain upright posture in order to avoid COM shifting outside of JUDITH     Balance  Posture: Fair  Sitting - Static: Good  Sitting - Dynamic: Good  Standing - Static: Fair;+  Standing - Dynamic: Poor;+           Gait Training:  Cues were given for safety, sequence, device management, balance, posture, awareness, path. Therapeutic Activity Training:   Therapeutic activity training was instructed today. Cues were given for safety, sequence, UE/LE placement, awareness, and balance. Activities performed today included bed mobility training, sup-sit, sit-stand. AM-PAC Score  AM-PAC Inpatient Mobility Raw Score : 14 (10/08/22 1934)  AM-PAC Inpatient T-Scale Score : 38.1 (10/08/22 1934)  Mobility Inpatient CMS 0-100% Score: 61.29 (10/08/22 1934)  Mobility Inpatient CMS G-Code Modifier : CL (10/08/22 1934)          Goals  Long Term Goals  Time Frame for Long Term Goals :  In one week:  Long Term Goal 1: Pt will complete all bed mobility with SBAx1  Long Term Goal 2: Pt will complete sit <> stand transfers with SBAx1  Long Term Goal 3: Pt will ambulate 100 feet with SBAx1 with LRAD  Long Term Goal 4: Pt will ascend/descend 6 steps with a handrail with minAx1  Long Term Goal 5: Pt will independently complete 3 sets of 10 reps of BLE AROM exercises in available and allowed ROM       Education  Patient Education  Education Given To: Patient  Education Provided: Role of Therapy; Energy Conservation; Fall Prevention Strategies; Plan of Care;IADL Safety; ADL Adaptive Strategies; Equipment;Transfer Training  Education Method: Demonstration;Verbal  Education Outcome: Verbalized understanding;Demonstrated understanding;Continued education needed      Time In: 1048  Time Out: 1130  Total Treatment Time: 42  Timed Code Treatment Minutes: Susan Dawson DPT  License #: [id number]

## 2022-10-08 NOTE — PROGRESS NOTES
Occupational Therapy    [facility] ACUTE CARE OCCUPATIONAL THERAPY EVALUATION  Camila Gonzáles, 1935, 3104/3104-A, 10/8/2022    History  [place]:  The primary encounter diagnosis was Syncope and collapse. Diagnoses of Hypokalemia, YASSINE (acute kidney injury) ([facility] Non compliance w medication regimen, Frequent falls, Acute right ankle pain, and Hypertensive urgency were also pertinent to this visit. Patient  has a past medical history of A-fib ([address] Arthritis, CAD (coronary artery disease), Eye abnormality, Fibromyalgia, GERD (gastroesophageal reflux disease), Hx of Doppler echocardiogram, MI (myocardial infarction) ([address] Thyroid disease, and Ulcer. Patient  has a past surgical history that includes Cholecystectomy; Bladder surgery; Eye surgery; Hysterectomy; Appendectomy; Ventricular cardiac pacer insertion (2011); Colonoscopy (11/4/14); and pacemaker placement (Left, 02/20/2020). Subjective:  Patient states: \"It was so hard to get up to the [facility] yesterday\". Pain:  8/10 pain in R knee and ankle during functional mobility decreases when sitting  Pain Intervention: Increased movement, repositioned, RN notified.     Communication with other providers:  Handoff to RN  Restrictions: General Precautions, Fall Risk    Home Setup/Prior level of function  Social/Functional History  Lives With: Alone  Type of Home: House  Home Layout: Two level  Home Access: Stairs to enter with rails  Entrance Stairs - Number of Steps: 3  Entrance Stairs - Rails: Both  Bathroom Shower/Tub: Tub/Shower unit  Bathroom Toilet: Standard  Bathroom Accessibility: Accessible  Home Equipment: Cane  Has the patient had two or more falls in the past year or any fall with injury in the past year?: Yes  ADL Assistance: [facility] [address]: Independent  Homemaking Responsibilities: Yes  Ambulation Assistance: Independent  Transfer Assistance: Independent  Active : Yes  Mode of Transportation: Car    Examination of body systems (includes body structures/functions, activity/participation limitations):  Observation:  Supine in bed upon arrival, agreeable to therapy   Vision:  Glasses  Hearing:  [employer] [facility]  Cardiopulmonary:  No 02 needs. BP supine in bed: 127/68 (86); sitting upright in chair at end of session: 121/68 (86)      Body Systems and functions:  ROM R/L:  WFL. Strength R/L:  4+/5,   Sensation: WFL  Tone: Normal  Coordination: WFL  Perception: WNL    Activities of Daily Living (ADLs):  Feeding: Independent  Grooming: CGA (washing hands/face w/ warm washcloth)  UB bathing: Supervision  LB bathing: modA  UB dressing: Supervision  LB dressing: modA  Toileting: modA    Cognitive and Psychosocial Functioning:  Overall cognitive status: WFL  Affect: Normal        Mobility:  Supine to sit:  SBA  Transfers: Tony from EOB up to RW   Sitting balance:  Supervision. Standing balance:  Tony w/ RW due to pain in RLE. Functional Mobility Tony w/ RW due to pain in RLE ~8 feet before fatiguing due to pain  Toilet/Shower Transfers: DNT             AM-[facility] Daily Activity Inpatient   How much help for putting on and taking off regular lower body clothing?: A Lot  How much help for Bathing?: A Little  How much help for Toileting?: A Lot  How much help for putting on and taking off regular upper body clothing?: None  How much help for taking care of personal grooming?: A Little  How much help for eating meals?: None  AM-[facility] Inpatient Daily Activity Raw Score: 18  AM-PAC Inpatient ADL T-Scale Score : 38.66  ADL Inpatient CMS 0-100% Score: 46.65  ADL Inpatient CMS G-Code Modifier : CK    Treatment:  Therapeutic Activity Training:   Therapeutic activity training was instructed today. Cues were given for safety, sequence, UE/LE placement, awareness, and balance.     Activities performed today included bed mobility training, sup-sit, sit-stand,  functional mobility, stand to sit    Self Care Training:   Cues were given for safety, sequence, UE/LE placement, visual cues, and balance. Grooming     Safety: patient left in chair with chair alarm, call light within reach, RN notified, gait belt used. Assessment:  Pt is a 81 yo female admitted from home for hypertensive injury. Pt at baseline is Independent for ADLs Independent for high level IADLs and Independent for functional transfers/mobility w/ AD. Pt currently presents w/ deficits in ADL and high level IADL independence, functional activity tolerance, dynamic sitting and standing balance and tolerance and functional transfers and OOB activity. Pt would benefit from continued acute care OT services w/ discharge to ARU  Complexity: Moderate  Prognosis: Good, no significant barriers to participation at this time. Occupational Therapy Plan  Times Per Week: 3x+  Times Per Day:  Once a day  Current Treatment Recommendations: Strengthening, ROM, Balance training, Functional mobility training, Endurance training, Pain management, Equipment evaluation, education, & procurement, Safety education & training, Patient/Caregiver education & training, Self-Care / ADL, Home management training, Positioning     Equipment: defer    Goals:  Pt goal: go home  Time Frame for STGs: discharge  Goal 1: Pt will perform UE ADLs Independent  Goal 2: Pt will perform LE ADLs Independent  Goal 3: Pt will perform toileting Independent  Goal 4: Pt will perform functional transfer w/ AD Power  Goal 5: Pt will perform functional mobility w/ AD Power  Goal 6: Pt will perform therex/theract in order to increase functional activity tolerance and dynamic standing balance    Treatment plan:  Pt will perform functional task in stand reaching in all 3 planes in order to increase dynamic standing balance and functional activity tolerance    Recommendations for NURSING activity: Up to chair for all 3 meals and up to [facility] (setup and in room) for all toileting needs    Time:   Time in: 0831  Time out: 0859  Timed treatment minutes: 23 minutes  Total time: 28 minutes    Electronically signed by:    Gwendolyn LITTLE [id number]  10:24 AM,10/8/2022

## 2022-10-08 NOTE — PROGRESS NOTES
Hospitalist    Currently in chair - stated she doesn't recall passing out at [facility] - stated \"I just walk up, you asked me too fast\". Reports had 1 son and 2 daughter - all passed away. Nayeli Siddiqui is her neighbor. Off NTG at this time. PT norberto ordered.

## 2022-10-08 NOTE — H&P
History and Physical      Name:  Viola Anthony /Age/Sex: 1935  (80 y.o. female)   MRN & CSN:  [id number] & [id number] Encounter Date/Time: 10/8/2022 3:27 AM EDT   Location:  ED14/ED-14 PCP: Emeterio Galvin, [facility] Day: 2    Assessment and Plan:     Patient is a 80-year-old female who presented with syncope. # Syncope  - Allegedly had syncope at [facility] ED for over 20 mins without a prodrome. Description is questioned as patient with significant memory impairment. She has previous episodes of vasovagal syncope and hx of CHB s/p BIV pacemaker in 2020. She has not been taking antihypertensive medications for over x2 weeks, unable to state why.   - Orthostatics negative in ED. Labs unremarkable besides moderate hypokalemia. Tn negative. ECG with v-paced rhythm. CTH and CTPE negative. - Held Paxil (anticholinergic profile). Likely multifactorial etiology of syncope. - Cardiology consulted, follow-up. # Hypertensive emergency  - She has not been taking antihypertensive medications for over x2 weeks, unable to state why.   - BP >210/90 mmHg with end-organ damage (renal). - Resumed home Norvasc (increased to 10 mg) in the ED with improvement in BP, will consider starting Nitro gtt. Held Lisinopril in setting of YASSINE. # YASSINE  - Cr 1.5 on admission, baseline ~ 1.0.  - UA unremarkable. - No recent NSAIDs. Received contrast in ED, monitor function.  - Held Lisinopril. # Cognitive impairment  - Lives alone, and stated that she independent with all IADLs. She has repeat falls. PCP discussing placement due to safety concerns. -  Mini-Cog 2/5 in ED, indicating significant cognitive impairment, although MMSE 30/30 in 2021. No recent CVAs to explain rapid decline. Will benefit from repeat MMSE outpatient. - Delirium precautions.  - PT/OT/SW consulted. # AF  - Continue Eliquis. # Hypokalemia  - Moderate, repleted. - Follow-up repeat labs.     Checklist:  Advanced directive: full  Antibiotics: none  Catheter: none  DVT ppx: Xarelto  Electrolytes: corrected as above  Nutrition/Diet: cardiac  PT/OT:  ordered    Disposition: patient requires continued admission due to syncope. MDM: moderate. History of Present Illness:     Chief Complaint: passing out    Patient is a 59-year-old female with a PMHx of CHB s/p PPM in 2/2020, syncope, AF (on Xarelto), HTN and cognitive impairment who presented to the ED with after having a syncopal episode at [facility]. Allegedly she was at [facility] and had a's witnessed syncopal episode with a bystander who broke her fall and lowered her to the ground. Allegedly she was unconscious for over 20 minutes. Patient does not recall any prodrome. Further history is limited as patient unable to remember or provide significant amount of details. She endorsed not taking her antihypertensive medicines for the past 2 weeks, but unable to say why. Reported only eating a piece of toast and oatmeal in the morning. Denied any fevers, chills, SOB, CP, palpitations, nausea, vomiting, abdominal pain, constipation, diarrhea or urinary changes. Denied any tobacco, alcohol or illicit drug use. She was seen by her PCP who recommended possible placement due to concerns of safety while awake at home. In the ED, orthostatics were negative. Labs were unremarkable except for mild hypokalemia. CTH and CTPE negative for any acute issues. Her home Norvasc was resumed with sp,e improvement in her BP.    ROS:     Ten point ROS reviewed negative, unless as noted above. Objective:     No intake or output data in the 24 hours ending 10/08/22 0327     Vitals:   Vitals:    10/08/22 0032 10/08/22 0104 10/08/22 0221 10/08/22 0304   BP: (!) 182/75 (!) 175/92 (!) 209/91 (!) 182/83   Pulse: 62 63 69 65   Resp: 19 15 13 17   Temp:   98 °F (36.7 °C)    TempSrc:       SpO2: 95% 98% 97% 98%   Weight:       Height:         BMI: Body mass index is 28.35 kg/m². General: Awake. WDWN. AAOx2. HEENT: PERRLA. Oropharynx clear. Edentulous. Neck: Supple. No JVD. CV: RRR. NL S1/S2. No murmurs. CR <2 secs. No peripheral edema. Pulm: NL effort on RA. CTAB. CW NTTP. GI: +BS x4. Soft. NT/ND. : No CVA or suprapubic tenderness. No Rodriguez catheter. Skin: Some yellow bruising over right shoulder. MSK: No gross joint deformities. Full ROM. Muscle strength is 5/5 B/L. Neuro: CNs grossly intact. Normal speech. No focal deficit. Psych: Pleasant. 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 [address] 2011    No Chest Pains - MI with collapse and pacemaker insertion. Thyroid disease     Ulcer in the past    Gastric       PSHx:   Past Surgical History:   Procedure Laterality Date    APPENDECTOMY      BLADDER SURGERY      CHOLECYSTECTOMY      COLONOSCOPY  11/4/14    normal, biopsy    EYE SURGERY      HYSTERECTOMY (CERVIX STATUS UNKNOWN)      PACEMAKER PLACEMENT Left 02/20/2020    bivi pacer upgrade-Medtronic Percepta Quad CRT-P MRI [facility]  2011    Dr. Mellissa Fierro. [facility] - following MI       Allergies: Allergies   Allergen Reactions    Amitriptyline     Aspirin     Codeine     Elavil [Amitriptyline Hcl]     Hydroxychloroquine     Ibuprofen     Plaquenil [Hydroxychloroquine Sulfate]     Prevnar 13 [Pneumococcal 13-Kristal Conj Vacc]     Theophyllines        FHx: family history includes Cancer in her brother, father, mother, and sister; Heart Disease in her father. SHx:   Social History     Socioeconomic History    Marital status:       Spouse name: None    Number of children: None    Years of education: None    Highest education level: None   Tobacco Use Smoking status: Former     Types: Cigarettes     Quit date: 1977     Years since quittin.7    Smokeless tobacco: Never   Substance and Sexual Activity    Alcohol use: No    Drug use: No     Social Determinants of Health     Financial Resource Strain: Low Risk     Difficulty of Paying Living Expenses: Not hard at all   Food Insecurity: No Food Insecurity    Worried About Running Out of Food in the Last Year: Never true    Ran Out of Food in the Last Year: Never true   Physical Activity: Insufficiently Active    Days of Exercise per Week: 3 days    Minutes of Exercise per Session: 30 min       Medications Prior to Admission     Prior to Admission medications    Medication Sig Start Date End Date Taking?  Authorizing Provider   PARoxetine (PAXIL) 10 MG tablet Take 1 tablet by mouth daily 10/7/22   Carrington Henriquez MD   levothyroxine (SYNTHROID) 50 MCG tablet Take 1 tablet by mouth every morning (before breakfast) TAKE ONE TABLET BY MOUTH DAILY 10/7/22 4/5/23  Carrington Henriquez MD   rivaroxaban [facility] Dove) 15 MG TABS tablet Take 1 tablet by mouth daily 10/7/22   Carrington Henriquez MD   memantine [facility]) 10 MG tablet Take 1 tablet by mouth 2 times daily 10/7/22   Carrington Henriquez MD   lisinopril (PRINIVIL;ZESTRIL) 5 MG tablet Take 1 tablet by mouth daily 10/7/22   Carrington Henriquez MD   DULoxetine (CYMBALTA) 30 MG extended release capsule Take 1 capsule by mouth daily 10/7/22   Carrington Henriquez MD   amLODIPine [facility]) 5 MG tablet Take 1 tablet by mouth daily 10/7/22   Carrington Henriquez MD   metoprolol tartrate (LOPRESSOR) 50 MG tablet Take 1 tablet by mouth 2 times daily 10/7/22   Carrington Henriquez MD   albuterol sulfate HFA (VENTOLIN HFA) 108 (90 Base) MCG/ACT inhaler Inhale 2 puffs into the lungs 4 times daily as needed for Wheezing 21   Mariana Brooks MD   Acetaminophen (TYLENOL ARTHRITIS PAIN PO) Take by mouth    Historical Provider, MD       Data:     CBC: Recent Labs     10/07/22  2025   WBC 9.6   HGB 13.8      MCV 85.3   RDW 13.9   LYMPHOPCT 12.1*   MONOPCT 7.8*   BASOPCT 0.4   MONOSABS 0.8   LYMPHSABS 1.2   EOSABS 0.0   BASOSABS 0.0     CMP:    Recent Labs     10/07/22  2025      K 2.9*      CO2 27   BUN 25*   CREATININE 1.5*   GFRAA 40*   GLUCOSE 112*   LABALBU 4.8   CALCIUM 9.5   BILITOT 0.7   ALKPHOS 108   AST 22   ALT 17     Lipids:   Lab Results   Component Value Date/Time    CHOL 166 06/04/2019 03:55 PM    HDL 34 06/04/2019 03:55 PM    TRIG 133 06/04/2019 03:55 PM     Hemoglobin A1C:   Lab Results   Component Value Date/Time    LABA1C 5.4 04/18/2022 02:52 PM     TSH:   Lab Results   Component Value Date/Time    TSH 3.14 04/18/2022 02:52 PM     Troponin:   Lab Results   Component Value Date/Time    TROPONINT <0.010 10/07/2022 08:25 PM    TROPONINT <0.010 09/18/2021 04:08 AM    TROPONINT <0.010 09/18/2021 01:34 AM     BNP:   Recent Labs     10/07/22  2025   PROBNP 1,271*     Lactic Acid: No results for input(s): LACTA in the last 72 hours. UA:  Lab Results   Component Value Date/Time    NITRU NEGATIVE 10/08/2022 12:27 AM    NITRU Negative 06/04/2019 03:55 PM    COLORU YELLOW 10/08/2022 12:27 AM    PHUR 6.0 06/04/2019 03:55 PM    WBCUA 3 10/08/2022 12:27 AM    RBCUA 1 10/08/2022 12:27 AM    MUCUS RARE 11/08/2013 01:45 PM    TRICHOMONAS NONE SEEN 10/08/2022 12:27 AM    BACTERIA RARE 10/08/2022 12:27 AM    CLARITYU CLEAR 10/08/2022 12:27 AM    SPECGRAV 1.010 10/08/2022 12:27 AM    LEUKOCYTESUR NEGATIVE 10/08/2022 12:27 AM    UROBILINOGEN 0.2 10/08/2022 12:27 AM    BILIRUBINUR NEGATIVE 10/08/2022 12:27 AM    BLOODU TRACE 10/08/2022 12:27 AM    GLUCOSEU Negative 06/04/2019 03:55 PM    KETUA NEGATIVE 10/08/2022 12:27 AM     Urine Cultures:   Lab Results   Component Value Date/Time    LABURIN  06/04/2019 03:55 PM     <10,000 CFU/ml mixed skin/urogenital ange.  No further workup    LABURIN  06/04/2019 03:55 PM     25,000 CFU/ml  Susceptibility testing of penicillin and other beta-lactams is  not necessary for beta hemolytic Streptococci since resistant  strains have not been identified. (CLSI M100)       Blood Cultures: No results found for: BC  No results found for: BLOODCULT2  Organism:   Lab Results   Component Value Date/Time    ORG BHS Group B (Strep agalacticae) 06/04/2019 03:55 PM       Radiology results:  CTA PULMONARY W CONTRAST   Final Result   No evidence of pulmonary embolism or acute pulmonary abnormality. CT Head W/O Contrast   Final Result   Addendum (preliminary) 1 of 1   ADDENDUM:   Sagittal and coronal images were reviewed. The report is unchanged. Final   No acute intracranial abnormality. Generalized atrophy and chronic small vessel ischemic white matter disease. CT CSpine W/O Contrast   Final Result   Multilevel degenerative changes with no acute fracture or traumatic   malalignment. XR ANKLE RIGHT (MIN 3 VIEWS)   Final Result   No evidence of an acute injury. XR CHEST PORTABLE   Final Result   No acute findings. Large lung volumes.              Medications:     Medications:        Infusions:    nitroGLYCERIN Stopped (10/08/22 0308)       PRN Meds:        Malcom Dumont MD  10/08/22 3:27 AM

## 2022-10-09 ENCOUNTER — APPOINTMENT (OUTPATIENT)
Dept: ULTRASOUND IMAGING | Age: 87
DRG: 312 | End: 2022-10-09
Payer: MEDICARE

## 2022-10-09 LAB — VITAMIN D 25-HYDROXY: 17.01 NG/ML

## 2022-10-09 PROCEDURE — 82306 VITAMIN D 25 HYDROXY: CPT

## 2022-10-09 PROCEDURE — 97530 THERAPEUTIC ACTIVITIES: CPT

## 2022-10-09 PROCEDURE — 6370000000 HC RX 637 (ALT 250 FOR IP): Performed by: STUDENT IN AN ORGANIZED HEALTH CARE EDUCATION/TRAINING PROGRAM

## 2022-10-09 PROCEDURE — 2140000000 HC CCU INTERMEDIATE R&B

## 2022-10-09 PROCEDURE — 6370000000 HC RX 637 (ALT 250 FOR IP): Performed by: PHYSICIAN ASSISTANT

## 2022-10-09 PROCEDURE — 2580000003 HC RX 258: Performed by: STUDENT IN AN ORGANIZED HEALTH CARE EDUCATION/TRAINING PROGRAM

## 2022-10-09 PROCEDURE — 94761 N-INVAS EAR/PLS OXIMETRY MLT: CPT

## 2022-10-09 PROCEDURE — 93880 EXTRACRANIAL BILAT STUDY: CPT

## 2022-10-09 PROCEDURE — 97110 THERAPEUTIC EXERCISES: CPT

## 2022-10-09 PROCEDURE — 36415 COLL VENOUS BLD VENIPUNCTURE: CPT

## 2022-10-09 RX ADMIN — SODIUM CHLORIDE, PRESERVATIVE FREE 5 ML: 5 INJECTION INTRAVENOUS at 09:28

## 2022-10-09 RX ADMIN — DULOXETINE 30 MG: 30 CAPSULE, DELAYED RELEASE ORAL at 09:21

## 2022-10-09 RX ADMIN — LEVOTHYROXINE SODIUM 50 MCG: 0.05 TABLET ORAL at 06:38

## 2022-10-09 RX ADMIN — MEMANTINE 10 MG: 10 TABLET ORAL at 09:21

## 2022-10-09 RX ADMIN — CARVEDILOL 12.5 MG: 6.25 TABLET, FILM COATED ORAL at 09:21

## 2022-10-09 RX ADMIN — MEMANTINE 10 MG: 10 TABLET ORAL at 19:35

## 2022-10-09 RX ADMIN — CARVEDILOL 12.5 MG: 6.25 TABLET, FILM COATED ORAL at 16:23

## 2022-10-09 RX ADMIN — SODIUM CHLORIDE, PRESERVATIVE FREE 10 ML: 5 INJECTION INTRAVENOUS at 19:35

## 2022-10-09 RX ADMIN — AMLODIPINE BESYLATE 5 MG: 5 TABLET ORAL at 09:21

## 2022-10-09 RX ADMIN — RIVAROXABAN 15 MG: 15 TABLET, FILM COATED ORAL at 09:21

## 2022-10-09 ASSESSMENT — PAIN SCALES - GENERAL
PAINLEVEL_OUTOF10: 0

## 2022-10-09 NOTE — PROGRESS NOTES
Daily Progress Note  Subjective:  Pt awake in chair  No SOB or chest pain  Tele SR  BP improved -154  No further syncope episodes  Pt complains of right leg and knee pain today- right fibula fx ortho consulted  Pt rec IP rehab    Attending Note:      Impression and Plan:     HTN urgency      Off nitro drip- on Norvasc and Coreg      S/p BiV-PPM -device check with one episode of AF rate controlled on 10/7      Recent stress neg. Orthostatics neg. Echo ordered for Monday      Carotid last year was neg. Syncope      Orthostatic vitals negative       CT chest neg for PE        Will check carotid U/S today     PAF      Currently SR      On Xarelto       Last Echo 9/20/21           EF 50-55%       Summary          Left ventricular systolic function is normal.          Ejection fraction is visually estimated at 50-55%. Mild to moderate left ventricular hypertrophy. Grade I diastolic dysfunction. PPM wiring visualized within the right heart. Trace aortic regurgitation noted with color doppler. No evidence of any pericardial effusion. Atherosclerotic plaque noted in the abdominal aorta. H/O CHB     S/p biventricular pacer 2011        PAST MEDICAL HISTORY:  PAF, CAD status post PCI in 2011, complete heart  block status post pacemaker and upgrade to a biventricular pacemaker in  2011, GERD, fibromyalgia, hypothyroidism. PAST SURGICAL HISTORY:  PCI to the posterior lateral branch placed in  2011, appendectomy, bladder surgery, cholecystectomy, multiple  endoscopies, hysterectomy, pacemaker placement and then a BiV pacer  upgrade, Medtronic device placed in 2020. ALLERGIES:  AMITRIPTYLINE, ASPIRIN, CODEINE, ELAVIL, HYDROXYCHLOROQUINE,  IBUPROFEN, PREVNAR 13 VACCINE, and THEOPHYLLINE. FAMILY HISTORY:  Reviewed and noncontributory. SOCIAL HISTORY:  She is a former smoker that quit in 1977. Per the  notes, does not drink any alcohol. HOME MEDICATIONS:  She was on Paxil, Synthroid, Xarelto 50 mg daily,  Namenda, lisinopril 5 mg daily, Cymbalta, Norvasc 5 mg daily, metoprolol  50 mg b.i.d., albuterol, acetaminophen      Objective:   /70   Pulse 65   Temp 98 °F (36.7 °C) (Oral)   Resp 17   Ht 5' 7\" (1.702 m)   Wt 183 lb 3.2 oz (83.1 kg)   SpO2 95%   BMI 28.69 kg/m²     Intake/Output Summary (Last 24 hours) at 10/9/2022 0939  Last data filed at 10/8/2022 1554  Gross per 24 hour   Intake --   Output 350 ml   Net -350 ml       Medications:   Scheduled Meds:   DULoxetine  30 mg Oral Daily    levothyroxine  50 mcg Oral QAM AC    memantine  10 mg Oral BID    rivaroxaban  15 mg Oral Daily    sodium chloride flush  5-40 mL IntraVENous 2 times per day    amLODIPine  5 mg Oral Daily    carvedilol  12.5 mg Oral BID WC      Infusions:   nitroGLYCERIN Stopped (10/08/22 0914)    sodium chloride        PRN Meds:  albuterol sulfate HFA, sodium chloride flush, sodium chloride, ondansetron **OR** ondansetron, polyethylene glycol, acetaminophen **OR** acetaminophen     Physical Exam:  Vitals:    10/09/22 0929   BP: 125/70   Pulse: 65   Resp: 17   Temp: 98 °F (36.7 °C)   SpO2: 95%        General: AAO, NAD  Chest: Nontender  Cardiac: First and Second Heart Sounds are Normal, No Murmurs or Gallops noted  Lungs:Clear to auscultation and percussion. Abdomen: Soft, NT, ND, +BS  Extremities: No clubbing, no edema  Vascular:  Equal 2+ peripheral pulses. Lab Data:  CBC:   Recent Labs     10/07/22  2025   WBC 9.6   HGB 13.8   HCT 43.1   MCV 85.3        BMP:   Recent Labs     10/07/22  2025 10/08/22  0926    138   K 2.9* 3.8    101   CO2 27 25   BUN 25* 22   CREATININE 1.5* 1.0     LIVER PROFILE:   Recent Labs     10/07/22  2025   AST 22   ALT 17   BILITOT 0.7   ALKPHOS 108     PT/INR: No results for input(s): PROTIME, INR in the last 72 hours. APTT: No results for input(s): APTT in the last 72 hours.   BNP:  No results for input(s): BNP in the last 72 hours.       Assessment:  Patient Active Problem List    Diagnosis Date Noted    Hypertensive emergency 10/08/2022    Lupus ([facility] 10/07/2022    Senile degeneration of brain ([facility] 10/07/2022    Chronic renal disease, stage III [facility]) [[id number]] 10/07/2022    Syncope and collapse 09/18/2021    Other cardiomyopathies ([facility] 07/06/2021    Fibromyalgia 04/05/2021    Episode of recurrent major depressive disorder ([facility] 04/05/2021    SOB (shortness of breath) 09/08/2020    Other specified hypothyroidism 09/08/2020    OAB (overactive bladder) 06/08/2020    S/P biventricular cardiac pacemaker procedure 02/27/2020    A-fib ([facility] 08/26/2019    Type 2 diabetes mellitus with diabetic polyneuropathy, without long-term current use of insulin ([facility] 06/05/2019       Electronically signed by Tarik Rivas PA-C on 10/9/2022 at 9:39 AM

## 2022-10-09 NOTE — PROGRESS NOTES
Physical Therapy    Physical Therapy Treatment Note  Name: Claudio Babb MRN: [id number] :   1935   Date:  10/9/2022   Admission Date: 10/7/2022 Room:  [address] [place] [address]-A   Restrictions/Precautions:  Restrictions/Precautions  Restrictions/Precautions: General Precautions, Fall Risk           Subjective:  Patient states:  \"I feel very tired today\"  Pain:   Location, Type, Intensity (0/10 to 10/10):  denies; 0/10    Objective:    Observation:  pt supine in bed upon entry    Treatment, including education/measures:  Pt agreeable to participating in therapy at this time. Therapeutic Activity Training:   Therapeutic activity training was instructed today. Cues were given for safety, sequence, UE/LE placement, awareness, and balance. Activities performed today included bed mobility training, sup-sit, sit-stand, SPT. Pt completed supine to sit with modAx1 with verbal and tactile cues for BUE and BLE placement throughout transfer and with HOB elevated. Pt completed sit to stand with modAx1 with verbal cues to push through bed and avoid pulling on walker. Pt completed stand to sit with modAx1 with verbal cues to feel bed against back of legs, reach back, and sit slowly. Pt completed sit to supine with modAx1. Pt scooted up in bed with minAx1. Therapeutic Exercise:  Cues were given for technique, safety, recruitment, and rationale. Cues were verbal and/or tactile. Pt completed 3 sets of 10 reps of BLE heel slides, ankle pumps, and quad sets. Safety  Patient left safely in the bed, with call light/phone in reach with alarm applied. Gait belt was used for transfers and gait.       Assessment / Impression:    Patient's tolerance of treatment:  good; patient tolerated OOB mobility and therapeutic exercises today  Adverse Reaction: none  Significant change in status and impact:  none  Barriers to improvement:  generalized weakness    Plan for Next Session:    Continue progressing toward goals per plan of care.  Progress independence with transfers and ambulation as tolerated and appropriate. Progress ambulation distance as tolerated and appropriate. Time in:  1120  Time out:  1149  Timed treatment minutes:  29  Total treatment time:  29    Previously filed items:  Social/Functional History  Lives With: Alone  Type of Home: House  Home Layout: Two level  Home Access: Stairs to enter with rails  Entrance Stairs - Number of Steps: 3  Entrance Stairs - Rails: Both  Bathroom Shower/Tub: Tub/Shower unit  Bathroom Toilet: Standard  Bathroom Accessibility: Accessible  Home Equipment: Cane  Has the patient had two or more falls in the past year or any fall with injury in the past year?: Yes  ADL Assistance: Independent  Homemaking Assistance: Independent  Homemaking Responsibilities: Yes  Ambulation Assistance: Independent  Transfer Assistance: Independent  Active : Yes  Mode of Transportation: Car     Long Term Goals  Time Frame for Long Term Goals :  In one week:  Long Term Goal 1: Pt will complete all bed mobility with SBAx1  Long Term Goal 2: Pt will complete sit <> stand transfers with SBAx1  Long Term Goal 3: Pt will ambulate 100 feet with SBAx1 with LRAD  Long Term Goal 4: Pt will ascend/descend 6 steps with a handrail with minAx1  Long Term Goal 5: Pt will independently complete 3 sets of 10 reps of BLE AROM exercises in available and allowed ROM       Electronically signed by:    Anthony Gotti PT, DPT  License #: [id number]

## 2022-10-09 NOTE — PROGRESS NOTES
V2.0  [facility] [place]      Name:  Dali Calderon /Age/Sex: 1935  (80 y.o. female)   MRN & CSN:  [id number] & [id number] Encounter Date/Time: 10/9/2022 10:24 AM EDT    Location:  [facility]-A PCP: D[facility] Day: 3    Assessment and Plan:   Dali Calderon is a 80 y.o. female       Syncope-work-up in progress    Right fibula fracture-orthopedics consult appreciated-nonoperative management    S/P BiV pacer -pacer interrogation per cardiology    Echo Monday     PT/OT ordered    Prior history of syncope-followed up with neurology-they felt it likely represents vasovagal syncope at neurology follow-up in the office in 2021    Cognitive disorder  -Neurology started a dementia evaluation in the office in 2021. It was felt that she has early dementia. Namenda started in 2021-continue. MRI brain was ordered in 2021, but has not been done. She does have a BiV pacer which was apparently placed a long time ago in . The leads may not be compatible with MRI      Diet ADULT DIET; Regular; 5 carb choices (75 gm/meal); Low Fat/Low Chol/High Fiber/ARIANA; Low Sodium (2 gm)   DVT Prophylaxis [] Lovenox, []  Heparin, [] SCDs, [] Ambulation,  [] Eliquis, [x] Xarelto  [] Coumadin   Code Status Full Code   Disposition From: Home  Expected Disposition: To be determined  Estimated Date of Discharge: In about 2 days  Patient requires continued admission due to syncope evaluation, and fibula fracture management   Surrogate Decision Maker/ POA Neighbor is listed as the only contact in the chart     Subjective:     Chief Complaint: Loss of Consciousness       -she does report pain in the right upper leg      Dali Calderon is a 80 y.o. female who presents with the following:    ED triage note:    Patient was brought in by squad after being witnessed having a syncopal episode. Patient did not hit the ground and was caught by bystanders before doing so. Patient was reported to have been unconscious for 20 minutes. ED provider notes:      Román Xiong is a 80 y.o. female who presents to the emergency department after a syncopal episode. Patient states she was at [facility] and the next thing she knew, she was waking up on the ground. Per EMS, syncopal episode was witnessed and bystander was able to catch her and lower her to the ground. Patient does not recall feeling dizzy/lightheaded prior to passing out. She states this is her second episode of syncope recently and that she has also had multiple falls in the last month or so. She admits she hasn't been taking all of her medications as she should. She has been feeling fatigued. She actually saw Dr. Jensen Sellers earlier today and told him all of this and she states he gave her \"a whole list\" of medications to fill. Patient denies any head injury. She does complain of right ankle pain that has developed since the syncopal episode--she is unsure if she injured it or her shoes were just too tight. Denies any chest pain, palpitations, sob. Denies n/v/d. She does state she only ate a small bowl of oatmeal and a piece of toast this morning, so she wonders if she passed out because she was hungry. ED medications:  NS, Kdur, Nitro gtt, Norvasc  -  Results discussed with patient. She is hypokalemic with an YASSINE. No UTI. COVID is negative. CXR and XR right ankle negative. Her d-dimer was elevated so CTA chest obtained which is non-acute. CT head and c-spine non-acute. I did have Case Management meet with patient and discuss placement vs [facility]. Patient initially adamant that she \"doesn't need any of that\" when I spoke with her about her results. She wanted to get home to her cat but after further discussion decided she was nervous that she could pass out again and be alone at home. She ultimately was agreeable to admission for further management. Final Impression       1. Syncope and collapse    2. Hypokalemia    3. YASSINE (acute kidney injury) ([facility] [address]    4. Non compliance w medication regimen    5. Frequent falls    6. Acute right ankle pain    7. Hypertensive urgency         October 8-was sitting up in the chair today. Upon my second visit around 7 PM she stated her right leg was hurting. October 9-assisted patient in calling her neighbor from her room. Patient gave me permission to speak with the neighbor over the phone to gather some history. Her neighbor indicated that the patient has a grandson -but per neighbor apparently they do not have much contact. Review of Systems:    Review of Systems    No vomiting or bleeding noted    Objective: Intake/Output Summary (Last 24 hours) at 10/9/2022 1259  Last data filed at 10/8/2022 1554  Gross per 24 hour   Intake --   Output 350 ml   Net -350 ml          Vitals:   Vitals:    10/09/22 1243   BP: (!) 140/83   Pulse: 62   Resp:    Temp:    SpO2:        Physical Exam:     General: NAD  Respiratory effort nonlabored the chest  Skin: warm, dry        Medications:   Medications:    DULoxetine  30 mg Oral Daily    levothyroxine  50 mcg Oral QAM AC    memantine  10 mg Oral BID    rivaroxaban  15 mg Oral Daily    sodium chloride flush  5-40 mL IntraVENous 2 times per day    amLODIPine  5 mg Oral Daily    carvedilol  12.5 mg Oral BID WC      Infusions:    nitroGLYCERIN Stopped (10/08/22 0914)    sodium chloride       PRN Meds: albuterol sulfate HFA, 2 puff, 4x Daily PRN  sodium chloride flush, 5-40 mL, PRN  sodium chloride, , PRN  ondansetron, 4 mg, Q8H PRN   Or  ondansetron, 4 mg, Q6H PRN  polyethylene glycol, 17 g, Daily PRN  acetaminophen, 650 mg, Q6H PRN   Or  acetaminophen, 650 mg, Q6H PRN      Labs      No results found for this or any previous visit (from the past 24 hour(s)). Imaging/Diagnostics Last 24 Hours   XR ANKLE RIGHT (MIN 3 VIEWS)    Result Date: 10/7/2022  EXAMINATION: THREE XRAY VIEWS OF THE RIGHT ANKLE 10/7/2022 7:25 pm COMPARISON: None. HISTORY: ORDERING SYSTEM PROVIDED HISTORY: syncope, now with pain TECHNOLOGIST PROVIDED HISTORY: Reason for exam:->syncope, now with pain Reason for Exam: right ankle pain Additional signs and symptoms: syncope / loc FINDINGS: No significant bone, joint or soft tissue abnormality. No evidence of an acute fracture or dislocation. Ankle mortise is symmetric. No evidence of an acute injury. CT Head W/O Contrast    Addendum Date: 10/7/2022    ADDENDUM: Sagittal and coronal images were reviewed. The report is unchanged. Result Date: 10/7/2022  EXAMINATION: CT OF THE HEAD WITHOUT CONTRAST  10/7/2022 7:44 pm TECHNIQUE: CT of the head was performed without the administration of intravenous contrast. Automated exposure control, iterative reconstruction, and/or weight based adjustment of the mA/kV was utilized to reduce the radiation dose to as low as reasonably achievable. COMPARISON: 09/17/2021 HISTORY: ORDERING SYSTEM PROVIDED HISTORY: syncope TECHNOLOGIST PROVIDED HISTORY: Reason for exam:->syncope Has a \"code stroke\" or \"stroke alert\" been called? ->No Decision Support Exception - unselect if not a suspected or confirmed emergency medical condition->Emergency Medical Condition (MA) Reason for Exam: syncope FINDINGS: BRAIN/VENTRICLES: There is generalized age-appropriate atrophy. There is moderate to severe patchy periventricular and subcortical white matter low attenuation that is nonspecific but most consistent with chronic small vessel ischemia. There is no evidence of acute hemorrhage, mass or extra-axial fluid collection. ORBITS: Stable postsurgical changes. The visualized portion of the orbits demonstrate no acute abnormality. SINUSES: The visualized paranasal sinuses and mastoid air cells demonstrate no acute abnormality. SOFT TISSUES/SKULL:  No acute abnormality of the visualized skull or soft tissues. No acute intracranial abnormality.  Generalized atrophy and chronic small vessel ischemic white matter disease. CT CSpine W/O Contrast    Result Date: 10/7/2022  EXAMINATION: CT OF THE CERVICAL SPINE WITHOUT CONTRAST 10/7/2022 7:44 pm TECHNIQUE: CT of the cervical spine was performed without the administration of intravenous contrast. Multiplanar reformatted images are provided for review. Automated exposure control, iterative reconstruction, and/or weight based adjustment of the mA/kV was utilized to reduce the radiation dose to as low as reasonably achievable. COMPARISON: 09/17/2021 HISTORY: ORDERING SYSTEM PROVIDED HISTORY: syncope TECHNOLOGIST PROVIDED HISTORY: Reason for exam:->syncope Decision Support Exception - unselect if not a suspected or confirmed emergency medical condition->Emergency Medical Condition (MA) Reason for Exam: syncope FINDINGS: BONES/ALIGNMENT: Cervical vertebral body heights and alignment are normal. Facet joints are normally aligned. There is no acute fracture or traumatic malalignment. DEGENERATIVE CHANGES: There are multilevel degenerative changes in the cervical spine similar to the prior study. SOFT TISSUES: There is no prevertebral soft tissue swelling. Multilevel degenerative changes with no acute fracture or traumatic malalignment. XR CHEST PORTABLE    Result Date: 10/7/2022  EXAMINATION: ONE XRAY VIEW OF THE CHEST 10/7/2022 6:25 pm COMPARISON: 09/17/2021. HISTORY: ORDERING SYSTEM PROVIDED HISTORY: syncope TECHNOLOGIST PROVIDED HISTORY: Reason for exam:->syncope Reason for Exam: syncope Additional signs and symptoms: LOC FINDINGS: There is no confluent consolidation or effusion. Lung volumes are large. A sequential lead pacemaker appears unchanged. The cardiomediastinal silhouette and pulmonary vessels appear normal.     No acute findings. Large lung volumes.      CTA PULMONARY W CONTRAST    Result Date: 10/7/2022  EXAMINATION: CTA OF THE CHEST 10/7/2022 9:44 pm TECHNIQUE: CTA of the chest was performed after the administration of intravenous contrast. Multiplanar reformatted images are provided for review. MIP images are provided for review. Automated exposure control, iterative reconstruction, and/or weight based adjustment of the mA/kV was utilized to reduce the radiation dose to as low as reasonably achievable. COMPARISON: 09/17/2021 HISTORY: ORDERING SYSTEM PROVIDED HISTORY: +ddimer, syncope TECHNOLOGIST PROVIDED HISTORY: Reason for exam:->+ddimer, syncope Decision Support Exception - unselect if not a suspected or confirmed emergency medical condition->Emergency Medical Condition (MA) Reason for Exam: +ddimer, syncope Additional signs and symptoms: no Relevant Medical/Surgical History: 80 ml isovue 370 used FINDINGS: Pulmonary Arteries: Pulmonary arteries are adequately opacified for evaluation. No evidence of intraluminal filling defect to suggest pulmonary embolism. Central pulmonary arteries are upper limits of normal in caliber. Mediastinum: No evidence of mediastinal lymphadenopathy. The heart is mildly enlarged. There is a left subclavian cardiac pacemaker. The heart and pericardium demonstrate no acute abnormality. There is no acute abnormality of the thoracic aorta. There is calcified plaque in the aortic arch and descending thoracic aorta. Lungs/pleura: The lungs are without acute process. No focal consolidation or pulmonary edema. No evidence of pleural effusion or pneumothorax. Upper Abdomen: There multiple surgical clips in the right quadrant status post cholecystectomy. Soft Tissues/Bones: No acute bone or soft tissue abnormality. No evidence of pulmonary embolism or acute pulmonary abnormality.        Electronically signed by Christopher Monsalve MD on 10/9/2022 at 12:59 PM

## 2022-10-09 NOTE — PLAN OF CARE
Problem: Discharge Planning  Goal: Discharge to home or other facility with appropriate resources  Outcome: Progressing  Flowsheets (Taken 10/8/2022 2000)  Discharge to home or other facility with appropriate resources:   Identify barriers to discharge with patient and caregiver   Arrange for needed discharge resources and transportation as appropriate   Identify discharge learning needs (meds, wound care, etc)     Problem: Skin/Tissue Integrity  Goal: Absence of new skin breakdown  Description: 1. Monitor for areas of redness and/or skin breakdown  2. Assess vascular access sites hourly  3. Every 4-6 hours minimum:  Change oxygen saturation probe site  4. Every 4-6 hours:  If on nasal continuous positive airway pressure, respiratory therapy assess nares and determine need for appliance change or resting period.   Outcome: Progressing     Problem: Safety - Adult  Goal: Free from fall injury  Outcome: Progressing  Flowsheets (Taken 10/8/2022 2027)  Free From Fall Injury:   Instruct family/caregiver on patient safety   Based on caregiver fall risk screen, instruct family/caregiver to ask for assistance with transferring infant if caregiver noted to have fall risk factors     Problem: ABCDS Injury Assessment  Goal: Absence of physical injury  Outcome: Progressing  Flowsheets (Taken 10/8/2022 2027)  Absence of Physical Injury: Implement safety measures based on patient assessment

## 2022-10-10 ENCOUNTER — HOSPITAL ENCOUNTER (INPATIENT)
Age: 87
LOS: 9 days | Discharge: HOME HEALTH | DRG: 078 | End: 2022-10-19
Attending: PHYSICAL MEDICINE & REHABILITATION | Admitting: PHYSICAL MEDICINE & REHABILITATION
Payer: MEDICARE

## 2022-10-10 VITALS
RESPIRATION RATE: 20 BRPM | HEIGHT: 67 IN | SYSTOLIC BLOOD PRESSURE: 64 MMHG | TEMPERATURE: 96.3 F | DIASTOLIC BLOOD PRESSURE: 28 MMHG | HEART RATE: 62 BPM | OXYGEN SATURATION: 93 % | WEIGHT: 181.4 LBS | BODY MASS INDEX: 28.47 KG/M2

## 2022-10-10 PROBLEM — I67.4 HYPERTENSIVE ENCEPHALOPATHY: Status: ACTIVE | Noted: 2022-10-10

## 2022-10-10 LAB
LV EF: 60 %
LVEF MODALITY: NORMAL
SARS-COV-2, NAAT: NOT DETECTED
SOURCE: NORMAL

## 2022-10-10 PROCEDURE — 97530 THERAPEUTIC ACTIVITIES: CPT

## 2022-10-10 PROCEDURE — 97116 GAIT TRAINING THERAPY: CPT

## 2022-10-10 PROCEDURE — 1280000000 HC REHAB R&B

## 2022-10-10 PROCEDURE — 6370000000 HC RX 637 (ALT 250 FOR IP): Performed by: PHYSICIAN ASSISTANT

## 2022-10-10 PROCEDURE — 6370000000 HC RX 637 (ALT 250 FOR IP): Performed by: STUDENT IN AN ORGANIZED HEALTH CARE EDUCATION/TRAINING PROGRAM

## 2022-10-10 PROCEDURE — 97535 SELF CARE MNGMENT TRAINING: CPT

## 2022-10-10 PROCEDURE — 87635 SARS-COV-2 COVID-19 AMP PRB: CPT

## 2022-10-10 PROCEDURE — 94761 N-INVAS EAR/PLS OXIMETRY MLT: CPT

## 2022-10-10 PROCEDURE — 97110 THERAPEUTIC EXERCISES: CPT

## 2022-10-10 PROCEDURE — 99223 1ST HOSP IP/OBS HIGH 75: CPT | Performed by: PHYSICAL MEDICINE & REHABILITATION

## 2022-10-10 PROCEDURE — 2580000003 HC RX 258: Performed by: STUDENT IN AN ORGANIZED HEALTH CARE EDUCATION/TRAINING PROGRAM

## 2022-10-10 PROCEDURE — 6370000000 HC RX 637 (ALT 250 FOR IP): Performed by: PHYSICAL MEDICINE & REHABILITATION

## 2022-10-10 PROCEDURE — 93306 TTE W/DOPPLER COMPLETE: CPT

## 2022-10-10 PROCEDURE — 6370000000 HC RX 637 (ALT 250 FOR IP): Performed by: INTERNAL MEDICINE

## 2022-10-10 RX ORDER — LEVOTHYROXINE SODIUM 0.05 MG/1
50 TABLET ORAL
Status: DISCONTINUED | OUTPATIENT
Start: 2022-10-11 | End: 2022-10-19 | Stop reason: HOSPADM

## 2022-10-10 RX ORDER — POLYETHYLENE GLYCOL 3350 17 G/17G
17 POWDER, FOR SOLUTION ORAL DAILY PRN
Status: CANCELLED | OUTPATIENT
Start: 2022-10-10

## 2022-10-10 RX ORDER — ACETAMINOPHEN 650 MG/1
650 SUPPOSITORY RECTAL EVERY 6 HOURS PRN
Status: DISCONTINUED | OUTPATIENT
Start: 2022-10-10 | End: 2022-10-10

## 2022-10-10 RX ORDER — ERGOCALCIFEROL 1.25 MG/1
50000 CAPSULE ORAL WEEKLY
Status: DISCONTINUED | OUTPATIENT
Start: 2022-10-10 | End: 2022-10-10 | Stop reason: HOSPADM

## 2022-10-10 RX ORDER — MIDODRINE HYDROCHLORIDE 5 MG/1
5 TABLET ORAL
Status: CANCELLED | OUTPATIENT
Start: 2022-10-10

## 2022-10-10 RX ORDER — ACETAMINOPHEN 650 MG/1
650 SUPPOSITORY RECTAL EVERY 6 HOURS PRN
Status: CANCELLED | OUTPATIENT
Start: 2022-10-10

## 2022-10-10 RX ORDER — ACETAMINOPHEN 325 MG/1
650 TABLET ORAL EVERY 4 HOURS PRN
Status: DISCONTINUED | OUTPATIENT
Start: 2022-10-10 | End: 2022-10-19 | Stop reason: HOSPADM

## 2022-10-10 RX ORDER — ACETAMINOPHEN 325 MG/1
650 TABLET ORAL EVERY 6 HOURS PRN
Status: DISCONTINUED | OUTPATIENT
Start: 2022-10-10 | End: 2022-10-10 | Stop reason: SDUPTHER

## 2022-10-10 RX ORDER — ALBUTEROL SULFATE 90 UG/1
2 AEROSOL, METERED RESPIRATORY (INHALATION) 4 TIMES DAILY PRN
Status: CANCELLED | OUTPATIENT
Start: 2022-10-10

## 2022-10-10 RX ORDER — BISACODYL 10 MG
10 SUPPOSITORY, RECTAL RECTAL DAILY PRN
Status: DISCONTINUED | OUTPATIENT
Start: 2022-10-10 | End: 2022-10-19 | Stop reason: HOSPADM

## 2022-10-10 RX ORDER — MEMANTINE HYDROCHLORIDE 10 MG/1
10 TABLET ORAL 2 TIMES DAILY
Status: DISCONTINUED | OUTPATIENT
Start: 2022-10-10 | End: 2022-10-19

## 2022-10-10 RX ORDER — LEVOTHYROXINE SODIUM 0.05 MG/1
50 TABLET ORAL
Status: CANCELLED | OUTPATIENT
Start: 2022-10-11

## 2022-10-10 RX ORDER — DULOXETIN HYDROCHLORIDE 30 MG/1
30 CAPSULE, DELAYED RELEASE ORAL DAILY
Status: DISCONTINUED | OUTPATIENT
Start: 2022-10-11 | End: 2022-10-11

## 2022-10-10 RX ORDER — METOPROLOL SUCCINATE 25 MG/1
25 TABLET, EXTENDED RELEASE ORAL DAILY
Status: DISCONTINUED | OUTPATIENT
Start: 2022-10-11 | End: 2022-10-13

## 2022-10-10 RX ORDER — METOPROLOL SUCCINATE 25 MG/1
25 TABLET, EXTENDED RELEASE ORAL DAILY
Status: CANCELLED | OUTPATIENT
Start: 2022-10-10

## 2022-10-10 RX ORDER — MEMANTINE HYDROCHLORIDE 10 MG/1
10 TABLET ORAL 2 TIMES DAILY
Status: CANCELLED | OUTPATIENT
Start: 2022-10-10

## 2022-10-10 RX ORDER — CARVEDILOL 6.25 MG/1
12.5 TABLET ORAL 2 TIMES DAILY WITH MEALS
Status: CANCELLED | OUTPATIENT
Start: 2022-10-10

## 2022-10-10 RX ORDER — ACETAMINOPHEN 325 MG/1
650 TABLET ORAL EVERY 6 HOURS PRN
Status: CANCELLED | OUTPATIENT
Start: 2022-10-10

## 2022-10-10 RX ORDER — MIDODRINE HYDROCHLORIDE 5 MG/1
5 TABLET ORAL
Status: DISCONTINUED | OUTPATIENT
Start: 2022-10-10 | End: 2022-10-10 | Stop reason: HOSPADM

## 2022-10-10 RX ORDER — DULOXETIN HYDROCHLORIDE 30 MG/1
30 CAPSULE, DELAYED RELEASE ORAL DAILY
Status: CANCELLED | OUTPATIENT
Start: 2022-10-11

## 2022-10-10 RX ORDER — ALBUTEROL SULFATE 90 UG/1
2 AEROSOL, METERED RESPIRATORY (INHALATION) 4 TIMES DAILY PRN
Status: DISCONTINUED | OUTPATIENT
Start: 2022-10-10 | End: 2022-10-19 | Stop reason: HOSPADM

## 2022-10-10 RX ORDER — POLYETHYLENE GLYCOL 3350 17 G/17G
17 POWDER, FOR SOLUTION ORAL DAILY PRN
Status: DISCONTINUED | OUTPATIENT
Start: 2022-10-10 | End: 2022-10-19 | Stop reason: HOSPADM

## 2022-10-10 RX ORDER — ERGOCALCIFEROL 1.25 MG/1
50000 CAPSULE ORAL WEEKLY
Status: CANCELLED | OUTPATIENT
Start: 2022-10-10

## 2022-10-10 RX ORDER — POLYETHYLENE GLYCOL 3350 17 G/17G
17 POWDER, FOR SOLUTION ORAL DAILY PRN
Status: DISCONTINUED | OUTPATIENT
Start: 2022-10-10 | End: 2022-10-10

## 2022-10-10 RX ORDER — ERGOCALCIFEROL 1.25 MG/1
50000 CAPSULE ORAL WEEKLY
Status: DISCONTINUED | OUTPATIENT
Start: 2022-10-10 | End: 2022-10-19 | Stop reason: HOSPADM

## 2022-10-10 RX ORDER — MIDODRINE HYDROCHLORIDE 5 MG/1
5 TABLET ORAL
Status: DISCONTINUED | OUTPATIENT
Start: 2022-10-10 | End: 2022-10-19 | Stop reason: HOSPADM

## 2022-10-10 RX ORDER — METOPROLOL SUCCINATE 25 MG/1
25 TABLET, EXTENDED RELEASE ORAL DAILY
Status: DISCONTINUED | OUTPATIENT
Start: 2022-10-10 | End: 2022-10-10 | Stop reason: HOSPADM

## 2022-10-10 RX ADMIN — MEMANTINE 10 MG: 10 TABLET ORAL at 21:41

## 2022-10-10 RX ADMIN — ERGOCALCIFEROL 50000 UNITS: 1.25 CAPSULE ORAL at 17:00

## 2022-10-10 RX ADMIN — RIVAROXABAN 15 MG: 15 TABLET, FILM COATED ORAL at 08:45

## 2022-10-10 RX ADMIN — MEMANTINE 10 MG: 10 TABLET ORAL at 08:45

## 2022-10-10 RX ADMIN — MIDODRINE HYDROCHLORIDE 5 MG: 5 TABLET ORAL at 17:00

## 2022-10-10 RX ADMIN — AMLODIPINE BESYLATE 5 MG: 5 TABLET ORAL at 08:45

## 2022-10-10 RX ADMIN — LEVOTHYROXINE SODIUM 50 MCG: 0.05 TABLET ORAL at 05:18

## 2022-10-10 RX ADMIN — ACETAMINOPHEN 650 MG: 325 TABLET ORAL at 21:41

## 2022-10-10 RX ADMIN — SODIUM CHLORIDE, PRESERVATIVE FREE 10 ML: 5 INJECTION INTRAVENOUS at 08:46

## 2022-10-10 RX ADMIN — CARVEDILOL 12.5 MG: 6.25 TABLET, FILM COATED ORAL at 08:46

## 2022-10-10 RX ADMIN — DULOXETINE 30 MG: 30 CAPSULE, DELAYED RELEASE ORAL at 08:44

## 2022-10-10 ASSESSMENT — PAIN DESCRIPTION - DESCRIPTORS: DESCRIPTORS: ACHING

## 2022-10-10 ASSESSMENT — PAIN - FUNCTIONAL ASSESSMENT: PAIN_FUNCTIONAL_ASSESSMENT: ACTIVITIES ARE NOT PREVENTED

## 2022-10-10 ASSESSMENT — PAIN DESCRIPTION - ONSET: ONSET: ON-GOING

## 2022-10-10 ASSESSMENT — PAIN SCALES - GENERAL
PAINLEVEL_OUTOF10: 0
PAINLEVEL_OUTOF10: 5
PAINLEVEL_OUTOF10: 8
PAINLEVEL_OUTOF10: 0

## 2022-10-10 ASSESSMENT — PAIN DESCRIPTION - PAIN TYPE: TYPE: CHRONIC PAIN

## 2022-10-10 ASSESSMENT — LIFESTYLE VARIABLES
HOW OFTEN DO YOU HAVE A DRINK CONTAINING ALCOHOL: NEVER
HOW MANY STANDARD DRINKS CONTAINING ALCOHOL DO YOU HAVE ON A TYPICAL DAY: PATIENT DOES NOT DRINK

## 2022-10-10 ASSESSMENT — PAIN DESCRIPTION - FREQUENCY: FREQUENCY: CONTINUOUS

## 2022-10-10 ASSESSMENT — PAIN DESCRIPTION - ORIENTATION: ORIENTATION: RIGHT

## 2022-10-10 ASSESSMENT — PAIN DESCRIPTION - LOCATION: LOCATION: FOOT

## 2022-10-10 NOTE — CONSULTS
Orthopaedic Consult    Patient Name: Laurie Schmitz   ([id number])  MRN   [id number]   Today's date:  10/9/2022     CHIEF COMPLAINT: Right knee pain    HISTORY OF PRESENT ILLNESS:      The patient is a 80 y.o. female  who presents with right knee pain after a syncopal episode at home. She cannot recall exactly what happened. She did not. And swelling of the right knee while in the emergency room. She was admitted for medical issues and further evaluation of her syncope. She has a history of prior pain and stiffness in her left knee. No history of previous fractures or trauma to the right knee. Pain is diffuse throughout the knee and worse with extremes of motion and weightbearing activities. 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 Thyroid disease     Ulcer in the past    Gastric       Past Surgical History         Procedure Laterality Date    APPENDECTOMY      BLADDER SURGERY      CHOLECYSTECTOMY      COLONOSCOPY  11/4/14    normal, biopsy    EYE SURGERY      HYSTERECTOMY (CERVIX STATUS UNKNOWN)      PACEMAKER PLACEMENT Left 02/20/2020    bivi pacer upgrade-Medtronic Percepta Quad CRT-P [facility]  2011    Dr. Deacon Camarena. [facility] - following MI       Medications Prior to Admission:     Prior to Admission medications    Medication Sig Start Date End Date Taking?  Authorizing Provider   PARoxetine (PAXIL) 10 MG tablet Take 1 tablet by mouth daily 10/7/22   Gosia Mesa Bairon Yousif MD   levothyroxine (SYNTHROID) 50 MCG tablet Take 1 tablet by mouth every morning (before breakfast) TAKE ONE TABLET BY MOUTH DAILY 10/7/22 4/5/23  Sj Hester MD   rivaroxaban Ol[facility] Llamas) 15 MG TABS tablet Take 1 tablet by mouth daily 10/7/22   Sj Hester MD   memantine [facility]) 10 MG tablet Take 1 tablet by mouth 2 times daily 10/7/22   Sj Hester MD   lisinopril (PRINIVIL;ZESTRIL) 5 MG tablet Take 1 tablet by mouth daily 10/7/22   Sj Hester MD   DULoxetine (CYMBALTA) 30 MG extended release capsule Take 1 capsule by mouth daily 10/7/22   Sj Hester MD   amLODIPine [facility]) 5 MG tablet Take 1 tablet by mouth daily 10/7/22   Sj Hester MD   metoprolol tartrate (LOPRESSOR) 50 MG tablet Take 1 tablet by mouth 2 times daily 10/7/22   Sj Hester MD   albuterol sulfate HFA (VENTOLIN HFA) 108 (90 Base) MCG/ACT inhaler Inhale 2 puffs into the lungs 4 times daily as needed for Wheezing 1/4/21   Cara May MD   Acetaminophen (TYLENOL ARTHRITIS PAIN PO) Take by mouth    Historical Provider, MD       Allergies     Amitriptyline, Aspirin, Codeine, Elavil [amitriptyline hcl], Hydroxychloroquine, Ibuprofen, Plaquenil [hydroxychloroquine sulfate], Prevnar 13 [pneumococcal 13-naun conj vacc], and Theophyllines    Social History   TOBACCO:   reports that she quit smoking about 45 years ago. Her smoking use included cigarettes. She has never used smokeless tobacco.  ETOH:   reports no history of alcohol use. Family History         Problem Relation Age of Onset    Cancer Mother     Cancer Father     Heart Disease Father     Cancer Sister     Cancer Brother        Review of Systems   As in admission H&P, reviewed.   Musculoskeltal as in HPI     Physical Exam:    Vitals: BP (!) 149/79   Pulse 64   Temp 97.5 °F (36.4 °C) (Oral)   Resp 17   Ht 5' 7\" (1.702 m)   Wt 183 lb 3.2 oz (83.1 kg)   SpO2 98%   BMI 28.69 kg/m² ,  Body mass index is 28.69 kg/m². General appearance: alert, appears stated age and cooperative  Skin: Skin color normal. No rashes or lesions  HEENT: Head: Normocephalic, no lesions, without obvious abnormality. Neck: supple, symmetrical, trachea midline    Extremities:    Right Lower Extremity:    There is mild diffuse tenderness to palpation throughout the knee maximally along the lateral joint line. There is moderate tenderness to palpation directly over the fibular head and proximal fibular neck at the lateral aspect of the lower leg. There is mild global swelling anteriorly at the knee with no obvious effusion. There is 5 out of 5 strength with knee flexion and extension. Sensation is intact throughout the lower extremity. There is mildly limited range of motion at the knee with discomfort at the extremes of motion, especially terminal flexion. No instability with varus or valgus stress testing or anterior/posterior drawer testing. Medial Dileep's test produces mild pain but no palpable click. Skin is intact. Normal knee alignment. Pulses intact. Neurologic: Mental status: Alert, oriented, thought content appropriate    Labs     CBC:   Recent Labs     10/07/22  2025   WBC 9.6   HGB 13.8        BMP:    Recent Labs     10/07/22  2025 10/08/22  0926    138   K 2.9* 3.8    101   CO2 27 25   BUN 25* 22   CREATININE 1.5* 1.0   GLUCOSE 112* 139*     INR: No results for input(s): INR in the last 72 hours. X-ray view   Imaging Review      X-ray images of the right tibia and fibula and knee from 28 2022 were reviewed by myself and discussed with the patient:    Impression   Acute nondisplaced fracture in the proximal metaphysis of the right fibula. No acute fracture or dislocation the remainder of the right tibia fibula. No acute fracture or dislocation of the remainder of the right knee.        Mild degenerative changes in the right knee, most pronounced in medial compartment. Mild suprapatellar joint effusion. Assessment and Plan       1. right proximal fibula nondisplaced fracture    Fracture is amenable to nonoperative management. Fracture is stable pattern and she can proceed with weightbearing as tolerated. She may benefit from Ace wrap or knee immobilizer as needed for comfort and protection with ambulation. Okay for range of motion as tolerated. Recommended use of a walker. Follow-up in my office in 2 weeks for repeat x-rays to evaluate fracture healing and alignment.       Silvestre Bell MD

## 2022-10-10 NOTE — PLAN OF CARE
ARU Interdisciplinary Plan of Care (IPOC)  [facility] Ricardo [facility], [address]  [phone]  Fax: [phone]    Alexandria Laboy    : 1935  Acct #: [de-identified]  MRN: [id number]   PHYSICIAN:  Mirella Justice MD  Primary Active Problems:   Active Hospital Problems    Diagnosis Date Noted    Hypertensive encephalopathy [I67.4] 10/10/2022     Priority: Medium     Rehabilitation Diagnosis:     Hypertensive encephalopathy [I67.4]  Hypertensive encephalopathy [I67.4]      CARE PLAN   NURSING:  Alexandria Most while on this unit will:      Bowel and Bladder   [x] Be continent of bowel and bladder      [] Have an adequate number of bowel movements   [x] Urinate with no urinary retention >300ml in bladder   [] Bladder Scan: (details)   [] Complete bladder protocol with huertas removal   [] Initiate Bladder Program to toilet every ___ hours   [] Initiate Bowel Program to toilet every ___hours   [] Bladder training    [] Bowel training  Pulmonary   [x] Maintain O2 SATs at 92% or greater  Pain Management   [x] Have pain managed while on ARU        [] Be pain free by discharge    [] Medication Management and Education  Maintenance of Skin Integrity/Wound Management   [x] Have no skin breakdown while on ARU   [] Have improved skin integrity via wound measurements   [] Have no signs/symptoms of infection via infection protection and monitoring at the          wound site  Fall Prevention   [x] Be free from injury during hospitalization via fall prevention measures     [] Disease management and Education  Precautions   [] Weight Bearing Precautions   [] Swallowing Precautions   [x] Monitoring of Risks of Complications   [x] DVT Prophylaxis    [] Fluid/electrolyte/Nutrition Management    [x] Complete education with patient/family with understanding demonstrated for          in-room safety with transfers to bed, toilet, wheelchair, shower as well as bathroom activities and hygiene. [] Adjustment   [x] Other:   Nursing interventions may include bowel/bladder training, education for medical assistive devices, medication education, O2 saturation management, energy conservation, stress management techniques, fall prevention, alarms protocol, seating and positioning, skin/wound care, pressure relief instruction,dressing changes,  infection protection, DVT prophylaxis, and/or assistance with in room safety with transfers to bed, toilet, wheelchair, shower as well as bathroom activities and hygiene. Patient/caregiver education for:   [x] Disease/sustained injury/management      [x] Medication Use   [] Surgical intervention   [x] Safety/Precautions   [x] Body mechanics and or joint protection   [x] Health maintenance         PHYSICAL THERAPY:  Goals:                  Short Term Goals  Time Frame for Short Term Goals: 10 tx days:  Short Term Goal 1: Pt will complete bed mobility (scooting, rolling R/L, and sup<->sit) Ind. Short Term Goal 2: Pt will sit->stand and car transfers using 2WW Mod Ind. Short Term Goal 3: Pt will complete stand->sit and stand turn transfers using 2WW with Sup. Short Term Goal 4: Pt will ambulate 10 ft and 50 ft with turns over level surface using 2WW Mod Ind. Short Term Goal 5: Pt will ambulate 10 ft of uneven surface and >/=150 ft of level surface using 2WW with SBA-Sup. Additional Goals?: Yes  Short Term Goal 6: Pt will ascend/descend curb step using 2WW and 1 flight of stairs using railings following appropriate step-to pattern on ascent/descent with SBA-Sup. Short Term Goal 7: Pt will complete object retrieval from the floor with 2WW and reacher prn Mod Ind. These goals were reviewed with this patient at the time of assessment and Domenic Otero is in agreement. Plan of Care: Pt to be seen 5 days per week for a minimum of 60 minutes for 10 days.                 Current Treatment Recommendations: Strengthening, ROM, Balance training, Functional mobility training, Transfer training, Cognitive/Perceptual training, Endurance training, Gait training, Stair training, Pain management, Home exercise program, Safety education & training, Patient/Caregiver education & training, Equipment evaluation, education, & procurement, Therapeutic activities community reintegration,animal assisted therapy, and concurrent/group therapy.     PT IRF-KATELYN scores and goals for initial assessment:   Bed Mobility:   Sit to Lying  Assistance Needed: Supervision or touching assistance  Comment: SBA without use of bed features  CARE Score: 4  Discharge Goal: Independent  Roll Left and Right  Assistance Needed: Supervision or touching assistance  Comment: SBA without use of bed features; pt reports pain on R lower leg on both directions  CARE Score: 4  Discharge Goal: Independent  Lying to Sitting on Side of Bed  Assistance Needed: Supervision or touching assistance  Comment: SBA without use of bed features (pt reports dizziness upon sitting up but no trunk swaying observed); pt able to maintain static sitting even without UE support  CARE Score: 4  Discharge Goal: Independent    Transfers:    Sit to Stand  Assistance Needed: Supervision or touching assistance  Comment: CGA with 2WW  CARE Score: 4  Discharge Goal: Independent  Chair/Bed-to-Chair Transfer  Assistance Needed: Partial/moderate assistance  Comment: Min A due to poor eccentric control as pt did not usually transition at least 1 hand to sit; decreased insight to safety observed as well as she had episodes of abandoning AD during transfer process  CARE Score: 3  Discharge Goal: Supervision or touching assistance     Car Transfer  Assistance Needed: Supervision or touching assistance  Comment: CGA with 2WW  CARE Score: 4  Discharge Goal: Independent    Ambulation:    Walking Ability  Does the Patient Walk?: Yes     Walk 10 Feet  Assistance Needed: Supervision or touching assistance  Comment: CGA with 2WW  CARE Score: 4  Discharge Goal: Independent     Walk 50 Feet with Two Turns  Assistance Needed: Dependent  Comment: CGA->Min A with 2WW + SBA of 2nd person for safety due to variable R knee pain intensity and unpredictable occurrence/intensity of lightheadedness  CARE Score: 1  Discharge Goal: Independent     Walk 150 Feet  Assistance Needed: Dependent  Comment: CGA->Min A with 2WW + SBA of 2nd person for safety due to variable R knee pain intensity and unpredictable occurrence/intensity of lightheadedness; pt was able to tolerate 160 ft distance today  CARE Score: 1  Discharge Goal: Supervision or touching assistance     Walking 10 Feet on Uneven Surfaces  Assistance Needed: Dependent  Comment: CGA->Min A with 2WW + SBA of 2nd person for safety due to variable R knee pain intensity and unpredictable occurrence/intensity of lightheadedness  CARE Score: 1  Discharge Goal: Supervision or touching assistance     1 Step (Curb)  Assistance Needed: Dependent  Comment: CGA->Min A with 2WW + SBA of 2nd person for safety due to variable R knee pain intensity and unpredictable occurrence/intensity of lightheadedness  CARE Score: 1  Discharge Goal: Supervision or touching assistance     4 Steps  Assistance Needed: Dependent  Comment: CGA->Min A with railings + SBA->CGA  of 2nd person for safety due to variable R knee pain intensity and unpredictable occurrence/intensity of lightheadedness; required verbal cues 50% of the time to carry out appropriate step-to pattern sequencing after initial education/demonstration  CARE Score: 1  Discharge Goal: Supervision or touching assistance     12 Steps  Assistance Needed: Dependent  Comment: CGA->Min A with railings + SBA->CGA  of 2nd person for safety due to variable R knee pain intensity and unpredictable occurrence/intensity of lightheadedness; required verbal cues >50% of the time to carry out appropriate step-to pattern sequencing after initial education/demonstration; pt was able to ascend/descend 15 steps today  CARE Score: 1  Discharge Goal: Supervision or touching assistance    Gait Deviations: []None []Slow arnulfo  [] Increased JUDITH  [] Staggers []Deviated Path  [] Decreased step length  [] Decreased step height  []Decreased arm swing  [] Shuffles  [] Decreased head and trunk rotation  []other:        Wheelchair:  w/c Ability: Wheelchair Ability  Uses a Wheelchair and/or Scooter?: No                Balance:        Object: Picking Up Object  Assistance Needed: Partial/moderate assistance  Comment: Min A (required holding of trunk as pt did not rely on 2WW to assist with balance and performed task in unsupported stance instead; task completed without use of reacher)  CARE Score: 3  Discharge Goal: Supervision or touching assistance  OCCUPATIONAL THERAPY:  Goals:             Short Term Goals  Time Frame for Short Term Goals: STGs=LTGs :  Long Term Goals  Time Frame for Long Term Goals : ~10 days or until d/c  Long Term Goal 1: Pt will complete grooming tasks Ind (seated, PRN)  Long Term Goal 2: Pt will complete total body bathing c S  Long Term Goal 3: Pt will complete UB dressing c setup  Long Term Goal 4: Pt will complete LB dressing c setup using AE PRN  Long Term Goal 5: Pt will doff/don footwear c setup using AE PRN  Additional Goals?: Yes  Long Term Goal 6: Pt will complete toileting c supervision  Long Term Goal 7: Pt will complete functional transfers (bed, chair, toilet, shower) c DME PRN and supervision  Long Term Goal 8: Pt will perform therex/therax to facilitate increased strength/endurance/ax tolerance (c emphasis on dynamic standing balance/tolerance >8 mins, BUE endurance) c SBA  Long Term Goal 9: Pt will complete simple homemaking tasks c DME PRN and S :    These goals were reviewed with this patient at the time of assessment and Tosha Griffiths is in agreement    Plan of Care:  Pt to be seen 5 days per week for a minimum of 60 minutes for 10 days. Occupational Therapy Plan  Current Treatment Recommendations: Balance training, Functional mobility training, Endurance training, Safety education & training, Patient/Caregiver education & training, Equipment evaluation, education, & procurement, Self-Care / ADL, Home management training, Cognitive/Perceptual training         cognitive training, home management, energy conservation training, community reintegration, splint fabrication, patient/caregiver education and training, animal assisted therapy, and concurrent and/or group therapy. OT IRF-KATELYN scores and goals for initial assessment:    ADLs:    Eating: Eating  Assistance Needed: Independent  Comment: able to open packages/containers  CARE Score: 6  Discharge Goal: Independent       Oral Hygiene: Oral Hygiene  Assistance Needed: Setup or clean-up assistance  Comment: required to perform seated 2* hypotension  CARE Score: 5  Discharge Goal: Independent    UB/LB Bathing: Shower/Bathe Self  Assistance Needed: Supervision or touching assistance  Comment: CGA while in stance to bathe perineal area; completed remainder seated. Required min cues for sequencing d/t decreased recall of parts washed  CARE Score: 4  Discharge Goal: Supervision or touching assistance    UB Dressing: Upper Body Dressing  Assistance Needed: Supervision or touching assistance  Comment: to don undershirt and sweatshirt  CARE Score: 4  Discharge Goal: Set-up or clean-up assistance         LB Dressing: Lower Body Dressing  Assistance Needed: Partial/moderate assistance  Comment: able to doff and don Depends, able to thread LLE in pants but required assist to thread RLE.   Performed pants management c CGA  CARE Score: 3  Discharge Goal: Set-up or clean-up assistance    Donning and [place] Footwear: Putting On/Taking Off Footwear  Assistance Needed: Supervision or touching assistance  Comment: to doff/don hospital socks  CARE Score: 4  Discharge Goal: Set-up or clean-up assistance Toiletin [address] needed: Supervision or touching assistance  Comment: CGA  CARE Score: 4  Discharge Goal: Supervision or touching assistance      Toilet Transfers: Toilet Transfer  Assistance needed: Supervision or touching assistance  Comment: CGA to<>from W/C using grab bar  CARE Score: 4  Discharge Goal: Supervision or touching assistance      SPEECH THERAPY: (If ordered)  Plan of Care and Goals:   LTG       Duration/Frequency of Treatment  Duration of Treatment: No ST                                                    LTG:                           Treatments may include speech/language/communication therapy, cognitive training, animal assisted therapy, group therapy, education, and/or dysphagia therapy based on the above goals. Co-treats where appropriate with PT or OT to facilitate patient goals in functional tasks. These goals were reviewed with this patient at the time of assessment and Claudio Bermeopayal is in agreement. CASE MANAGEMENT:  Goals:   Assist patient/family with discharge planning, patient/family counseling, assistance in obtaining recommended equipment and other services, and coordination with insurance during ARU stay. Patient Goals:  Return to maximum level of independent function. Patient goal is to return home alone. Nutrition goal: Patient will consume at least 75% at meals during stay     Activities Prior to [address]:   Homemaking Responsibilities: Yes (pt reports being Ind but charting indicates pt is non compliant with medication)  Active : Yes  Mode of Transportation: Car  Occupation: Retired  Leisure & Hobbies: Has a cat Allyssa, housework, grocery, pt sets up a 7 day pill box, pt manages finances via autopay       Intensity of Therapy  Claudiomarbella Babb will be seen a minimum of 3 hours of therapy per day/a minimum of 5 out of 7 days per week.     [] In this rare instance due to the nature of this patient's medical involvement, this patient will be seen 15 hours per week (900 minutes within a 7 day period). Treatments may include therapeutic exercises, gait training, neuromuscular re-ed, transfer training, community reintegration, bed mobility, w/c mobility and training, self care, home mgmt, cognitive training, energy conservation,dysphagia tx, speech/language/communication therapy, group therapy, and patient/family education. In addition, dietician/nutritionist may monitor calorie count as well as intake and collaboratively work with SLP on dietary upgrades. Neuropsychology/Psychology may evaluate and provide necessary support. Group therapy as appropriate to facilitate improved endurance, STR, COORD, function, safety, transfers, awareness and insight into deficits, problem solving, memory, and social interaction and engagement. Medical issues being managed closely and that require 24 hour availability of a physician:   [] Swallowing Precautions                                     [] Weight bearing precautions   [] Wound Care                             [x] Infection Prevention   [x] DVT Prophylaxis/assessment              [x] Monitoring for complications    [x] Fall Precautions/Prevention                         [x] Fluid/Electrolyte/Nutrition Balance   [] Voice Protection                           [x] Medication Management   [x] Respiratory                   [x] Pain Mgmt   [x] Bowel/Bladder Fx    Medical Prognosis: [] Good  [x] Fair    [] Guarded   Total expected IRF days 12                                            Physician anticipated functional outcomes:  FWW and HHC OT/PT and supervision.   Rehab Goals:   [] Return to premorbid function of_______________________________.    [] Independent   [] Mod I  [x] Supervision  [] CGA   [] Min A   [] Mod A  Level for ambulation []without assistive device  [x] with assistive device        [] Independent   [] Mod I  [x] Supervision [] CGA   [] Min A   [] Mod A  Level for transfers []without assistive device  [x] with assistive device         [] Independent   [] Mod I  [x] Supervision [] CGA   [] Min A   [] Mod A Level with ADL's []without assistive device   [x] with assistive device     ___________________     Level with cognitive skills requiring [] No assist [x] Supervision  [] Active Assist/Cues     [] Maximize level of mobility and ADL's to decrease burden on caregiver    IPOC brief synthesis of Preadmission Screen, Post-Admission Evaluation, and Therapy Evaluations:  Acute inpatient rehabilitation with occupational and physical therapy 180 minutes 5 out of every 7 days. Will address basic and  advancing mobility with self-care instruction and adaptive equipment training. Caregiver education will be offered. Expected length of stay  prior to a supervised level of function for discharge home with a walker and [facility] OT/PT is 2 weeks. Additional recommendation:     Hypertensive encephalopathy with gait disturbance: The patient requires daily occupational and physical therapy with speech-language pathology. We must work to control her systolic blood pressure from fluctuations up or down. Toprol and Norvasc reduced to lower her fluctuations and ProAmatine has just been started in the midst of her transfer to our unit for symptomatic hypotension. She needs adaptive equipment training, caregiver education and aggressive pulmonary hygiene measures. We will provide DVT prophylaxis, bowel and bladder retraining and cautious pain management. Outpatient follow-up with her PCP. DVT prophylaxis: The Xarelto she takes for her atrial fibrillation should protect her against new DVT. I must periodically monitor her hemoglobin and platelet count while on this medication. Weightbearing activities will be pursued daily. GI prophylaxis is available. Orthostatic hypotension: Just before transfer to our unit, the hospitalist documented symptomatic orthostatic hypotension that has not been evaluated or treated yet.   He is recommending that we do orthostatic blood pressure checks on a regular basis and cardiologist as just started ProAmatine. Vital signs will be checked at rest and with activities. We are encouraging consistent oral intake. Acute kidney injury: Encouraging oral hydration and trying to avoid wide fluctuations of blood pressure. Avoiding nephrotoxic medications when possible. Periodic check of her chemistries. Paroxysmal atrial fibrillation: She has a permanent pacemaker and interrogation should take place as an outpatient. For now she is on Toprol for rate control and Xarelto for anticoagulation. Daily weights to detect any decompensation of CHF. Right proximal fibular fracture: Weightbearing as tolerated. Cautious use of analgesics due to her confusion and hypotension. Cryotherapy. Vascular dementia: Namenda. Cymbalta. She requests to be taken off the Cymbalta. Anticipated discharge destination:    [] Home Independently   [x] Home with supervision    []SNF     [] Other       This plan has been reviewed with me in a language I understand. I have had the opportunity to include my input with my therapy team.    ________________________________________________   ______________________  Patient/Significant Other      Date    I have reviewed this initial plan of care and agree with its contents:    Title   Name    Date    Time    Physician: Travis Reilly MD 10/12/2022 10:44 AM    Case Mgmt: Malvin Myers MSW LSW 10/11/2022 8:48 AM     OT: Aubrey Espinoza MS, OTR/L 10/22/2022 1123    PT: Melanie Keller, PT     10/11/2022   15:19    RN: Jb Smith RN 10/10/22    ST: Kimber Sloan.  [place], [place], [address] [facility] 10/11/2022  5:28 PM      Dietician: SHASHANK Madison  10/11/2022  15:22     ADMIT DATE:10/10/2022

## 2022-10-10 NOTE — PLAN OF CARE
Problem: Discharge Planning  Goal: Discharge to home or other facility with appropriate resources  Outcome: Progressing  Flowsheets (Taken 10/9/2022 2000)  Discharge to home or other facility with appropriate resources:   Identify barriers to discharge with patient and caregiver   Arrange for needed discharge resources and transportation as appropriate   Identify discharge learning needs (meds, wound care, etc)     Problem: Skin/Tissue Integrity  Goal: Absence of new skin breakdown  Description: 1. Monitor for areas of redness and/or skin breakdown  2. Assess vascular access sites hourly  3. Every 4-6 hours minimum:  Change oxygen saturation probe site  4. Every 4-6 hours:  If on nasal continuous positive airway pressure, respiratory therapy assess nares and determine need for appliance change or resting period.   Outcome: Progressing     Problem: Safety - Adult  Goal: Free from fall injury  Outcome: Progressing  Flowsheets (Taken 10/9/2022 2031)  Free From Fall Injury:   Instruct family/caregiver on patient safety   Based on caregiver fall risk screen, instruct family/caregiver to ask for assistance with transferring infant if caregiver noted to have fall risk factors     Problem: ABCDS Injury Assessment  Goal: Absence of physical injury  Outcome: Progressing  Flowsheets (Taken 10/9/2022 2031)  Absence of Physical Injury: Implement safety measures based on patient assessment

## 2022-10-10 NOTE — DISCHARGE SUMMARY
Hospitalist      Orthostatic hypotension - just prior to transfer to ARU patient found to be significantly orthostatic. Dr. Ngozi Kovacs recommends checking orthostatic vitals signs on the ARU prior to giving each dose of Toprol in am - as patient is at risk of more falls.

## 2022-10-10 NOTE — PROGRESS NOTES
Hospitalist      Cardiac workup in progress. Echo is currently being done.       PT recommended ARU on 10/8

## 2022-10-10 NOTE — CARE COORDINATION
Pt is medically ready for d/c per Dr. Allyn Palafox is now agreeable to go to ARU per nurse. Notified Ivett/ARU via confidential VM. Will await decision from ARU to accept or not.   TE

## 2022-10-10 NOTE — CARE COORDINATION
Met with patient and discussed ARU. Explained to patient the required 3 hours of therapy a day. Also explained the average length of stay is 11 days, could be longer or shorter depending on recommendations of therapy and Dr. Sierra Vera. Patient expresses her understanding and states she's agreeable to admit to ARU. Spoke with patients neighbor Rey Smith) regarding concern of patients cognitive status and returning home alone. Per Margaux Marques she feel's that patient would be fine once she regained her strength and returned home d/t being more comfortable in her own surroundings. Patient children have all passed away and only has one grandson who she doesn't speak to often. Margaux Marques is her main support and is willing to help as needed. Patient meets criteria and is approved to come to ARU. Patient able to admit once medically stable and after ARU Medical Director and  sign the pre-admission screen (PAS), pending rapid COVID results.

## 2022-10-10 NOTE — PROGRESS NOTES
Occupational Therapy  . Occupational Therapy Treatment Note    Name: Anders Urbina MRN: [id number] :   1935   Date:  10/10/2022   Admission Date: 10/7/2022 Room:  [address] [place] [address]-A     Primary Problem:  The primary encounter diagnosis was Syncope and collapse. Diagnoses of Hypokalemia, YASSINE (acute kidney injury) ([facility] Non compliance w medication regimen, Frequent falls, Acute right ankle pain, and Hypertensive urgency were also pertinent to this visit. Patient  has a past medical history of A-fib ([facility] Arthritis, CAD (coronary artery disease), Eye abnormality, Fibromyalgia, GERD (gastroesophageal reflux disease), Hx of Doppler echocardiogram, MI (myocardial infarction) ([facility] Thyroid disease, and Ulcer. Patient  has a past surgical history that includes Cholecystectomy; Bladder surgery; Eye surgery; Hysterectomy; Appendectomy; Ventricular cardiac pacer insertion (); Colonoscopy (14); and pacemaker placement (Left, 2020). Restrictions/Precautions:  Restrictions/Precautions  Restrictions/Precautions: General Precautions, Fall Risk           Communication with other providers: Per chart review, patient is appropriate for therapeutic intervention. Nurse Lilly      Subjective:  Patient states:  \"I feel bad. ... my head feels like it does before I have a spell. Maybe it's the meds they gave me. \" Pt reported recent \"new meds\", had no worsening complaints during Tx session and actively participated. Pain:   Location, Type, Intensity (0/10 to 10/10):  0/10, denies pain at rest, reports 9/10 pain in R knee c mobility and standing    Objective:    Observation: Pt received seated in bedside chair, A&O. Objective Measures:  Telemetry, HR 60, O2 sat 94 on room air , RR 16, /90    Treatment, including education:  Therapeutic Activity Training:   Therapeutic activity training was instructed today. Cues were given for safety, sequence, UE/LE placement, awareness, and balance.     Activities performed today included transfer training c emphasis on standing balance / tolerance and sit-stand, SPT. Sit to stands: Mod A c RW (for two trials) + mod cues for safe body positioning / hand placement    Stand to sit: Min A c RW + cues for safe body positioning / hand placement    Stand Step Transfers: Mod A c RW x4 steps c intermittent touching assist to advance walker as pt exhibits increasing s/s of pain in R LE. Cues for safe body positioning throughout. Standing balance / tolerance: CGA static standing, increased to Min A c RW during dynamic reaching    Self Care Training:   Cues were given for safety, sequence, UE/LE placement, visual cues, and balance. Toilet Transfer: Mod A c RW + mod cues for safe body positioning / hand placement and intermittent touching of walker for placement. Pt identifying R knee pain as greatest limiting factor. Toileting: Min A for steadying assist during clothing mgmt up / down, seated for hernan care / hygiene w/o assistance. LB Dressing: Min A for assistance doffing / donning R LE through pull up pants, pt able to hike c steadying assist.     All therapeutic intervention performed c emphasis on LB dressing, toilet transfer, dynamic balance / standing tolerance to inc strength, endurance and act tolerance for inc Indep c ADL tasks, func transfers / mobility. Safety  Patient safely in bedside chair + alarm placed at at end of session, with call light/phone in reach, and nursing aware. Gait belt was used for func transfers / mobility. Assessment / Impression:    Patient's tolerance of treatment: Well  Adverse Reaction: None  Significant change in status and impact: Performed toileting on bathroom toilet c bedside chair brought to doorway. Barriers to improvement: None      Plan for Next Session:    Continue per OT POC per patient's tolerance c emphasis on functional transfers / mobility during ADL tasks.  Would benefit from use of AE for increased indep c LB dressing d/t R knee pain / decreased activity tolerance during knee flexion.     Time in:  1403  Time out:  1443  Timed treatment minutes:  40  Total treatment time:  40      Electronically signed by:    CARMEN Escobedo  10/10/2022, 2:05 PM    Goals:  Pt goal: go home  Time Frame for STGs: discharge  Goal 1: Pt will perform UE ADLs Independent  Goal 2: Pt will perform LE ADLs Independent  Goal 3: Pt will perform toileting Independent  Goal 4: Pt will perform functional transfer w/ AD Power  Goal 5: Pt will perform functional mobility w/ AD Power  Goal 6: Pt will perform therex/theract in order to increase functional activity tolerance and dynamic standing balance

## 2022-10-10 NOTE — PROGRESS NOTES
[facility] Admission Assessment      A Complete drug regimen review was completed for this patient this date. [x]  No clinically significant medication issue was identified   []  Yes, a clinically significant medication issue was identified     []  Adverse Drug Event:    []  Allergy:    []  Side Effect:    []  Ineffective Therapy:    []  Drug interaction:     []  Duplicate Therapy:    []  Untreated Indication:    []  Non-adherence:    []  Other:  Nursing contacted the physician:       Date:                Time:    Actions recommended by physician were completed:   Date:                 Time:  Action(s) Taken:             [x]  New Physician Order Received    []  Issue Noted by Physician; However No Action Required    []  Other:        Ethnicity  \"Are you of , /a, or [place] origin? \"  Check all that apply:  [x] A. No, not of , /a, or [place] Origin  [] B.  Yes, [place], [place] American, Chicano/a  [] C.  Yes, [address] [place] [address]  [] D.  Yes, [place]  [] E.  Yes, another , , or [place] origin  [] X. Patient unable to respond    Race  \"What is your race? \"  Check all that apply:  [x] A. White  [] B. Black or   [] C. American [place] [facility] or [place] Native  [] D.  [place]  [] E. [place]  [] F. Armenian  [] G. [place]  [] Delfina Mantle  [] I. [place]  [] J.  Other   [] K.   [] L. Cymro or Luis Daniel  [] M. Russian  [] N. Other [facility]  [] X. Patient unable to respond    Language  A. \"What is your preferred language? \"   English    B. \"Do you need or want an  to communicate with a doctor or health care staff? \"  Check only one:  [x] 0. No  [] 1. Yes  [] 9. Unable to determine    Transportation  \"Has lack of transportation kept you from medical appointments, meetings, work, or from getting things needed for daily living? \"Check all that apply:  [] A.  Yes, it has kept me from medical appointments or from getting my medications  [] B.  Yes, it has kept me from non-medical meetings, appointments, work, or from getting things that I need  [x] C.  No  [] X. Patient unable to respond    Hearing  Ability to hear (with hearing aid or hearing appliances if normally used)  [x]  0. Adequate - no difficulty in normal conversation, social interaction, listening to TV  []  1. Minimal difficulty - difficulty in some environments (e.g. when person speaks softly or setting is noisy)  []  2. Moderate difficulty - speaker has to increase volume and speak distinctly   []  3. Highly impaired - absence of useful hearing    Vision  Ability to see in adequate light (with glasses or other visual appliances)  [x]  0. Adequate - sees fine detail, such as regular print in newspapers/books  []  1. Impaired - sees large print, but nto regular print in newspapers/books  []  2. Moderately impaired - limited vision; not able to see newspaper headlines but can identify objects  []  3. Highly impaired - object identification in question, but eyes appear to follow objects  []  4. Severely impaired - no vision or sees only light, colors, or shapes; eyes do not appear to follow objects    Health Literacy  \"How often do you need to have someone help you when you read instructions, pamphlets, or other written material from your doctor or pharmacy? \"  [x]  0. Never  []  1. Rarely  []  2. Sometimes  []  3. Often  []  4. Always  []  8. Patient unable to respond    Signs and Symptoms of Delirium  A. Acute Onset Mental Status Change - Is there evidence of an acute change in mental status from the patient's baseline? [x] 0. No  [] 1. Yes    B. Inattention - Did the patient have difficulty focusing attention, for example being easily distractible or having difficulty keeping track of what was being said? [x]  0. Behavior not present  []  1. Behavior continuously present, does not fluctuate  []  2.   Behavior present, fluctuates (comes and goes, changes in severity)    C. Disorganized thinking - Was the patient's thinking disorganized or incoherent (rambling or irrelevant conversation, unclear or illogical flow of ideas, or unpredictable switching from subject to subject)? [x]  0. Behavior not present  []  1. Behavior continuously present, does not fluctuate  []  2. Behavior present, fluctuates (comes and goes, changes in severity)    D. Altered level of consciousness - Did the patient have altered level of consciousness as indicated by any of the following criteria? Vigilant - startled easily to any sound or touch  Lethargic - repeatedly dozed off while being asked questions, but responded to voice or touch  Stuporous - very difficulty to arouse and keep aroused for the interview  Comatose - could not be aroused  [x]  0. Behavior not present  []  1. Behavior continuously present, does not fluctuate  []  2. Behavior present, fluctuates (comes and goes, changes in severity)    Mood    \"Over the last 2 weeks, have you been bothered by any of the following problems?\" 1. Symptom Presence    0 = No  1 = Yes  9 = No Response 2. Symptom Frequency    0 = Never or 1 day  1 = 2-6 days (several days)  2 = 7-11 days (half or more of the days)  3 = 12-14 days (nearly every day)  **Leave blank if 'No Reponse'**      Enter scores in boxes    Column 1 Column 2   Little interest or pleasure in doing things   [x] 0. No  [] 1. Yes  [] 9. No Response [x] 0. Never or one day  [] 1.  2-6 days (several days)  [] 2.  7-11 days (half or more of days)  [] 3.  12-14 days (nearly every day)     Feeling down, depressed, or hopeless   [x] 0. No  [] 1. Yes  [] 9. No Response [x] 0. Never or one day  [] 1.  2-6 days (several days)  [] 2.  7-11 days (half or more of days)  [] 3.  12-14 days (nearly every day)   **If Question A or B in column 2 is coded 2 or 3, CONTINUE asking the questions below. If not, SKIP this next part of interview. **     Trouble falling or staying asleep, or sleeping too much   [] 0. No  [] 1. Yes  [] 9. No Response [] 0. Never or one day  [] 1.  2-6 days (several days)  [] 2.  7-11 days (half or more of days)  [] 3.  12-14 days (nearly every day   Feeling tired or having little energy   [] 0. No  [] 1. Yes  [] 9. No Response [] 0. Never or one day  [] 1.  2-6 days (several days)  [] 2.  7-11 days (half or more of days)  [] 3.  12-14 days (nearly every day   Poor appetite or overeating     [] 0. No  [] 1. Yes  [] 9. No Response [] 0. Never or one day  [] 1.  2-6 days (several days)  [] 2.  7-11 days (half or more of days)  [] 3.  12-14 days (nearly every day   Feeling bad about yourself - or that you are a failure or have let yourself or your family down   [] 0. No  [] 1. Yes  [] 9. No Response [] 0. Never or one day  [] 1.  2-6 days (several days)  [] 2.  7-11 days (half or more of days)  [] 3.  12-14 days (nearly every day   Trouble concentrating on things, such as reading the newspaper or watching television   [] 0. No  [] 1. Yes  [] 9. No Response [] 0. Never or one day  [] 1.  2-6 days (several days)  [] 2.  7-11 days (half or more of days)  [] 3.  12-14 days (nearly every day   Moving or speaking so slowly that other people could have noticed. Or the opposite- being so fidgety or restless that you have been moving around a lot more than usual.   [] 0. No  [] 1. Yes  [] 9. No Response [] 0. Never or one day  [] 1.  2-6 days (several days)  [] 2.  7-11 days (half or more of days)  [] 3.  12-14 days (nearly every day   Thoughts that you would be better off dead, or of hurting yourself in some way.   [] 0. No  [] 1. Yes  [] 9. No Response [] 0. Never or one day  [] 1.  2-6 days (several days)  [] 2.  7-11 days (half or more of days)  [] 3.  12-14 days (nearly every day     Social Isolation  \"How often do you feel lonely or isolated from those around you? \"  [x] 0. Never  [] 1. Rarely  [] 2. Sometimes  [] 3. Often  [] 4. Always  [] 8. Patient unable to respond    Pain Effect on Sleep  \"Over the past 5 days, how much of the time has pain made it hard for you to sleep at night? \"  []  0. Does not apply - I have not had any pain or hurting in the past 5 days  []  1. Rarely or not at all  []  2. Occasionally  [x]  3. Frequently  []  4. Almost constantly  []  8. Unable to answer  **If the patient answers \"0. Does not apply\" to this question, skip the next two \"Pain\" questions**      Pain Interference with Therapy Activities  \"Over the past 5 days, how often have you limited your participation in rehabilitation therapy sessions due to pain? \"  []  0. Does not apply - I have not received rehabilitation therapy in the past 5 days  [x]  1. Rarely or not at all  []  2. Occasionally  []  3. Frequently  []  4. Almost constantly  []  8. Unable to answer    Pain Interference with Day-to-Day Activities: \"Over the past 5 days, how often have you limited your day-to-day activities (excluding rehabilitation therapy session)? \"  [x]  1. Rarely or not at all  []  2. Occasionally  []  3. Frequently  []  4. Almost constantly  []  8. Unable to answer    Nutritional Approaches  Check all of the following nutritional approaches that apply on admission:  []  A. Parenteral/IV feeding  []  B. Feeding tube (e.g., nasogastric or abdominal (PEG))  []  C. Mechanically altered diet - requires change in texture of food or liquids (e.g., pureed food, thickened liquids)  []  D. Therapeutic diet (e.g., low salt, diabetic, low cholesterol)  [x]  Z. None of the above    High Risk Drug Classes:  Use and Indication    Is taking: Check if the pt is taking any medications by pharmacological classification, not how it is used, in the following classes  Indication noted:  If column 1 is checked, check if there is an indication noted for all meds in the drug class Is taking  (check all that apply) Indication noted (check all that apply)   Antipsychotic [] []   Anticoagulant [x] [x]   Antibiotic [] []   Opioid [] []   Antiplatelet [] []   Hypoglycemic (including insulin) [] []   None of the above []     Special Treatments, Procedures, and Programs    Check all of the following treatments, procedures, and programs that apply on admission. On admission (check all that apply)   Cancer Treatments   A1. Chemotherapy []           A2. IV []           A3. Oral []           A10. Other []   B1. Radiation []   Respiratory Therapies   C1. Oxygen Therapy []           C2. Continuous []           C3. Intermittent []           C4. High-concentration []   D1. Suctioning []           D2. Scheduled []           D3. As needed []   E1. Tracheostomy Care []   F1. Invasive Mechanical Ventilator (ventilator or respirator) []   G1. Non-invasive Mechanical Ventilator []           G2. BiPAP []           G3. CPAP []   Other   H1. IV Medications []           H2. Vasoactive medications []           H3. Antibiotics []           H4. Anticoagulation []           H10. Other []   I1. Transfusions []   J1. Dialysis []           J2. Hemodialysis []           J3. Peritoneal dialysis []   O1. IV access []           O2. Peripheral [x]           O3. Midline []           O4.  Central (PICC, tunneled, port) []      None of the above (select if no Cancer, Respiratory, or Other boxes are checked) []

## 2022-10-10 NOTE — DISCHARGE SUMMARY
V2.0  Discharge Summary    Name:  Ida Nagel /Age/Sex: 1935 (73 y.o. female)   Admit Date: 10/7/2022  Discharge Date: 10/10/22    MRN & CSN:  [id number] & [id number] Encounter Date and Time 10/10/22 12:15 PM EDT    Attending:  No att. providers found Discharging Provider: Geno Sweet MD       Hospital Course:     Brief HPI: Ida Nagel is a 80 y.o. female who presented to ED with syncope. She report to me that she was standing and talking to some people when she passed out. Per ED notes she was caught by bystanders and lost consciousness for about 20 minutes. Additional history from the ED is that she has fallen multiple times this month. She was admitted. Brief Problem Based Course: While here she was seen by orthopedics for a proximal fibula fracture. Nonoperative management is recommended. She was also seen by cardiology. She was noted to be very orthostatic and symptomatic today when her orthostatic vital signs were taken. Her medications were adjusted and midodrine was added. She has now been released to the acute rehab unit after a 3-day inpatient stay. Problem list    Syncope --likely due to orthostatic hypotension    Orthostatic hypotension, symptomatic  -Dr. Cheryln Hammans recommends checking orthostatic vital signs daily on the acute rehab unit before giving blood pressure medications as she is at risk for further falls if she becomes orthostatic again  -Home meds on her list include metoprolol tartrate 50 mg twice daily-last filled about 2 weeks ago. Per records review she was probably without it for the 2 months before that. Additionally, home med list includes Norvasc 5 mg a day. Last fill date on that was in May-3-month supply. At this time, amlodipine has been discontinued. Metoprolol tartrate 50 mg twice daily has been changed to metoprolol succinate 25 mg daily.  -For the last 3 days she has been receiving Norvasc 5 mg a day and Coreg 12.5 mg twice daily. After 3 days of this therapy, this afternoon orthostatic vital signs were as follows:   -Laying---117/67  -Sitting--100/64  -Standing --64/28 -very symptomatic  -Thus, upon transfer to the rehab unit Norvasc has been discontinued, and her beta blocker therapy as mentioned above will consist of only metoprolol succinate 25 mg daily  -Goal systolic blood pressure 874 per cardiology    History of syncope in September 2021-felt to be vasovagal at that time-Per discharge summary that episode of syncope occurred when she was presented with an $800 bill            Other problems    Right fibula fracture  -Proximal, nondisplaced fracture-amenable to nonoperative management. Dr. Lee Pollard as tolerated  -May benefit from Ace wrap or knee immobilizer as needed for comfort and protection of ambulation per orthopedics  -Walker recommended  -Follow-up with Dr. Robert Jones in 2 weeks for repeat x-rays to evaluate healing and alignment    Acute kidney injury  -Creatinine was 1.5 upon arrival.  This compares to 1.1 last April. The morning following admission creatinine improved to 1.0. This occurred after receiving 1 L normal saline in the ED. -Hypokalemia was associated with the acute kidney injury. Potassium level was 2.9 upon admission. This was corrected. Recommend follow-up electrolytes as outpatient. Atrial fibrillation  -Anticoagulation: Cardiology mentions that she is at risk because of recent multiple falls. Cardiology plans to keep her on Xarelto for now, and watch her carefully. Her outpatient therapy consisted of Xarelto 15 mg daily. Last fill dates were April 19--90-day supply, and it was refill the day of admission.   The lower dose of Xarelto is being used because her creatinine clearance is 44  -She is in atrial fibrillation today-Per cardiology note she has a history of paroxysmal atrial fibrillation    History of complete heart block-status post pacer later upgraded to a BiV pacer  -Pacemaker was apparently placed in 2011 and upgraded to a BiV pacer by Dr. 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 Medication changes discussed above. Cognitive disorder-neurology suspects early dementia per outpatient notes  -Neurology, Dr. Floyd Garner, started a dementia evaluation in the office in October 2021 after an admission for vasovagal syncope. [facility] started in October 2021. MRI of the brain ordered at that time, but apparently her pacer leads from 2011 are not compatible with an MRI. The MRI was never done. -CT head done this admission shows generalized atrophy and chronic small vessel ischemic white matter disease  -Memantine was only dispensed twice in October 2021. It was restarted this admission. Difficulty with compliance with medications  -She may need home health care upon discharge from the rehab unit    Multiple falls this past month  -Transfer to acute rehab unit    Vitamin D deficiency-vitamin D level came back 16  -We will arrange for replacement  -Needs vitamin D level rechecked    Social history  -She lives alone  -With her permission I spoke with her neighbor over the phone who indicated the patient has a grandson who is not around  -She saw her PCP Dr. Mandy Willams the day of admission who noted that she was not taking her medications correctly, was not sure what she takes, has been losing weight, and appeared depressed with impaired memory.   He had concerns for her living alone  -She was seen by the ED case manager who noted the above. -Further care at acute rehab unit. Former smoker-quit in 1977 per chart  Body mass index is 28.41 kg/m². Chronic kidney disease stage III  -As documented on the last by her PCP    Lupus is on third on her problem list    Diabetes mellitus type 2  -Metformin was prescribed and failed multiple times in 2014 and 2015. No diabetic meds. In the record since then. Hemoglobin A1c was 5.4 in April 2022. Medication list includes the following additional medications  -Duloxetine-last dispense record was in April 2021. It was then filled 10/7/2022. It has not been reordered upon transfer to the acute rehab unit. Defer to accepting physician  -[place] is a dispense record going back to 2014 was being filled regularly in 2021, and was last filled in April-90-day supply-and was just refilled 10/7 discharged with levothyroxine 50   -Paroxetine-last filled a 30-day supply in April not reordered at this time-defer to PCP  -Leflunomide 10 mg daily filled in October and November 2016 by a rheumatologist in [facility] 12 5 mg daily ordered by her rheumatologist in 2016, and prior to that by PCP in 2014. A total of 5 scripts noted for #30 each-take 5 mg a day during those years. Not on it currently.     Consults this admission:  IP CONSULT TO CASE MANAGEMENT  IP CONSULT TO HOSPITALIST  IP CONSULT TO CARDIOLOGY  IP CONSULT TO HOME CARE NEEDS  IP CONSULT TO CASE MANAGEMENT  IP CONSULT TO CASE MANAGEMENT  IP CONSULT TO ORTHOPEDIC SURGERY    Discharge Diagnosis:     Syncope  Severe orthostatic hypotension    Discharge Instruction:   Follow up appointments: Cardiology  Primary care physician: Rome Claude, PA-C within 2 weeks  Diet:  solids    Activity: activity as tolerated  Disposition: Discharged to:   []Home, []HHC, []SNF, []Acute Rehab, []Hospice   Condition on discharge: Stable  Labs and Tests to be Followed up as an outpatient by PCP or Specialist: Recommend repeat vitamin D level after supplementation    Discharge Medications:     Levothyroxine 50 MCG daily  Memantine 10 mg daily  Metoprolol succinate 25 mg daily  Midodrine 5 mg 3 times a day  Rivaroxaban 15 mg daily     Objective Findings at Discharge:   BP (!) 64/28   Pulse 62   Temp (!) 96.3 °F (35.7 °C) (Oral)   Resp 20   Ht 5' 7\" (1.702 m)   Wt 181 lb 6.4 oz (82.3 kg)   SpO2 93%   BMI 28.41 kg/m²       Physical Exam:   General: NAD  Eyes: EOMI  Skin: warm, dry  Neuro: Alert. Psych: Mood appropriate. Labs and Imaging   XR KNEE RIGHT (3 VIEWS)    Result Date: 10/8/2022  EXAMINATION: THREE XRAY VIEWS OF THE RIGHT KNEE;  2 XRAY VIEWS OF THE RIGHT TIBIA AND FIBULA 10/8/2022 8:19 pm COMPARISON: None. HISTORY: ORDERING SYSTEM PROVIDED HISTORY: knee pain s/p fall TECHNOLOGIST PROVIDED HISTORY: Reason for exam:->knee pain s/p fall Reason for Exam: knee pain s/p fall Additional signs and symptoms: loc; ORDERING SYSTEM PROVIDED HISTORY: Pain s/p fall TECHNOLOGIST PROVIDED HISTORY: Reason for exam:->Pain s/p fall Reason for Exam: right leg pain s/p fall Additional signs and symptoms: none FINDINGS: There is acute nondisplaced fracture in the proximal metaphysis of the right fibula. There is no acute fracture or dislocation the remainder of the right tibia fibula. There is soft tissue swelling. No radiopaque foreign body. There is no evidence of acute fracture or dislocation of the remainder of the right knee. There are mild degenerative changes in the right knee, most pronounced in medial compartment. There is mild suprapatellar joint effusion. There is mild soft tissue swelling. No evidence of radiopaque foreign body. Acute nondisplaced fracture in the proximal metaphysis of the right fibula. No acute fracture or dislocation the remainder of the right tibia fibula. No acute fracture or dislocation of the remainder of the right knee.  Mild degenerative changes in the right knee, most pronounced in medial compartment. Mild suprapatellar joint effusion. XR TIBIA FIBULA RIGHT (2 VIEWS)    Result Date: 10/8/2022  EXAMINATION: THREE XRAY VIEWS OF THE RIGHT KNEE;  2 XRAY VIEWS OF THE RIGHT TIBIA AND FIBULA 10/8/2022 8:19 pm COMPARISON: None. HISTORY: ORDERING SYSTEM PROVIDED HISTORY: knee pain s/p fall TECHNOLOGIST PROVIDED HISTORY: Reason for exam:->knee pain s/p fall Reason for Exam: knee pain s/p fall Additional signs and symptoms: loc; ORDERING SYSTEM PROVIDED HISTORY: Pain s/p fall TECHNOLOGIST PROVIDED HISTORY: Reason for exam:->Pain s/p fall Reason for Exam: right leg pain s/p fall Additional signs and symptoms: none FINDINGS: There is acute nondisplaced fracture in the proximal metaphysis of the right fibula. There is no acute fracture or dislocation the remainder of the right tibia fibula. There is soft tissue swelling. No radiopaque foreign body. There is no evidence of acute fracture or dislocation of the remainder of the right knee. There are mild degenerative changes in the right knee, most pronounced in medial compartment. There is mild suprapatellar joint effusion. There is mild soft tissue swelling. No evidence of radiopaque foreign body. Acute nondisplaced fracture in the proximal metaphysis of the right fibula. No acute fracture or dislocation the remainder of the right tibia fibula. No acute fracture or dislocation of the remainder of the right knee. Mild degenerative changes in the right knee, most pronounced in medial compartment. Mild suprapatellar joint effusion. XR ANKLE RIGHT (MIN 3 VIEWS)    Result Date: 10/7/2022  EXAMINATION: THREE XRAY VIEWS OF THE RIGHT ANKLE 10/7/2022 7:25 pm COMPARISON: None.  HISTORY: ORDERING SYSTEM PROVIDED HISTORY: syncope, now with pain TECHNOLOGIST PROVIDED HISTORY: Reason for exam:->syncope, now with pain Reason for Exam: right ankle pain Additional signs and symptoms: syncope / loc FINDINGS: No significant bone, joint or soft tissue abnormality. No evidence of an acute fracture or dislocation. Ankle mortise is symmetric. No evidence of an acute injury. CT Head W/O Contrast    Addendum Date: 10/7/2022    ADDENDUM: Sagittal and coronal images were reviewed. The report is unchanged. Result Date: 10/7/2022  EXAMINATION: CT OF THE HEAD WITHOUT CONTRAST  10/7/2022 7:44 pm TECHNIQUE: CT of the head was performed without the administration of intravenous contrast. Automated exposure control, iterative reconstruction, and/or weight based adjustment of the mA/kV was utilized to reduce the radiation dose to as low as reasonably achievable. COMPARISON: 09/17/2021 HISTORY: ORDERING SYSTEM PROVIDED HISTORY: syncope TECHNOLOGIST PROVIDED HISTORY: Reason for exam:->syncope Has a \"code stroke\" or \"stroke alert\" been called? ->No Decision Support Exception - unselect if not a suspected or confirmed emergency medical condition->Emergency Medical Condition (MA) Reason for Exam: syncope FINDINGS: BRAIN/VENTRICLES: There is generalized age-appropriate atrophy. There is moderate to severe patchy periventricular and subcortical white matter low attenuation that is nonspecific but most consistent with chronic small vessel ischemia. There is no evidence of acute hemorrhage, mass or extra-axial fluid collection. ORBITS: Stable postsurgical changes. The visualized portion of the orbits demonstrate no acute abnormality. SINUSES: The visualized paranasal sinuses and mastoid air cells demonstrate no acute abnormality. SOFT TISSUES/SKULL:  No acute abnormality of the visualized skull or soft tissues. No acute intracranial abnormality. Generalized atrophy and chronic small vessel ischemic white matter disease.      CT CSpine W/O Contrast    Result Date: 10/7/2022  EXAMINATION: CT OF THE CERVICAL SPINE WITHOUT CONTRAST 10/7/2022 7:44 pm TECHNIQUE: CT of the cervical spine was performed without the administration of intravenous contrast. Multiplanar reformatted images are provided for review. Automated exposure control, iterative reconstruction, and/or weight based adjustment of the mA/kV was utilized to reduce the radiation dose to as low as reasonably achievable. COMPARISON: 09/17/2021 HISTORY: ORDERING SYSTEM PROVIDED HISTORY: syncope TECHNOLOGIST PROVIDED HISTORY: Reason for exam:->syncope Decision Support Exception - unselect if not a suspected or confirmed emergency medical condition->Emergency Medical Condition (MA) Reason for Exam: syncope FINDINGS: BONES/ALIGNMENT: Cervical vertebral body heights and alignment are normal. Facet joints are normally aligned. There is no acute fracture or traumatic malalignment. DEGENERATIVE CHANGES: There are multilevel degenerative changes in the cervical spine similar to the prior study. SOFT TISSUES: There is no prevertebral soft tissue swelling. Multilevel degenerative changes with no acute fracture or traumatic malalignment. XR CHEST PORTABLE    Result Date: 10/7/2022  EXAMINATION: ONE XRAY VIEW OF THE CHEST 10/7/2022 6:25 pm COMPARISON: 09/17/2021. HISTORY: ORDERING SYSTEM PROVIDED HISTORY: syncope TECHNOLOGIST PROVIDED HISTORY: Reason for exam:->syncope Reason for Exam: syncope Additional signs and symptoms: LOC FINDINGS: There is no confluent consolidation or effusion. Lung volumes are large. A sequential lead pacemaker appears unchanged. The cardiomediastinal silhouette and pulmonary vessels appear normal.     No acute findings. Large lung volumes. VL DUP CAROTID BILATERAL    Result Date: 10/9/2022  EXAMINATION: ULTRASOUND EVALUATION OF THE CAROTID ARTERIES 10/9/2022 TECHNIQUE: Duplex ultrasound using B-mode/gray scaled imaging, Doppler spectral analysis and color flow Doppler was obtained of the carotid arteries.  COMPARISON: 09/18/2021 HISTORY: ORDERING SYSTEM PROVIDED HISTORY: syncope TECHNOLOGIST PROVIDED HISTORY: Reason for exam:->syncope FINDINGS: RIGHT: The right common carotid artery demonstrates peak systolic velocities of 53 and 53 cm/sec in the proximal and distal segments respectively. The right common carotid artery demonstrates end-diastolic velocities of 16 and 17 cm/sec in the proximal and distal segments respectively. The right internal carotid artery demonstrates the systolic velocities of 68, 72, 103 cm/sec in the proximal, mid and distal segments respectively. The right internal carotid artery demonstrates the end-diastolic velocities of 9, 22, 31 cm/sec in the proximal, mid and distal segments respectively. The external carotid artery appears patent. The vertebral artery demonstrates normal antegrade flow. Calcific plaque within the carotid bulb and bifurcation. ICA/CCA ratio of 1.9 LEFT: The left common carotid artery demonstrates peak systolic velocities of 87 and 65 cm/sec in the proximal and distal segments respectively. The left common carotid artery demonstrates end-diastolic velocities of 21 and 14 cm/sec in the proximal and distal segments respectively. The left internal carotid artery demonstrates the systolic velocities of 70, 67, 67 cm/sec in the proximal, mid and distal segments respectively. The left internal carotid artery demonstrates the end-diastolic velocities of 19, 12, 21 cm/sec in the proximal, mid and distal segments respectively. The external carotid artery appears patent. The vertebral artery demonstrates normal antegrade flow. Calcific plaque within the carotid bulb and bifurcation. ICA/CCA ratio of 0.8     The right internal carotid artery demonstrates 0-50% stenosis. The left internal carotid artery demonstrates 0-50% stenosis. Bilateral vertebral arteries are patent with flow in the normal direction.      CTA PULMONARY W CONTRAST    Result Date: 10/7/2022  EXAMINATION: CTA OF THE CHEST 10/7/2022 9:44 pm TECHNIQUE: CTA of the chest was performed after the administration of intravenous contrast. Multiplanar reformatted images are provided for review. MIP images are provided for review. Automated exposure control, iterative reconstruction, and/or weight based adjustment of the mA/kV was utilized to reduce the radiation dose to as low as reasonably achievable. COMPARISON: 09/17/2021 HISTORY: ORDERING SYSTEM PROVIDED HISTORY: +ddimer, syncope TECHNOLOGIST PROVIDED HISTORY: Reason for exam:->+ddimer, syncope Decision Support Exception - unselect if not a suspected or confirmed emergency medical condition->Emergency Medical Condition (MA) Reason for Exam: +ddimer, syncope Additional signs and symptoms: no Relevant Medical/Surgical History: 80 ml isovue 370 used FINDINGS: Pulmonary Arteries: Pulmonary arteries are adequately opacified for evaluation. No evidence of intraluminal filling defect to suggest pulmonary embolism. Central pulmonary arteries are upper limits of normal in caliber. Mediastinum: No evidence of mediastinal lymphadenopathy. The heart is mildly enlarged. There is a left subclavian cardiac pacemaker. The heart and pericardium demonstrate no acute abnormality. There is no acute abnormality of the thoracic aorta. There is calcified plaque in the aortic arch and descending thoracic aorta. Lungs/pleura: The lungs are without acute process. No focal consolidation or pulmonary edema. No evidence of pleural effusion or pneumothorax. Upper Abdomen: There multiple surgical clips in the right quadrant status post cholecystectomy. Soft Tissues/Bones: No acute bone or soft tissue abnormality. No evidence of pulmonary embolism or acute pulmonary abnormality. VL DUP LOWER EXTREMITY VENOUS RIGHT    Result Date: 10/8/2022  EXAMINATION: DUPLEX VENOUS ULTRASOUND OF THE RIGHT LOWER EXTREMITY 10/8/2022 8:38 pm TECHNIQUE: Duplex ultrasound using B-mode/gray scaled imaging and Doppler spectral analysis and color flow was obtained of the deep venous structures of the right extremity.  COMPARISON: None. HISTORY: ORDERING SYSTEM PROVIDED HISTORY: edema TECHNOLOGIST PROVIDED HISTORY: Reason for exam:->edema FINDINGS: The visualized veins of the right lower extremity are patent and free of echogenic thrombus. The veins demonstrate good compressibility with normal color flow study and spectral analysis. No evidence of DVT in the right lower extremity. RECOMMENDATIONS: Unavailable       CBC:   No results for input(s): WBC, HGB, PLT in the last 72 hours. BMP:    No results for input(s): NA, K, CL, CO2, BUN, CREATININE, GLUCOSE in the last 72 hours. Hepatic:   No results for input(s): AST, ALT, ALB, BILITOT, ALKPHOS in the last 72 hours. Lipids:   Lab Results   Component Value Date/Time    CHOL 166 06/04/2019 03:55 PM    HDL 34 06/04/2019 03:55 PM    TRIG 133 06/04/2019 03:55 PM     Hemoglobin A1C:   Lab Results   Component Value Date/Time    LABA1C 5.4 04/18/2022 02:52 PM     TSH:   Lab Results   Component Value Date/Time    TSH 3.14 04/18/2022 02:52 PM     Troponin:   Lab Results   Component Value Date/Time    TROPONINT <0.010 10/08/2022 09:26 AM    TROPONINT <0.010 10/07/2022 08:25 PM    TROPONINT <0.010 09/18/2021 04:08 AM     Lactic Acid: No results for input(s): LACTA in the last 72 hours. BNP:   No results for input(s): PROBNP in the last 72 hours.     UA:  Lab Results   Component Value Date/Time    NITRU NEGATIVE 10/08/2022 12:27 AM    NITRU Negative 06/04/2019 03:55 PM    COLORU YELLOW 10/08/2022 12:27 AM    PHUR 6.0 06/04/2019 03:55 PM    WBCUA 3 10/08/2022 12:27 AM    RBCUA 1 10/08/2022 12:27 AM    MUCUS RARE 11/08/2013 01:45 PM    TRICHOMONAS NONE SEEN 10/08/2022 12:27 AM    BACTERIA RARE 10/08/2022 12:27 AM    CLARITYU CLEAR 10/08/2022 12:27 AM    SPECGRAV 1.010 10/08/2022 12:27 AM    LEUKOCYTESUR NEGATIVE 10/08/2022 12:27 AM    UROBILINOGEN 0.2 10/08/2022 12:27 AM    BILIRUBINUR NEGATIVE 10/08/2022 12:27 AM    BLOODU TRACE 10/08/2022 12:27 AM    GLUCOSEU Negative 06/04/2019 03:55 PM [address] NEGATIVE 10/08/2022 12:27 AM       Time Spent Discharging patient 45 minutes    Electronically signed by Yumiko Galvan MD on 10/11/2022 at 5:49 PM

## 2022-10-10 NOTE — PROGRESS NOTES
Called for, and received Report from Markos Cochran, All questions answered appropriately. JORDYN Cochran informed that we are ready to receive patient at [facility].

## 2022-10-10 NOTE — CARE COORDINATION
Pt approved for ARU per Ivett/She will notify Dr Aretha Matthews that they are able to accept pt today if medically stable.   TE

## 2022-10-10 NOTE — CARE COORDINATION
Met with pt to discuss the PT recommendation for ARU. Pt states that she does not know if she wants to go or not. She states that she has not been away from home that long. Pt states that she wants to talk to her family today and will have a decision for CM tomorrow. D/c plan is home alone w/HHC vs ARU. CM to f/u tomorrow am for her decision. Referral made to Ivett/TANYA via Confidential VM for review only to see if pt meets ARU criteria in case she is agreeable.   TE

## 2022-10-10 NOTE — PROGRESS NOTES
Ortho BP  Laying---117/67  Sitting--100/64  Standing --64/28    Change Coreg to Toprol  And ProAmatine  Keep systolic pressure 706  Check blood pressure sitting or standing not laying  Concern for falls and risk for bleed  She is on anticoagulation so watch for now  She is going to ARU--need to check orthostatic blood pressures every day

## 2022-10-10 NOTE — PROGRESS NOTES
Physical Therapy    Physical Therapy Treatment Note  Name: Naveed Wise MRN: [id number] :   1935   Date:  10/10/2022   Admission Date: 10/7/2022 Room:  [address] [place] [address]-   Restrictions/Precautions:  Restrictions/Precautions  Restrictions/Precautions: General Precautions, Fall Risk           Subjective:  Patient states:  Patient is agreeable for rehab today  Pain:   Location, Type, Intensity (0/10 to 10/10):  denies; 0/10    Objective:    Observation:  Patient is supine in bed    Treatment, including education/measures:  Pt agreeable to participating in therapy at this time. She has very limited ankle AF and has pain and discomfort in Lane feet, more so Lt foot. Manual stretching into DF on ea ankle 2x30\" ea. Educated on having feet flat on floor while in recliner and incrementally moving heel retro for increase static DF. Therapeutic Activity Training:   Therapeutic activity training was instructed today. Cues were given for safety, sequence, UE/LE placement, awareness, and balance. Activities performed today included bed mobility training, sup-sit, sit-stand, SPT. Pt completed supine to sit with Min A with Hob flat. Patient uses bed rails and needs assistance with trunk rise  Pt completed sit to stand with CGA with verbal cues to push through bed and avoid pulling on walker. Pt completed stand to sit with Min Ax1 with verbal cues to feel bed against back of legs, reach back, and sit slowly. Seated balance on EOB and recliner SBA    Gait training: Patient ambulated 35 ft with RW with chair pulled behind for seated rest break. Patient demo's forward flexed posture, decrease step length and poor LLE stability. She required manual walker guidance towards the end of gait distance and was cued for seated rest d/t unsafe gait mechanics. Therapeutic Exercise:  Cues were given for technique, safety, recruitment, and rationale. Cues were verbal and/or tactile.   Pt completed 1 sets of 10 reps of BLE Marching, heel slides and LAQ in recliner. Safety  Patient left safely in the recliner with call light/phone in reach with alarm applied. Gait belt was used for transfers and gait. Assessment / Impression:    Patient's tolerance of treatment:  good; patient tolerated OOB mobility and therapeutic exercises today  Adverse Reaction: none  Significant change in status and impact:  none  Barriers to improvement:  generalized weakness    Plan for Next Session:    Continue progressing toward goals per plan of care. Progress independence with transfers and ambulation as tolerated and appropriate. Progress ambulation distance as tolerated and appropriate. Time in:  1108  Time out:  1135  Timed treatment minutes:  27  Total treatment time:  27    Previously filed items:  Social/Functional History  Lives With: Alone  Type of Home: House  Home Layout: Two level  Home Access: Stairs to enter with rails  Entrance Stairs - Number of Steps: 3  Entrance Stairs - Rails: Both  Bathroom Shower/Tub: Tub/Shower unit  Bathroom Toilet: Standard  Bathroom Accessibility: Accessible  Home Equipment: Cane  Has the patient had two or more falls in the past year or any fall with injury in the past year?: Yes  ADL Assistance: Independent  Homemaking Assistance: Independent  Homemaking Responsibilities: Yes  Ambulation Assistance: Independent  Transfer Assistance: Independent  Active : Yes  Mode of Transportation: Car     Long Term Goals  Time Frame for Long Term Goals : In one week:  Long Term Goal 1: Pt will complete all bed mobility with SBAx1  Long Term Goal 2: Pt will complete sit <> stand transfers with SBAx1  Long Term Goal 3: Pt will ambulate 100 feet with SBAx1 with LRAD  Long Term Goal 4: Pt will ascend/descend 6 steps with a handrail with minAx1  Long Term Goal 5: Pt will independently complete 3 sets of 10 reps of BLE AROM exercises in available and allowed ROM       Electronically signed by:   Chrissy Mijares PTA 12:12 PM 10/10/2022

## 2022-10-10 NOTE — PROGRESS NOTES
Daily Progress Note  Subjective:    Pt awake, alert and feeling ok  No SOB or chest pain  Tele SR, BP stable    Attending Note:  Patient is awake alert feeling better  AV paced noted -rate is controlled-  She was in A. Fib-now rate is controlled  She is on anticoagulation  Came in with syncope--orthostatic blood pressures  Recent echo shows LV function was preserved  Carotid ultrasound was negative  She has a fracture noted seen by orthopedic-conservative treatment--right proximal fibula nondisplaced fracture  Creatinine improved--check labs  Keep on Coreg for now-stop her Norvasc--orthostatic blood pressures  Echo is pending  Suggest keeping systolic pressure on 781  She had vasovagal syncope back in September 2021  She has history of paroxysmal atrial fibrillation she is on anticoagulation    Impression and Plan:     HTN urgency    Off nitro drip- on Norvasc and Coreg    Much improved now    Not sure if she was taking her meds at home     Syncope    Orthostatic vitals negative    CT chest neg for PE    S/p BiV-PPM -device check with one episode of AF rate controlled on 10/7- leads are old and they are not MRI compatible    Recent stress neg. Orthostatics neg. Echo today- will review    Carotid neg. PAF    Currently SR    On Xarelto and Coreg    Will follow tele     H/O CHB     S/p biventricular pacer 2011    Fibula Fracture noted- reviewed ortho note  Will follow-further recs after echo today    Most Recent Echo  9/20/21        Summary          Left ventricular systolic function is normal.          Ejection fraction is visually estimated at 50-55%. Mild to moderate left ventricular hypertrophy. Grade I diastolic dysfunction. PPM wiring visualized within the right heart. Trace aortic regurgitation noted with color doppler. No evidence of any pericardial effusion. Atherosclerotic plaque noted in the abdominal aorta.     Most Recent Stress test  Last stress test was done at the office on  05/23/2022, it was negative for any ischemia. EF was in the 60% range. Radiology  10/9/22  Impression   The right internal carotid artery demonstrates 0-50% stenosis. The left internal carotid artery demonstrates 0-50% stenosis. Bilateral vertebral arteries are patent with flow in the normal direction. Objective:   /88   Pulse 66   Temp 97.5 °F (36.4 °C) (Oral)   Resp 17   Ht 5' 7\" (1.702 m)   Wt 181 lb 6.4 oz (82.3 kg)   SpO2 94%   BMI 28.41 kg/m²     Intake/Output Summary (Last 24 hours) at 10/10/2022 0956  Last data filed at 10/10/2022 0000  Gross per 24 hour   Intake --   Output 200 ml   Net -200 ml       Medications:   Scheduled Meds:   DULoxetine  30 mg Oral Daily    levothyroxine  50 mcg Oral QAM AC    memantine  10 mg Oral BID    rivaroxaban  15 mg Oral Daily    sodium chloride flush  5-40 mL IntraVENous 2 times per day    amLODIPine  5 mg Oral Daily    carvedilol  12.5 mg Oral BID WC      Infusions:   nitroGLYCERIN Stopped (10/08/22 0914)    sodium chloride        PRN Meds:  albuterol sulfate HFA, sodium chloride flush, sodium chloride, ondansetron **OR** ondansetron, polyethylene glycol, acetaminophen **OR** acetaminophen     Physical Exam:  Vitals:    10/10/22 0838   BP: 117/88   Pulse: 66   Resp: 17   Temp: 97.5 °F (36.4 °C)   SpO2: 94%        General: AAO, NAD  Chest: Nontender  Cardiac: First and Second Heart Sounds are Normal, No Murmurs or Gallops noted  Lungs:Clear to auscultation and percussion. Abdomen: Soft, NT, ND, +BS  Extremities: No clubbing, no edema  Vascular:  Equal 2+ peripheral pulses.     Lab Data:  CBC:   Recent Labs     10/07/22  2025   WBC 9.6   HGB 13.8   HCT 43.1   MCV 85.3        BMP:   Recent Labs     10/07/22  2025 10/08/22  0926    138   K 2.9* 3.8    101   CO2 27 25   BUN 25* 22   CREATININE 1.5* 1.0     LIVER PROFILE:   Recent Labs     10/07/22  2025   AST 22   ALT 17   BILITOT 0.7 ALKPHOS 108     PT/INR: No results for input(s): PROTIME, INR in the last 72 hours. APTT: No results for input(s): APTT in the last 72 hours. BNP:  No results for input(s): BNP in the last 72 hours.       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    Chronic renal disease, stage III [facility]) [198091] 10/07/2022    Syncope and collapse 09/18/2021    Other cardiomyopathies ([facility] 07/06/2021    Fibromyalgia 04/05/2021    Episode of recurrent major depressive disorder ([facility] 04/05/2021    SOB (shortness of breath) 09/08/2020    Other specified hypothyroidism 09/08/2020    OAB (overactive bladder) 06/08/2020    S/P biventricular cardiac pacemaker procedure 02/27/2020    A-fib ([facility] 08/26/2019    Type 2 diabetes mellitus with diabetic polyneuropathy, without long-term current use of insulin ([facility] 06/05/2019       Electronically signed by Leobardo Macias PA-C on 10/10/2022 at 9:56 AM    Electronically signed by Olga Tran MD on 10/10/22 at 12:05 PM EDT

## 2022-10-10 NOTE — PROGRESS NOTES
4 Eyes Skin Assessment     NAME:  Edmund Barahona OF BIRTH:  1935  MEDICAL RECORD NUMBER:  [id number]    The patient is being assess for  Admission    I agree that 2 RN's have performed a thorough Head to Toe Skin Assessment on the patient. ALL assessment sites listed below have been assessed. Areas assessed by both nurses:    Head, Face, Ears, Shoulders, Back, Chest, Arms, Elbows, Hands, Sacrum. Buttock, Coccyx, Ischium, and Legs. Feet and Heels        Does the Patient have a Wound?  No noted wound(s)       Erlin Prevention initiated:  NA   Wound Care Orders initiated:  NA    Pressure Injury (Stage 3,4, Unstageable, DTI, NWPT, and Complex wounds) if present place referral/consult order under [de-identified] NA    New and Established Ostomies if present place consult order under : NA      Nurse 1 eSignature: Electronically signed by Pushpa Dickinson RN on 10/10/22 at 5:16 PM EDT    **SHARE this note so that the co-signing nurse is able to place an eSignature**    Nurse 2 eSignature: Electronically signed by Kathy Ferrell RN on 10/10/22 at 6:11 PM EDT

## 2022-10-11 LAB — GLUCOSE BLD-MCNC: 89 MG/DL (ref 70–99)

## 2022-10-11 PROCEDURE — 97167 OT EVAL HIGH COMPLEX 60 MIN: CPT

## 2022-10-11 PROCEDURE — 92523 SPEECH SOUND LANG COMPREHEN: CPT

## 2022-10-11 PROCEDURE — 6370000000 HC RX 637 (ALT 250 FOR IP): Performed by: INTERNAL MEDICINE

## 2022-10-11 PROCEDURE — 1280000000 HC REHAB R&B

## 2022-10-11 PROCEDURE — 97535 SELF CARE MNGMENT TRAINING: CPT

## 2022-10-11 PROCEDURE — 97162 PT EVAL MOD COMPLEX 30 MIN: CPT

## 2022-10-11 PROCEDURE — 97116 GAIT TRAINING THERAPY: CPT

## 2022-10-11 PROCEDURE — 82962 GLUCOSE BLOOD TEST: CPT

## 2022-10-11 PROCEDURE — 94761 N-INVAS EAR/PLS OXIMETRY MLT: CPT

## 2022-10-11 PROCEDURE — 6370000000 HC RX 637 (ALT 250 FOR IP): Performed by: PHYSICAL MEDICINE & REHABILITATION

## 2022-10-11 PROCEDURE — 99232 SBSQ HOSP IP/OBS MODERATE 35: CPT | Performed by: PHYSICAL MEDICINE & REHABILITATION

## 2022-10-11 PROCEDURE — 97530 THERAPEUTIC ACTIVITIES: CPT

## 2022-10-11 RX ORDER — LORAZEPAM 0.5 MG/1
0.5 TABLET ORAL ONCE
Status: COMPLETED | OUTPATIENT
Start: 2022-10-11 | End: 2022-10-11

## 2022-10-11 RX ORDER — HALOPERIDOL 5 MG/ML
5 INJECTION INTRAMUSCULAR EVERY 6 HOURS PRN
Status: DISCONTINUED | OUTPATIENT
Start: 2022-10-11 | End: 2022-10-19

## 2022-10-11 RX ADMIN — MIDODRINE HYDROCHLORIDE 5 MG: 5 TABLET ORAL at 16:59

## 2022-10-11 RX ADMIN — ACETAMINOPHEN 650 MG: 325 TABLET ORAL at 21:59

## 2022-10-11 RX ADMIN — LEVOTHYROXINE SODIUM 50 MCG: 0.07 TABLET ORAL at 06:04

## 2022-10-11 RX ADMIN — DULOXETINE 30 MG: 30 CAPSULE, DELAYED RELEASE ORAL at 08:12

## 2022-10-11 RX ADMIN — MIDODRINE HYDROCHLORIDE 5 MG: 5 TABLET ORAL at 12:34

## 2022-10-11 RX ADMIN — RIVAROXABAN 15 MG: 15 TABLET, FILM COATED ORAL at 08:13

## 2022-10-11 RX ADMIN — LORAZEPAM 0.5 MG: 0.5 TABLET ORAL at 20:30

## 2022-10-11 RX ADMIN — MEMANTINE 10 MG: 10 TABLET ORAL at 08:14

## 2022-10-11 RX ADMIN — MIDODRINE HYDROCHLORIDE 5 MG: 5 TABLET ORAL at 08:12

## 2022-10-11 RX ADMIN — METOPROLOL SUCCINATE 25 MG: 25 TABLET, EXTENDED RELEASE ORAL at 08:13

## 2022-10-11 ASSESSMENT — PAIN DESCRIPTION - LOCATION: LOCATION: LEG

## 2022-10-11 ASSESSMENT — PAIN DESCRIPTION - ORIENTATION: ORIENTATION: RIGHT

## 2022-10-11 ASSESSMENT — PAIN SCALES - GENERAL
PAINLEVEL_OUTOF10: 0
PAINLEVEL_OUTOF10: 0
PAINLEVEL_OUTOF10: 5
PAINLEVEL_OUTOF10: 0

## 2022-10-11 ASSESSMENT — PAIN DESCRIPTION - DESCRIPTORS: DESCRIPTORS: ACHING;DISCOMFORT

## 2022-10-11 ASSESSMENT — PAIN - FUNCTIONAL ASSESSMENT: PAIN_FUNCTIONAL_ASSESSMENT: PREVENTS OR INTERFERES SOME ACTIVE ACTIVITIES AND ADLS

## 2022-10-11 NOTE — PROGRESS NOTES
Facility/Department: [facility] ARU  Initial Speech/Language/Cognitive Assessment    NAME: Nika Mantilla  : 1935   MRN: [id number]  ADMISSION DATE: 10/10/2022  ADMITTING DIAGNOSIS: has Type 2 diabetes mellitus with diabetic polyneuropathy, without long-term current use of insulin ([address] A-fib ([address] S/P biventricular cardiac pacemaker procedure; OAB (overactive bladder); SOB (shortness of breath); Other specified hypothyroidism; Fibromyalgia; Episode of recurrent major depressive disorder ([facility] Other cardiomyopathies ([address] Syncope and collapse; Lupus ([facility] Senile degeneration of brain ([facility] Chronic renal disease, stage III ([facility] [510340]; Hypertensive emergency; and Hypertensive encephalopathy on their problem list.  DATE ONSET: 10/7/22    Date of Eval: 10/11/2022   Evaluating Therapist: TATY Cedillo    RECENT RESULTS  CT OF HEAD/MRI:    FINDINGS:   BRAIN/VENTRICLES: There is generalized age-appropriate atrophy. There is   moderate to severe patchy periventricular and subcortical white matter low   attenuation that is nonspecific but most consistent with chronic small vessel   ischemia. There is no evidence of acute hemorrhage, mass or extra-axial   fluid collection. ORBITS: Stable postsurgical changes. The visualized portion of the orbits   demonstrate no acute abnormality. SINUSES: The visualized paranasal sinuses and mastoid air cells demonstrate   no acute abnormality. SOFT TISSUES/SKULL:  No acute abnormality of the visualized skull or soft   tissues. Impression   No acute intracranial abnormality. Generalized atrophy and chronic small vessel ischemic white matter disease. Primary Complaint: I keep seeing people I know aren't here. I wonder if I'm getting something that's making that happen.     Pain:  Pain Assessment  Pain Assessment: None - Denies Pain  Pain Level: 0  Patient's Stated Pain Goal: 0 - No pain  Pain Location: Foot  Pain Orientation: Right  Pain Descriptors: Aching  Functional Pain Assessment: Activities are not prevented  Pain Type: Chronic pain  Pain Frequency: Continuous  Pain Onset: On-going  Non-Pharmaceutical Pain Intervention(s): Rest  Response to Pain Intervention: Patient satisfied  Side Effects: No reported side effects    Vision/ Hearing  Vision  Vision Exceptions: Wears glasses for reading (blind in L eye; having some hallucinations)  Hearing  Hearing: Within functional limits    Assessment:  Per PAS: Roosevelt Mari, 30-year-old female with h/o PPM in 2/2020, syncope, afib, HTN and dementia who presented to the ED on 10/7 after having a syncopal episode at [facility]. Allegedly she was at [employer] and had a witnessed syncopal episode with a bystander who broke her fall and lowered her to the ground. Allegedly she was unconscious for over 20 minutes. Patient does not recall any prodrome. She endorsed not taking her antihypertensive medicines for the past 2 weeks, but unable to say why. She was seen by her PCP who recommended possible placement due to concerns of safety while awake at home. In the ED, orthostatics were negative. Labs were unremarkable except for mild hypokalemia. Patient was found to have a right fibula fracture, per ortho non-operative. Patient to remain WBAT. Patient being medically managed for hypokalemia, HTN, and YASSINE. Patient lives alone is IND with ADL's and ambulation. Currently patient is min-modA with ADL's and Tony with RW for transfers and ambulation. Spoke with patients neighbor Moisés Sellers) regarding concern of patients cognitive status and returning home alone. Per Rin Espinal she feel's that patient would be fine once she regained her strength and returned home d/t being more comfortable in her own surroundings. Patient children have all passed away and only has one grandson who she doesn't speak to often. Rin Espinal is her main support and is willing to help as needed. COVID negative test noted on 10/10.   Medical/Surgical History   A-fib (HCC)    Arthritis    CAD (coronary artery disease)    Eye abnormality     Left Eye - Hx torn Retina and Cyst; Partial Blindness in Left Eye  Fibromyalgia    GERD (gastroesophageal reflux disease)    Hx of Doppler echocardiogram 01/16/2020   EF 45%  MI (myocardial infarction) ([facility] 2011   No Chest Pains - MI with collapse and pacemaker insertion. Thyroid disease    Ulcer in the past   Gastric  Radiology Findings  Impression No acute intracranial abnormality. Generalized atrophy and chronic small vessel ischemic white matter disease. Consults  Cognitive Diagnosis: Pt exhibits mild cognitive deficits characterized by reduced attn, reduced mental flexibility and problem solving, reduced judgemetn, STM deficits, and some hallucinations. Communication Diagnosis: WFL   Pt lives alone with limited support reported. Stated \"I think it's time I stop driving since all this happened and I don't know why--I can have my grandkids help me with driving places. Pt would benefit from Lou Herron NS for med mgmt post discharge. Recommendations:  Recommendations  Requires SLP Intervention: No  Patient Education: safety in the room  Patient Education Response: Verbalizes understanding  Duration of Treatment: No ST          Plan:   Individuals consulted  Consulted and agree with results and recommendations: Patient  Safety Devices  Safety Devices in place:  (NSG in to check BP during standing)    Goals:  Long Term Goals  Time Frame for Long Term Goals: No ST recommended-mild memory deficits will be well served with PT/OT with practical application and repetition of needed skills. Patient/family involved in developing goals and treatment plan: Pt in agreement    Subjective:   Previous level of function and limitations: Syble Older was independent with activities of daily living prior to admit. 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  was in the [facility]: Has a cat Allyssa, housework, grocery, pt sets up a 7 day pill box, pt manages finances via autopay  Additional Comments: Pt typically sleeps in a flat, regular bed at home  Vision  Vision Exceptions: Wears glasses for reading (blind in L eye; having some hallucinations)  Hearing  Hearing: Within functional limits           Objective:       Oral Motor   Labial: No impairment  Lingual: No impairment  Mandible: No impairment    Motor Speech  Apraxic Characteristics: None  Dysarthric Characteristics: None  Overall Impairment Severity: None    Auditory Comprehension  Comprehension: Within Functional Limits         Expression  Primary Mode of Expression: Verbal    Verbal Expression  Verbal Expression: Within functional limits    Written Expression  Dominant Hand: Right  Written Expression: Within Functional Limits (Basic name and numbers)         Pragmatics/Social Functioning  Pragmatics: Within functional limits    Cognition:      Orientation  Overall Orientation Status: Within Functional Limits  Attention  Attention: Exceptions to [facility]  Alternating Attention: Mild  Sustained Attention: Mild  Memory  Memory: Exceptions to [facility]  Short-term Memory: Mild  Numeric Reasoning  Numeric Reasoning: Within Functional Limits  Safety/Judgment  Safety/Judgment: Exceptions to [facility]  Novel Situations: Mild  Insight: Mild    Flexibility of Thought: Mild    Additional Assessments: The Brief Cognitive Assessment Tool (BCAT®) was administered 10/11/2022 to assess the following cognitive domains: orientation, verbal recall, visual recognition, visual recall, attention, abstraction, language, executive functions, and visuo-spatial processing. The emphasis of the tool is assessing contextual memory, executive functions, and attentional capacity.    Cognitive Impairment Scale:  NORMAL:    46-50   NO FUNCTIONAL DEFICIT; INDEPENDENT LIVING; MAY BE  SUBJECTIVE MEMORY COMPLAINTS BUT LITTLE TO NO EVIDENCE  MILD COGNITIVE  IMPAIRMENT; 34-46   GENERALLY FUNCTIONAL WITH EARLY SPECIFIC FX DECLINE (IADL)   LOWER SCORES MORE SUGGESTIVE OF ADDITIONAL SUPPORT NEEDS   BOTTOM RANGE SCORES CONSIDER MED MGMT AND SUPPORT FOR COMMUNITY REINTEGRATION    MILD DEMENTIA 26-34   IADL DEFICITS; CLEARLY OBJECTIVE EVIDENCE OF MEMORY AND OTHER COGNITIVE DECLINE; MED MGMT AND COMMUNITY REINTEGRATION SUPPORT NEEDED    MODERATE TO   SEVERE DEMENTIA 0-25   MODERATE (UPPER END OF RANGE)--PERVASIVE FX DEFICITS (IADLS) BUT ADLS GENERALLY INTACT   MARKED DEFICITS IN MEMORY AND EXECUTIVE FX; BEHAVIORAL AND PSYCH SYMPTOMS ARE COMMON      Dementia Impairment Scale:  Mild Cognitive Impairment  38  Mild Dementia  28  Moderate Dementia  18      BCAT score for Teri Blood: 32/50 indicating mild cognitive impairment. Strengths:  Ability to put names to objects  Awareness of self, time, place, and situation  Determine how objects are similar to one another  Understand and express speech  Understand visual processes and relationships  Weaknesses:  Ability to concentrate and focus  \"Command and control\" cognitive activities  Ability to immediately recall a word list of 4 items: banana/justice/Isabel/bridge. Pt able to complete immediate recall 4/4 and delayed recall 0/4  Ability to recall 3 pictures after 10 minute delay 1/3  Recall previously presented words over a time delay and recall elements of a story and previously presented pictures/story  Story specifics included:  Jann Gosselin / borrowed / $9 / from her brother / Suzi Salkum / last week. / She couldn't pay him back / because she bought /a delicious / ice cream cone / at the circus instead.    Pts immediate response was 3/11 and delayed response: 5/11  Strategies that improved performance included:   [x] repetition   [x] practical application    [] spaced retrieval    [x] choice cues               Prognosis:  Individuals consulted  Consulted and agree with results and recommendations: Patient    Education:  Patient Education: safety in the room  Patient Education Response: Verbalizes understanding  Safety Devices in place:  (NSG in to check BP during standing)       Therapy Time:   Individual Concurrent Group Co-treatment   Time In 1630         Time Out 1700         Minutes 30                 Electronically signed by TATY Sprague on 10/11/2022 at 5:23 PM

## 2022-10-11 NOTE — PROGRESS NOTES
Comprehensive Nutrition Assessment    Type and Reason for Visit:  Initial, Consult (oral nutrition supplement)    Nutrition Recommendations/Plan:   Continue carb controlled diet, low sodium diet   Will offer oral nutrition supplement during stay  Will continue to follow up during stay      Malnutrition Assessment:  Malnutrition Status:  Insufficient data (10/11/22 0011)    Context:  Acute Illness       Nutrition Assessment:    Admit to acute rehab unit with weakness, hypertensive encephalopathy. Currently on carb controlled, cardiac, low sodium diet. Meal intake recent meals %. With advanced age likely benefit from liberalized diet. Will follow at low nutrition risk at this time. Nutrition Related Findings:    asleep in bed on visit, hx DM but HbA1c 5.4%, dementia, CAD Wound Type: None       Current Nutrition Intake & Therapies:    Average Meal Intake: %  Average Supplements Intake: None Ordered  ADULT DIET; Regular; 5 carb choices (75 gm/meal); Low Fat/Low Chol/High Fiber/ARIANA; Low Sodium (2 gm)    Anthropometric Measures:  Height: 5' 7\" (170.2 cm)  Ideal Body Weight (IBW): 135 lbs (61 kg)       Current Body Weight: 175 lb 14.8 oz (79.8 kg), 130.3 % IBW. Weight Source: Bed Scale  Current BMI (kg/m2): 27.5  Usual Body Weight: 178 lb 9.2 oz (81 kg) (hx last year 2021)  % Weight Change (Calculated): -1.5  Weight Adjustment For: No Adjustment                 BMI Categories: Overweight (BMI 25.0-29. 9)    Estimated Daily Nutrient Needs:  Energy Requirements Based On: Formula  Weight Used for Energy Requirements: Current  Energy (kcal/day): 1400  Weight Used for Protein Requirements: Ideal  Protein (g/day): 61-73 (1-1.2 g/kg)  Method Used for Fluid Requirements: ml/Kg  Fluid (ml/day): 1900 (25 ml/kg)    Nutrition Diagnosis:   No nutrition diagnosis at this time related to   as evidenced by      Nutrition Interventions:   Food and/or Nutrient Delivery: Continue Current Diet, Start Oral Nutrition Supplement  Nutrition Education/Counseling: No recommendation at this time  Coordination of Nutrition Care: Continue to monitor while inpatient       Goals:     Goals: PO intake 75% or greater, by next RD assessment       Nutrition Monitoring and Evaluation:   Behavioral-Environmental Outcomes: None Identified  Food/Nutrient Intake Outcomes: Food and Nutrient Intake  Physical Signs/Symptoms Outcomes: Biochemical Data, Meal Time Behavior, Skin, Weight    Discharge Planning:    Continue current diet     Carver Severin, RD, LD  Contact: [phone]

## 2022-10-11 NOTE — PROGRESS NOTES
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 Thyroid disease     Ulcer in the past    Gastric     Past Surgical History:   Procedure Laterality Date    APPENDECTOMY      BLADDER SURGERY      CHOLECYSTECTOMY      COLONOSCOPY  11/4/14    normal, biopsy    EYE SURGERY      HYSTERECTOMY (CERVIX STATUS UNKNOWN)      PACEMAKER PLACEMENT Left 02/20/2020    bivi pacer upgrade-Fund Recs Percepta Quad CRT-P MRI [place]  2011    Dr. Lela Aguiar.  [facility] - following MI     Social History:  Social/Functional History  Lives With: Alone  Type of Home: House  Home Layout: Two level, Able to Live on Main level with bedroom/bathroom, 1/2 bath on main level (reports she hasn't gone upstairs in years)  Home Access: Stairs to enter with rails  Entrance Stairs - Number of Steps: 3-4  Entrance Stairs - Rails: Both (however cannot reach both simultaneously)  Bathroom Shower/Tub: Tub/Shower unit (however sponge bathes at baseline)  Bathroom Toilet: Standard  Bathroom Accessibility: Walker accessible  Home Equipment: Rilla Beards, standard  Has the patient had two or more falls in the past year or any fall with injury in the past year?: Yes (pt unable to give exact number but is confident it was more than 2)  Receives Help From: Liana Chowdary  ADL Assistance: [address] [place] [address]: Independent  Homemaking Responsibilities: Yes (pt reports being Ind but charting indicates pt is non compliant with medication)  Ambulation Assistance: Independent (with cane)  Transfer Assistance: Independent  Active : Yes  Mode of Transportation: Car  Occupation: Retired  Type of Occupation: Housewife, 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  was in the [address] [place]: Has a cat Allyssa  Additional Comments: Pt typically sleeps in a flat, regular bed at home    Restrictions:  Restrictions/Precautions  Restrictions/Precautions: General Precautions, Fall Risk (watch ortho BPs)                  Pain Level: 0  Pain Location: Foot    IRF-Hearing and Speech: Hearing, Speech, and Vision  Expression of Ideas and Wants: Without difficulty  Understanding Verbal and Non-Verbal Content: Understands  IRF-COG:  Cognitive Patterns  Cognitive Pattern Assessment Used: BIMS  Repetition of Three Words (First Attempt): 3  Temporal Orientation: Year: Correct  Temporal Orientation: Month: Accurate within 5 days  Temporal Orientation: Day: Incorrect or no answer  Able to recall \"sock: No, could not recall  Able to recall \"blue\": Yes, no cue required  Able to recall \"bed\": No, could not recall  BIMS Summary Score: 10  IRF-CAM (Confusion Assessment Method): Confusion Assessment Method (CAM)  Is there evidence of an acute change in mental status from the patient's baseline?: Yes  Inattention: Behavior present, fluctuates (comes and goes, changes in severity)  Disorganized thinking: Behavior present, fluctuates (comes and goes, changes in severity)  Altered level of consciousness: Behavior not present      Objective:  Observation/Palpation  Observation: Pt in bed on approach, reported feeling \"not 100%\" but agreeable to evaluation.   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) Hearing  Hearing: Within functional limits    ROM:      LUE AROM (degrees)  LUE AROM : WFL     Left Hand AROM (degrees)  Left Hand AROM: WFL     RUE AROM (degrees)  RUE AROM : WFL     Right Hand AROM (degrees)  Right Hand AROM: WFL    Strength:    LUE Strength  Gross LUE Strength: WFL  L Hand General: 4+/5  LUE Strength Comment: 4+/5 grossly  RUE Strength  Gross RUE Strength: WFL  R Hand General: 4+/5  RUE Strength Comment: 4+/5 grossly    Quality of Movement: Tone RUE  RUE Tone: Normotonic  Tone LUE  LUE Tone: Normotonic  Coordination  Movements Are Fluid And Coordinated: Yes       Sensation:    Sensation  Overall Sensation Status: Impaired (reports numbness in feet)     ADLs:    Eating: Eating  Assistance Needed: Independent  Comment: able to open packages/containers  CARE Score: 6  Discharge Goal: Independent       Oral Hygiene: Oral Hygiene  Assistance Needed: Setup or clean-up assistance  Comment: required to perform seated 2* hypotension  CARE Score: 5  Discharge Goal: Independent    UB/LB Bathing: Shower/Bathe Self  Assistance Needed: Supervision or touching assistance  Comment: CGA while in stance to bathe perineal area; completed remainder seated. Required min cues for sequencing d/t decreased recall of parts washed  CARE Score: 4  Discharge Goal: Supervision or touching assistance    UB Dressing: Upper Body Dressing  Assistance Needed: Supervision or touching assistance  Comment: to don undershirt and sweatshirt  CARE Score: 4  Discharge Goal: Set-up or clean-up assistance         LB Dressing:   Lower Body Dressing  Assistance Needed: Partial/moderate assistance  Comment: able to doff and don Depends, able to thread LLE in pants but required assist to thread RLE.   Performed pants management c CGA  CARE Score: 3  Discharge Goal: Set-up or clean-up assistance    Donning and [place] Footwear: Putting On/Taking Off Footwear  Assistance Needed: Supervision or touching assistance  Comment: to doff/don hospital socks  CARE Score: 4  Discharge Goal: Set-up or clean-up assistance      Toile[address] needed: Supervision or touching assistance  Comment: CGA  CARE Score: 4  Discharge Goal: Supervision or touching assistance      Bed Mobility:    Bed mobility  Supine to Sit: Supervision  Sit to Supine: Supervision    Transfers:    Transfers  Stand Pivot Transfers: Contact guard assistance  Sit to stand: Contact guard assistance  Stand to sit: Contact guard assistance     Shower Transfers  Shower Transfers: Not tested  Shower Transfers Comments: 2* hypotension; pt agreeable to trial in future     Toilet Transfer  Assistance needed: Supervision or touching assistance  Comment: CGA to<>from W/C using grab bar  CARE Score: 4  Discharge Goal: Supervision or touching assistance    Functional Mobility:    Balance  Sitting Balance: Supervision  Standing Balance: Contact guard assistance  Standing Balance  Time: 2 stands 1-2 mins each  Activity: ADLs  Comment: Pt able to maintain balance c 0-1 UE support  Functional Mobility  Functional Mobility Comments: Mobility deferred 2* hypotension, therapist propelled W/C     Cognition:  Cognition  Overall Cognitive Status: Exceptions  Following Commands:  Follows multistep commands with repitition  Memory: Decreased short term memory (forgot task completed or conversation at times after 5 minutes)  Safety Judgement: Decreased awareness of need for assistance, Decreased awareness of need for safety  Problem Solving: Decreased awareness of errors  Insights: Decreased awareness of deficits  Initiation: Requires cues for some  Sequencing: Requires cues for some  Cognition Comment: H/o dementia; on [place]    Perception:  Perception  Overall Perceptual Status: WFL      Assessment:     Performance deficits / Impairments: Decreased functional mobility , Decreased ADL status, Decreased safe awareness, Decreased cognition, Decreased endurance, Decreased balance, Decreased sensation, Decreased posture    The patient is a 80year old female admitted onto ARU after hospitalization for syncopal episode with collapse in public; report is that pt lost consciousness for 20 mins. Pt was initially treated for hypertension, however is now experiencing orthostatic hypotension; cardiology is following. During fall pt sustained a R proximal fibula fx; ortho is recommending non surgical measures and pt is cleared for WBAT RLE and ROM. PTA, pt lives alone and is Ind c ADLs, IADLs (however there are concerns about how well pt was managing this, for example med management as charting indicates non compliance), and functional transfers/mobility using a cane at times. Today, pt was symptomatic with orthostatic BPs therefore only a sinkside ADL was completed and mobility was deferred. I am concerned about pt's discharge as she does have a dementia diagnosis and decreased memory and safety was observed throughout evaluation. Pt did express some insight into this, however also expressed how she wants to stay in her home. D/t cognitive concerns mod I goals were not set. The QI, MMT, and ROM standardized assessments were used this date to determine the above performance deficits, which compromise pt's ability to safely complete ADLs/IADLs/mobility. Pt will benefit from ARU OT services to increase functional performance and return to PLOF. Decision Making: High Complexity  Clinical Presentation:  Evolving c unstable characteristics (I.e. ortho BP)  Patient education:   ARU vipin, Role of O.T., O.T. plan of care  []   Patient goal was established and reviewed in Rehab[facility]er with patient and/or family this date.   REQUIRES OT FOLLOW-UP: Yes  Discharge Recommendations:  Home c continuous assist, C OT  Equipment Recommendations:  TBD    Goals:     Short Term Goals  Time Frame for Short Term Goals: STGs=LTGs  Long Term Goals  Time Frame for Long Term Goals : ~10 days or until d/c  Long

## 2022-10-11 NOTE — PROGRESS NOTES
Physical Therapy    [facility] ARU PHYSICAL THERAPY EVALUATION    Chart Review:  Past Medical History:   Diagnosis Date    A-fib ([facility] [address]     Arthritis     CAD (coronary artery disease)     Eye abnormality     Left Eye - Hx torn Retina and Cyst; Partial Blindness in Left Eye    Fibromyalgia     GERD (gastroesophageal reflux disease)     Hx of Doppler echocardiogram 01/16/2020    EF 45%     MI (myocardial infarction) ([facility] [address] 2011    No Chest Pains - MI with collapse and pacemaker insertion. Thyroid disease     Ulcer in the past    Gastric     Past Surgical History:   Procedure Laterality Date    APPENDECTOMY      BLADDER SURGERY      CHOLECYSTECTOMY      COLONOSCOPY  11/4/14    normal, biopsy    EYE SURGERY      HYSTERECTOMY (CERVIX STATUS UNKNOWN)      PACEMAKER PLACEMENT Left 02/20/2020    bivi pacer upgrade-[employer] Percepta Quad CRT-P MRI [place]  2011    Dr. Rin Shoemaker - following MI     Fall History: at least 2   Social History:  Social/Functional History  Lives With: Alone  Type of Home: House  Home Layout: Two level, Able to Live on Main level with bedroom/bathroom, 1/2 bath on main level (reports she hasn't gone upstairs in years)  Home Access: Stairs to enter with rails  Entrance Stairs - Number of Steps: 3-4  Entrance Stairs - Rails: Both (however cannot reach both simultaneously)  Bathroom Shower/Tub: Tub/Shower unit (however sponge bathes at baseline)  Bathroom Toilet: Standard  Bathroom Accessibility: Walker accessible  Home Equipment: Claudio Dakins, standard  Has the patient had two or more falls in the past year or any fall with injury in the past year?: Yes (pt unable to give exact number but is confident it was more than 2)  Receives Help From: Vannessa Landin  ADL Assistance: [address] [place] [address]: Independent  Homemaking Responsibilities: Yes (pt reports being Ind but charting indicates pt is non compliant with medication)  Ambulation Assistance: Independent (with cane)  Transfer Assistance: Independent  Active : Yes  Mode of Transportation: Car  Occupation: Retired  Type of Occupation: Housewife, 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  was in the [address]: Has a cat Allyssa  Additional Comments: Pt typically sleeps in a flat, regular bed at home    Restrictions:  Restrictions/Precautions  Restrictions/Precautions: General Precautions, Fall Risk (watch ortho BPs)       Subjective: Pt seated at EOB, states being assisted to the bathroom by the nurse recently, had pain on R knee, talkative requiring redirection.     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)  Hearing  Hearing: Within functional limits    Sensation:  Sensation  Overall Sensation Status: Impaired (reports numbness in feet)      ROM:      AROM RLE (degrees)  RLE General AROM: decreased R knee movements due to pain     AROM LLE (degrees)  LLE AROM : WFL                 Strength:    Strength RLE  Comment: 3-/5 on knee  Strength LLE  Strength LLE: WFL  Comment: grossly 4/5              Bed Mobility:   Lying to Sitting on Side of Bed  Assistance Needed: Supervision or touching assistance  Comment: SBA without use of bed features (pt reports dizziness upon sitting up but no trunk swaying observed); pt able to maintain static sitting even without UE support  CARE Score: 4  Discharge Goal: Independent  Roll Left and Right  Assistance Needed: Supervision or touching assistance  Comment: SBA without use of bed features; pt reports pain on R lower leg on both directions  CARE Score: 4  Discharge Goal: Independent  Sit to Lying  Assistance Needed: Supervision or touching assistance  Comment: SBA without use of bed features  CARE Score: 4  Discharge Goal: Independent    Transfers:    Sit to Stand  Assistance Needed: Supervision or touching assistance  Comment: CGA with 2WW  CARE Score: 4  Discharge Goal: Independent  Chair/Bed-to-Chair Transfer  Assistance Needed: Partial/moderate assistance  Comment: Min A due to poor eccentric control as pt did not usually transition at least 1 hand to sit; decreased insight to safety observed as well as she had episodes of abandoning AD during transfer process  CARE Score: 3  Discharge Goal: Supervision or touching assistance     Car Transfer  Assistance Needed: Supervision or touching assistance  Comment: CGA with 2WW  CARE Score: 4  Discharge Goal: Independent    Ambulation:   Device used PTA: cane    Walking Ability  Does the Patient Walk?: Yes     Walk 10 Feet  Assistance Needed: Supervision or touching assistance  Comment: CGA with 2WW  CARE Score: 4  Discharge Goal: Independent     Walk 50 Feet with Two Turns  Assistance Needed: Dependent  Comment: CGA->Min A with 2WW + SBA of 2nd person for safety due to variable R knee pain intensity and unpredictable occurrence/intensity of lightheadedness  CARE Score: 1  Discharge Goal: Independent     Walk 150 Feet  Assistance Needed: Dependent  Comment: CGA->Min A with 2WW + SBA of 2nd person for safety due to variable R knee pain intensity and unpredictable occurrence/intensity of lightheadedness; pt was able to tolerate 160 ft distance today  CARE Score: 1  Discharge Goal: Supervision or touching assistance     Walking 10 Feet on Uneven Surfaces  Assistance Needed: Dependent  Comment: CGA->Min A with 2WW + SBA of 2nd person for safety due to variable R knee pain intensity and unpredictable occurrence/intensity of lightheadedness  CARE Score: 1  Discharge Goal: Supervision or touching assistance     1 Step (Curb)  Assistance Needed: Dependent  Comment: CGA->Min A with 2WW + SBA of 2nd person for safety due to variable R knee pain intensity and unpredictable occurrence/intensity of lightheadedness  CARE Score: 1  Discharge Goal: Supervision or touching assistance     4 Steps  Assistance Needed: Dependent  Comment: CGA->Min A with railings + SBA->CGA  of 2nd person for safety due to variable R knee pain intensity and unpredictable occurrence/intensity of lightheadedness; required verbal cues 50% of the time to carry out appropriate step-to pattern sequencing after initial education/demonstration  CARE Score: 1  Discharge Goal: Supervision or touching assistance     12 Steps  Assistance Needed: Dependent  Comment: CGA->Min A with railings + SBA->CGA  of 2nd person for safety due to variable R knee pain intensity and unpredictable occurrence/intensity of lightheadedness; required verbal cues >50% of the time to carry out appropriate step-to pattern sequencing after initial education/demonstration; pt was able to ascend/descend 15 steps today  CARE Score: 1  Discharge Goal: Supervision or touching assistance    Gait Deviations: []None [x]Slow arnulfo  [] Increased JUDITH  [] Staggers []Deviated Path  [x] Decreased R step length  [x] Decreased R step height  []Decreased arm swing  [] Shuffles  [] Decreased head and trunk rotation  [x]other: antalgic, step-to and gradually progressed to step-through pattern but with impaired heel-toe pattern        Wheelchair:  w/c Ability: Wheelchair Ability  Uses a Wheelchair and/or Scooter?: No                Balance:        Object: Picking Up Object  Assistance Needed: Partial/moderate assistance  Comment: Min A (required holding of trunk as pt did not rely on 2WW to assist with balance and performed task in unsupported stance instead; task completed without use of reacher)  CARE Score: 3  Discharge Goal: Supervision or touching assistance               Assessment:   The patient is an 80year old female admitted onto ARU after hospitalization for a syncopal episode while at a grocery store. Pt had LOC for over 20 minutes but does not recall any prodrome. Pt admitted to not taking her antihypertensive meds for the past 2 weeks but no reason provided. When asked about this today, pt clarified that it was her thyroid pills that she was not able to take as prescribed as she misplaced the container. PCP has recommeded possible placement due to safety concerns. In the ED, orthostatics were negative. Labs were unremarkable except for mild hypokalemia. Pt was found to have R proximal fibular Fx and is being treated conservatively and is WBAT. Pt required medical management for hypokalemia, HTN, and YASSINE. Pt does not have local family support and neighbor Gerson Aguila) is her main support. Pt with Hx of A-fib and  vascular dementia. Pt presented with pain primarily on R knee with movement and weight bearing that made her gait quality unsteady. Her BP was assessed before and after mobility tasks and were much improved and stable versus during OT session. The status of her BP will be an important factor to her activity tolerance and progression of her mobility on subsequent therapy sessions. She reported lightheadedness but did not progress to any syncopal episode allowing pt to tolerate positional changes and progressive mobility assessment today. Considering her acute injury and pain on the RLE, pt required VCs at least 50% of the time to carry out appropriate step sequencing due to impaired memory and decreased attention. It is anticipated that pt will require increased assist at home for safety due to her cognitive deficits, however she has no local family support available and so this will be imperative for discharge planning.           Body Structures, Functions, Activity Limitations Requiring Skilled Therapeutic Intervention: Decreased functional mobility , Decreased ADL status, Decreased ROM, Decreased strength, Decreased safe awareness, Decreased cognition, Decreased endurance, Decreased balance, Decreased sensation, Decreased high-level IADLs, Increased pain, Decreased vision/visual deficit     Therapy Prognosis: Good, Guarded  Decision Making: Medium Complexity  Clinical Presentation: evolving with changing characteristics      Patient education:   ARU schedule, ARU expectations for participation, plan of care  Treatment Initiated:  Functional mobility training, gait training, patient education  Barriers to Improvement:  cognition, status of BP and intensity of Sx, RLE pain, insight to deficits and safety   Discharge Recommendations:  home with increased assist   Equipment Recommendations:  has walker but pt uncertain of what type and current condition     Goals:  Patient Goals   Patient Goals : to be able to care for self and go home  Short Term Goals  Time Frame for Short Term Goals: 10 tx days:  Short Term Goal 1: Pt will complete bed mobility (scooting, rolling R/L, and sup<->sit) Ind. Short Term Goal 2: Pt will sit->stand and car transfers using 2WW Mod Ind. Short Term Goal 3: Pt will complete stand->sit and stand turn transfers using 2WW with Sup. Short Term Goal 4: Pt will ambulate 10 ft and 50 ft with turns over level surface using 2WW Mod Ind. Short Term Goal 5: Pt will ambulate 10 ft of uneven surface and >/=150 ft of level surface using 2WW with SBA-Sup. Additional Goals?: Yes  Short Term Goal 6: Pt will ascend/descend curb step using 2WW and 1 flight of stairs using railings following appropriate step-to pattern on ascent/descent with SBA-Sup. Short Term Goal 7: Pt will complete object retrieval from the floor with 2WW and reacher prn Mod Ind.      Plan:    Requires PT Follow-Up: Yes  Pt will be seen at least 60 minutes per day for a minimum of 5 days per week, plus group therapy as appropriate  Physcial Therapy Plan  Current Treatment Recommendations: Strengthening, ROM, Balance training, Functional mobility training, Transfer training, Cognitive/Perceptual training, Endurance training, Gait training, Stair training, Pain management, Home exercise program, Safety education & training, Patient/Caregiver education & training, Equipment evaluation, education, & procurement, Therapeutic activities    PT Individual Minutes  Time In: 9466  Time Out: 1430  Minutes: 75        Timed Code Treatment Minutes: 60 Minutes    Number of Minutes/Billable Intervention      PT Evaluation 15   Gait Training 30   Therapeutic Exercise    Neuro Re-Ed    Therapeutic Activity 30   Wheelchair Propulsion    Group    Other:    TOTAL 75       Electronically signed by:    Pj Sun PT  10/11/2022, 15:17 Acute respiratory failure

## 2022-10-11 NOTE — H&P
Duane Norrie    : 1935  Acct #: [de-identified]  MRN: [id number]              History and physical      Admitting diagnosis: Hypertensive encephalopathy ( [place] [address] [address])    Comorbid diagnoses impacting rehabilitation: Generalized weakness, gait disturbance, uncontrolled pain, orthostatic hypotension, acute kidney injury, paroxysmal atrial fibrillation, hypokalemia, right proximal fibular fracture, vascular dementia    Chief complaint: \"I just do not feel right\". History of present illness: The patient is an 15-year-old right-hand-dominant female who presented to our ED on 10/7/2022 after suffering a syncopal episode while shopping. No seizure activity was described. The patient does not have any significant recall of the events before or during the fall. Her loss of consciousness was described as minutes in length. In our ED, brain imaging was unremarkable but she was significantly hypertensive and hypokalemic. No arrhythmia aside from her baseline atrial fibrillation was identified. Since then she has had some fluctuation of heart rate up and down. She was diagnosed with acute kidney injury and a right proximal fibular fracture. Orthopedic consultation recommended no surgery and allowed weightbearing as tolerated. She has struggled with forgetfulness (a recently progressive concern) and generalized weakness with dizziness. She has been unable to do her own toileting, transfers and self-care and cannot return directly home. She requires inpatient rehabilitation to address these issues. Review of systems: She complains of seeing things and people that are not there. She does not admit to forgetting things. She denied nausea, chills, cough or chest pain. She has no awareness of any palpitations. Infrequent bowel movements. The remainder of their review of systems was negative except as mentioned in the history of present illness.     Social History: Lives With: Alone  Type of Home: [facility] Layout: Two level, Able to Live on Main level with bedroom/bathroom, 1/2 bath on main level (reports she hasn't gone upstairs in years)  Home Access: Stairs to enter with rails  Entrance Stairs - Number of Steps: 3-4  Entrance Stairs - Rails: Both (however cannot reach both simultaneously)  Bathroom Shower/Tub: Tub/Shower unit (however sponge bathes at baseline)  Bathroom Toilet: Standard  Bathroom Accessibility: Walker accessible  Home Equipment: [employer]  Has the patient had two or more falls in the past year or any fall with injury in the past year?: Yes (pt unable to give exact number but is confident it was more than 2)  Receives Help From: [employer] Micheline Richardson)  ADL Assistance: [address] [place] [address] Avenue: Independent  Homemaking Responsibilities: Yes (pt reports being Ind but charting indicates pt is non compliant with medication)  Ambulation Assistance: Independent (with cane)  Transfer Assistance: Independent  Active : Yes  Mode of Transportation: Car  Occupation: Retired  Type of Occupation: Housewife, 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  was in the [address] [place] [address]: Has a cat Allyssa  Additional Comments: Pt typically sleeps in a flat, regular bed at home       She reports that she quit smoking about 45 years ago. Her smoking use included cigarettes. She has never used smokeless tobacco. She reports that she does not drink alcohol and does not use drugs. Prior (baseline) level of function: Modified independent for mobility and self-care, but she was struggling to manage her medications. Current level of function: Moderate physical assistance and verbal cues for mobility and self-care.     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 [address] 2011    No Chest Pains - MI with collapse and pacemaker insertion. Thyroid disease     Ulcer in the past    Gastric        Past Surgical History:     Past Surgical History:   Procedure Laterality Date    APPENDECTOMY      BLADDER SURGERY      CHOLECYSTECTOMY      COLONOSCOPY  11/4/14    normal, biopsy    EYE SURGERY      HYSTERECTOMY (CERVIX STATUS UNKNOWN)      PACEMAKER PLACEMENT Left 02/20/2020    bivi pacer upgrade-Medtronic Percepta Quad CRT-P MRI Jolene Estrada  2011    Dr. Daryl Macias.  [facility] - following MI       Current Medications:     Current Facility-Administered Medications:     acetaminophen (TYLENOL) tablet 650 mg, 650 mg, Oral, Q6H PRN **OR** [DISCONTINUED] acetaminophen (TYLENOL) suppository 650 mg, 650 mg, Rectal, Q6H PRN, Louis Sprague MD    albuterol sulfate HFA (PROVENTIL;VENTOLIN;PROAIR) 108 (90 Base) MCG/ACT inhaler 2 puff, 2 puff, Inhalation, 4x Daily PRN, MD Salma Coles ON 10/11/2022] DULoxetine (CYMBALTA) extended release capsule 30 mg, 30 mg, Oral, Daily, Louis Sprague MD    [facility]) tablet 10 mg, 10 mg, Oral, BID, MD Salma Coles ON 10/11/2022] rivaroxaban (XARELTO) tablet 15 mg, 15 mg, Oral, Daily, MD Salma Coles ON 10/11/2022] levothyroxine (SYNTHROID) tablet 50 mcg, 50 mcg, Oral, QAM AC, Louis Sprague MD    vitamin D (ERGOCALCIFEROL) capsule 50,000 Units, 50,000 Units, Oral, Weekly, Louis Sprague MD, 50,000 Units at 10/10/22 1700    [START ON 10/11/2022] metoprolol succinate (TOPROL XL) extended release tablet 25 mg, 25 mg, Oral, Daily, Louis Sprague MD    midodrine ([address] [place] [address]) tablet 5 mg, 5 mg, Oral, TID WC, Rosalba Sprague MD, 5 mg at 10/10/22 1700    acetaminophen (TYLENOL) tablet 650 mg, 650 mg, Oral, Q4H PRN, FABIEN Moran MD    polyethylene glycol (GLYCOLAX) packet 17 g, 17 g, Oral, Daily PRN, FABIEN Moran MD    bisacodyl (DULCOLAX) suppository 10 mg, 10 mg, Rectal, Daily PRN, FABIEN Moran MD    Family History:   Family History   Problem Relation Age of Onset    Cancer Mother     Cancer Father     Heart Disease Father     Cancer Sister     Cancer Brother        Exam:    Blood pressure (!) 97/52, pulse 66, temperature 97.1 °F (36.2 °C), temperature source Oral, resp. rate 18, height 5' 7\" (1.702 m), weight 178 lb 12.7 oz (81.1 kg), not currently breastfeeding. General: Patient was seen semirecumbent in bed. She was alert but easily distracted. Oriented to person and hospital but not situation or unit. She generally follows one-step commands. In no distress. HEENT: No signs of trauma to her head or neck. Cervical active range of motion was age-appropriate. MMM. No adenopathy or JVD. Full visual field. Pulmonary: Shallow respirations without wheezes or rales. Cardiac: Infrequent premature beats. Soft systolic murmur. Abdomen: Patient's abdomen was soft and nondistended. Bowel sounds were present throughout. There was no rebound, guarding or masses noted. Spinal exam: No significant percussion tenderness or skin breakdown. Upper extremities: Patient was able to bring both hands up overhead. She had clumsy movements of both hands and give way with MMT. No tremor or clonus noted. Perception of touch seemed preserved. Lower extremities: No significant edema. Heels clear. Give way with MMT. Sitting balance was fair-.  Standing balance was poor.     Lab Results   Component Value Date    WBC 9.6 10/07/2022    HGB 13.8 10/07/2022    HCT 43.1 10/07/2022    MCV 85.3 10/07/2022     10/07/2022     Lab Results   Component Value Date INR 1.02 02/25/2020    INR 1.00 11/08/2013    INR 3.53 01/24/2011    PROTIME 12.3 02/25/2020    PROTIME 10.8 11/08/2013    PROTIME 41.8 (H) 01/24/2011     Lab Results   Component Value Date    CREATININE 1.0 10/08/2022    BUN 22 10/08/2022     10/08/2022    K 3.8 10/08/2022     10/08/2022    CO2 25 10/08/2022     Lab Results   Component Value Date    ALT 17 10/07/2022    AST 22 10/07/2022    ALKPHOS 108 10/07/2022    BILITOT 0.7 10/07/2022         Impression: 59-year-old female with a history of hypertension, atrial fibrillation and past pacemaker placement with possible progressive vascular dementia recently. Now she is status post a fall with mental status changes associated with excessive hypertension, acute kidney injury and orthostatic hypotension. Additionally she has a proximal right fibular fracture. Strengths for the patient: Some use of adaptive equipment, a supportive son and in a reasonably accessible home. Limitations/barriers for the patient: Her age, her forgetfulness and she lives alone. Recommendation: Acute inpatient rehabilitation with occupational and physical therapy 180 minutes 5 out of every 7 days. Will address basic and  advancing mobility with self-care instruction and adaptive equipment training. Caregiver education will be offered. Expected length of stay  prior to a supervised level of function for discharge home with a walker and [facility] OT/PT is 2 weeks. Additional recommendation:    Hypertensive encephalopathy with gait disturbance: The patient requires daily occupational and physical therapy with speech-language pathology. We must work to control her systolic blood pressure from fluctuations up or down. Toprol and Norvasc reduced to lower her fluctuations and ProAmatine has just been started in the midst of her transfer to our unit for symptomatic hypotension.   She needs adaptive equipment training, caregiver education and aggressive pulmonary hygiene measures. We will provide DVT prophylaxis, bowel and bladder retraining and cautious pain management. Outpatient follow-up with her PCP. DVT prophylaxis: The Xarelto she takes for her atrial fibrillation should protect her against new DVT. I must periodically monitor her hemoglobin and platelet count while on this medication. Weightbearing activities will be pursued daily. GI prophylaxis is available. Orthostatic hypotension: Just before transfer to our unit, the hospitalist documented symptomatic orthostatic hypotension that has not been evaluated or treated yet. He is recommending that we do orthostatic blood pressure checks on a regular basis and cardiologist as just started ProAmatine. Vital signs will be checked at rest and with activities. We are encouraging consistent oral intake. Acute kidney injury: Encouraging oral hydration and trying to avoid wide fluctuations of blood pressure. Avoiding nephrotoxic medications when possible. Periodic check of her chemistries. Paroxysmal atrial fibrillation: She has a permanent pacemaker and interrogation should take place as an outpatient. For now she is on Toprol for rate control and Xarelto for anticoagulation. Daily weights to detect any decompensation of CHF. Right proximal fibular fracture: Weightbearing as tolerated. Cautious use of analgesics due to her confusion and hypotension. Cryotherapy. Vascular dementia: Namenda. Cymbalta. She requests to be taken off the Cymbalta. I personally performed a history and physical on this patient within 24 hours of admission to the rehab unit. I have reviewed the preadmission screening and concur with its findings without change. A detailed plan of care will be established by hospital day 4 and I attest the patient is appropriate for inpatient rehabilitation at this time.   I have compared the patient's current functional status noted during my history and physical with that of the preadmission screen and I have found no significant differences.

## 2022-10-12 PROBLEM — R53.1 GENERALIZED WEAKNESS: Status: ACTIVE | Noted: 2022-10-12

## 2022-10-12 PROBLEM — N17.0 ACUTE KIDNEY INJURY (AKI) WITH ACUTE TUBULAR NECROSIS (ATN) (HCC): Status: ACTIVE | Noted: 2022-10-12

## 2022-10-12 PROBLEM — E87.6 HYPOKALEMIA: Status: ACTIVE | Noted: 2022-10-12

## 2022-10-12 PROBLEM — I95.1 ORTHOSTATIC HYPOTENSION: Status: ACTIVE | Noted: 2022-10-12

## 2022-10-12 PROBLEM — S82.831A CLOSED FRACTURE OF UPPER END OF RIGHT FIBULA: Status: ACTIVE | Noted: 2022-10-12

## 2022-10-12 PROBLEM — R26.9 GAIT DISTURBANCE: Status: ACTIVE | Noted: 2022-10-12

## 2022-10-12 PROBLEM — R52 UNCONTROLLED PAIN: Status: ACTIVE | Noted: 2022-10-12

## 2022-10-12 PROBLEM — F01.B4 MODERATE VASCULAR DEMENTIA WITH ANXIETY (HCC): Status: ACTIVE | Noted: 2022-10-12

## 2022-10-12 PROCEDURE — 97110 THERAPEUTIC EXERCISES: CPT

## 2022-10-12 PROCEDURE — 97535 SELF CARE MNGMENT TRAINING: CPT

## 2022-10-12 PROCEDURE — 99232 SBSQ HOSP IP/OBS MODERATE 35: CPT | Performed by: PHYSICAL MEDICINE & REHABILITATION

## 2022-10-12 PROCEDURE — 94664 DEMO&/EVAL PT USE INHALER: CPT

## 2022-10-12 PROCEDURE — 97116 GAIT TRAINING THERAPY: CPT

## 2022-10-12 PROCEDURE — 94150 VITAL CAPACITY TEST: CPT

## 2022-10-12 PROCEDURE — 1280000000 HC REHAB R&B

## 2022-10-12 PROCEDURE — 6370000000 HC RX 637 (ALT 250 FOR IP): Performed by: INTERNAL MEDICINE

## 2022-10-12 PROCEDURE — 6370000000 HC RX 637 (ALT 250 FOR IP): Performed by: PHYSICAL MEDICINE & REHABILITATION

## 2022-10-12 PROCEDURE — 94761 N-INVAS EAR/PLS OXIMETRY MLT: CPT

## 2022-10-12 PROCEDURE — 97530 THERAPEUTIC ACTIVITIES: CPT

## 2022-10-12 RX ADMIN — MIDODRINE HYDROCHLORIDE 5 MG: 5 TABLET ORAL at 09:10

## 2022-10-12 RX ADMIN — ACETAMINOPHEN 650 MG: 325 TABLET ORAL at 13:37

## 2022-10-12 RX ADMIN — ACETAMINOPHEN 650 MG: 325 TABLET ORAL at 05:50

## 2022-10-12 RX ADMIN — RIVAROXABAN 15 MG: 15 TABLET, FILM COATED ORAL at 09:10

## 2022-10-12 RX ADMIN — MIDODRINE HYDROCHLORIDE 5 MG: 5 TABLET ORAL at 18:26

## 2022-10-12 RX ADMIN — MEMANTINE 10 MG: 10 TABLET ORAL at 21:33

## 2022-10-12 RX ADMIN — METOPROLOL SUCCINATE 25 MG: 25 TABLET, EXTENDED RELEASE ORAL at 09:10

## 2022-10-12 RX ADMIN — LEVOTHYROXINE SODIUM 50 MCG: 0.07 TABLET ORAL at 05:50

## 2022-10-12 RX ADMIN — MIDODRINE HYDROCHLORIDE 5 MG: 5 TABLET ORAL at 13:36

## 2022-10-12 RX ADMIN — MEMANTINE 10 MG: 10 TABLET ORAL at 09:10

## 2022-10-12 ASSESSMENT — PAIN - FUNCTIONAL ASSESSMENT: PAIN_FUNCTIONAL_ASSESSMENT: PREVENTS OR INTERFERES SOME ACTIVE ACTIVITIES AND ADLS

## 2022-10-12 ASSESSMENT — PAIN DESCRIPTION - DESCRIPTORS
DESCRIPTORS: ACHING
DESCRIPTORS: ACHING;DISCOMFORT

## 2022-10-12 ASSESSMENT — PAIN SCALES - GENERAL
PAINLEVEL_OUTOF10: 5
PAINLEVEL_OUTOF10: 5
PAINLEVEL_OUTOF10: 2
PAINLEVEL_OUTOF10: 5

## 2022-10-12 ASSESSMENT — PAIN DESCRIPTION - LOCATION
LOCATION: LEG
LOCATION: KNEE
LOCATION: KNEE

## 2022-10-12 ASSESSMENT — PAIN DESCRIPTION - ORIENTATION
ORIENTATION: RIGHT
ORIENTATION: RIGHT

## 2022-10-12 NOTE — PROGRESS NOTES
Dali Calderon    : 1935  Acct #: [de-identified]  MRN: [id number]              PM&R Progress Note      Admitting diagnosis: Hypertensive encephalopathy ( [place] [address])     Comorbid diagnoses impacting rehabilitation: Generalized weakness, gait disturbance, uncontrolled pain, orthostatic hypotension, acute kidney injury, paroxysmal atrial fibrillation, hypokalemia, right proximal fibular fracture, vascular dementia    Chief complaint: She expresses confusion as to why she is on the rehab unit. She wants to go home. Prior (baseline) level of function: Independent.     Current level of function:         Current  IRF-KATELYN and Goals:   Occupational Therapy:    Short Term Goals  Time Frame for Short Term Goals: STGs=LTGs :   Long Term Goals  Time Frame for Long Term Goals : ~10 days or until d/c  Long Term Goal 1: Pt will complete grooming tasks Ind (seated, PRN)  Long Term Goal 2: Pt will complete total body bathing c S  Long Term Goal 3: Pt will complete UB dressing c setup  Long Term Goal 4: Pt will complete LB dressing c setup using AE PRN  Long Term Goal 5: Pt will doff/don footwear c setup using AE PRN  Additional Goals?: Yes  Long Term Goal 6: Pt will complete toileting c supervision  Long Term Goal 7: Pt will complete functional transfers (bed, chair, toilet, shower) c DME PRN and supervision  Long Term Goal 8: Pt will perform therex/therax to facilitate increased strength/endurance/ax tolerance (c emphasis on dynamic standing balance/tolerance >8 mins, BUE endurance) c SBA  Long Term Goal 9: Pt will complete simple homemaking tasks c DME PRN and S :                                       Eating: Eating  Assistance Needed: Independent  Comment: able to open packages/containers  CARE Score: 6  Discharge Goal: Independent       Oral Hygiene: Oral Hygiene  Assistance Needed: Setup or clean-up assistance  Comment: required to perform seated 2* hypotension  CARE Score: 5  Discharge Goal: Independent    UB/LB Bathing: Shower/Bathe Self  Assistance Needed: Supervision or touching assistance  Comment: CGA while in stance to bathe perineal area; completed remainder seated. Required min cues for sequencing d/t decreased recall of parts washed  CARE Score: 4  Discharge Goal: Supervision or touching assistance    UB Dressing: Upper Body Dressing  Assistance Needed: Supervision or touching assistance  Comment: to don undershirt and sweatshirt  CARE Score: 4  Discharge Goal: Set-up or clean-up assistance         LB Dressing: Lower Body Dressing  Assistance Needed: Partial/moderate assistance  Comment: able to doff and don Depends, able to thread LLE in pants but required assist to thread RLE. Performed pants management c CGA  CARE Score: 3  Discharge Goal: Set-up or clean-up assistance    Donning and [place] Footwear: Putting On/Taking Off Footwear  Assistance Needed: Supervision or touching assistance  Comment: to doff/don hospital socks  CARE Score: 4  Discharge Goal: Set-up or clean-up assistance      Toiletin [address] needed: Supervision or touching assistance  Comment: CGA  CARE Score: 4  Discharge Goal: Supervision or touching assistance      Toilet Transfers: Toilet Transfer  Assistance needed: Supervision or touching assistance  Comment: CGA to<>from W/C using grab bar  CARE Score: 4  Discharge Goal: Supervision or touching assistance    Physical Therapy:   Short Term Goals  Time Frame for Short Term Goals: 10 tx days:  Short Term Goal 1: Pt will complete bed mobility (scooting, rolling R/L, and sup<->sit) Ind. Short Term Goal 2: Pt will sit->stand and car transfers using 2WW Mod Ind. Short Term Goal 3: Pt will complete stand->sit and stand turn transfers using 2WW with Sup. Short Term Goal 4: Pt will ambulate 10 ft and 50 ft with turns over level surface using 2WW Mod Ind. Short Term Goal 5: Pt will ambulate 10 ft of uneven surface and >/=150 ft of level surface using 2WW with SBA-Sup.   Additional Goals?: Yes  Short Term Goal 6: Pt will ascend/descend curb step using 2WW and 1 flight of stairs using railings following appropriate step-to pattern on ascent/descent with SBA-Sup. Short Term Goal 7: Pt will complete object retrieval from the floor with 2WW and reacher prn Mod Ind.             Bed Mobility:   Sit to Lying  Assistance Needed: Supervision or touching assistance  Comment: SBA without use of bed features  CARE Score: 4  Discharge Goal: Independent  Roll Left and Right  Assistance Needed: Supervision or touching assistance  Comment: SBA without use of bed features; pt reports pain on R lower leg on both directions  CARE Score: 4  Discharge Goal: Independent  Lying to Sitting on Side of Bed  Assistance Needed: Supervision or touching assistance  Comment: SBA without use of bed features (pt reports dizziness upon sitting up but no trunk swaying observed); pt able to maintain static sitting even without UE support  CARE Score: 4  Discharge Goal: Independent    Transfers:    Sit to Stand  Assistance Needed: Supervision or touching assistance  Comment: CGA with 2WW  CARE Score: 4  Discharge Goal: Independent  Chair/Bed-to-Chair Transfer  Assistance Needed: Partial/moderate assistance  Comment: Min A due to poor eccentric control as pt did not usually transition at least 1 hand to sit; decreased insight to safety observed as well as she had episodes of abandoning AD during transfer process  CARE Score: 3  Discharge Goal: Supervision or touching assistance     Car Transfer  Assistance Needed: Supervision or touching assistance  Comment: CGA with 2WW  CARE Score: 4  Discharge Goal: Independent    Ambulation:    Walking Ability  Does the Patient Walk?: Yes     Walk 10 Feet  Assistance Needed: Supervision or touching assistance  Comment: CGA with 2WW  CARE Score: 4  Discharge Goal: Independent     Walk 50 Feet with Two Turns  Assistance Needed: Dependent  Comment: CGA->Min A with 2WW + SBA of 2nd person for safety due to variable R knee pain intensity and unpredictable occurrence/intensity of lightheadedness  CARE Score: 1  Discharge Goal: Independent     Walk 150 Feet  Assistance Needed: Dependent  Comment: CGA->Min A with 2WW + SBA of 2nd person for safety due to variable R knee pain intensity and unpredictable occurrence/intensity of lightheadedness; pt was able to tolerate 160 ft distance today  CARE Score: 1  Discharge Goal: Supervision or touching assistance     Walking 10 Feet on Uneven Surfaces  Assistance Needed: Dependent  Comment: CGA->Min A with 2WW + SBA of 2nd person for safety due to variable R knee pain intensity and unpredictable occurrence/intensity of lightheadedness  CARE Score: 1  Discharge Goal: Supervision or touching assistance     1 Step (Curb)  Assistance Needed: Dependent  Comment: CGA->Min A with 2WW + SBA of 2nd person for safety due to variable R knee pain intensity and unpredictable occurrence/intensity of lightheadedness  CARE Score: 1  Discharge Goal: Supervision or touching assistance     4 Steps  Assistance Needed: Dependent  Comment: CGA->Min A with railings + SBA->CGA  of 2nd person for safety due to variable R knee pain intensity and unpredictable occurrence/intensity of lightheadedness; required verbal cues 50% of the time to carry out appropriate step-to pattern sequencing after initial education/demonstration  CARE Score: 1  Discharge Goal: Supervision or touching assistance     12 Steps  Assistance Needed: Dependent  Comment: CGA->Min A with railings + SBA->CGA  of 2nd person for safety due to variable R knee pain intensity and unpredictable occurrence/intensity of lightheadedness; required verbal cues >50% of the time to carry out appropriate step-to pattern sequencing after initial education/demonstration; pt was able to ascend/descend 15 steps today  CARE Score: 1  Discharge Goal: Supervision or touching assistance       Wheelchair:  w/c Ability: Wheelchair Ability  Uses a Wheelchair and/or Scooter?: No                Balance:        Object: Picking Up Object  Assistance Needed: Partial/moderate assistance  Comment: Min A (required holding of trunk as pt did not rely on 2WW to assist with balance and performed task in unsupported stance instead; task completed without use of reacher)  CARE Score: 3  Discharge Goal: Supervision or touching assistance    I      Exam:    Blood pressure 129/74, pulse 62, temperature 98.1 °F (36.7 °C), resp. rate 16, height 5' 7\" (1.702 m), weight 175 lb 14.4 oz (79.8 kg), SpO2 94 %, not currently breastfeeding. General: Semiupright in bed. Talkative but tangential.  Poor insight and reasoning. HEENT: Gazing right and left. Cervical range of motion appropriate for age. MMM. No JVD. Pulmonary: Shallow respirations but symmetric air exchange noted. Cardiac: Infrequent premature beats. Rate was controlled. Abdomen: Patient's abdomen is soft and nondistended. Bowel sounds were present throughout. There was no rebound, guarding or masses noted. Upper extremities: Slow and deliberate with bringing her hands up to meet mine. Fair-coordination. No significant bruising or swelling. Lower extremities: Calves soft. Heels clear. No signs of DVT.  4 -/5 strength throughout. Sitting balance was fair. Standing balance was poor.     Lab Results   Component Value Date    WBC 9.6 10/07/2022    HGB 13.8 10/07/2022    HCT 43.1 10/07/2022    MCV 85.3 10/07/2022     10/07/2022     Lab Results   Component Value Date    INR 1.02 02/25/2020    INR 1.00 11/08/2013    INR 3.53 01/24/2011    PROTIME 12.3 02/25/2020    PROTIME 10.8 11/08/2013    PROTIME 41.8 (H) 01/24/2011     Lab Results   Component Value Date    CREATININE 1.0 10/08/2022    BUN 22 10/08/2022     10/08/2022    K 3.8 10/08/2022     10/08/2022    CO2 25 10/08/2022     Lab Results   Component Value Date    ALT 17 10/07/2022    AST 22 10/07/2022    ALKPHOS 108 10/07/2022    BILITOT 0.7 10/07/2022       Expected length of stay  prior to a supervised level of function for discharge home with a walker and [facility] OT/PT is 2 weeks. Recommendations:    Hypertensive encephalopathy with gait disturbance: We are attempting to develop the routine for her daily occupational and physical therapy with speech-language pathology. She is not understanding our role in her recovery yet. She is resistant to much of what we want to do. Trying to control her systolic blood pressure from fluctuations up or down. Toprol and Norvasc reduced to lower her symptomatic orthostasis. Continuing the ProAmatine started by cardiology yesterday. She needs adaptive equipment training, caregiver education and aggressive pulmonary hygiene measures. Providing DVT prophylaxis, bowel and bladder retraining and cautious pain management. Outpatient follow-up with her PCP. Verbal cues and moderate physical assistance for transfers. DVT prophylaxis: The Xarelto she takes for her atrial fibrillation should protect her against new DVT. I must periodically monitor her hemoglobin and platelet count while on this medication. Weightbearing activities may be limited but tried daily. GI prophylaxis is available. No signs of acute blood loss. Orthostatic hypotension: This is a symptomatic issue for the patient. ProAmatine was started yesterday by cardiology. We will consult them to manage her volatile blood pressure during her rehab stay. Checking orthostatic blood pressure on a regular basis to monitor the effects of the ProAmatine. Vital signs are checked at rest and with activities. We are encouraging consistent oral intake. Acute kidney injury: Encouraging oral hydration and trying to avoid wide fluctuations of blood pressure. Avoiding nephrotoxic medications when possible. Periodic check of her chemistries.   Paroxysmal atrial fibrillation: She has a permanent pacemaker and interrogation should take place as an outpatient. For now she is on Toprol for rate control and Xarelto for anticoagulation. Daily weights do not reveal any decompensation of CHF. Right proximal fibular fracture: Weightbearing as tolerated. Cautious use of analgesics due to her confusion and hypotension. Cryotherapy. Minor pain at rest.  Vascular dementia: Namenda. Cymbalta was discontinued at her request.  Verbal and written cues provided when possible.

## 2022-10-12 NOTE — PATIENT CARE CONFERENCE
ACUTE REHAB TEAM CONFERENCE SUMMARY   [facility]    NAME: Ash Arceo  : 1935 ADMIT DATE: 10/10/2022    Rehab Admitting Dx:Hypertensive encephalopathy [I67.4]  Hypertensive encephalopathy [I67.4]  Patient Comorbid Conditions: Active Hospital Problems    Diagnosis Date Noted    Generalized weakness [R53.1] 10/12/2022     Priority: Medium    Gait disturbance [R26.9] 10/12/2022     Priority: Medium    Uncontrolled pain [R52] 10/12/2022     Priority: Medium    Closed fracture of upper end of right fibula [S82.831A] 10/12/2022     Priority: Medium    Orthostatic hypotension [I95.1] 10/12/2022     Priority: Medium    Acute kidney injury (YASSINE) with acute tubular necrosis (ATN) ([facility] [address] [N17.0] 10/12/2022     Priority: Medium    Hypokalemia [E87.6] 10/12/2022     Priority: Medium    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 B4] 10/12/2022     Priority: Medium    Hypertensive encephalopathy [I67.4] 10/10/2022     Priority: Medium     Date: 10/13/2022    CASE MANAGEMENT  Current issues/needs regarding patient and family discharge status: Patient goal is to return home alone. PHYSICAL THERAPY (Updated in QI)  Short Term Goals  Time Frame for Short Term Goals: 10 tx days:  Short Term Goal 1: Pt will complete bed mobility (scooting, rolling R/L, and sup<->sit) Ind.  SUPV  Short Term Goal 2: Pt will sit->stand and car transfers using 2WW Mod Ind. CGA  Short Term Goal 3: Pt will complete stand->sit and stand turn transfers using 2WW with Sup. CGA  Short Term Goal 4: Pt will ambulate 10 ft and 50 ft with turns over level surface using 2WW Mod Ind. CGA with RW--385 feet CGA  Short Term Goal 5: Pt will ambulate 10 ft of uneven surface and >/=150 ft of level surface using 2WW with SBA-Sup.   CGA RW  Additional Goals?: Yes  Short Term Goal 6: Pt will ascend/descend curb step using 2WW and 1 flight of stairs using railings following appropriate step-to pattern on ascent/descent with SBA-Sup. CGA  Short Term Goal 7: Pt will complete object retrieval from the floor with 2WW and reacher prn Mod Ind. CGA       Impairments/deficits, barriers:     Body Structures, Functions, Activity Limitations Requiring Skilled Therapeutic Intervention: Decreased functional mobility , Decreased ADL status, Decreased ROM, Decreased strength, Decreased safe awareness, Decreased cognition, Decreased endurance, Decreased balance, Decreased sensation, Decreased high-level IADLs, Increased pain, Decreased vision/visual deficit     Therapy Prognosis: Good, Guarded  Decision Making: Medium Complexity  Clinical Presentation: evolving with changing characteristics  Equipment needed at discharge: 2WW      PT IRF-KATELYN scores since initial assessment  Bed Mobility:   Roll Left and Right  Assistance Needed: Supervision or touching assistance  Comment: SBA without use of bed features; pt reports pain on R lower leg on both directions  CARE Score: 4  Discharge Goal: Independent    Sit to Lying  Assistance Needed: Supervision or touching assistance  Comment: SBA without use of bed features  CARE Score: 4  Discharge Goal: Independent    Lying to Sitting on Side of Bed  Assistance Needed: Supervision or touching assistance  Comment: Supervision without bed features  CARE Score: 4  Discharge Goal: Independent    Transfers:    Sit to Stand  Assistance Needed: Supervision or touching assistance  Comment: CGA at RW, mod cues for safety with hand placement  CARE Score: 4  Discharge Goal: Independent    Chair/Bed-to-Chair Transfer  Assistance Needed: Supervision or touching assistance  Comment: CGA for balance  CARE Score: 4  Discharge Goal: Supervision or touching assistance         Car Transfer  Assistance Needed: Supervision or touching assistance  Comment: CGA with 2WW  CARE Score: 4  Discharge Goal: Independent    Ambulation:    Walking Ability  Does the Patient Walk?: Yes     Walk 10 Feet  Assistance Needed: Supervision or touching assistance  Comment: CGA with RW  CARE Score: 4  Discharge Goal: Independent     Walk 50 Feet with Two Turns  Assistance Needed: Supervision or touching assistance  Comment: CGA with RW, slow arnulfo with decreased step length bilaterally, mildly antalgic, manages turns without cues  CARE Score: 4  Discharge Goal: Independent     Walk 150 Feet  Assistance Needed: Supervision or touching assistance  Comment: CGA for balance with RW amb 385' max distance no change in JULIO, intermittent stopping with pt bending R knee to \"stretch it out\", mildly antalgic  CARE Score: 4  Discharge Goal: Supervision or touching assistance     Walking 10 Feet on Uneven Surfaces  Assistance Needed: Supervision or touching assistance  Comment: CGA for balance wtih RW,manages AD over transition and obstacles with min cues to amb within JUDITH of rW  CARE Score: 4  Discharge Goal: Supervision or touching assistance     1 Step (Curb)  Assistance Needed: Supervision or touching assistance  Comment: CGA with RW, mod seq cues  CARE Score: 4  Discharge Goal: Supervision or touching assistance     4 Steps  Assistance Needed: Supervision or touching assistance  Comment: CGA B rails in non-recip pattern, max seq cues  CARE Score: 4  Discharge Goal: Supervision or touching assistance     12 Steps  Assistance Needed: Supervision or touching assistance  Comment: CGA B rails, non-recip pattern, mod seq cues  CARE Score: 4  Discharge Goal: Supervision or touching assistance           Wheelchair:  w/c Ability: Wheelchair Ability  Uses a Wheelchair and/or Scooter?: No                        Balance:        Object: Picking Up Object  Assistance Needed: Supervision or touching assistance  Comment: CG-SBA with reacher, min cues  CARE Score: 4  Discharge Goal: Supervision or touching assistance    Fall Risk: []  Yes  []  No    OCCUPATIONAL THERAPY  (Updated in QI)  Short Term Goals  Time Frame for Short Term Goals: STGs=LTGs :   Long Term Goals  Time Frame for Long Term Goals : ~10 days or until d/c  Long Term Goal 1: Pt will complete grooming tasks Ind (seated, PRN)  Long Term Goal 2: Pt will complete total body bathing c S  Long Term Goal 3: Pt will complete UB dressing c setup  Long Term Goal 4: Pt will complete LB dressing c setup using AE PRN  Long Term Goal 5: Pt will doff/don footwear c setup using AE PRN  Additional Goals?: Yes  Long Term Goal 6: Pt will complete toileting c supervision  Long Term Goal 7: Pt will complete functional transfers (bed, chair, toilet, shower) c DME PRN and supervision  Long Term Goal 8: Pt will perform therex/therax to facilitate increased strength/endurance/ax tolerance (c emphasis on dynamic standing balance/tolerance >8 mins, BUE endurance) c SBA  Long Term Goal 9: Pt will complete simple homemaking tasks c DME PRN and S :                                       OT IRF-KATELYN scores and goals since initial assessment:    ADLs:    Eating: Eating  Assistance Needed: Independent  Comment: able to open packages/containers  CARE Score: 6  Discharge Goal: Independent       Oral Hygiene: Oral Hygiene  Assistance Needed: Setup or clean-up assistance  Comment: required to perform seated 2* hypotension  CARE Score: 5  Discharge Goal: Independent    Toiletin [address] needed: Supervision or touching assistance  Comment: CGA  CARE Score: 4  Discharge Goal: Supervision or touching assistance      UB/LB Bathing: Shower/Bathe Self  Assistance Needed: Supervision or touching assistance  Comment: CGA while in stance to bathe perineal area; completed remainder seated.   Required min cues for sequencing d/t decreased recall of parts washed  CARE Score: 4  Discharge Goal: Supervision or touching assistance    UB Dressing: Upper Body Dressing  Assistance Needed: Supervision or touching assistance  Comment: to don undershirt and sweatshirt  CARE Score: 4  Discharge Goal: Set-up or clean-up assistance         LB Dressing: Lower Body Dressing  Assistance Needed: Partial/moderate assistance  Comment: able to doff and don Depends, able to thread LLE in pants but required assist to thread RLE. Performed pants management c CGA  CARE Score: 3  Discharge Goal: Set-up or clean-up assistance    Donning and [place] Footwear: Putting On/Taking Off Footwear  Assistance Needed: Supervision or touching assistance  Comment: to doff/don hospital socks  CARE Score: 4  Discharge Goal: Set-up or clean-up assistance      Toilet Transfers: Toilet Transfer  Assistance needed: Supervision or touching assistance  Comment: CGA to<>from W/C using grab bar  CARE Score: 4  Discharge Goal: Supervision or touching assistance      Impairments/deficits, barriers:  Decreased balance, endurance, COGNITION (memory, problem solving, safety awareness), hypotension improved from eval  Assessment  Performance deficits / Impairments: Decreased functional mobility , Decreased ADL status, Decreased safe awareness, Decreased cognition, Decreased endurance, Decreased balance, Decreased sensation, Decreased posture  Decision Making: High Complexity  Equipment needed at discharge: [facility] for over toilet?  Need to confirm with pt      COGNITIVE FUNCTION/SPEECH THERAPY (AS INDICATED)  LTG         Duration/Frequency of Treatment  Duration of Treatment: No ST                  Nursing Current Medical Status:   [x] Is continent of bowel and bladder     [] Is incontinent of bowel and bladder    [x] Has had an adequate number of bowel movements   [] Urinates with no urinary retention >300ml in bladder   [] Targeting bladder protocol with huertas removal   [x] Maintaining O2 SATs at 92% or greater   [x] Has pain managed while on ARU         [x] Has had no skin breakdown while on ARU   [] Has improved skin integrity via wound measurements   [] Has no signs/symptoms of infection at the wound site   [] Pressure wounds Stage/Location:    [] Arrived on unit with pressure wound  [x] Has been free from injury during hospitalization   [] Has experienced a fall during hospitalization  [] Ongoing education with patient/family with understanding demonstrated for:  [] Receives IV Fluids  [] Other:        NUTRITION  Weight: 177 lb 4 oz (80.4 kg) / Body mass index is 27.76 kg/m². Current diet: ADULT DIET; Regular; 5 carb choices (75 gm/meal); Low Sodium (2 gm)  ADULT ORAL NUTRITION SUPPLEMENT; Breakfast, Dinner; Low Calorie/High Protein Oral Supplement  Intake: Meal intake %      Medical improvements/barriers: cognition, safety, carryover, knee pain, BP issues        Team goals for next treatment period/Intervention for current barriers:   [] Pt will increase activity tolerance for daily tasks. [] Pt will improve bed mobility with reduced assist.  [x] Pt will improve safety in fx tasks with reduced cues/assist  [x] Pt will improve transfers with reduced assist  [x] Pt will improve toileting with reduced assist  [x] Pt will improve ADL's with use of adaptive equipment with reduced assist  [] Pt will improve pain mgmt for maximum participation in tx program  [] Pt will improve communication to get basic needs met on unit  [] Pt will improve swallowing for safe diet advancement with use of strategies  []  Plan for discharge to home. Patient Strengths: Motivated, Cooperative, and Pleasant    Justification for Continued Stay  Based on my medical assessment of the patient and review of information from the interdisciplinary team as part of this weekly team conference, the patient continues to meet the following criteria for IRF level of care:   The patient requires active and ongoing intervention of multiple therapy disciplines  The patient requires and intensive rehabilitation therapy program  The patient requires continued physician supervision by a rehabilitation physician  The patient requires 24 hours rehab nursing care  The patient requires an intensive and coordinated interdisciplinary team approach to the delivery of rehabilitative care. Assessment/Plan   [x]  The patient is making good progression towards their long term goals and is actively participating in and has a reasonable expectation to continue to benefit from the intensive rehabilitation therapy program   []  The estimated discharge date has been changed from initial team conference due to:   []  The estimated discharge destination has been changed from initial team conference due to:         Ongoing tx following discharge: [x]HHC OT  PT   []OUTPATIENT     [] No Further Treatment     [] Family/Caregiver Training  []  Transitional Living Arrangement   [] Home Assessment (date  )     [] Family Conference   []  Therapeutic Pass       []  Other: (specify)    Estimated Discharge Date: 10/19/22    Estimated Discharge Destination: []home alone   []home alone with assist prn  [x]Continuous supervision []Return home with s/o/spouse/family   [] Assisted living    []SNF     Team members participating in today's conference. [x] Sagar Reis, Medical Director  [x] Justo Foreman, DPT,       [] Contreras Pitts, RN Nurse Supervisor     []  Tong Tian, PT  [x]  Andrew Carolina, PT       [x] Chantal Wisdom, OT   [] Ta Cedeno, OT  [] Breana Duncan, OT     [x]  Jannie Osborne, SLP    []  Lonza Brunner, TATY   [] Marisela Louie, TATY      []Muna Webber,   [x]Carmina Friedman MSW, LSW,      [] Nicol Carrasco, JORDYN   [] Telly Del Valle RN    [] Olive Stewart RN    [] Purvi Marks RN    [] Car Rodriguez RN   [x] Christiana Cintron RN       I have led this Team Conference and agree with the plan, Sagar Reis MD, 10/13/2022, 12:55 PM  Goals have been updated to reflect recent status.     Team conference note transcribed this date by: Lilliana Sharma MA, [id number] [facility], [place], Therapy Coordinator

## 2022-10-12 NOTE — PROGRESS NOTES
Occupational Therapy  Physical Rehabilitation: OCCUPATIONAL THERAPY     [x] daily progress note       [] discharge       Patient Name:  Tosha Griffiths   :  1935 MRN: [id number]  Room:  [address] [place] [address] Date of Admission: 10/10/2022  Rehabilitation Diagnosis:   Hypertensive encephalopathy [I67.4]  Hypertensive encephalopathy [I67.4]       Date 10/12/2022       Day of ARU Week:  3   Time IN/OUT 5930-5837   Individual Tx Minutes 55   Group Tx Minutes    Co-Treat Minutes    Concurrent Tx Minutes    TOTAL Tx Time Mins 55   Variance Time -5 (\[facility] [place] [facility]\""-Bernadette received +5)    Variance Time []   Refusal due to:     []   Medical hold/reason:    []   Illness   []   Off Unit for test/procedure  []   Extra time needed to complete task  []   Therapeutic need  []   Other (specify):   Restrictions Restrictions/Precautions: General Precautions, Fall Risk (watch ortho BPs)         Communication with other providers: [x]   OK to see per nursing:     []   Spoke with team member regarding:      Subjective observations and cognitive status: Received pt in therapy gym from [facility]. Pt pleasant and agreeable to therapy.       Pain level/location:    /10       Location: R knee-did not rate    Discharge recommendations  Anticipated discharge date:  TBD  Destination: []home alone   []home alone w assist prn   [] home w/ family    [] Continuous supervision       []SNF    [] Assisted living     [] Other:   Continued therapy: []HHC OT  []OUTPATIENT  OT   [] No Further OT  Equipment needs: TBD      Toileting:   SBA for clothing management and bladder hygiene        Toilet Transfers:   CGA   Device Used:    []   Standard Toilet         [x]   Grab Bars           []  Bedside Commode       []   Elevated Toilet          []   Other:        Bed Mobility:           [x]   Pt received out of bed   Sit --> Supine:  SBA    Transfers:    Sit--> Stand:  CGA  Stand --> Sit:   CGA  Stand-Pivot:   CGA  Other:    Assistive device required for transfer:   IRA Functional Mobility: To increase BUE strength and endurance for functional transfers and ADLs, Pt self propelled WC 70 ft     Assistance:  min A c cues   Device:   []   Rolling Walker     []   Standard Walker [x]   Wheelchair        []   [place]       []   [place]         []   Cardiac Jostin Star       []   Other:        Homemaking Tasks:   educated pt on walker safety techniques c handout provided. Pt verbalized understanding. Will continue education       Additional Therapeutic activities/exercises completed this date:     [x]   ADL Training   []   Balance/Postural training     [x]   Bed/Transfer Training: to increase independence and safety in functional transfers, instructed pt in sit<>stand transfers x4 c CGA. [x]   Endurance Training: To increase BUE endurance for ADLs and transfers pt completed arm ergometer on min resistance 15 mins, average 45 RPM.      []   Neuromuscular Re-ed   []   Nu-step:  Time:        Level:         #Steps:       []   Rebounder:    []  Seated     []  Standing        []   Supine Ther Ex (reps/sets):     []   Seated Ther Ex (reps/sets):     []   Standing Ther Ex (reps/sets):     []   Other:      Comments:      Patient/Caregiver Education and Training:   []   [employer] Equipment Use  []   Bed Mobility/Transfer Technique/Safety  []   Energy Conservation Tips  []   Family training  []   Postural Awareness  []   Safety During Functional Activities  []   Reinforced Patient's Precautions   []   Progress was updated and reviewed in Rehabtracker with patient and/or family this         date.     Treatment Plan for Next Session: Continue OT POC           Treatment/Activity Tolerance:   [x] Tolerated treatment with no adverse effects    [] Patient limited by fatigue  [] Patient limited by pain   [] Patient limited by medical complications:    [] Adverse reaction to Tx:   [] Significant change in status    Safety:       [x]  bed alarm set    []  chair alarm set    []  Pt refused alarms []  Telesitter activated      [x]  Gait belt used during tx session      []other:       Number of Minutes/Billable Intervention  Therapeutic Exercise 15   ADL Self-care 10   Neuro Re-Ed    Therapeutic Activity 30   Group    Other:    TOTAL 55       Social History  Social/Functional History  Lives With: Alone  Type of Home: House  Home Layout: Two level, Able to Live on Main level with bedroom/bathroom, 1/2 bath on main level (reports she hasn't gone upstairs in years)  Home Access: Stairs to enter with rails  Entrance Stairs - Number of Steps: 3-4  Entrance Stairs - Rails: Both (however cannot reach both simultaneously)  Bathroom Shower/Tub: Tub/Shower unit (however sponge bathes at baseline)  Bathroom Toilet: Standard  Bathroom Accessibility: Walker accessible  Home Equipment: [place], standard  Has the patient had two or more falls in the past year or any fall with injury in the past year?: Yes (pt unable to give exact number but is confident it was more than 2)  Receives Help From: Bennett Ortez)  ADL Assistance: Independent  Homemaking Assistance: Independent  Homemaking Responsibilities: Yes (pt reports being Ind but charting indicates pt is non compliant with medication)  Ambulation Assistance: Independent (with cane)  Transfer Assistance: Independent  Active : Yes  Mode of Transportation: Car  Occupation: Retired  Type of Occupation: Housewife, 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    was in the [facility] [place]: Has a cat Allyssa, housework, grocery, pt sets up a 7 day pill box, pt manages finances via autopay  Additional Comments: Pt typically sleeps in a flat, regular bed at home    Objective                                                                                    Goals:  (Update in navigator)  Short Term Goals  Time Frame for Short Term Goals: STGs=LTGs:  Long Term Goals  Time Frame for Long Term Goals : ~10 days or until d/c  Long Term Goal 1: Pt will complete grooming tasks Ind (seated, PRN)  Long Term Goal 2: Pt will complete total body bathing c S  Long Term Goal 3: Pt will complete UB dressing c setup  Long Term Goal 4: Pt will complete LB dressing c setup using AE PRN  Long Term Goal 5: Pt will doff/don footwear c setup using AE PRN  Additional Goals?: Yes  Long Term Goal 6: Pt will complete toileting c supervision  Long Term Goal 7: Pt will complete functional transfers (bed, chair, toilet, shower) c DME PRN and supervision  Long Term Goal 8: Pt will perform therex/therax to facilitate increased strength/endurance/ax tolerance (c emphasis on dynamic standing balance/tolerance >8 mins, BUE endurance) c SBA  Long Term Goal 9: Pt will complete simple homemaking tasks c DME PRN and S:        Plan of Care                                                                              Times per week: 5 days per week for a minimum of 60 minutes/day plus group as appropriate for 60 minutes.   Treatment to include Occupational Therapy Plan  Current Treatment Recommendations: Balance training, Functional mobility training, Endurance training, Safety education & training, Patient/Caregiver education & training, Equipment evaluation, education, & procurement, Self-Care / ADL, Home management training, Cognitive/Perceptual training    Electronically signed by   CARLOS Huertas,  10/12/2022, 2:47 PM

## 2022-10-12 NOTE — PROGRESS NOTES
Physical Therapy      [x] daily progress note       [] discharge       Patient Name:  Claudio Babb   :  1935 MRN: [id number]  Room:  [address] [place] [address] Date of Admission: 10/10/2022  Rehabilitation Diagnosis:   Hypertensive encephalopathy [I67.4]  Hypertensive encephalopathy [I67.4]       Date 10/12/2022       Day of ARU Week:  3   Time IN/OUT 4302-7307  2719-1729   Individual Tx Minutes 60+65   TOTAL Tx Time Mins 125                              Variance Time +5( informed Gabi OT of 5'overage)   Variance Time []   Refusal due to:     []   Medical hold/reason:    []   Illness   []   Off Unit for test/procedure  []   Extra time needed to complete task  [x]   Therapeutic need  []   Other (specify):   Restrictions Restrictions/Precautions  Restrictions/Precautions: General Precautions, Fall Risk (watch ortho BPs)      Communication with other providers: [x]   OK to see per nursing:     []   Spoke with team member regarding:      Subjective observations and cognitive status: Pt asleep upon arrival, needing to go to the BR and wanting to get dressed before leaving room req extra time. VS in semi fowers: /74, HR 60.  O2 sats 94% on RA     Pain level/location:  C/o \"stiff neck\", no other c/o at rest   Discharge recommendations  Anticipated discharge date:  TBD  Destination: []home alone   []home alone with assist PRN     [] home w/ family      [] Continuous supervision  []SNF    [] Assisted living     [] Other:   Continued therapy: []HHC PT  []OUTPATIENT  PT   [] No Further PT  Equipment needs: TBD       Bed Mobility:         []   Pt received out of bed     Roll Left and Right  Assistance Needed: Supervision or touching assistance  Comment: SBA without use of bed features; pt reports pain on R lower leg on both directions  CARE Score: 4  Discharge Goal: Independent    Sit to Lying  Assistance Needed: Supervision or touching assistance  Comment: SBA without use of bed features  CARE Score: 4  Discharge Goal: Independent    Lying to Sitting on Side of Bed  Assistance Needed: Supervision or touching assistance  Comment: Supervision without bed features  CARE Score: 4  Discharge Goal: Independent    Transfers:    Sit to Stand  Assistance Needed: Supervision or touching assistance  Comment: CGA at RW, mod cues for safety with hand placement  CARE Score: 4  Discharge Goal: Independent    Chair/Bed-to-Chair Transfer  Assistance Needed: Supervision or touching assistance  Comment: CGA for balance  CARE Score: 4  Discharge Goal: Supervision or touching assistance         Car Transfer  Assistance Needed: Supervision or touching assistance  Comment: CGA with 2WW  CARE Score: 4  Discharge Goal: Independent           Object: Picking Up Object  Assistance Needed: Supervision or touching assistance  Comment: CG-SBA with reacher, min cues  CARE Score: 4  Discharge Goal: Supervision or touching assistance    Ambulation:    Walking Ability  Does the Patient Walk?: Yes     Walk 10 Feet  Assistance Needed: Supervision or touching assistance  Comment: CGA with RW  CARE Score: 4  Discharge Goal: Independent     Walk 50 Feet with Two Turns  Assistance Needed: Supervision or touching assistance  Comment: CGA with RW, slow arnulfo with decreased step length bilaterally, mildly antalgic, manages turns without cues  CARE Score: 4  Discharge Goal: Independent     Walk 150 Feet  Assistance Needed: Supervision or touching assistance  Comment: CGA for balance with RW amb 385' max distance no change in JULIO, intermittent stopping with pt bending R knee to \"stretch it out\", mildly antalgic  CARE Score: 4  Discharge Goal: Supervision or touching assistance     Walking 10 Feet on Uneven Surfaces  Assistance Needed: Supervision or touching assistance  Comment: CGA for balance wtih RW,manages AD over transition and obstacles with min cues to amb within JUDITH of rW  CARE Score: 4  Discharge Goal: Supervision or touching assistance     1 Step (Curb)  Assistance Needed: Supervision or touching assistance  Comment: CGA with RW, mod seq cues  CARE Score: 4  Discharge Goal: Supervision or touching assistance     4 Steps  Assistance Needed: Supervision or touching assistance  Comment: CGA B rails in non-recip pattern, max seq cues  CARE Score: 4  Discharge Goal: Supervision or touching assistance     12 Steps  Assistance Needed: Supervision or touching assistance  Comment: CGA B rails, non-recip pattern, mod seq cues  CARE Score: 4  Discharge Goal: Supervision or touching assistance          Additional Therapeutic activities/exercises completed this date:     []   Nu-step:  Time:        Level:         #Steps:       []   Rebounder:    []  Seated     []  Standing        []   Balance training         []   Postural training    []   Supine ther ex (reps/sets):     [x]   Seated ther ex (reps/sets): B LE x 20 reps for LAQ, marching, hip abd/add, knee flexion, isometric hip add AROM with min cues for ex technique. Demos improved R knee extension with repetition.  .     []   Standing ther ex (reps/sets):     []   Other:                []   Other:   []   Other:      Patient/Caregiver Education and Training:   [x]   Bed Mobility/Transfer technique/safety  [x]   Gait technique/sequencing  [x]   Proper use of assistive device  [x]   Advanced mobility safety and technique  []   Reinforced patient's precautions/mobility while maintaining precautions  []   Postural awareness  []   Family training    Specific instructions for next treatment: gait progression, LE strengthening, dynamic balance    Treatment/Activity Tolerance:   [x] Tolerated treatment with no adverse effects    [] Patient limited by fatigue  [] Patient limited by pain   [] Patient limited by medical complications:    [] Adverse reaction to Tx:   [] Significant change in status    Safety:       []  bed alarm set    [x]  chair alarm set    []  Pt refused alarms                []  Telesitter activated      [x] Gait belt used during tx session      []other:         Number of Minutes/Billable Intervention  Gait Training 65   Therapeutic Exercise 15   Neuro Re-Ed    Therapeutic Activity 45   Wheelchair Propulsion    Group    Other:    TOTAL 125         Social History  Social/Functional History  Lives With: Alone  Type of Home: House  Home Layout: Two level, Able to Live on Main level with bedroom/bathroom, 1/2 bath on main level (reports she hasn't gone upstairs in years)  Home Access: Stairs to enter with rails  Entrance Stairs - Number of Steps: 3-4  Entrance Stairs - Rails: Both (however cannot reach both simultaneously)  Bathroom Shower/Tub: Tub/Shower unit (however sponge bathes at baseline)  Bathroom Toilet: Standard  Bathroom Accessibility: Walker accessible  Home Equipment: theresa Barragan  Has the patient had two or more falls in the past year or any fall with injury in the past year?: Yes (pt unable to give exact number but is confident it was more than 2)  Receives Help From: Grecia Castro  ADL Assistance: [address] [place] [address] Avenue: Independent  Homemaking Responsibilities: Yes (pt reports being Ind but charting indicates pt is non compliant with medication)  Ambulation Assistance: Independent (with cane)  Transfer Assistance: Independent  Active : Yes  Mode of Transportation: Car  Occupation: Retired  Type of Occupation: Housewife, 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  was in the [address] [place]: Has a cat Allyssa, housework, grocery, pt sets up a 7 day pill box, pt manages finances via autopay  Additional Comments: Pt typically sleeps in a flat, regular bed at home    Objective                                                                                    Goals:  (Update in navigator)  Short Term Goals  Time Frame for Short Term Goals: 10 tx days:  Short Term Goal 1: Pt will complete bed mobility (scooting, rolling R/L, and sup<->sit) Ind.   Short Term Goal 2: Pt will sit->stand and car transfers using 2WW Mod Ind. Short Term Goal 3: Pt will complete stand->sit and stand turn transfers using 2WW with Sup. Short Term Goal 4: Pt will ambulate 10 ft and 50 ft with turns over level surface using 2WW Mod Ind. Short Term Goal 5: Pt will ambulate 10 ft of uneven surface and >/=150 ft of level surface using 2WW with SBA-Sup. Additional Goals?: Yes  Short Term Goal 6: Pt will ascend/descend curb step using 2WW and 1 flight of stairs using railings following appropriate step-to pattern on ascent/descent with SBA-Sup. Short Term Goal 7: Pt will complete object retrieval from the floor with 2WW and reacher prn Mod Ind.:   :        Plan of Care                                                                              Times per week: 5 days per week for a minimum of 60 minutes/day plus group as appropriate for 60 minutes.   Treatment to include Current Treatment Recommendations: Strengthening, ROM, Balance training, Functional mobility training, Transfer training, Cognitive/Perceptual training, Endurance training, Gait training, Stair training, Pain management, Home exercise program, Safety education & training, Patient/Caregiver education & training, Equipment evaluation, education, & procurement, Therapeutic activities    Electronically signed by   Leesa Mata, PTA #[id number]  10/12/2022, 2:20 PM

## 2022-10-13 LAB
ALBUMIN SERPL-MCNC: 3.7 GM/DL (ref 3.4–5)
ALP BLD-CCNC: 83 IU/L (ref 40–129)
ALT SERPL-CCNC: 14 U/L (ref 10–40)
ANION GAP SERPL CALCULATED.3IONS-SCNC: 6 MMOL/L (ref 4–16)
APTT: 55.5 SECONDS (ref 25.1–37.1)
AST SERPL-CCNC: 18 IU/L (ref 15–37)
BASOPHILS ABSOLUTE: 0.1 K/CU MM
BASOPHILS RELATIVE PERCENT: 0.8 % (ref 0–1)
BILIRUB SERPL-MCNC: 0.3 MG/DL (ref 0–1)
BUN BLDV-MCNC: 29 MG/DL (ref 6–23)
CALCIUM SERPL-MCNC: 8.9 MG/DL (ref 8.3–10.6)
CHLORIDE BLD-SCNC: 103 MMOL/L (ref 99–110)
CO2: 31 MMOL/L (ref 21–32)
CREAT SERPL-MCNC: 1.3 MG/DL (ref 0.6–1.1)
DIFFERENTIAL TYPE: ABNORMAL
EOSINOPHILS ABSOLUTE: 0.1 K/CU MM
EOSINOPHILS RELATIVE PERCENT: 1.4 % (ref 0–3)
GFR AFRICAN AMERICAN: 47 ML/MIN/1.73M2
GFR NON-AFRICAN AMERICAN: 39 ML/MIN/1.73M2
GLUCOSE BLD-MCNC: 128 MG/DL (ref 70–99)
HCT VFR BLD CALC: 37 % (ref 37–47)
HEMOGLOBIN: 11.8 GM/DL (ref 12.5–16)
IMMATURE NEUTROPHIL %: 0.3 % (ref 0–0.43)
INR BLD: 1.9 INDEX
LYMPHOCYTES ABSOLUTE: 2 K/CU MM
LYMPHOCYTES RELATIVE PERCENT: 31.5 % (ref 24–44)
MAGNESIUM: 2.1 MG/DL (ref 1.8–2.4)
MCH RBC QN AUTO: 28.1 PG (ref 27–31)
MCHC RBC AUTO-ENTMCNC: 31.9 % (ref 32–36)
MCV RBC AUTO: 88.1 FL (ref 78–100)
MONOCYTES ABSOLUTE: 0.8 K/CU MM
MONOCYTES RELATIVE PERCENT: 12.3 % (ref 0–4)
NUCLEATED RBC %: 0 %
PDW BLD-RTO: 13.9 % (ref 11.7–14.9)
PHOSPHORUS: 4.2 MG/DL (ref 2.5–4.9)
PLATELET # BLD: 196 K/CU MM (ref 140–440)
PMV BLD AUTO: 9.8 FL (ref 7.5–11.1)
POTASSIUM SERPL-SCNC: 4.5 MMOL/L (ref 3.5–5.1)
PROTHROMBIN TIME: 24.7 SECONDS (ref 11.7–14.5)
RBC # BLD: 4.2 M/CU MM (ref 4.2–5.4)
SEGMENTED NEUTROPHILS ABSOLUTE COUNT: 3.4 K/CU MM
SEGMENTED NEUTROPHILS RELATIVE PERCENT: 53.7 % (ref 36–66)
SODIUM BLD-SCNC: 140 MMOL/L (ref 135–145)
TOTAL IMMATURE NEUTOROPHIL: 0.02 K/CU MM
TOTAL NUCLEATED RBC: 0 K/CU MM
TOTAL PROTEIN: 5.8 GM/DL (ref 6.4–8.2)
WBC # BLD: 6.4 K/CU MM (ref 4–10.5)

## 2022-10-13 PROCEDURE — 1280000000 HC REHAB R&B

## 2022-10-13 PROCEDURE — 97530 THERAPEUTIC ACTIVITIES: CPT

## 2022-10-13 PROCEDURE — 85025 COMPLETE CBC W/AUTO DIFF WBC: CPT

## 2022-10-13 PROCEDURE — 97535 SELF CARE MNGMENT TRAINING: CPT

## 2022-10-13 PROCEDURE — 94761 N-INVAS EAR/PLS OXIMETRY MLT: CPT

## 2022-10-13 PROCEDURE — 97116 GAIT TRAINING THERAPY: CPT

## 2022-10-13 PROCEDURE — 36415 COLL VENOUS BLD VENIPUNCTURE: CPT

## 2022-10-13 PROCEDURE — 80053 COMPREHEN METABOLIC PANEL: CPT

## 2022-10-13 PROCEDURE — 85730 THROMBOPLASTIN TIME PARTIAL: CPT

## 2022-10-13 PROCEDURE — 6370000000 HC RX 637 (ALT 250 FOR IP): Performed by: INTERNAL MEDICINE

## 2022-10-13 PROCEDURE — 85610 PROTHROMBIN TIME: CPT

## 2022-10-13 PROCEDURE — 83735 ASSAY OF MAGNESIUM: CPT

## 2022-10-13 PROCEDURE — 97110 THERAPEUTIC EXERCISES: CPT

## 2022-10-13 PROCEDURE — 6370000000 HC RX 637 (ALT 250 FOR IP): Performed by: PHYSICAL MEDICINE & REHABILITATION

## 2022-10-13 PROCEDURE — 97542 WHEELCHAIR MNGMENT TRAINING: CPT

## 2022-10-13 PROCEDURE — 94150 VITAL CAPACITY TEST: CPT

## 2022-10-13 PROCEDURE — 99233 SBSQ HOSP IP/OBS HIGH 50: CPT | Performed by: PHYSICAL MEDICINE & REHABILITATION

## 2022-10-13 PROCEDURE — 84100 ASSAY OF PHOSPHORUS: CPT

## 2022-10-13 RX ORDER — METOPROLOL SUCCINATE 25 MG/1
25 TABLET, EXTENDED RELEASE ORAL EVERY EVENING
Status: DISCONTINUED | OUTPATIENT
Start: 2022-10-13 | End: 2022-10-19 | Stop reason: HOSPADM

## 2022-10-13 RX ADMIN — ACETAMINOPHEN 650 MG: 325 TABLET ORAL at 10:00

## 2022-10-13 RX ADMIN — MIDODRINE HYDROCHLORIDE 5 MG: 5 TABLET ORAL at 14:08

## 2022-10-13 RX ADMIN — LEVOTHYROXINE SODIUM 50 MCG: 0.07 TABLET ORAL at 06:24

## 2022-10-13 RX ADMIN — MEMANTINE 10 MG: 10 TABLET ORAL at 08:49

## 2022-10-13 RX ADMIN — MEMANTINE 10 MG: 10 TABLET ORAL at 21:23

## 2022-10-13 RX ADMIN — RIVAROXABAN 15 MG: 15 TABLET, FILM COATED ORAL at 08:44

## 2022-10-13 RX ADMIN — METOPROLOL SUCCINATE 25 MG: 25 TABLET, EXTENDED RELEASE ORAL at 17:54

## 2022-10-13 RX ADMIN — MIDODRINE HYDROCHLORIDE 5 MG: 5 TABLET ORAL at 08:49

## 2022-10-13 ASSESSMENT — PAIN DESCRIPTION - DESCRIPTORS: DESCRIPTORS: ACHING

## 2022-10-13 ASSESSMENT — PAIN DESCRIPTION - ORIENTATION: ORIENTATION: RIGHT

## 2022-10-13 ASSESSMENT — PAIN SCALES - GENERAL: PAINLEVEL_OUTOF10: 8

## 2022-10-13 ASSESSMENT — PAIN DESCRIPTION - LOCATION: LOCATION: KNEE

## 2022-10-13 NOTE — CONSULTS
Will dictate full note  Ortho BP  147/74-lying   Standing 100/62  Check labs       [de-identified]    Check pacer tomorrow  She has RA/RV/LV lead --check device-[employer] device      Change Coreg to Toprol  And ProAmatine 5 tid  Keep systolic pressure 604  Check blood pressure sitting or standing not laying  Concern for falls and risk for bleed  She is on anticoagulation so watch for now  Add stocking knee high  AV paced   Hx of P-afib on AC    AV paced noted -rate is controlled-  She was in A. Fib-now rate is controlled  She is on anticoagulation  Came in with syncope--orthostatic blood pressures  Recent echo shows LV function was preserved  Carotid ultrasound was negative  She has a fracture noted seen by orthopedic-conservative treatment--right proximal fibula nondisplaced fracture      Most Recent Echo  9/20/21        Summary          Left ventricular systolic function is normal.          Ejection fraction is visually estimated at 50-55%. Mild to moderate left ventricular hypertrophy. Grade I diastolic dysfunction. PPM wiring visualized within the right heart. Trace aortic regurgitation noted with color doppler. No evidence of any pericardial effusion. Atherosclerotic plaque noted in the abdominal aorta. Most Recent Stress test  Last stress test was done at the office on  05/23/2022, it was negative for any ischemia. EF was in the 60% range.        Electronically signed by Vasquez Mariscal MD on 10/13/22 at 3:00 PM EDT

## 2022-10-13 NOTE — PROGRESS NOTES
Ulysses Yu    : 1935  Acct #: [de-identified]  MRN: [id number]              PM&R Progress Note      Admitting diagnosis: Hypertensive encephalopathy ( [address])     Comorbid diagnoses impacting rehabilitation: Generalized weakness, gait disturbance, uncontrolled pain, orthostatic hypotension, acute kidney injury, paroxysmal atrial fibrillation, hypokalemia, right proximal fibular fracture, vascular dementia    Chief complaint: Poor sleep and poor appetite. Dizziness when out of bed. No chest pain. Prior (baseline) level of function: Independent.     Current level of function:         Current  IRF-KATELYN and Goals:   Occupational Therapy:    Short Term Goals  Time Frame for Short Term Goals: STGs=LTGs :   Long Term Goals  Time Frame for Long Term Goals : ~10 days or until d/c  Long Term Goal 1: Pt will complete grooming tasks Ind (seated, PRN)  Long Term Goal 2: Pt will complete total body bathing c S  Long Term Goal 3: Pt will complete UB dressing c setup  Long Term Goal 4: Pt will complete LB dressing c setup using AE PRN  Long Term Goal 5: Pt will doff/don footwear c setup using AE PRN  Additional Goals?: Yes  Long Term Goal 6: Pt will complete toileting c supervision  Long Term Goal 7: Pt will complete functional transfers (bed, chair, toilet, shower) c DME PRN and supervision  Long Term Goal 8: Pt will perform therex/therax to facilitate increased strength/endurance/ax tolerance (c emphasis on dynamic standing balance/tolerance >8 mins, BUE endurance) c SBA  Long Term Goal 9: Pt will complete simple homemaking tasks c DME PRN and S :                                       Eating: Eating  Assistance Needed: Independent  Comment: able to open packages/containers  CARE Score: 6  Discharge Goal: Independent       Oral Hygiene: Oral Hygiene  Assistance Needed: Setup or clean-up assistance  Comment: required to perform seated 2* hypotension  CARE Score: 5  Discharge Goal: Independent    UB/LB Bathing: Shower/Bathe Self  Assistance Needed: Supervision or touching assistance  Comment: CGA while in stance to bathe perineal area; completed remainder seated. Required min cues for sequencing d/t decreased recall of parts washed  CARE Score: 4  Discharge Goal: Supervision or touching assistance    UB Dressing: Upper Body Dressing  Assistance Needed: Supervision or touching assistance  Comment: to don undershirt and sweatshirt  CARE Score: 4  Discharge Goal: Set-up or clean-up assistance         LB Dressing: Lower Body Dressing  Assistance Needed: Partial/moderate assistance  Comment: able to doff and don Depends, able to thread LLE in pants but required assist to thread RLE. Performed pants management c CGA  CARE Score: 3  Discharge Goal: Set-up or clean-up assistance    Donning and Vici Footwear: Putting On/Taking Off Footwear  Assistance Needed: Supervision or touching assistance  Comment: to doff/don hospital socks  CARE Score: 4  Discharge Goal: Set-up or clean-up assistance      Toiletin [address] needed: Supervision or touching assistance  Comment: CGA  CARE Score: 4  Discharge Goal: Supervision or touching assistance      Toilet Transfers: Toilet Transfer  Assistance needed: Supervision or touching assistance  Comment: CGA to<>from W/C using grab bar  CARE Score: 4  Discharge Goal: Supervision or touching assistance    Physical Therapy:   Short Term Goals  Time Frame for Short Term Goals: 10 tx days:  Short Term Goal 1: Pt will complete bed mobility (scooting, rolling R/L, and sup<->sit) Ind. Short Term Goal 2: Pt will sit->stand and car transfers using 2WW Mod Ind. Short Term Goal 3: Pt will complete stand->sit and stand turn transfers using 2WW with Sup. Short Term Goal 4: Pt will ambulate 10 ft and 50 ft with turns over level surface using 2WW Mod Ind. Short Term Goal 5: Pt will ambulate 10 ft of uneven surface and >/=150 ft of level surface using 2WW with SBA-Sup.   Additional Goals?: Yes  Short Term Goal 6: Pt will ascend/descend curb step using 2WW and 1 flight of stairs using railings following appropriate step-to pattern on ascent/descent with SBA-Sup. Short Term Goal 7: Pt will complete object retrieval from the floor with 2WW and reacher prn Mod Ind.             Bed Mobility:   Sit to Lying  Assistance Needed: Supervision or touching assistance  Comment: SBA without use of bed features  CARE Score: 4  Discharge Goal: Independent  Roll Left and Right  Assistance Needed: Supervision or touching assistance  Comment: SBA without use of bed features; pt reports pain on R lower leg on both directions  CARE Score: 4  Discharge Goal: Independent  Lying to Sitting on Side of Bed  Assistance Needed: Supervision or touching assistance  Comment: Supervision without bed features  CARE Score: 4  Discharge Goal: Independent    Transfers:    Sit to Stand  Assistance Needed: Supervision or touching assistance  Comment: CGA at RW, mod cues for safety with hand placement  CARE Score: 4  Discharge Goal: Independent  Chair/Bed-to-Chair Transfer  Assistance Needed: Supervision or touching assistance  Comment: CGA for balance  CARE Score: 4  Discharge Goal: Supervision or touching assistance     Car Transfer  Assistance Needed: Supervision or touching assistance  Comment: CGA with 2WW  CARE Score: 4  Discharge Goal: Independent    Ambulation:    Walking Ability  Does the Patient Walk?: Yes     Walk 10 Feet  Assistance Needed: Supervision or touching assistance  Comment: CGA with RW  CARE Score: 4  Discharge Goal: Independent     Walk 50 Feet with Two Turns  Assistance Needed: Supervision or touching assistance  Comment: CGA with RW, slow arnulfo with decreased step length bilaterally, mildly antalgic, manages turns without cues  CARE Score: 4  Discharge Goal: Independent     Walk 150 Feet  Assistance Needed: Supervision or touching assistance  Comment: CGA for balance with RW amb 385' max distance no change in JULIO, intermittent stopping with pt bending R knee to \"stretch it out\", mildly antalgic  CARE Score: 4  Discharge Goal: Supervision or touching assistance     Walking 10 Feet on Uneven Surfaces  Assistance Needed: Supervision or touching assistance  Comment: CGA for balance wtih RW,manages AD over transition and obstacles with min cues to amb within JUDITH of rW  CARE Score: 4  Discharge Goal: Supervision or touching assistance     1 Step (Curb)  Assistance Needed: Supervision or touching assistance  Comment: CGA with RW, mod seq cues  CARE Score: 4  Discharge Goal: Supervision or touching assistance     4 Steps  Assistance Needed: Supervision or touching assistance  Comment: CGA B rails in non-recip pattern, max seq cues  CARE Score: 4  Discharge Goal: Supervision or touching assistance     12 Steps  Assistance Needed: Supervision or touching assistance  Comment: CGA B rails, non-recip pattern, mod seq cues  CARE Score: 4  Discharge Goal: Supervision or touching assistance       Wheelchair:  w/c Ability: Wheelchair Ability  Uses a Wheelchair and/or Scooter?: No                Balance:        Object: Picking Up Object  Assistance Needed: Supervision or touching assistance  Comment: CG-SBA with reacher, min cues  CARE Score: 4  Discharge Goal: Supervision or touching assistance    I      Exam:    Blood pressure (!) 166/145, pulse 70, temperature 98.4 °F (36.9 °C), temperature source Oral, resp. rate 16, height 5' 7\" (1.702 m), weight 179 lb 0.2 oz (81.2 kg), SpO2 97 %, not currently breastfeeding. General: Semiupright in bed. Legs outstretched. More quiet today. HEENT: Gazing right and left. Neck supple. MMM. Symmetric facial expression. Pulmonary: No wheezes, rales or coughing. Cardiac: Her rate is controlled but premature beats are present. Abdomen: Patient's abdomen is soft and nondistended. Bowel sounds were present throughout.   There was no rebound, guarding or masses cautious pain management. Outpatient follow-up with her PCP. Verbal cues and minimum to moderate physical assistance for transfers. DVT prophylaxis: The Xarelto she takes for her atrial fibrillation should protect her against new DVT. I must periodically monitor her hemoglobin and platelet count while on this medication. Weightbearing activities have been limited but tried daily. GI prophylaxis is available. No new bruising or swelling. Orthostatic hypotension: This remains a symptomatic issue for the patient. ProAmatine parameters are to be applied to standing blood pressure measurements only as per cardiology. Vital signs are checked at rest and with activities in various positions. We are encouraging consistent oral intake. Acute kidney injury: Encouraging oral hydration and trying to avoid wide fluctuations of blood pressure. Avoiding nephrotoxic medications when possible. Periodic check of her chemistries. Paroxysmal atrial fibrillation: She has a permanent pacemaker and interrogation should take place as an outpatient. For now she is on Toprol for rate control and Xarelto for anticoagulation. Daily weights do not reveal any decompensation of CHF. Right proximal fibular fracture: Weightbearing as tolerated. Cautious use of analgesics due to her confusion and hypotension. Cryotherapy. Minor pain at rest.  Vascular dementia: Namenda. Cymbalta was discontinued at her request without ill effect thus far. Verbal and written instructions provided when possible.

## 2022-10-13 NOTE — PROGRESS NOTES
Physical Therapy      [x] daily progress note       [] discharge       Patient Name:  Ash Arceo   :  1935 MRN: [id number]  Room:  [address] [place] [address] Date of Admission: 10/10/2022  Rehabilitation Diagnosis:   Hypertensive encephalopathy [I67.4]  Hypertensive encephalopathy [I67.4]       Date 10/13/2022       Day of ARU Week:  4   Time IN/OUT 7365-2458  [id number]   Individual Tx Minutes 60+60   TOTAL Tx Time Mins 120   Variance Time    Variance Time []   Refusal due to:     []   Medical hold/reason:    []   Illness   []   Off Unit for test/procedure  []   Extra time needed to complete task  []   Therapeutic need  []   Other (specify):   Restrictions Restrictions/Precautions  Restrictions/Precautions: General Precautions, Fall Risk (watch ortho BPs)      Communication with other providers: [x]   OK to see per nursing:     []   Spoke with team member regarding:      Subjective observations and cognitive status: Pt alert resting in bed, willing to participate but reports having pain this AM, requesting Tylenol, informed nsg; Applied Biofreeze to R knee and ace wrap at beginning of session. Pt intermittently reporting lightheadedness. BP in sitting after amb 113/66. After 2' in standing 104/68, after 4' standing 101/58 with pt symptomatic  PM: Pt up in recliner with RN in room taking BPs in standing. First BP 77/48, second stand BP 97/58. Pt symptomatic with reports of lightheadedness. RN reports cardiologist consulted, midodrine given at beginning of session. Standing/amb deferred this session due to BPs and pt's symptoms; Carson villareal donned this session.     Pain level/location: 7/10       Location: R knee radiates down to ankle   Discharge recommendations  Anticipated discharge date:  10/19  Destination: []home alone   []home alone with assist PRN     [] home w/ family      [] Continuous supervision  []SNF    [] Assisted living     [] Other:   Continued therapy: []HHC PT  []OUTPATIENT  PT   [] No Further PT  Equipment needs: [facility] requires the assistance of a wheeled walker to successfully ambulate from room to room at home to allow completion of daily living tasks such as: bathing, toileting, dressing and grooming. A wheeled walker is necessary due to the patient's unsteady gait, upper body weakness, inability to  a standard walker. This patient can ambulate only by pushing a walker instead of using a lesser assistive device such as a cane or crutch. Poly Palacio was evaluated today and a DME order was entered for a standard wheelchair with anti-tippers (due to safety )because she requires this to successfully complete daily living tasks of eating, toileting, personal cares, ambulating, hygiene, dressing upper body, dressing lower body, meal preparation, and taking own medications. A standard manual wheelchair is necessary due to patient's impaired ambulation and mobility restrictions and would be unable to resolve these daily living tasks using a cane or walker. The patient is capable of using a standard wheelchair safely in their home and can maneuver within their home with adequate access. There is a caregiver available to provide necessary assistance. The need for this equipment was discussed with the patient and she understands, is in agreement, and has not expressed an unwillingness to use the wheelchair. Bed Mobility:           []   Pt received out of bed   Scooting:  Indep sit scoot to EOB  Supine --> Sit:  Supervision with rail   Bed features used:    [] HOB elevated      [x] Bed rail                                    [] No     Transfers:    Sit--> Stand:  SBA  Stand --> Sit:   SBA  Stand-Pivot:   SBA  Assistive device required for transfer:   RW    Gait:    Distance:  160'+95'+ 72'  Assistance:  SB-CGA  Device:  RW  Gait Quality:   increased antalgic gait with intermittent stopping due to c/o pain in R knee and ankle, reports of lightheadedness.  See above for VS    Wheelchair Propulsion:  Distance:   365'+ 965'+23'  Assistance:   Supervision, intermittent stopping with   Extremities Used:   B UEs, intermittent LEs         Additional Therapeutic activities/exercises completed this date:     []   Nu-step:  Time:        Level:         #Steps:       []   Rebounder:    []  Seated     []  Standing        []   Balance training         []   Postural training    [x]   B LEs AROM  Supine: Ankle Pumps x 20  Quad Sets x 20  Gluteal Sets x 20  Heel Slides x 20  Short Arc Quads x 20  Hip Abduction x 20  Sitting:  Long Arc Quads x 20, lacks full extension on R but improved after ~ 10 reps. Knee Flexion x 20  Seated Marching x 20     [x]   Seated ther ex (reps/sets):     []   Standing ther ex (reps/sets):     []   Picked up object from floor                       []   Reacher used   [x]   Other:Indep donning socks sitting EOB and threading pants over feet; SBA for balance to pull up   [x]   Other:Pt amb with RW ~ 20' x 2 around obstacles  and side stepping through small spaces with SB-CGA and min cues for clearance on chair legs, able to carryover safety with side stepping after initial demo provided.     []   Other:      Patient/Caregiver Education and Training:   [x]   Bed Mobility/Transfer technique/safety  [x]   Gait technique/sequencing  [x]   Proper use of assistive device  []   Advanced mobility safety and technique  []   Reinforced patient's precautions with mobility/functional tasks  []   Postural awareness  []   Family training  []   Other:    Treatment Plan for Next Session: stair training per home scenario, outside surfaces, walker safety with transfers, dynamic balance        Treatment/Activity Tolerance:   [] Tolerated treatment with no adverse effects    [] Patient limited by fatigue  [x] Patient limited by pain, lightheadedness and decreased BP in AM and PM,   [] Patient limited by medical complications:    [] Adverse reaction to Tx:   [] Significant change in status    Safety:       []  bed alarm set    [x]  chair alarm set    []  Pt refused alarms                []  Telesitter activated      [x]  Gait belt used during tx session      [] Call light and belongings in reach       [] Other      Number of Minutes/Billable Intervention  Gait Training 35   Therapeutic Exercise 30   Neuro Re-Ed    Therapeutic Activity 30   Wheelchair Propulsion 25   Group    Other:    TOTAL 120         Social History  Social/Functional History  Lives With: Alone  Type of Home: House  Home Layout: Two level, Able to Live on Main level with bedroom/bathroom, 1/2 bath on main level (reports she hasn't gone upstairs in years)  Home Access: Stairs to enter with rails  Entrance Stairs - Number of Steps: 3-4  Entrance Stairs - Rails: Both (however cannot reach both simultaneously)  Bathroom Shower/Tub: Tub/Shower unit (however sponge bathes at baseline)  Bathroom Toilet: Standard  Bathroom Accessibility: Walker accessible  Home Equipment: [employer], standard  Has the patient had two or more falls in the past year or any fall with injury in the past year?: Yes (pt unable to give exact number but is confident it was more than 2)  Receives Help From: Santo Youer Zainab Cotto)  ADL Assistance: 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    was in the [address] [place]: Has a cat Allyssa, housework, grocery, pt sets up a 7 day pill box, pt manages finances via autopay  Additional Comments: Pt typically sleeps in a flat, regular bed at home    Objective                                                                                    Goals:  (Update in navigator)  Short Term Goals  Time Frame for Short Term Goals: 10 tx days:  Short Term Goal 1: Pt will complete bed mobility (scooting, rolling R/L, and sup<->sit) Ind. Short Term Goal 2: Pt will sit->stand and car transfers using 2WW Mod Ind. Short Term Goal 3: Pt will complete stand->sit and stand turn transfers using 2WW with Sup. Short Term Goal 4: Pt will ambulate 10 ft and 50 ft with turns over level surface using 2WW Mod Ind. Short Term Goal 5: Pt will ambulate 10 ft of uneven surface and >/=150 ft of level surface using 2WW with SBA-Sup. Additional Goals?: Yes  Short Term Goal 6: Pt will ascend/descend curb step using 2WW and 1 flight of stairs using railings following appropriate step-to pattern on ascent/descent with SBA-Sup. Short Term Goal 7: Pt will complete object retrieval from the floor with 2WW and reacher prn Mod Ind.:   :        Plan of Care                                                                              Times per week: 5 days per week for a minimum of 60 minutes/day plus group as appropriate for 60 minutes.   Treatment to include Current Treatment Recommendations: Strengthening, ROM, Balance training, Functional mobility training, Transfer training, Cognitive/Perceptual training, Endurance training, Gait training, Stair training, Pain management, Home exercise program, Safety education & training, Patient/Caregiver education & training, Equipment evaluation, education, & procurement, Therapeutic activities    Electronically signed by   Lucho London, PTA #[id number]  10/13/2022, 8:57 AM

## 2022-10-13 NOTE — PROGRESS NOTES
Patient had a BP of 147/74 when lying in a semi fowlers position and a BP of 100/62 while standing at 0844. Orthostatic BP was taken again at 1400. Patients BP was 134/74 while sitting and 97/58 when standing. This nurse contacted Dr. Mendez Villagomez for a consult to cardiology and consulted Dr. Justine Garcia.

## 2022-10-13 NOTE — PROGRESS NOTES
Occupational Therapy  Physical Rehabilitation: OCCUPATIONAL THERAPY     [x] daily progress note       [] discharge       Patient Name:  Merline Birchwood   :  1935 MRN: [id number]  Room:  [address] [place] [address] Date of Admission: 10/10/2022  Rehabilitation Diagnosis:   Hypertensive encephalopathy [I67.4]  Hypertensive encephalopathy [I67.4]       Date 10/13/2022       Day of ARU Week:  4   Time IN/OUT 4189-2768   Individual Tx Minutes 60   Group Tx Minutes    Co-Treat Minutes    Concurrent Tx Minutes    TOTAL Tx Time Mins 60   Variance Time    Variance Time []   Refusal due to:     []   Medical hold/reason:    []   Illness   []   Off Unit for test/procedure  []   Extra time needed to complete task  []   Therapeutic need  []   Other (specify):   Restrictions Restrictions/Precautions: General Precautions, Fall Risk (watch ortho BPs)         Communication with other providers: [x]   OK to see per nursing:     []   Spoke with team member regarding:      Subjective observations and cognitive status: Pt resting in bed on approach; pt stated \"I feel like crap! \" Pt reports feeling light headed and \"like I have a sinus infection or something. \" Vitals taken- BP-151/81 in semi jacobo's. Pain level/location:    /10       Location:    Discharge recommendations  Anticipated discharge date:  10/19  Destination: []home alone   []home alone w assist prn   [] home w/ family    [x] Continuous supervision       []SNF    [] Assisted living     [] Other:   Continued therapy: [x]HHC OT  []OUTPATIENT  OT   [] No Further OT  Equipment needs: BS       Merline Birchwood requires a 3 in 1 bedside commode due to being unable to use the toilet within the home  And confined to one level of the home.        Toileting:   SBA for clothing management        Toilet Transfers:   SBA c cues for walker safety   Device Used:    []   Standard Toilet         []   Grab Bars           []  Bedside Commode       []   Elevated Toilet          []   Other:        Bed Mobility:           []   Pt received out of bed   Supine --> Sit:  SBA     Transfers:    Sit--> Stand:  SBA c cues for hand placement   Stand --> Sit:   SBA   Stand-Pivot:   SBA   Other:    Assistive device required for transfer:   RW       Functional Mobility:  to bathroom + 25 ft c SBA   To increase BUE strength and endurance for functional transfers and ADLs, Pt self propelled  ft c SBA and cues for proper technique     Assistance:  SBA   Device:   [x]   Rolling Walker     []   Standard Walker []   Wheelchair        []   Wallene Brenton       []   4-Wheeled Anabellraquel mAadorer         []   Cardiac Walker       []   Other:          Additional Therapeutic activities/exercises completed this date:     [x]   ADL Training   []   Balance/Postural training     [x]   Bed/Transfer Training: sit<>stand transfer training x 3 from various surfaces c mod cues for hand placement and safety. []   Endurance Training   []   Neuromuscular Re-ed   []   Nu-step:  Time:        Level:         #Steps:       []   Rebounder:    []  Seated     []  Standing        []   Supine Ther Ex (reps/sets):     [x]   Seated Ther Ex (reps/sets): To increase BUE endurance for ADLs and transfers, pt completed 2 sets x10 reps of the following therex,c a 3# weight:   Shoulder presses  Chest presses  Shoulder abduction/adduction  Shoulder horizontal abduction/adduction  Concentric arm circles (clockwise and counterclockwise)  Bicep curls  Tricep curls     []   Standing Ther Ex (reps/sets):     []   Other:      Comments:      Patient/Caregiver Education and Training:   []   [employer] Equipment Use  []   Bed Mobility/Transfer Technique/Safety  []   Energy Conservation Tips  []   Family training  []   Postural Awareness  []   Safety During Functional Activities  []   Reinforced Patient's Precautions   []   Progress was updated and reviewed in Rehabtracker with patient and/or family this         date.     Treatment Plan for Next Session: Continue OT POC Treatment/Activity Tolerance:   [x] Tolerated treatment with no adverse effects    [] Patient limited by fatigue  [] Patient limited by pain   [] Patient limited by medical complications:    [] Adverse reaction to Tx:   [] Significant change in status    Safety:       []  bed alarm set    [x]  chair alarm set    []  Pt refused alarms                []  Telesitter activated      [x]  Gait belt used during tx session      []other:       Number of Minutes/Billable Intervention  Therapeutic Exercise 15   ADL Self-care 15   Neuro Re-Ed    Therapeutic Activity 30   Group    Other:    TOTAL 60       Social History  Social/Functional History  Lives With: Alone  Type of Home: House  Home Layout: Two level, Able to Live on Main level with bedroom/bathroom, 1/2 bath on main level (reports she hasn't gone upstairs in years)  Home Access: Stairs to enter with rails  Entrance Stairs - Number of Steps: 3-4  Entrance Stairs - Rails: Both (however cannot reach both simultaneously)  Bathroom Shower/Tub: Tub/Shower unit (however sponge bathes at baseline)  Bathroom Toilet: Standard  Bathroom Accessibility: Walker accessible  Home Equipment: [employer], standard  Has the patient had two or more falls in the past year or any fall with injury in the past year?: Yes (pt unable to give exact number but is confident it was more than 2)  Receives Help From: Iman Aguila)  ADL Assistance: Independent  Homemaking Assistance: Independent  Homemaking Responsibilities: Yes (pt reports being Ind but charting indicates pt is non compliant with medication)  Ambulation Assistance: Independent (with cane)  Transfer Assistance: Independent  Active : Yes  Mode of Transportation: Car  Occupation: Retired  Type of Occupation: Housewife, also factory work, .    was in the [address] [place]: Has a cat Allyssa, housework, grocery, pt sets up a 7 day pill box, pt manages finances via autopay  Additional Comments: Pt typically sleeps in a flat, regular bed at home    Objective                                                                                    Goals:  (Update in navigator)  Short Term Goals  Time Frame for Short Term Goals: STGs=LTGs:  Long Term Goals  Time Frame for Long Term Goals : ~10 days or until d/c  Long Term Goal 1: Pt will complete grooming tasks Ind (seated, PRN)  Long Term Goal 2: Pt will complete total body bathing c S  Long Term Goal 3: Pt will complete UB dressing c setup  Long Term Goal 4: Pt will complete LB dressing c setup using AE PRN  Long Term Goal 5: Pt will doff/don footwear c setup using AE PRN  Additional Goals?: Yes  Long Term Goal 6: Pt will complete toileting c supervision  Long Term Goal 7: Pt will complete functional transfers (bed, chair, toilet, shower) c DME PRN and supervision  Long Term Goal 8: Pt will perform therex/therax to facilitate increased strength/endurance/ax tolerance (c emphasis on dynamic standing balance/tolerance >8 mins, BUE endurance) c SBA  Long Term Goal 9: Pt will complete simple homemaking tasks c DME PRN and S:        Plan of Care                                                                              Times per week: 5 days per week for a minimum of 60 minutes/day plus group as appropriate for 60 minutes.   Treatment to include Occupational Therapy Plan  Current Treatment Recommendations: Balance training, Functional mobility training, Endurance training, Safety education & training, Patient/Caregiver education & training, Equipment evaluation, education, & procurement, Self-Care / ADL, Home management training, Cognitive/Perceptual training    Electronically signed by   CARLOS Soto,  10/13/2022, 9:22 AM

## 2022-10-14 PROCEDURE — 97530 THERAPEUTIC ACTIVITIES: CPT

## 2022-10-14 PROCEDURE — 94150 VITAL CAPACITY TEST: CPT

## 2022-10-14 PROCEDURE — 97110 THERAPEUTIC EXERCISES: CPT

## 2022-10-14 PROCEDURE — 97535 SELF CARE MNGMENT TRAINING: CPT

## 2022-10-14 PROCEDURE — 1280000000 HC REHAB R&B

## 2022-10-14 PROCEDURE — 6370000000 HC RX 637 (ALT 250 FOR IP): Performed by: INTERNAL MEDICINE

## 2022-10-14 PROCEDURE — 99232 SBSQ HOSP IP/OBS MODERATE 35: CPT | Performed by: PHYSICAL MEDICINE & REHABILITATION

## 2022-10-14 PROCEDURE — 94761 N-INVAS EAR/PLS OXIMETRY MLT: CPT

## 2022-10-14 PROCEDURE — 6370000000 HC RX 637 (ALT 250 FOR IP): Performed by: PHYSICAL MEDICINE & REHABILITATION

## 2022-10-14 PROCEDURE — 97116 GAIT TRAINING THERAPY: CPT

## 2022-10-14 RX ADMIN — LEVOTHYROXINE SODIUM 50 MCG: 0.07 TABLET ORAL at 06:33

## 2022-10-14 RX ADMIN — MEMANTINE 10 MG: 10 TABLET ORAL at 20:17

## 2022-10-14 RX ADMIN — ACETAMINOPHEN 650 MG: 325 TABLET ORAL at 09:01

## 2022-10-14 RX ADMIN — MIDODRINE HYDROCHLORIDE 5 MG: 5 TABLET ORAL at 18:00

## 2022-10-14 RX ADMIN — MIDODRINE HYDROCHLORIDE 5 MG: 5 TABLET ORAL at 09:01

## 2022-10-14 RX ADMIN — MEMANTINE 10 MG: 10 TABLET ORAL at 09:01

## 2022-10-14 RX ADMIN — RIVAROXABAN 15 MG: 15 TABLET, FILM COATED ORAL at 09:01

## 2022-10-14 RX ADMIN — METOPROLOL SUCCINATE 25 MG: 25 TABLET, EXTENDED RELEASE ORAL at 18:00

## 2022-10-14 RX ADMIN — MIDODRINE HYDROCHLORIDE 5 MG: 5 TABLET ORAL at 13:08

## 2022-10-14 ASSESSMENT — PAIN SCALES - GENERAL: PAINLEVEL_OUTOF10: 0

## 2022-10-14 NOTE — PROGRESS NOTES
Physical Rehabilitation: OCCUPATIONAL THERAPY     [x] daily progress note       [] discharge       Patient Name:  Tu Son   :  1935 MRN: [id number]  Room:  [address] [place] [address] Date of Admission: 10/10/2022  Rehabilitation Diagnosis:   Hypertensive encephalopathy [I67.4]  Hypertensive encephalopathy [I67.4]       Date 10/14/2022       Day of ARU Week:  5   Time IN//1050   Individual Tx Minutes 60   Group Tx Minutes    Co-Treat Minutes    Concurrent Tx Minutes    TOTAL Tx Time Mins 60   Variance Time    Variance Time []   Refusal due to:     []   Medical hold/reason:    []   Illness   []   Off Unit for test/procedure  []   Extra time needed to complete task  []   Therapeutic need  []   Other (specify):   Restrictions Restrictions/Precautions: General Precautions, Fall Risk (watch ortho BPs)         Communication with other providers: [x]   OK to see per nursing:     []   Spoke with team member regarding:      Subjective observations and cognitive status: Patient in semi fowlers/supine upon approach with eyes closed, patient easily aroused, expresses concerns over not feeling well and bouts of low BP, dizziness.   Patient declines shower, is agreeable to some exercises in room and possible wash up at sink if feeling better throughout session; patient BP WFL for first 20 min of session; patient agreeable to wash up at sink, c/o dizziness dissipate by middle of therapy session    Monitored vitals throughout; see comments below        Pain level/location:    6/10       Location: [facility]   Discharge recommendations  Anticipated discharge date:  10/19  Destination: []home alone   []home alone w assist prn   [] home w/ family    [x] Continuous supervision       []SNF    [] Assisted living     [] Other:   Continued therapy: [x]HHC OT  []OUTPATIENT  OT   [] No Further OT  Equipment needs:   Tu Son requires a 3 in 1 bedside commode due to being unable to use the toilet within the home  And confined to one level of the home. ADLs:    UB/LB Bathing: Shower/Bathe Self  Assistance Needed: Supervision or touching assistance  Comment: SBA in stance, seated sinkside for majority, flexes at hips to wash distal BLE (declines LHS)  CARE Score: 4  Discharge Goal: Supervision or touching assistance    UB Dressing: Upper Body Dressing  Assistance Needed: Setup or clean-up assistance  Comment: Set up  CARE Score: 5  Discharge Goal: Set-up or clean-up assistance         LB Dressing: Lower Body Dressing  Assistance Needed: Supervision or touching assistance  Comment: SBA in stance, threads pants without use of AE  CARE Score: 4  Discharge Goal: Set-up or clean-up assistance    Donning and [place] Footwear: Putting On/Taking Off Footwear  Assistance Needed: Partial/moderate assistance  Comment: Min A to kenrick ROSARIO hosedarn[facility] socks independently using figure four method  CARE Score: 3  Discharge Goal: Set-up or clean-up assistance      Toiletin [address] needed: Supervision or touching assistance  Comment: SBA in stance managing pants over hips, hygiene completed seated  CARE Score: 4  Discharge Goal: Supervision or touching assistance         Toilet Transfers:     Toilet Transfer  Assistance needed: Supervision or touching assistance  Comment: SBA  CARE Score: 4  Discharge Goal: Supervision or touching assistance  Device Used:    [x]   Standard Toilet         [x]   Grab Bars           []  Bedside Commode       []   Elevated Toilet          []   Other:         Bed Mobility:           []   Pt received out of bed   Rolling R/L:    Scooting:  Ind to EOB   Supine --> Sit:  Ind  Sit --> Supine:  Ind    Transfers:    Sit--> Stand:  SBA  Stand --> Sit:   SBA  Stand-Pivot:   SB/CGA (2* to symptoms with lowered BP)   Other:    Assistive device required for transfer:   RW        Additional Therapeutic activities/exercises completed this date:     [x]   ADL Training   [x]   Balance/Postural training     [x] Bed/Transfer Training   []   Endurance Training   []   Neuromuscular Re-ed   []   Nu-step:  Time:        Level:         #Steps:       []   Rebounder:    []  Seated     []  Standing        [x]   Supine Ther Ex (reps/sets): Patient instructed in ankle pumps, glute squeeze supine and , knee raises at EOB to assist with low blood pressures    []   Seated Ther Ex (reps/sets):     []   Standing Ther Ex (reps/sets):     []   Other:      Comments:  BP throughout session    146/78 in semi fowlers beginning of session    123/82   Seated EOB     109/76 following stance to was lower body     93/72 in stance     117/72  end of ADL session seated in wc       Patient/Caregiver Education and Training:   [x]   Adaptive Equipment Use  [x]   Bed Mobility/Transfer Technique/Safety  [x]   Energy Conservation Tips  []   Family training  [x]   Postural Awareness  [x]   Safety During Functional Activities  []   Reinforced Patient's Precautions   []   Progress was updated and reviewed in Rehabtracker with patient and/or family this         date.     Treatment Plan for Next Session: POC to continue as tolerated         Treatment/Activity Tolerance:   [x] Tolerated treatment with no adverse effects    [] Patient limited by fatigue  [] Patient limited by pain   [] Patient limited by medical complications:    [] Adverse reaction to Tx:   [] Significant change in status    Safety:       []  bed alarm set    [x]  chair alarm set    []  Pt refused alarms                []  Telesitter activated      [x]  Gait belt used during tx session      []other:       Number of Minutes/Billable Intervention  Therapeutic Exercise 15   ADL Self-care 30   Neuro Re-Ed    Therapeutic Activity 15   Group    Other:    TOTAL 60       Social History  Social/Functional History  Lives With: Alone  Type of Home: House  Home Layout: Two level, Able to Live on Main level with bedroom/bathroom, 1/2 bath on main level (reports she hasn't gone upstairs in years)  Home Access: Stairs to enter with rails  Entrance Stairs - Number of Steps: 3-4  Entrance Stairs - Rails: Both (however cannot reach both simultaneously)  Bathroom Shower/Tub: Tub/Shower unit (however sponge bathes at baseline)  Bathroom Toilet: Standard  Bathroom Accessibility: Walker accessible  Home Equipment: Rolando Shank, standard  Has the patient had two or more falls in the past year or any fall with injury in the past year?: Yes (pt unable to give exact number but is confident it was more than 2)  Receives Help From: Demetra Hernandez  ADL Assistance: [address]: Independent  Homemaking Responsibilities: Yes (pt reports being Ind but charting indicates pt is non compliant with medication)  Ambulation Assistance: Independent (with cane)  Transfer Assistance: Independent  Active : Yes  Mode of Transportation: Car  Occupation: Retired  Type of Occupation: Housewife, 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    was in the [address]: Has a cat Allyssa, housework, grocery, pt sets up a 7 day pill box, pt manages finances via autopay  Additional Comments: Pt typically sleeps in a flat, regular bed at home    Objective                                                                                    Goals:  (Update in navigator)  Short Term Goals  Time Frame for Short Term Goals: STGs=LTGs:  Long Term Goals  Time Frame for Long Term Goals : ~10 days or until d/c  Long Term Goal 1: Pt will complete grooming tasks Ind (seated, PRN)  Long Term Goal 2: Pt will complete total body bathing c S  Long Term Goal 3: Pt will complete UB dressing c setup  Long Term Goal 4: Pt will complete LB dressing c setup using AE PRN  Long Term Goal 5: Pt will doff/don footwear c setup using AE PRN  Additional Goals?: Yes  Long Term Goal 6: Pt will complete toileting c supervision  Long Term Goal 7: Pt will complete functional transfers (bed, chair, toilet, shower) c DME PRN and supervision  Long Term Goal 8: Pt will perform therex/therax to facilitate increased strength/endurance/ax tolerance (c emphasis on dynamic standing balance/tolerance >8 mins, BUE endurance) c SBA  Long Term Goal 9: Pt will complete simple homemaking tasks c DME PRN and S:        Plan of Care                                                                              Times per week: 5 days per week for a minimum of 60 minutes/day plus group as appropriate for 60 minutes.   Treatment to include Occupational Therapy Plan  Current Treatment Recommendations: Balance training, Functional mobility training, Endurance training, Safety education & training, Patient/Caregiver education & training, Equipment evaluation, education, & procurement, Self-Care / ADL, Home management training, Cognitive/Perceptual training    Electronically signed by   CARLOS Welch,  10/14/2022, 10:53 AM

## 2022-10-14 NOTE — PROGRESS NOTES
Laurie Schmitz    : 1935  Acct #: [de-identified]  MRN: [id number]              PM&R Progress Note      Admitting diagnosis: Hypertensive encephalopathy ( [place] [address])     Comorbid diagnoses impacting rehabilitation: Generalized weakness, gait disturbance, uncontrolled pain, orthostatic hypotension, acute kidney injury, paroxysmal atrial fibrillation, hypokalemia, right proximal fibular fracture, vascular dementia    Chief complaint: Right knee pain during transfer and gait training. Dizziness initially when out of bed. Prior (baseline) level of function: Independent.     Current level of function:         Current  IRF-KATELYN and Goals:   Occupational Therapy:    Short Term Goals  Time Frame for Short Term Goals: STGs=LTGs :   Long Term Goals  Time Frame for Long Term Goals : ~10 days or until d/c  Long Term Goal 1: Pt will complete grooming tasks Ind (seated, PRN)  Long Term Goal 2: Pt will complete total body bathing c S  Long Term Goal 3: Pt will complete UB dressing c setup  Long Term Goal 4: Pt will complete LB dressing c setup using AE PRN  Long Term Goal 5: Pt will doff/don footwear c setup using AE PRN  Additional Goals?: Yes  Long Term Goal 6: Pt will complete toileting c supervision  Long Term Goal 7: Pt will complete functional transfers (bed, chair, toilet, shower) c DME PRN and supervision  Long Term Goal 8: Pt will perform therex/therax to facilitate increased strength/endurance/ax tolerance (c emphasis on dynamic standing balance/tolerance >8 mins, BUE endurance) c SBA  Long Term Goal 9: Pt will complete simple homemaking tasks c DME PRN and S :                                       Eating: Eating  Assistance Needed: Independent  Comment: able to open packages/containers  CARE Score: 6  Discharge Goal: Independent       Oral Hygiene: Oral Hygiene  Assistance Needed: Setup or clean-up assistance  Comment: required to perform seated 2* hypotension  CARE Score: 5  Discharge Goal: Independent    UB/LB Bathing: Shower/Bathe Self  Assistance Needed: Supervision or touching assistance  Comment: CGA while in stance to bathe perineal area; completed remainder seated. Required min cues for sequencing d/t decreased recall of parts washed  CARE Score: 4  Discharge Goal: Supervision or touching assistance    UB Dressing: Upper Body Dressing  Assistance Needed: Supervision or touching assistance  Comment: to don undershirt and sweatshirt  CARE Score: 4  Discharge Goal: Set-up or clean-up assistance         LB Dressing: Lower Body Dressing  Assistance Needed: Partial/moderate assistance  Comment: able to doff and don Depends, able to thread LLE in pants but required assist to thread RLE. Performed pants management c CGA  CARE Score: 3  Discharge Goal: Set-up or clean-up assistance    Donning and [place] Footwear: Putting On/Taking Off Footwear  Assistance Needed: Supervision or touching assistance  Comment: to doff/don hospital socks  CARE Score: 4  Discharge Goal: Set-up or clean-up assistance      Toiletin [address] needed: Supervision or touching assistance  Comment: CGA  CARE Score: 4  Discharge Goal: Supervision or touching assistance      Toilet Transfers: Toilet Transfer  Assistance needed: Supervision or touching assistance  Comment: CGA to<>from W/C using grab bar  CARE Score: 4  Discharge Goal: Supervision or touching assistance    Physical Therapy:   Short Term Goals  Time Frame for Short Term Goals: 10 tx days:  Short Term Goal 1: Pt will complete bed mobility (scooting, rolling R/L, and sup<->sit) Ind. Short Term Goal 2: Pt will sit->stand and car transfers using 2WW Mod Ind. Short Term Goal 3: Pt will complete stand->sit and stand turn transfers using 2WW with Sup. Short Term Goal 4: Pt will ambulate 10 ft and 50 ft with turns over level surface using 2WW Mod Ind.   Short Term Goal 5: Pt will ambulate 10 ft of uneven surface and >/=150 ft of level surface using 2WW with SBA-Sup. Additional Goals?: Yes  Short Term Goal 6: Pt will ascend/descend curb step using 2WW and 1 flight of stairs using railings following appropriate step-to pattern on ascent/descent with SBA-Sup. Short Term Goal 7: Pt will complete object retrieval from the floor with 2WW and reacher prn Mod Ind.             Bed Mobility:   Sit to Lying  Assistance Needed: Supervision or touching assistance  Comment: SBA without use of bed features  CARE Score: 4  Discharge Goal: Independent  Roll Left and Right  Assistance Needed: Supervision or touching assistance  Comment: SBA without use of bed features; pt reports pain on R lower leg on both directions  CARE Score: 4  Discharge Goal: Independent  Lying to Sitting on Side of Bed  Assistance Needed: Supervision or touching assistance  Comment: Supervision without bed features  CARE Score: 4  Discharge Goal: Independent    Transfers:    Sit to Stand  Assistance Needed: Supervision or touching assistance  Comment: CGA at RW, mod cues for safety with hand placement  CARE Score: 4  Discharge Goal: Independent  Chair/Bed-to-Chair Transfer  Assistance Needed: Supervision or touching assistance  Comment: CGA for balance  CARE Score: 4  Discharge Goal: Supervision or touching assistance     Car Transfer  Assistance Needed: Supervision or touching assistance  Comment: CGA with 2WW  CARE Score: 4  Discharge Goal: Independent    Ambulation:    Walking Ability  Does the Patient Walk?: Yes     Walk 10 Feet  Assistance Needed: Supervision or touching assistance  Comment: CGA with RW  CARE Score: 4  Discharge Goal: Independent     Walk 50 Feet with Two Turns  Assistance Needed: Supervision or touching assistance  Comment: CGA with RW, slow arnulfo with decreased step length bilaterally, mildly antalgic, manages turns without cues  CARE Score: 4  Discharge Goal: Independent     Walk 150 Feet  Assistance Needed: Supervision or touching assistance  Comment: CGA for balance with RW amb 385' max distance no change in JULIO, intermittent stopping with pt bending R knee to \"stretch it out\", mildly antalgic  CARE Score: 4  Discharge Goal: Supervision or touching assistance     Walking 10 Feet on Uneven Surfaces  Assistance Needed: Supervision or touching assistance  Comment: CGA for balance wtih RW,manages AD over transition and obstacles with min cues to amb within JUDITH of rW  CARE Score: 4  Discharge Goal: Supervision or touching assistance     1 Step (Curb)  Assistance Needed: Supervision or touching assistance  Comment: CGA with RW, mod seq cues  CARE Score: 4  Discharge Goal: Supervision or touching assistance     4 Steps  Assistance Needed: Supervision or touching assistance  Comment: CGA B rails in non-recip pattern, max seq cues  CARE Score: 4  Discharge Goal: Supervision or touching assistance     12 Steps  Assistance Needed: Supervision or touching assistance  Comment: CGA B rails, non-recip pattern, mod seq cues  CARE Score: 4  Discharge Goal: Supervision or touching assistance       Wheelchair:  w/c Ability: Wheelchair Ability  Uses a Wheelchair and/or Scooter?: No                Balance:        Object: Picking Up Object  Assistance Needed: Supervision or touching assistance  Comment: CG-SBA with reacher, min cues  CARE Score: 4  Discharge Goal: Supervision or touching assistance    I      Exam:    Blood pressure (!) 152/61, pulse 61, temperature 98.4 °F (36.9 °C), temperature source Oral, resp. rate 18, height 5' 7\" (1.702 m), weight 177 lb 4 oz (80.4 kg), SpO2 97 %, not currently breastfeeding. General: Sitting up in a bedside chair with legs elevated. Talkative. Fair-insight and reasoning. HEENT: Neck supple. Gazing right and left. MMM. Pulmonary: No wheezes, rales or coughing. Cardiac: Premature beats occasionally occur. Her rate is controlled. Abdomen: Patient's abdomen is soft and nondistended. Bowel sounds were present throughout.   There was no rebound, guarding or masses noted. Upper extremities: She can bring both hands together in the midline. No tremor or clonus. Fair  strength. Lower extremities: 4 -/5 strength across the knees and ankles. No signs of DVT. Trace edema. Sitting balance was fair+. Standing balance was poor. Lab Results   Component Value Date    WBC 6.4 10/13/2022    HGB 11.8 (L) 10/13/2022    HCT 37.0 10/13/2022    MCV 88.1 10/13/2022     10/13/2022     Lab Results   Component Value Date    INR 1.90 10/13/2022    INR 1.02 02/25/2020    INR 1.00 11/08/2013    PROTIME 24.7 (H) 10/13/2022    PROTIME 12.3 02/25/2020    PROTIME 10.8 11/08/2013     Lab Results   Component Value Date    CREATININE 1.3 (H) 10/13/2022    BUN 29 (H) 10/13/2022     10/13/2022    K 4.5 10/13/2022     10/13/2022    CO2 31 10/13/2022     Lab Results   Component Value Date    ALT 14 10/13/2022    AST 18 10/13/2022    ALKPHOS 83 10/13/2022    BILITOT 0.3 10/13/2022       Expected length of stay  prior to a supervised level of function for discharge home with a walker and [address] OT/PT is 10/19/2022. Recommendations:    Hypertensive encephalopathy with gait disturbance: Symptomatic orthostasis and supine hypertension are significant obstacles for getting her through the activities of the daily occupational and physical therapy with speech-language pathology. Her blood pressure readings are difficult to interpret. Blood pressure is very tremendously from moment to moment regardless of the position she is in at the time of the check. Her insight and reasoning remain poor. She remains resistant to much of what we want to do. Trying to control her systolic blood pressure from fluctuations up or down. Cardiology is trialing a change in the timing of the beta-blocker. Continuing the ProAmatine with parameters tied to standing blood pressures only.   Ongoing attempts at adaptive equipment training, caregiver education and aggressive pulmonary hygiene measures. Providing DVT prophylaxis, bowel and bladder retraining and cautious pain management. Outpatient follow-up with her PCP. Verbal cues and minimum physical assistance for transfers. DVT prophylaxis: The Xarelto she takes for her atrial fibrillation should protect her against new DVT. I must periodically monitor her hemoglobin and platelet count while on this medication. Weightbearing activities have been limited but tried daily. GI prophylaxis is available. No clinical signs of blood loss. Orthostatic hypotension: This remains a symptomatic issue for the patient. ProAmatine parameters are to be applied to standing blood pressure measurements only as per cardiology. Vital signs are checked at rest and with activities in various positions. We are encouraging consistent oral intake. Acute kidney injury: Encouraging oral hydration and trying to avoid wide fluctuations of blood pressure. Avoiding nephrotoxic medications when possible. Periodic check of her chemistries. Paroxysmal atrial fibrillation: She has a permanent pacemaker and interrogation should take place as an outpatient. Continuing Toprol for rate control and Xarelto for anticoagulation. Daily weights do not reveal any decompensation of CHF. Right proximal fibular fracture: Weightbearing as tolerated. Cautious use of analgesics due to her confusion and hypotension. Cryotherapy. Some weightbearing pain which has minimal impact on her therapy activities. Vascular dementia: Namenda. Cymbalta was discontinued at her request without ill effect thus far. Verbal and written instructions provided when possible. She would benefit from supervision after discharge. Counseling and Coordination of Care: In care conference today I met with the patient's OT, PT, RN and . We discussed the patient's problems, progress and prognosis.  Disposition issues were clarified and plans were established for ongoing rehabilitation efforts beyond the ARU stay. I reviewed this information with the patient during a second distinct visit with the patient. More than half of the total time of 35 minutes spent with the patient involved counseling and coordination of care.

## 2022-10-14 NOTE — PROGRESS NOTES
Tosha Griffiths    : 1935  Acct #: [de-identified]  MRN: [id number]              PM&R Progress Note      Admitting diagnosis: Hypertensive encephalopathy ( [address])     Comorbid diagnoses impacting rehabilitation: Generalized weakness, gait disturbance, uncontrolled pain, orthostatic hypotension, acute kidney injury, paroxysmal atrial fibrillation, hypokalemia, right proximal fibular fracture, vascular dementia    Chief complaint: Sleeping fair. Knee control is reasonable. Prior (baseline) level of function: Independent.     Current level of function:         Current  IRF-KATELYN and Goals:   Occupational Therapy:    Short Term Goals  Time Frame for Short Term Goals: STGs=LTGs :   Long Term Goals  Time Frame for Long Term Goals : ~10 days or until d/c  Long Term Goal 1: Pt will complete grooming tasks Ind (seated, PRN)  Long Term Goal 2: Pt will complete total body bathing c S  Long Term Goal 3: Pt will complete UB dressing c setup  Long Term Goal 4: Pt will complete LB dressing c setup using AE PRN  Long Term Goal 5: Pt will doff/don footwear c setup using AE PRN  Additional Goals?: Yes  Long Term Goal 6: Pt will complete toileting c supervision  Long Term Goal 7: Pt will complete functional transfers (bed, chair, toilet, shower) c DME PRN and supervision  Long Term Goal 8: Pt will perform therex/therax to facilitate increased strength/endurance/ax tolerance (c emphasis on dynamic standing balance/tolerance >8 mins, BUE endurance) c SBA  Long Term Goal 9: Pt will complete simple homemaking tasks c DME PRN and S :                                       Eating: Eating  Assistance Needed: Independent  Comment: able to open packages/containers  CARE Score: 6  Discharge Goal: Independent       Oral Hygiene: Oral Hygiene  Assistance Needed: Setup or clean-up assistance  Comment: required to perform seated 2* hypotension  CARE Score: 5  Discharge Goal: Independent    UB/LB Bathing: Shower/Bathe Self  Assistance Needed: Supervision or touching assistance  Comment: SBA in stance, seated sinkside for majority, flexes at hips/and uses figure four method to wash distal BLE (declines LHS)  CARE Score: 4  Discharge Goal: Supervision or touching assistance    UB Dressing: Upper Body Dressing  Assistance Needed: Setup or clean-up assistance  Comment: Set up  CARE Score: 5  Discharge Goal: Set-up or clean-up assistance         LB Dressing: Lower Body Dressing  Assistance Needed: Supervision or touching assistance  Comment: SBA in stance, threads pants without use of AE  CARE Score: 4  Discharge Goal: Set-up or clean-up assistance    Donning and [place] Footwear: Putting On/Taking Off Footwear  Assistance Needed: Partial/moderate assistance  Comment: Min A to kenrick MARLENE hose, donns hospotal socks independently using figure four method  CARE Score: 3  Discharge Goal: Set-up or clean-up assistance      Toiletin [address] needed: Supervision or touching assistance  Comment: SBA in stance managing pants over hips, hygiene completed seated  CARE Score: 4  Discharge Goal: Supervision or touching assistance      Toilet Transfers: Toilet Transfer  Assistance needed: Supervision or touching assistance  Comment: SBA  CARE Score: 4  Discharge Goal: Supervision or touching assistance    Physical Therapy:   Short Term Goals  Time Frame for Short Term Goals: 10 tx days:  Short Term Goal 1: Pt will complete bed mobility (scooting, rolling R/L, and sup<->sit) Ind. Short Term Goal 2: Pt will sit->stand and car transfers using 2WW Mod Ind. Short Term Goal 3: Pt will complete stand->sit and stand turn transfers using 2WW with Sup. Short Term Goal 4: Pt will ambulate 10 ft and 50 ft with turns over level surface using 2WW Mod Ind. Short Term Goal 5: Pt will ambulate 10 ft of uneven surface and >/=150 ft of level surface using 2WW with SBA-Sup.   Additional Goals?: Yes  Short Term Goal 6: Pt will ascend/descend curb step using 2WW and 1 flight of stairs using railings following appropriate step-to pattern on ascent/descent with SBA-Sup. Short Term Goal 7: Pt will complete object retrieval from the floor with 2WW and reacher prn Mod Ind.             Bed Mobility:   Sit to Lying  Assistance Needed: Supervision or touching assistance  Comment: SBA without use of bed features  CARE Score: 4  Discharge Goal: Independent  Roll Left and Right  Assistance Needed: Supervision or touching assistance  Comment: SBA without use of bed features; pt reports pain on R lower leg on both directions  CARE Score: 4  Discharge Goal: Independent  Lying to Sitting on Side of Bed  Assistance Needed: Supervision or touching assistance  Comment: Supervision without bed features  CARE Score: 4  Discharge Goal: Independent    Transfers:    Sit to Stand  Assistance Needed: Supervision or touching assistance  Comment: [place] [facility] [place] with hand placement  CARE Score: 4  Discharge Goal: Independent  Chair/Bed-to-Chair Transfer  Assistance Needed: Supervision or touching assistance  Comment: CGA for balance  CARE Score: 4  Discharge Goal: Supervision or touching assistance     Car Transfer  Assistance Needed: Supervision or touching assistance  Comment: CGA with 2WW  CARE Score: 4  Discharge Goal: Independent    Ambulation:    Walking Ability  Does the Patient Walk?: Yes     Walk 10 Feet  Assistance Needed: Supervision or touching assistance  Comment: CGA with RW  CARE Score: 4  Discharge Goal: Independent     Walk 50 Feet with Two Turns  Assistance Needed: Supervision or touching assistance  Comment: 1 (deemed usual performance per team huddle)  CARE Score: 4  Discharge Goal: Independent     Walk 150 Feet  Assistance Needed: Supervision or touching assistance  Comment: 1 (deemed usual performance per team huddle)  CARE Score: 4  Discharge Goal: Supervision or touching assistance     Walking 10 Feet on Uneven Surfaces  Assistance Needed: Supervision or touching assistance  Comment: 1 (deemed usual performance per team huddle)  CARE Score: 4  Discharge Goal: Supervision or touching assistance     1 Step (Curb)  Assistance Needed: Supervision or touching assistance  Comment: 1 (deemed usual performance per team huddle)  CARE Score: 4  Discharge Goal: Supervision or touching assistance     4 Steps  Assistance Needed: Supervision or touching assistance  Comment: 1 (deemed usual performance per team huddle)  CARE Score: 4  Discharge Goal: Supervision or touching assistance     12 Steps  Assistance Needed: Supervision or touching assistance  Comment: 1 (deemed usual performance per team huddle)  CARE Score: 4  Discharge Goal: Supervision or touching assistance       Wheelchair:  w/c Ability: Wheelchair Ability  Uses a Wheelchair and/or Scooter?: No                Balance:        Object: Picking Up Object  Assistance Needed: Supervision or touching assistance  Comment: CG-SBA with reacher, min cues  CARE Score: 4  Discharge Goal: Supervision or touching assistance    I      Exam:    Blood pressure 96/61, pulse 62, temperature 97.9 °F (36.6 °C), temperature source Oral, resp. rate 16, height 5' 7\" (1.702 m), weight 171 lb 15.3 oz (78 kg), SpO2 95 %, not currently breastfeeding. General: Sitting up in bed. Talkative. Generally unhappy with everything. HEENT: Neck supple. MMM. Clear speech. Pulmonary: No wheezes or rales. Symmetric air exchange. Cardiac: Controlled rate with premature beats occasionally. Abdomen: Patient's abdomen is soft and nondistended. Bowel sounds were present throughout. There was no rebound, guarding or masses noted. Upper extremities: Fair  strength with no tremor or clonus. Lower extremities: No signs of DVT. Heels clear. Sitting balance was good. Standing balance was poor.     Lab Results   Component Value Date    WBC 6.4 10/13/2022    HGB 11.8 (L) 10/13/2022    HCT 37.0 10/13/2022    MCV 88.1 10/13/2022    PLT 196 10/13/2022     Lab Results   Component Value Date    INR 1.90 10/13/2022    INR 1.02 02/25/2020    INR 1.00 11/08/2013    PROTIME 24.7 (H) 10/13/2022    PROTIME 12.3 02/25/2020    PROTIME 10.8 11/08/2013     Lab Results   Component Value Date    CREATININE 1.3 (H) 10/13/2022    BUN 29 (H) 10/13/2022     10/13/2022    K 4.5 10/13/2022     10/13/2022    CO2 31 10/13/2022     Lab Results   Component Value Date    ALT 14 10/13/2022    AST 18 10/13/2022    ALKPHOS 83 10/13/2022    BILITOT 0.3 10/13/2022       Expected length of stay  prior to a supervised level of function for discharge home with a walker and [facility] OT/PT is 10/19/2022. Recommendations:    Hypertensive encephalopathy with gait disturbance: Persistent symptomatic orthostasis and supine hypertension as significant obstacles for getting her through the activities of the daily occupational and physical therapy with speech-language pathology. Overall, she is getting a bit steadier on her feet. We still believe she would benefit from some and supervising her medications and being available to her to set up ADLs. The various blood pressure readings remain somewhat difficult to interpret. Blood pressure is very tremendously from moment to moment regardless of the position she is in at the time of the check. Cardiology continues to monitor the impact of the ProAmatine dosing on her blood pressure and activity tolerance. Ongoing attempts at adaptive equipment training, caregiver education and aggressive pulmonary hygiene measures. Providing DVT prophylaxis, bowel and bladder retraining and cautious pain management. Outpatient follow-up with her PCP. Verbal cues and CGA-minimum physical assistance for transfers. DVT prophylaxis: The Xarelto she takes for her atrial fibrillation should protect her against new DVT. I must periodically monitor her hemoglobin and platelet count while on this medication.   Weightbearing activities are slowly improving daily. GI prophylaxis is available. No new bruising or swelling. Orthostatic hypotension: This remains a symptomatic issue for the patient. ProAmatine parameters are to be applied to standing blood pressure measurements only as per cardiology. Vital signs are checked at rest and with activities in various positions. We are encouraging consistent oral intake. Acute kidney injury: Encouraging oral hydration and trying to avoid wide fluctuations of blood pressure. Avoiding nephrotoxic medications when possible. Periodic check of her chemistries. Paroxysmal atrial fibrillation: She has a permanent pacemaker and interrogation should take place as an outpatient. Continuing Toprol for rate control and Xarelto for anticoagulation. Daily weights do not reveal any decompensation of CHF. Right proximal fibular fracture: Weightbearing as tolerated. Cautious use of analgesics due to her confusion and hypotension. Cryotherapy. Some weightbearing pain which has minimal impact on her therapy activities. Vascular dementia: Namenda. Cymbalta was discontinued at her request without ill effect thus far. Verbal and written instructions provided when possible. She would benefit from supervision after discharge.

## 2022-10-14 NOTE — CONSULTS
[address] [place] [address], [facility]                                  CONSULTATION    PATIENT NAME: González Constantino                      :        1935  MED REC NO:   [id number]                          ROOM:       1739  ACCOUNT NO:   [de-identified]                           ADMIT DATE: 10/10/2022  PROVIDER:     Davi Macario MD    CONSULT DATE:  10/13/2022    INDICATIONS:  Orthostatic blood pressure. HISTORY OF PRESENT ILLNESS:  An 60-year-old female patient who comes in  to the hospital today. She was transferred here. The patient has a  history of having orthostatic blood pressure present. She had recent  echo done. Echo showed LV function was preserved. A stress test was  done in 2022 in the office with negative ischemia. The patient's medications were adjusted and she is in the rehab at this  time. The patient came in to the hospital on 10/07/2022 after starting  syncopal episode while shopping. No seizures were active as noted. The  patient was found to have orthostatic blood pressure noted. Her  medications were adjusted and she was transferred from upstairs to the  rehab. She is doing better. She is not having any relative episodes of  syncope episode. She denies any chest pain. No shortness of breath. No other  or GI complaints present. The patient has a history of having atrial fibrillation noted. She was  on anticoagulation. She also had a fall and a fracture noted. Right  proximal _____ for which she was treated conservatively by Orthopedics. The patient was admitted to the hospital back in 2021 and found to  have a vasovagal syncope at that time. She has a biV pacer present. Her device check shows that she has paroxysmal atrial fibrillation  present. She is on anticoagulation for that.     The patient has a history of having hypertension, history of vasovagal  syncope, history of having coronary artery disease, status post  angioplasty done in 2011. The patient had a history of complete heart  block for which she underwent a pacemaker placement. She has a BiV  pacer present. Her pacer was checked and was found to have normal  functioning. AFib was noted. The patient's echo was done in 10/2022, LV function was preserved. PAST SURGICAL HISTORY:  Angioplasty done in 2011, appendectomy,  gallbladder surgery, permanent pacemaker placement present, biV pacer  and a [employer] device placed in 2020. SOCIAL HISTORY:  Does not smoke. Does not drink. ALLERGIES:  Nine medications. See the list.    PHYSICAL EXAMINATION:  GENERAL:  The patient is awake, alert, and answering questions, not in  acute distress. VITAL SIGNS:  Temperature afebrile, pulse is 75. Blood pressure is  130/80. HEENT:  Head is normocephalic and atraumatic. Pupils are equal and  reactive. CHEST:  Equal expansion. LUNGS:  Clear to auscultation. No wheezing or rhonchi present. HEART:  Regular rate and rhythm. ABDOMEN:  Soft and nontender. Bowel sounds are present. No  hepatosplenomegaly or guarding appreciated. EXTREMITIES:  No cyanosis or clubbing noted. NEUROLOGIC:  Cranial nerves II through II are grossly intact. IMPRESSION:  This is an 22-year-old female patient with a history of  having syncopal episode. She was admitted to the hospital with syncope. Her pacer was checked. Her pacer showed that her pacer was functioning  normal.  She did have significant orthostatic blood pressure present. Her blood pressure went from 117 down to 64. Recent echo showed LV  function was preserved. 1.  At this time, we will keep her on Toprol 25 mg once a day and  ProAmatine to help blood pressure and also knee high stockings. 2.  Paroxysmal atrial fibrillation. She is on anticoagulation. We will  continue that. We will watch for bleeding issues. 3.  We will repeat her labs.     The patient's EKG was checked in earlier this month and was found to  have V-paced rhythm present. Sinus rhythm with a V-paced rhythm  present. Her chest x-ray done in 10/2022 shows she has a right atrial  lead and RV lead and we will have her LV lead interrogated tomorrow. It  does not look like it is in the right place LV lead. We will make  further recommendations based on hospital course.         Denys Corey MD    D: 10/13/2022 18:57:11       T: 10/14/2022 0:35:04     [email or web address]  Job#: [id number]     Doc#: [id number]    CC:

## 2022-10-14 NOTE — PLAN OF CARE
Problem: Discharge Planning  Goal: Discharge to home or other facility with appropriate resources  10/13/2022 2333 by Herberth Stephens RN  Outcome: Progressing  10/13/2022 1549 by Niecy Bocanegra LPN  Outcome: Progressing     Problem: Safety - Adult  Goal: Free from fall injury  10/13/2022 2333 by Herberth Stephens RN  Outcome: Progressing  10/13/2022 1549 by Niecy Bocanegra LPN  Outcome: Progressing     Problem: ABCDS Injury Assessment  Goal: Absence of physical injury  10/13/2022 2333 by Herberth Stephens RN  Outcome: Progressing  10/13/2022 1549 by Niecy Bocanegra LPN  Outcome: Progressing     Problem: Pain  Goal: Verbalizes/displays adequate comfort level or baseline comfort level  10/13/2022 2333 by Herberth Stephens RN  Outcome: Progressing  10/13/2022 1549 by Niecy Bocanegra LPN  Outcome: Progressing

## 2022-10-14 NOTE — PROGRESS NOTES
Occupational Therapy  Physical Rehabilitation: OCCUPATIONAL THERAPY     [x] daily progress note       [] discharge       Patient Name:  Tu Son   :  1935 MRN: [id number]  Room:  [address] [place] [address] Date of Admission: 10/10/2022  Rehabilitation Diagnosis:   Hypertensive encephalopathy [I67.4]  Hypertensive encephalopathy [I67.4]       Date 10/14/2022       Day of ARU Week:  5   Time IN/OUT [id number]   Individual Tx Minutes 60   Group Tx Minutes    Co-Treat Minutes    Concurrent Tx Minutes    TOTAL Tx Time Mins 60   Variance Time    Variance Time []   Refusal due to:     []   Medical hold/reason:    []   Illness   []   Off Unit for test/procedure  []   Extra time needed to complete task  []   Therapeutic need  []   Other (specify):   Restrictions Restrictions/Precautions: General Precautions, Fall Risk (watch ortho BPs)         Communication with other providers: [x]   OK to see per nursing:     []   Spoke with team member regarding:      Subjective observations and cognitive status:  Pt sitting up in [facility] on approach; pleasant and agreeable to therapy session. Pt reported feeling \"not great. \" Vitals taken- /104 in sitting position. BP 96/61 in standing position. When pt was standing pt reported feeling \"Hot and sweating in the head\" not dizzy, \"feels like a head cold. \"      Pain level/location:    /10       Location:    Discharge recommendations  Anticipated discharge date:  10/19  Destination: []home alone   []home alone w assist prn   [] home w/ family    [x] Continuous supervision       []SNF    [] Assisted living     [] Other:   Continued therapy: [x]C OT  []OUTPATIENT  OT   [] No Further OT  Equipment needs:   Tu Son requires a 3 in 1 bedside commode due to being unable to use the toilet within the home  And confined to one level of the home.       Toileting:   SBA for clothing management and bladder hygiene        Toilet Transfers:   SBA c RW and grab bars   Device Used:    [x]   Standard Toilet         [x]   Grab Bars           []  Bedside Commode       []   Elevated Toilet          []   Other:        Bed Mobility:           [x]   Pt received out of bed   Sit --> Supine:  Supervision     Transfers:    Sit--> Stand:  SBA  Stand --> Sit:   SBA  Stand-Pivot:   SBA c for directional turn   Other:    Assistive device required for transfer:   RW       Functional Mobility: To increase BUE strength and endurance for functional transfers and ADLs, Pt self propelled WC 70 ft     Assistance:  SBA c cues for safety   Device:   []   Arely Gelineau     []   Standard Walker [x]   Wheelchair        []   [employer]       []   [employer] [place]         []   Cardiac Damien Alstrom       []   Other:        Homemaking Tasks: To increase independence in IADLs, Pt engaged in med management task c 2 VC for correct management, however, pt often would look at therapist for reassurance during task requiring mod cues for reassurance. Additional Therapeutic activities/exercises completed this date:     [x]   ADL Training   [x]   Balance/Postural training     []   Bed/Transfer Training   []   Endurance Training   []   Neuromuscular Re-ed   []   Nu-step:  Time:        Level:         #Steps:       []   Rebounder:    []  Seated     []  Standing        []   Supine Ther Ex (reps/sets):     []   Seated Ther Ex (reps/sets):     []   Standing Ther Ex (reps/sets):     []   Other:      Comments:      Patient/Caregiver Education and Training:   []   [employer] Equipment Use  []   Bed Mobility/Transfer Technique/Safety  []   Energy Conservation Tips  []   Family training  []   Postural Awareness  []   Safety During Functional Activities  []   Reinforced Patient's Precautions   []   Progress was updated and reviewed in Rehabtracker with patient and/or family this         date.     Treatment Plan for Next Session: Continue OT POC         Treatment/Activity Tolerance:   [x] Tolerated treatment with no adverse effects    [] Patient limited by fatigue  [] Patient limited by pain   [] Patient limited by medical complications:    [] Adverse reaction to Tx:   [] Significant change in status    Safety:       [x]  bed alarm set    []  chair alarm set    []  Pt refused alarms                []  Telesitter activated      [x]  Gait belt used during tx session      []other:       Number of Minutes/Billable Intervention  Therapeutic Exercise    ADL Self-care 15   Neuro Re-Ed    Therapeutic Activity 45   Group    Other:    TOTAL 60       Social History  Social/Functional History  Lives With: Alone  Type of Home: House  Home Layout: Two level, Able to Live on Main level with bedroom/bathroom, 1/2 bath on main level (reports she hasn't gone upstairs in years)  Home Access: Stairs to enter with rails  Entrance Stairs - Number of Steps: 3-4  Entrance Stairs - Rails: Both (however cannot reach both simultaneously)  Bathroom Shower/Tub: Tub/Shower unit (however sponge bathes at baseline)  Bathroom Toilet: Standard  Bathroom Accessibility: Walker accessible  Home Equipment: UsingMiless, standard  Has the patient had two or more falls in the past year or any fall with injury in the past year?: Yes (pt unable to give exact number but is confident it was more than 2)  Receives Help [facility]: [facility]  ADL Assistance: Independent  Homemaking Assistance: Independent  Homemaking Responsibilities: Yes (pt reports being Ind but charting indicates pt is non compliant with medication)  Ambulation Assistance: Independent (with cane)  Transfer Assistance: Independent  Active : Yes  Mode of Transportation: Car  Occupation: Retired  Type of Occupation: Housewife, also factory work, .    was in the [address] [place]: Has a cat Allyssa, housework, grocery, pt sets up a 7 day pill box, pt manages finances via autopay  Additional Comments: Pt typically sleeps in a flat, regular bed at home    Objective Goals:  (Update in navigator)  Short Term Goals  Time Frame for Short Term Goals: STGs=LTGs:  Long Term Goals  Time Frame for Long Term Goals : ~10 days or until d/c  Long Term Goal 1: Pt will complete grooming tasks Ind (seated, PRN)  Long Term Goal 2: Pt will complete total body bathing c S  Long Term Goal 3: Pt will complete UB dressing c setup  Long Term Goal 4: Pt will complete LB dressing c setup using AE PRN  Long Term Goal 5: Pt will doff/don footwear c setup using AE PRN  Additional Goals?: Yes  Long Term Goal 6: Pt will complete toileting c supervision  Long Term Goal 7: Pt will complete functional transfers (bed, chair, toilet, shower) c DME PRN and supervision  Long Term Goal 8: Pt will perform therex/therax to facilitate increased strength/endurance/ax tolerance (c emphasis on dynamic standing balance/tolerance >8 mins, BUE endurance) c SBA  Long Term Goal 9: Pt will complete simple homemaking tasks c DME PRN and S:        Plan of Care                                                                              Times per week: 5 days per week for a minimum of 60 minutes/day plus group as appropriate for 60 minutes.   Treatment to include Occupational Therapy Plan  Current Treatment Recommendations: Balance training, Functional mobility training, Endurance training, Safety education & training, Patient/Caregiver education & training, Equipment evaluation, education, & procurement, Self-Care / ADL, Home management training, Cognitive/Perceptual training    Electronically signed by   CARLOS Burris,  10/14/2022, 11:27 AM

## 2022-10-14 NOTE — PROGRESS NOTES
Physical Therapy      [x] daily progress note       [] discharge       Patient Name:  Salomon Cespedes   :  1935 MRN: [id number]  Room:  [address] [place] [address] Date of Admission: 10/10/2022  Rehabilitation Diagnosis:   Hypertensive encephalopathy [I67.4]  Hypertensive encephalopathy [I67.4]       Date 10/14/2022       Day of ARU Week:  5   Time IN/OUT 0022-3353   Individual Tx Minutes 60   TOTAL Tx Time Mins 60   Variance Time    Variance Time []   Refusal due to:     []   Medical hold/reason:    []   Illness   []   Off Unit for test/procedure  []   Extra time needed to complete task  []   Therapeutic need  []   Other (specify):   Restrictions Restrictions/Precautions  Restrictions/Precautions: General Precautions, Fall Risk (watch ortho BPs)      Communication with other providers: [x]   OK to see per nursing:     []   Spoke with team member regarding:      Subjective observations and cognitive status: Pt resting in bed, willing to participate. C/o lightheadedness when amb to BR. VS: in sitting /67, HR 60, O2 sats 96; BP in standing 72/48. Meds were received at beginning of session; After sitting 15' pt still symptomatic with BP 70/51, pt returned to supine with /72      Pain level/location: 6/10       Location: R knee and ankle, requesting Tylenol, notified RN. Biofreeze applied and ace wrap to R knee. Discharge recommendations  Anticipated discharge date:  10/19  Destination: []home alone   []home alone with assist PRN     [] home w/ family      [] Continuous supervision  []SNF    [] Assisted living     [] Other:   Continued therapy: []HHC PT  []OUTPATIENT  PT   [] No Further PT  Equipment needs: RW  Salomon Cespedes requires the assistance of a wheeled walker to successfully ambulate from room to room at home to allow completion of daily living tasks such as: bathing, toileting, dressing and grooming.   A wheeled walker is necessary due to the patient's unsteady gait, upper body weakness, inability to pick up a standard walker. This patient can ambulate only by pushing a walker instead of using a lesser assistive device such as a cane or crutch. Bed Mobility:           []   Pt received out of bed   Supine --> Sit:  Indep  Sit --> Supine:  Indep  Bed features used:    [] HOB elevated      [] Bed rail                                    [x] No     Transfers:    Sit--> Stand:  SBA  Stand --> Sit:   SBA  Req cues for safety with hand placement  Stand-Pivot:   SBA  Toilet Transfer: SBA   Toileting: SBA for balance for clothing management, performs hygiene without A  Assistive device required for transfer:   RW    Gait:    Distance:  15'+ 10'  Assistance:  CGA  Device:  RW  Gait Quality:   recip pattern, lightheadedness during amb. Standing BP 72/48, further amb deferred due to meds just received      Additional Therapeutic activities/exercises completed this date:     []   Nu-step:  Time:        Level:         #Steps:       []   Rebounder:    []  Seated     []  Standing        []   Balance training         []   Postural training    [x]   B LE therx  Supine:   Ankle Pumps x 20  Quad Sets x 20  Gluteal Sets x 20  Heel Slides x 20  Short Arc Quads x 20  Hip Abduction x 20  Sitting:  Long Arc Quads x 20  Seated Marching x 15       []   Seated ther ex (reps/sets):     []   Standing ther ex (reps/sets):     []   Picked up object from floor                       []   Reacher used   []   Other:   []   Other:   []   Other:      Patient/Caregiver Education and Training:   [x]   Bed Mobility/Transfer technique/safety  [x]   Gait technique/sequencing  [x]   Proper use of assistive device  []   Advanced mobility safety and technique  []   Reinforced patient's precautions with mobility/functional tasks  []   Postural awareness  []   Family training  []   Other:    Treatment Plan for Next Session: monitor BPs prior to mobility; gait progression, community barriers, stair training, dynamic balance, object retrieval Treatment/Activity Tolerance:   [x] Tolerated treatment with no adverse effects    [] Patient limited by fatigue  [] Patient limited by pain   [] Patient limited by medical complications:    [] Adverse reaction to Tx:   [] Significant change in status    Safety:       [x]  bed alarm set    []  chair alarm set    []  Pt refused alarms                []  Telesitter activated      [x]  Gait belt used during tx session      [] Call light and belongings in reach       [] Other      Number of Minutes/Billable Intervention  Gait Training 8   Therapeutic Exercise 20   Neuro Re-Ed    Therapeutic Activity 32   Wheelchair Propulsion    Group    Other:    TOTAL 60         Social History  Social/Functional History  Lives With: Alone  Type of Home: House  Home Layout: Two level, Able to Live on Main level with bedroom/bathroom, 1/2 bath on main level (reports she hasn't gone upstairs in years)  Home Access: Stairs to enter with rails  Entrance Stairs - Number of Steps: 3-4  Entrance Stairs - Rails: Both (however cannot reach both simultaneously)  Bathroom Shower/Tub: Tub/Shower unit (however sponge bathes at baseline)  Bathroom Toilet: Standard  Bathroom Accessibility: Walker accessible  Home Equipment: Jonathan Kristina, standard  Has the patient had two or more falls in the past year or any fall with injury in the past year?: Yes (pt unable to give exact number but is confident it was more than 2)  Receives Help From: Katarina Cosme Loveless)  ADL Assistance: [address]: Independent  Homemaking Responsibilities: Yes (pt reports being Ind but charting indicates pt is non compliant with medication)  Ambulation Assistance: Independent (with cane)  Transfer Assistance: Independent  Active : Yes  Mode of Transportation: Car  Occupation: Retired  Type of Occupation: Housewife, 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    was in the [address] [facility] [employer]: Has a cat Allyssa, housework, grocery, pt sets up a 7 day pill box, pt manages finances via autopay  Additional Comments: Pt typically sleeps in a flat, regular bed at home    Objective                                                                                    Goals:  (Update in navigator)  Short Term Goals  Time Frame for Short Term Goals: 10 tx days:  Short Term Goal 1: Pt will complete bed mobility (scooting, rolling R/L, and sup<->sit) Ind. Short Term Goal 2: Pt will sit->stand and car transfers using 2WW Mod Ind. Short Term Goal 3: Pt will complete stand->sit and stand turn transfers using 2WW with Sup. Short Term Goal 4: Pt will ambulate 10 ft and 50 ft with turns over level surface using 2WW Mod Ind. Short Term Goal 5: Pt will ambulate 10 ft of uneven surface and >/=150 ft of level surface using 2WW with SBA-Sup. Additional Goals?: Yes  Short Term Goal 6: Pt will ascend/descend curb step using 2WW and 1 flight of stairs using railings following appropriate step-to pattern on ascent/descent with SBA-Sup. Short Term Goal 7: Pt will complete object retrieval from the floor with 2WW and reacher prn Mod Ind.:   :        Plan of Care                                                                              Times per week: 5 days per week for a minimum of 60 minutes/day plus group as appropriate for 60 minutes.   Treatment to include Current Treatment Recommendations: Strengthening, ROM, Balance training, Functional mobility training, Transfer training, Cognitive/Perceptual training, Endurance training, Gait training, Stair training, Pain management, Home exercise program, Safety education & training, Patient/Caregiver education & training, Equipment evaluation, education, & procurement, Therapeutic activities    Electronically signed by   Gini Primrose, PTA #[id number]  10/14/2022, 8:40 AM

## 2022-10-14 NOTE — PROGRESS NOTES
Daily Progress Note      Subjective:    Awake alert-chest pain or shortness of breath  Pacer was checked--it is a BiV pacer present-RA RV LV lead present  Is functioning normal-BiV pacing  Rate is stable  Keep on current medications  Labs are stable  Still orthostatic for tension-  Continued ProAmatine and stockings  Presents in office 22 negative ischemia LV function preserved  Echo shows recently normal LV function  History of paroxysmal atrial fibrillation--- anticoagulation  History of coronary disease status post angioplasty--2011      Most Recent Echo  9/20/21        Summary          Left ventricular systolic function is normal.          Ejection fraction is visually estimated at 50-55%. Mild to moderate left ventricular hypertrophy. Grade I diastolic dysfunction. PPM wiring visualized within the right heart. Trace aortic regurgitation noted with color doppler. No evidence of any pericardial effusion. Atherosclerotic plaque noted in the abdominal aorta. Most Recent Stress test  Last stress test was done at the office on  05/23/2022, it was negative for any ischemia. EF was in the 60% range.            Radiology    Objective:   BP (!) 70/51 Comment: after sitting 15', symptomatic  Pulse 62   Temp 97.9 °F (36.6 °C) (Oral)   Resp 16   Ht 5' 7\" (1.702 m)   Wt 171 lb 15.3 oz (78 kg)   SpO2 95%   BMI 26.93 kg/m²     Intake/Output Summary (Last 24 hours) at 10/14/2022 0930  Last data filed at 10/13/2022 1951  Gross per 24 hour   Intake 717 ml   Output --   Net 717 ml       Medications:   Scheduled Meds:   metoprolol succinate  25 mg Oral QPM    influenza virus vaccine  0.5 mL IntraMUSCular Once    memantine  10 mg Oral BID    rivaroxaban  15 mg Oral Daily    levothyroxine  50 mcg Oral QAM AC    vitamin D  50,000 Units Oral Weekly    midodrine  5 mg Oral TID WC      Infusions:     PRN Meds:  haloperidol lactate, albuterol sulfate HFA, acetaminophen, polyethylene glycol, bisacodyl     Physical Exam:  Vitals:    10/14/22 0926   BP: (!) 70/51   Pulse:    Resp:    Temp:    SpO2:         General: AAO, NAD  Chest: Nontender  Cardiac: First and Second Heart Sounds are Normal, No Murmurs or Gallops noted  Lungs:Clear to auscultation and percussion. Abdomen: Soft, NT, ND, +BS  Extremities: No clubbing, no edema  Vascular:  Equal 2+ peripheral pulses. Lab Data:  CBC:   Recent Labs     10/13/22  1939   WBC 6.4   HGB 11.8*   HCT 37.0   MCV 88.1        BMP:   Recent Labs     10/13/22  1939      K 4.5      CO2 31   PHOS 4.2   BUN 29*   CREATININE 1.3*     LIVER PROFILE:   Recent Labs     10/13/22  1939   AST 18   ALT 14   BILITOT 0.3   ALKPHOS 83     PT/INR:   Recent Labs     10/13/22  1939   PROTIME 24.7*   INR 1.90     APTT:   Recent Labs     10/13/22  1939   APTT 55.5*     BNP:  No results for input(s): BNP in the last 72 hours.       Assessment:  Patient Active Problem List    Diagnosis Date Noted    Generalized weakness 10/12/2022    Gait disturbance 10/12/2022    Uncontrolled pain 10/12/2022    Closed fracture of upper end of right fibula 10/12/2022    Orthostatic hypotension 10/12/2022    Acute kidney injury (YASSINE) with acute tubular necrosis (ATN) ([facility] 10/12/2022    Hypokalemia 10/12/2022    Moderate vascular dementia with anxiety 10/12/2022    Hypertensive encephalopathy 10/10/2022    Hypertensive emergency 10/08/2022    Lupus ([address] 10/07/2022    Senile degeneration of brain ([facility] 10/07/2022    Chronic renal disease, stage III [facility]) [[id number]] 10/07/2022    Syncope and collapse 09/18/2021    Other cardiomyopathies ([address] 07/06/2021    Fibromyalgia 04/05/2021    Episode of recurrent major depressive disorder ([facility] 04/05/2021    SOB (shortness of breath) 09/08/2020    Other specified hypothyroidism 09/08/2020    OAB (overactive bladder) 06/08/2020    S/P biventricular cardiac pacemaker procedure 02/27/2020    A-fib ([facility] 08/26/2019    Type 2 diabetes mellitus with diabetic polyneuropathy, without long-term current use of insulin ([facility] 06/05/2019       Electronically signed by Leslie Zuleta MD on 10/14/2022 at 9:30 AM

## 2022-10-15 PROCEDURE — 6370000000 HC RX 637 (ALT 250 FOR IP): Performed by: INTERNAL MEDICINE

## 2022-10-15 PROCEDURE — 97535 SELF CARE MNGMENT TRAINING: CPT

## 2022-10-15 PROCEDURE — 97530 THERAPEUTIC ACTIVITIES: CPT

## 2022-10-15 PROCEDURE — 94150 VITAL CAPACITY TEST: CPT

## 2022-10-15 PROCEDURE — 97110 THERAPEUTIC EXERCISES: CPT

## 2022-10-15 PROCEDURE — 97116 GAIT TRAINING THERAPY: CPT

## 2022-10-15 PROCEDURE — 1280000000 HC REHAB R&B

## 2022-10-15 PROCEDURE — 6370000000 HC RX 637 (ALT 250 FOR IP): Performed by: PHYSICAL MEDICINE & REHABILITATION

## 2022-10-15 PROCEDURE — 94761 N-INVAS EAR/PLS OXIMETRY MLT: CPT

## 2022-10-15 RX ADMIN — RIVAROXABAN 15 MG: 15 TABLET, FILM COATED ORAL at 08:51

## 2022-10-15 RX ADMIN — LEVOTHYROXINE SODIUM 50 MCG: 0.07 TABLET ORAL at 05:53

## 2022-10-15 RX ADMIN — MIDODRINE HYDROCHLORIDE 5 MG: 5 TABLET ORAL at 08:51

## 2022-10-15 RX ADMIN — ACETAMINOPHEN 650 MG: 325 TABLET ORAL at 08:48

## 2022-10-15 RX ADMIN — MEMANTINE 10 MG: 10 TABLET ORAL at 08:51

## 2022-10-15 RX ADMIN — ACETAMINOPHEN 650 MG: 325 TABLET ORAL at 17:58

## 2022-10-15 RX ADMIN — METOPROLOL SUCCINATE 25 MG: 25 TABLET, EXTENDED RELEASE ORAL at 17:58

## 2022-10-15 RX ADMIN — MEMANTINE 10 MG: 10 TABLET ORAL at 20:16

## 2022-10-15 ASSESSMENT — PAIN SCALES - GENERAL
PAINLEVEL_OUTOF10: 0
PAINLEVEL_OUTOF10: 6
PAINLEVEL_OUTOF10: 4
PAINLEVEL_OUTOF10: 0
PAINLEVEL_OUTOF10: 1
PAINLEVEL_OUTOF10: 0

## 2022-10-15 ASSESSMENT — PAIN DESCRIPTION - ORIENTATION
ORIENTATION: RIGHT
ORIENTATION: LEFT;RIGHT

## 2022-10-15 ASSESSMENT — PAIN DESCRIPTION - DESCRIPTORS
DESCRIPTORS: ACHING
DESCRIPTORS: ACHING

## 2022-10-15 ASSESSMENT — PAIN DESCRIPTION - LOCATION
LOCATION: KNEE;TOE (COMMENT WHICH ONE)
LOCATION: LEG

## 2022-10-15 ASSESSMENT — PAIN SCALES - WONG BAKER
WONGBAKER_NUMERICALRESPONSE: 0

## 2022-10-15 ASSESSMENT — PAIN - FUNCTIONAL ASSESSMENT: PAIN_FUNCTIONAL_ASSESSMENT: ACTIVITIES ARE NOT PREVENTED

## 2022-10-15 NOTE — PROGRESS NOTES
Physical Therapy  [x] daily progress note       [] discharge       Patient Name:  Monhco Espinoza   :  1935 MRN: [id number]  Room:  [address] [place] [address] Date of Admission: 10/10/2022  Rehabilitation Diagnosis:   Hypertensive encephalopathy [I67.4]  Hypertensive encephalopathy [I67.4]       Date 10/15/2022       Day of ARU Week:  6   Time IN//932   Individual Tx Minutes 60   Group Tx Minutes    Co-Treat Minutes    Concurrent Tx Minutes    TOTAL Tx Time Mins 60   Variance Time    Variance Time []   Refusal due to:     []   Medical hold/reason:    []   Illness   []   Off Unit for test/procedure  []   Extra time needed to complete task  []   Therapeutic need  []   Other (specify):   Restrictions Restrictions/Precautions  Restrictions/Precautions: General Precautions, Fall Risk (watch ortho BPs)      Interdisciplinary communication [x]   Cleared for therapy per nursing     []   RN notified about issues during session  []   RN updated on pt performance  []   Spoke with   []   Spoke with OT  []   Spoke with MD  []   Other:    Subjective observations and cognitive status: Pt in bed, agreeable to therapy. Pt did not have morning meds. Called nursing due to need for proamitine for BP. Donned MARLENE hose before getting up. Pain level/location:   6 /10       Location: R knee and toes. No redness in knee or toes, edema in knee(chronic)   Discharge recommendations  Anticipated discharge date:  10/19  Destination: []home alone   [x]home alone with assist PRN     [] home w/ family      [] Continuous supervision  []SNF    [] Assisted living     [] Other:  Continued therapy: [x]C PT  []OUTPATIENT  PT   [] No Further PT  []SNF PT  Caregiver training recommended: []Yes  [] No   Equipment needs: Moncho Espinoza requires the assistance of a wheeled walker to successfully ambulate from room to room at home to allow completion of daily living tasks such as: bathing, toileting, dressing and grooming.   A wheeled walker is necessary due to the patient's unsteady gait, upper body weakness, inability to  a standard walker. This patient can ambulate only by pushing a walker instead of using a lesser assistive device such as a cane or crutch. Bed Mobility:           []   Pt received out of bed   Mod I for all, but with pain in R knee  Bed features used: [x] Yes  [] No       Transfers:    Sit--> Stand:  supervision due to BP issues  Stand --> Sit:   supervision  Chair-->Bed/Bed --> Chair:   supervision  Toilet Transfer (if applicable): SBA  Other:      Gait:    Distance:  150'x1, 100'x1(BP WNL)   Assistance:  SBA  Device:  2ww  Gait Quality:  slow, continuous steps, initially small but improving to normal length      Wheelchair Propulsion:  Distance:  100',    Assistance:  supervision  Extremities Used:   BUE/LE.    Type:    [x]  Manual        []  Electric  Used w/c mobility for building activity tolerance as midodrine taking effect           Additional Therapeutic activities/exercises completed this date:     []   Nu-step:  Time:        Level:         #Steps:       []   Rebounder:    []  Seated     []  Standing        []   Balance training         []   Postural training    []   Supine ther ex (reps/sets):     []   Seated ther ex (reps/sets):     []   Standing ther ex (reps/sets):     []   Picking up object from floor (standing):                   []   Reacher used   [x]   Other: assessed BP in static standing, with pt not able to tolerate static standing until completion of BP reading and needed to sit due to dizziness(<1 min)   []   Other:    Comments:      Patient/Caregiver Education and Training:   []   Role of PT  [x]   Education about Dx  [x]   Use of call light for assist   []   Wheelchair mobility/management  []   HEP provided and explained   [x]   Treatment plan reviewed  []   Home safety  []   Body mechanics  [x]   Positioning: education with pt regarding using extra time during transitional movements and using isometrics prior to standing and during standing to try to manage low BP sx. Pt verbalizes understanding, but with poor recall. [x]   Bed Mobility/Transfer technique  [x]   Gait technique/sequencing  [x]   Proper use of assistive device/adaptive equipment  []   Stair training/Advanced mobility safety and technique  []   Reinforced patient's precautions/mobility while maintaining precautions  []   Postural awareness  []   Family/caregiver training  []   Progress was updated and reviewed in Rehabtracker with patient and/or family this date. []   Other:      Treatment Plan for Next Session: continue gait training as able, reinforce safety      Assessment:    Assessment: This pt demonstrated a positive  response to today's treatment as evidenced by Pt able to tolerate standing on her feet while moving, though had low BP sx when static standing. The patient is making  progress toward established goals as evidenced by QI scores. Ongoing deficits are observed in the areas of safety and continued focus on consistency is recommended.       Treatment/Activity Tolerance:   [] Tolerated treatment with no adverse effects    [] Patient limited by fatigue  [] Patient limited by pain   [x] Patient limited by medical complications: dizziness   [] Adverse reaction to Tx:   [] Significant change in status    Barrier/s to progress/learning:   []   None  [x]   Cognition  []   Hearing deficit  []   Pre-morbid mental/psychological status   []   Motivation  []   Communication  []   Anxiety  []   Vision deficit  []   Attention  []   Other:      Safety:       []  bed alarm set    [x]  chair alarm set    []  Pt refused alarms                []  Telesitter activated      [x]  Gait belt used during tx session      []other:         Number of Minutes/Billable Intervention  Gait Training 15   Therapeutic Exercise    Neuro Re-Ed    Therapeutic Activity 45   Wheelchair Propulsion    Group    Other:    TOTAL 60         Social History  Social/Functional History  Lives With: Alone  Type of Home: House  Home Layout: Two level, Able to Live on Main level with bedroom/bathroom, 1/2 bath on main level (reports she hasn't gone upstairs in years)  Home Access: Stairs to enter with rails  Entrance Stairs - Number of Steps: 3-4  Entrance Stairs - Rails: Both (however cannot reach both simultaneously)  Bathroom Shower/Tub: Tub/Shower unit (however sponge bathes at baseline)  Bathroom Toilet: Standard  Bathroom Accessibility: Walker accessible  Home Equipment: BDNA, standard  Has the patient had two or more falls in the past year or any fall with injury in the past year?: Yes (pt unable to give exact number but is confident it was more than 2)  Receives Help From: Sukhdeep Mayes  ADL Assistance: [address]: Independent  Homemaking Responsibilities: Yes (pt reports being Ind but charting indicates pt is non compliant with medication)  Ambulation Assistance: Independent (with cane)  Transfer Assistance: Independent  Active : Yes  Mode of Transportation: Car  Occupation: Retired  Type of Occupation: Housewife, 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  was in the [address]: Has a cat Allyssa, housework, grocery, pt sets up a 7 day pill box, pt manages finances via autopay  Additional Comments: Pt typically sleeps in a flat, regular bed at home    Objective                                                                                    Goals:  (Update in navigator)  Short Term Goals  Time Frame for Short Term Goals: 10 tx days:  Short Term Goal 1: Pt will complete bed mobility (scooting, rolling R/L, and sup<->sit) Ind. Short Term Goal 2: Pt will sit->stand and car transfers using 2WW Mod Ind. Short Term Goal 3: Pt will complete stand->sit and stand turn transfers using 2WW with Sup. Short Term Goal 4: Pt will ambulate 10 ft and 50 ft with turns over level surface using 2WW Mod Ind.   Short Term Goal 5: Pt will ambulate 10 ft of uneven surface and >/=150 ft of level surface using 2WW with SBA-Sup. Additional Goals?: Yes  Short Term Goal 6: Pt will ascend/descend curb step using 2WW and 1 flight of stairs using railings following appropriate step-to pattern on ascent/descent with SBA-Sup. Short Term Goal 7: Pt will complete object retrieval from the floor with 2WW and reacher prn Mod Ind.:   :        Plan of Care                                                                              Times per week: 5 days per week for a minimum of 60 minutes/day plus group as appropriate for 60 minutes.   Treatment to include Current Treatment Recommendations: Strengthening, ROM, Balance training, Functional mobility training, Transfer training, Cognitive/Perceptual training, Endurance training, Gait training, Stair training, Pain management, Home exercise program, Safety education & training, Patient/Caregiver education & training, Equipment evaluation, education, & procurement, Therapeutic activities    Electronically signed by   Lilly Beltran PT,  10/15/2022, 8:15 AM

## 2022-10-15 NOTE — PROGRESS NOTES
Occupational Therapy  Physical Rehabilitation: OCCUPATIONAL THERAPY     [x] daily progress note       [] discharge       Patient Name:  Melva French   :  1935 MRN: [id number]  Room:  [address] [place] [address] Date of Admission: 10/10/2022  Rehabilitation Diagnosis:   Hypertensive encephalopathy [I67.4]  Hypertensive encephalopathy [I67.4]       Date 10/15/2022       Day of ARU Week:  6   Time IN/OUT 6503-7184   Individual Tx Minutes 60   Group Tx Minutes    Co-Treat Minutes    Concurrent Tx Minutes    TOTAL Tx Time Mins 60   Variance Time    Variance Time []   Refusal due to:     []   Medical hold/reason:    []   Illness   []   Off Unit for test/procedure  []   Extra time needed to complete task  []   Therapeutic need  []   Other (specify):   Restrictions Restrictions/Precautions: General Precautions, Fall Risk (watch ortho BPs)         Communication with other providers: [x]   OK to see per nursing:     [x]   Spoke with team member regarding: Pt. Unable to receive BP, medication dt. Not meeting parameters (BP too high). Nursing requested BP supine, seated and standing. Subjective observations and cognitive status: Upon arrival pt. Was supine in bed. Pt. Was pleasant and agreeable to therapy session. Vitals taken with positional changes:  BP at supine- 160/73 HR-61  BP seated EOB- 148/64  BP in stance- 116/56     Pain level/location:    0/10       Location: Pt. Denied pain   Discharge recommendations  Anticipated discharge date:  10/19  Destination: []home alone   []home alone w assist prn   [] home w/ family    [x] Continuous supervision       []SNF    [] Assisted living     [] Other:   Continued therapy: [x]HHC OT  []OUTPATIENT  OT   [] No Further OT  Equipment needs:   Mleva French requires a 3 in 1 bedside commode due to being unable to use the toilet within the home  And confined to one level of the home.       Toileting:   SBA      Toilet Transfers:   SBA  Device Used:    [x]   Standard Toilet         [x] Grab Bars           []  Bedside Commode       []   Elevated Toilet          []   Other:        Bed Mobility:           []   Pt received out of bed   Rolling R/L:  SBA  Scooting:  SBA  Supine --> Sit:  SBA  Sit --> Supine:  SBA    Transfers:    Sit--> Stand:  SBA  Stand --> Sit:   SBA  Stand-Pivot:   SBA  Other:    Assistive device required for transfer:   FWW      Functional Mobility:  Room><therapy gym  Assistance:  SBA  Device:   [x]   Rolling Walker     []   Standard Walker []   Wheelchair        []   [place]       []   4-Wheeled Muriel Walls         []   Cardiac Walker       []   Other:          Additional Therapeutic activities/exercises completed this date:     [x]   ADL Training: Pt. Utilized FWW to engage in toileting, oral and hand hygiene in stance at sink. []   Balance/Postural training     []   Bed/Transfer Training   [x]   Endurance Training: Pt. Engaged in 12 minutes of BUE reciprocal pattern on arm bike to increase BUE strength, activity tolerance and facilitate I with ADLs. Pt. Required 0 rest breaks and maintain RPM ~37.    []   Neuromuscular Re-ed   []   Nu-step:  Time:        Level:         #Steps:       []   Rebounder:    []  Seated     []  Standing        []   Supine Ther Ex (reps/sets):     []   Seated Ther Ex (reps/sets):     []   Standing Ther Ex (reps/sets):     []   Other:      Comments:      Patient/Caregiver Education and Training:   []   [employer] Equipment Use  []   Bed Mobility/Transfer Technique/Safety  []   Energy Conservation Tips  []   Family training  []   Postural Awareness  [x]   Safety During Functional Activities  []   Reinforced Patient's Precautions   []   Progress was updated and reviewed in Rehabtracker with patient and/or family this         date. Treatment Plan for Next Session: Continue OT POC. Assessment: This pt demonstrated a positive response to today's treatment as evidenced by pleasant and agreeable response to therapy session.   The patient is making progress toward established goals as evidenced by QI scores. Ongoing deficits are observed in the areas of BUE strength, ADLs, activity tolerance and safety awareness and continued focus on these areas is recommended.        Treatment/Activity Tolerance:   [x] Tolerated treatment with no adverse effects    [] Patient limited by fatigue  [] Patient limited by pain   [] Patient limited by medical complications:    [] Adverse reaction to Tx:   [] Significant change in status    Safety:       [x]  bed alarm set    []  chair alarm set    []  Pt refused alarms                []  Telesitter activated      []  Gait belt used during tx session      []other:       Number of Minutes/Billable Intervention  Therapeutic Exercise 17   ADL Self-care 20   Neuro Re-Ed    Therapeutic Activity 23   Group    Other:    TOTAL 60       Social History  Social/Functional History  Lives With: Alone  Type of Home: House  Home Layout: Two level, Able to Live on Main level with bedroom/bathroom, 1/2 bath on main level (reports she hasn't gone upstairs in years)  Home Access: Stairs to enter with rails  Entrance Stairs - Number of Steps: 3-4  Entrance Stairs - Rails: Both (however cannot reach both simultaneously)  Bathroom Shower/Tub: Tub/Shower unit (however sponge bathes at baseline)  Bathroom Toilet: Standard  Bathroom Accessibility: Walker accessible  Home Equipment: Sade Mic, theresa  Has the patient had two or more falls in the past year or any fall with injury in the past year?: Yes (pt unable to give exact number but is confident it was more than 2)  Receives Help From: Jennifer Friedman [employer]  ADL Assistance: Independent  Homemaking Assistance: Independent  Homemaking Responsibilities: Yes (pt reports being Ind but charting indicates pt is non compliant with medication)  Ambulation Assistance: Independent (with cane)  Transfer Assistance: Independent  Active : Yes  Mode of Transportation: Car  Occupation: Retired  Type of Occupation: Housewife, also factory work, .  was in the [address] [place]: Has a cat Allyssa, housework, grocery, pt sets up a 7 day pill box, pt manages finances via autopay  Additional Comments: Pt typically sleeps in a flat, regular bed at home    Objective                                                                                    Goals:  (Update in navigator)  Short Term Goals  Time Frame for Short Term Goals: STGs=LTGs:  Long Term Goals  Time Frame for Long Term Goals : ~10 days or until d/c  Long Term Goal 1: Pt will complete grooming tasks Ind (seated, PRN)  Long Term Goal 2: Pt will complete total body bathing c S  Long Term Goal 3: Pt will complete UB dressing c setup  Long Term Goal 4: Pt will complete LB dressing c setup using AE PRN  Long Term Goal 5: Pt will doff/don footwear c setup using AE PRN  Additional Goals?: Yes  Long Term Goal 6: Pt will complete toileting c supervision  Long Term Goal 7: Pt will complete functional transfers (bed, chair, toilet, shower) c DME PRN and supervision  Long Term Goal 8: Pt will perform therex/therax to facilitate increased strength/endurance/ax tolerance (c emphasis on dynamic standing balance/tolerance >8 mins, BUE endurance) c SBA  Long Term Goal 9: Pt will complete simple homemaking tasks c DME PRN and S:        Plan of Care                                                                              Times per week: 5 days per week for a minimum of 60 minutes/day plus group as appropriate for 60 minutes.   Treatment to include Occupational Therapy Plan  Current Treatment Recommendations: Balance training, Functional mobility training, Endurance training, Safety education & training, Patient/Caregiver education & training, Equipment evaluation, education, & procurement, Self-Care / ADL, Home management training, Cognitive/Perceptual training    Electronically signed by   CARLOS Hart,  10/15/2022, 1:48 PM

## 2022-10-15 NOTE — FLOWSHEET NOTE
[x] daily progress note       [] discharge       Patient Name:  Melva French   :  1935 MRN: [id number]  Room:  [address] [place] [address] Date of Admission: 10/10/2022  Rehabilitation Diagnosis:   Hypertensive encephalopathy [I67.4]  Hypertensive encephalopathy [I67.4]       Date 10/15/2022       Day of ARU Week:  6   Time IN/OUT 0992-3341   Individual Tx Minutes 60   TOTAL Tx Time Mins 60   Restrictions Restrictions/Precautions  Restrictions/Precautions: General Precautions, Fall Risk (watch ortho BPs)      Communication with other providers: [x]   OK to see per nursing:     []   Spoke with team member regarding:      Subjective observations and cognitive status: Pt seen in semi-jacobo's position in bed at beginning of treatment. Pt reported that her BP has been fluctuating today. Vitals: supine BP: 128/71; sitting /66. Pt reported no symptoms during session. Pain level/location: 6/10       Location: right knee and toes    Discharge recommendations  Anticipated discharge date:  10/19/2022  Destination: []home alone   [x]home alone with assist PRN     [] home w/ family      [] Continuous supervision  []SNF    [] Assisted living     [] Other:  Continued therapy: [x]HHC PT  []OUTPATIENT  PT   [] No Further PT  []SNF PT  Caregiver training recommended: []Yes  [] No   Equipment needs: Melva French requires the assistance of a wheeled walker to successfully ambulate from room to room at home to allow completion of daily living tasks such as: bathing, toileting, dressing and grooming. A wheeled walker is necessary due to the patient's unsteady gait, upper body weakness, inability to  a standard walker. This patient can ambulate only by pushing a walker instead of using a lesser assistive device such as a cane or crutch.        Bed Mobility:              Lying --> Sit:  Setup assist with bed rails and bed features   Sit --> lying:  Setup assist with bed rails and bed features     Transfers:    Sit--> Stand: supervision   Stand --> Sit:  supervision   Chair-->Bed/Bed --> Chair:  supervision   Assistive device required for transfer:   RW    Gait:    Distance: 410'    Assistance:  supervision   Device:  RW  Gait Quality: pt reported right knee pain, but able to maintain reciprocal pattern. Stairs   # Completed:  5  Assistance:  supervision   Supportive Device:  2 rails   Vcs for proper sequencing. Uneven Surfaces:       Assistance:    supervision   Device:    RW  Surfaces Completed:   [x]  Carpeted Surface with bean bags beneath      []  Throw rugs       []  Ramp       []  Outdoor pavements        []  Grass             []  Loose gravel        []  Other:      Pt amb up/down 4\" curb step SBA with RW. Min vcs for proper sequencing, positioning, and walker safety. Additional Therapeutic activities/exercises completed this date:     []   Nu-step:  Time:        Level:         #Steps:       []   Rebounder:    []  Seated     []  Standing        []   Balance training         []   Postural training    []   Supine ther ex (reps/sets):     []   Seated ther ex (reps/sets):     []   Standing ther ex (reps/sets):     []   Picking up object from floor (standing):                   []   Reacher used   []   Other:   []   Other:    Patient/Caregiver Education and Training:   [x]   Bed Mobility/Transfer technique/safety  [x]   Gait technique/sequencing  [x]   Proper use of assistive device  [x]   Advanced mobility safety and technique  []   Reinforced patient's precautions/mobility while maintaining precautions  []   Postural awareness  []   Family training    Treatment Plan for Next Session: exercises; balance training; transfers; gait; advanced gait; stair training      Assessment: This pt demonstrated a positive response to today's treatment as evidenced by improved mobility. The patient is making progress toward established goals as evidenced by QI scores.  Ongoing deficits are observed in the areas of strength, balance, endurance, and safety and continued focus on this is recommended. Treatment/Activity Tolerance:   [x] Tolerated treatment with no adverse effects    [] Patient limited by fatigue  [] Patient limited by pain   [] Patient limited by medical complications:    [] Adverse reaction to Tx:   [] Significant change in status    Safety:       [x]  bed alarm set    []  chair alarm set    []  Pt refused alarms                [x]  Telesitter activated      [x]  Gait belt used during tx session      [x]other: pt left in semi-jacobo's position in bed with call light at end of treatment. Number of Minutes/Billable Intervention  Gait Training 45   Therapeutic Exercise    Neuro Re-Ed    Therapeutic Activity 15   Wheelchair Propulsion    Group    Other:    TOTAL 60         Social History  Social/Functional History  Lives With: Alone  Type of Home: House  Home Layout: Two level, Able to Live on Main level with bedroom/bathroom, 1/2 bath on main level (reports she hasn't gone upstairs in years)  Home Access: Stairs to enter with rails  Entrance Stairs - Number of Steps: 3-4  Entrance Stairs - Rails: Both (however cannot reach both simultaneously)  Bathroom Shower/Tub: Tub/Shower unit (however sponge bathes at baseline)  Bathroom Toilet: Standard  Bathroom Accessibility: Walker accessible  Home Equipment: Tiffanie Case, standard  Has the patient had two or more falls in the past year or any fall with injury in the past year?: Yes (pt unable to give exact number but is confident it was more than 2)  Receives Help From: Feliz Genao  ADL Assistance: [facility] [address]: Independent  Homemaking Responsibilities: Yes (pt reports being Ind but charting indicates pt is non compliant with medication)  Ambulation Assistance: Independent (with cane)  Transfer Assistance: Independent  Active : Yes  Mode of Transportation: Car  Occupation: Retired  Type of Occupation: Housewife, 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  was in the [address]: Has a cat Allyssa, housework, grocery, pt sets up a 7 day pill box, pt manages finances via autopay  Additional Comments: Pt typically sleeps in a flat, regular bed at home    Objective                                                                                    Goals:  (Update in navigator)  Short Term Goals  Time Frame for Short Term Goals: 10 tx days:  Short Term Goal 1: Pt will complete bed mobility (scooting, rolling R/L, and sup<->sit) Ind. Short Term Goal 2: Pt will sit->stand and car transfers using 2WW Mod Ind. Short Term Goal 3: Pt will complete stand->sit and stand turn transfers using 2WW with Sup. Short Term Goal 4: Pt will ambulate 10 ft and 50 ft with turns over level surface using 2WW Mod Ind. Short Term Goal 5: Pt will ambulate 10 ft of uneven surface and >/=150 ft of level surface using 2WW with SBA-Sup. Additional Goals?: Yes  Short Term Goal 6: Pt will ascend/descend curb step using 2WW and 1 flight of stairs using railings following appropriate step-to pattern on ascent/descent with SBA-Sup. Short Term Goal 7: Pt will complete object retrieval from the floor with 2WW and reacher prn Mod Ind.:   :        Plan of Care                                                                              Times per week: 5 days per week for a minimum of 60 minutes/day plus group as appropriate for 60 minutes.   Treatment to include Current Treatment Recommendations: Strengthening, ROM, Balance training, Functional mobility training, Transfer training, Cognitive/Perceptual training, Endurance training, Gait training, Stair training, Pain management, Home exercise program, Safety education & training, Patient/Caregiver education & training, Equipment evaluation, education, & procurement, Therapeutic activities    Electronically signed by   Alicja Santacruz, [id number]  10/15/2022, 2:19 PM

## 2022-10-15 NOTE — PROGRESS NOTES
Daily Progress Note  Subjective:    Pt sleeping-awakens easily  No SOB or chest pain  Pt remains orthostatic at 0400 today vitals  /76 p-63 laying                                                                       /89 p-62 sitting                                                                       BP 97/58   p-63 standing       Attending Note:  OVERALL DOING BETTER  KEEP ON CURRENT MEDS  CONTINUE STOCKING  HX OF BIV-PACER-FUNCTION NORMAL   HX OF PAFIB=ON AC WATCH FOR BLEEDING ISSUES  AND FALL  HX OF CAD AND PCI IN 2011  KEEP ON TOPROL AND PROAMATINE   CHECK BP STANDING OR SITTING PLEASE       Impression and Plan:     Orthostatic hypotension       Continue ProAmatine with parameters and compression stockings    PAF      Currently SR      On Xarelto       Last Echo 9/20/21           EF 50-55%       Summary          Left ventricular systolic function is normal.          Ejection fraction is visually estimated at 50-55%. Mild to moderate left ventricular hypertrophy. Grade I diastolic dysfunction. PPM wiring visualized within the right heart. Trace aortic regurgitation noted with color doppler. No evidence of any pericardial effusion. Atherosclerotic plaque noted in the abdominal aorta    H/O CHB     S/p biventricular pacer 2011      Most Recent Echo  10/7/22   Summary   Left ventricular systolic function is normal.   Ejection fraction is visually estimated at 60%. Severe left ventricular hypertrophy. Grade II diastolic dysfunction. PPM visualized in right heart. No evidence of any pericardial effusion. Most Recent Stress test  Last stress test was done at the office on  05/23/2022, it was negative for any ischemia. EF was in the 60% range. PAST MEDICAL HISTORY:  PAF, CAD status post PCI in 2011, complete heart  block status post pacemaker and upgrade to a biventricular pacemaker in  2011, GERD, fibromyalgia, hypothyroidism. PAST SURGICAL HISTORY:  PCI to the posterior lateral branch placed in  2011, appendectomy, bladder surgery, cholecystectomy, multiple  endoscopies, hysterectomy, pacemaker placement and then a BiV pacer  upgrade, Medtronic device placed in 2020. ALLERGIES:  AMITRIPTYLINE, ASPIRIN, CODEINE, ELAVIL, HYDROXYCHLOROQUINE,  IBUPROFEN, PREVNAR 13 VACCINE, and THEOPHYLLINE. FAMILY HISTORY:  Reviewed and noncontributory. SOCIAL HISTORY:  She is a former smoker that quit in 1977. Per the  notes, does not drink any alcohol. Objective:   BP (!) 168/75   Pulse 59   Temp 97.3 °F (36.3 °C) (Oral)   Resp 15   Ht 5' 7\" (1.702 m)   Wt 170 lb 3.1 oz (77.2 kg)   SpO2 92%   BMI 26.66 kg/m²     Intake/Output Summary (Last 24 hours) at 10/15/2022 0804  Last data filed at 10/15/2022 0416  Gross per 24 hour   Intake 895 ml   Output --   Net 895 ml       Medications:   Scheduled Meds:   metoprolol succinate  25 mg Oral QPM    influenza virus vaccine  0.5 mL IntraMUSCular Once    memantine  10 mg Oral BID    rivaroxaban  15 mg Oral Daily    levothyroxine  50 mcg Oral QAM AC    vitamin D  50,000 Units Oral Weekly    midodrine  5 mg Oral TID WC      Infusions:     PRN Meds:  haloperidol lactate, albuterol sulfate HFA, acetaminophen, polyethylene glycol, bisacodyl     Physical Exam:  Vitals:    10/15/22 0416   BP: (!) 168/75   Pulse: 59   Resp: 15   Temp: 97.3 °F (36.3 °C)   SpO2: 92%        General: AAO, NAD  Chest: Nontender  Cardiac: First and Second Heart Sounds are Normal, No Murmurs or Gallops noted  Lungs:Clear to auscultation and percussion. Abdomen: Soft, NT, ND, +BS  Extremities: No clubbing, no edema  Vascular:  Equal 2+ peripheral pulses.     Lab Data:  CBC:   Recent Labs     10/13/22  1939   WBC 6.4   HGB 11.8*   HCT 37.0   MCV 88.1        BMP:   Recent Labs     10/13/22  1939      K 4.5      CO2 31   PHOS 4.2   BUN 29*   CREATININE 1.3*     LIVER PROFILE:   Recent Labs 10/13/22  1939   AST 18   ALT 14   BILITOT 0.3   ALKPHOS 83     PT/INR:   Recent Labs     10/13/22  1939   PROTIME 24.7*   INR 1.90     APTT:   Recent Labs     10/13/22  1939   APTT 55.5*     BNP:  No results for input(s): BNP in the last 72 hours.       Assessment:  Patient Active Problem List    Diagnosis Date Noted    Generalized weakness 10/12/2022    Gait disturbance 10/12/2022    Uncontrolled pain 10/12/2022    Closed fracture of upper end of right fibula 10/12/2022    Orthostatic hypotension 10/12/2022    Acute kidney injury (YASSINE) with acute tubular necrosis (ATN) ([facility] 10/12/2022    Hypokalemia 10/12/2022    Moderate vascular dementia with anxiety 10/12/2022    Hypertensive encephalopathy 10/10/2022    Hypertensive emergency 10/08/2022    Lupus ([address] 10/07/2022    Senile degeneration of brain ([facility] 10/07/2022    Chronic renal disease, stage III [facility]) [967329] 10/07/2022    Syncope and collapse 09/18/2021    Other cardiomyopathies ([address] 07/06/2021    Fibromyalgia 04/05/2021    Episode of recurrent major depressive disorder ([address] 04/05/2021    SOB (shortness of breath) 09/08/2020    Other specified hypothyroidism 09/08/2020    OAB (overactive bladder) 06/08/2020    S/P biventricular cardiac pacemaker procedure 02/27/2020    A-fib ([address] 08/26/2019    Type 2 diabetes mellitus with diabetic polyneuropathy, without long-term current use of insulin ([address] 06/05/2019       Electronically signed by Cindy Miller PA-C on 10/15/2022 at 8:04 AM     Electronically signed by Laura Harry MD on 10/15/22 at 10:10 AM EDT

## 2022-10-16 LAB — URIC ACID: 4.9 MG/DL (ref 2.6–6)

## 2022-10-16 PROCEDURE — 94761 N-INVAS EAR/PLS OXIMETRY MLT: CPT

## 2022-10-16 PROCEDURE — 6370000000 HC RX 637 (ALT 250 FOR IP): Performed by: INTERNAL MEDICINE

## 2022-10-16 PROCEDURE — 84550 ASSAY OF BLOOD/URIC ACID: CPT

## 2022-10-16 PROCEDURE — 94150 VITAL CAPACITY TEST: CPT

## 2022-10-16 PROCEDURE — 1280000000 HC REHAB R&B

## 2022-10-16 RX ADMIN — LEVOTHYROXINE SODIUM 50 MCG: 0.07 TABLET ORAL at 06:15

## 2022-10-16 RX ADMIN — MEMANTINE 10 MG: 10 TABLET ORAL at 20:24

## 2022-10-16 RX ADMIN — MIDODRINE HYDROCHLORIDE 5 MG: 5 TABLET ORAL at 07:47

## 2022-10-16 RX ADMIN — METOPROLOL SUCCINATE 25 MG: 25 TABLET, EXTENDED RELEASE ORAL at 17:45

## 2022-10-16 RX ADMIN — MIDODRINE HYDROCHLORIDE 5 MG: 5 TABLET ORAL at 17:50

## 2022-10-16 RX ADMIN — MEMANTINE 10 MG: 10 TABLET ORAL at 07:47

## 2022-10-16 RX ADMIN — MIDODRINE HYDROCHLORIDE 5 MG: 5 TABLET ORAL at 11:57

## 2022-10-16 RX ADMIN — RIVAROXABAN 15 MG: 15 TABLET, FILM COATED ORAL at 07:47

## 2022-10-16 ASSESSMENT — PAIN DESCRIPTION - LOCATION
LOCATION: LEG
LOCATION: LEG

## 2022-10-16 ASSESSMENT — PAIN SCALES - GENERAL
PAINLEVEL_OUTOF10: 0
PAINLEVEL_OUTOF10: 2
PAINLEVEL_OUTOF10: 0
PAINLEVEL_OUTOF10: 0
PAINLEVEL_OUTOF10: 2
PAINLEVEL_OUTOF10: 0

## 2022-10-16 ASSESSMENT — PAIN DESCRIPTION - ONSET: ONSET: ON-GOING

## 2022-10-16 ASSESSMENT — PAIN DESCRIPTION - ORIENTATION
ORIENTATION: RIGHT
ORIENTATION: RIGHT

## 2022-10-16 ASSESSMENT — PAIN SCALES - WONG BAKER
WONGBAKER_NUMERICALRESPONSE: 0

## 2022-10-16 ASSESSMENT — PAIN DESCRIPTION - FREQUENCY: FREQUENCY: INTERMITTENT

## 2022-10-16 ASSESSMENT — PAIN DESCRIPTION - DESCRIPTORS
DESCRIPTORS: ACHING;DISCOMFORT
DESCRIPTORS: ACHING;CRAMPING

## 2022-10-16 ASSESSMENT — PAIN DESCRIPTION - PAIN TYPE
TYPE: CHRONIC PAIN
TYPE: CHRONIC PAIN

## 2022-10-16 NOTE — PROGRESS NOTES
1 red top tube obtained for labs. 1 stick to right forearm area using a butterfly needle. Patient tolerated without complaints.

## 2022-10-16 NOTE — PROGRESS NOTES
Daily Progress Note  Subjective:  Patient sleeping awakens easily-no new complaints  States she was able to sit up in the chair for a while yesterday with compression stocking  Orthostatic vitals as followed this morning:  Lying: /92 p 66  Sitting: /107 p 65           Standing: /74 p 86- dizziness noted upon standing  Continue ProAmatine and stockings  History of PAFIB on AC and Toprol    Attending Note:  Patient awake alert this morning--chest pain or shortness of breath  Dizziness lightheadedness  Complaints  of right foot pain--- has good pulses present  Orthostatic hypotension continue medical treatment  Status post BiV pacer placement--pacer check shows normal function  History of coronary disease--PCI 2011  Check blood pressure standing  Pacer check no events noted  History of paroxysmal atrial fibrillation  Check a uric acid level    Impression and Plan:     Orthostatic hypotension       Continue ProAmatine with parameters and compression stockings     PAF      Currently SR      On Xarelto and Toprol       Last Echo 9/20/21           EF 50-55%       Summary          Left ventricular systolic function is normal.          Ejection fraction is visually estimated at 50-55%. Mild to moderate left ventricular hypertrophy. Grade I diastolic dysfunction. PPM wiring visualized within the right heart. Trace aortic regurgitation noted with color doppler. No evidence of any pericardial effusion. Atherosclerotic plaque noted in the abdominal aorta     H/O CHB     S/p biventricular pacer 2011        Most Recent Echo  10/7/22   Summary   Left ventricular systolic function is normal.   Ejection fraction is visually estimated at 60%. Severe left ventricular hypertrophy. Grade II diastolic dysfunction. PPM visualized in right heart. No evidence of any pericardial effusion.      Most Recent Stress test  Last stress test was done at the office on  05/23/2022, it was negative for any ischemia. EF was in the 60% range. PAST MEDICAL HISTORY:  PAF, CAD status post PCI in 2011, complete heart  block status post pacemaker and upgrade to a biventricular pacemaker in  2011, GERD, fibromyalgia, hypothyroidism. PAST SURGICAL HISTORY:  PCI to the posterior lateral branch placed in  2011, appendectomy, bladder surgery, cholecystectomy, multiple  endoscopies, hysterectomy, pacemaker placement and then a BiV pacer  upgrade, Medtronic device placed in 2020. ALLERGIES:  AMITRIPTYLINE, ASPIRIN, CODEINE, ELAVIL, HYDROXYCHLOROQUINE,  IBUPROFEN, PREVNAR 13 VACCINE, and THEOPHYLLINE. FAMILY HISTORY:  Reviewed and noncontributory. SOCIAL HISTORY:  She is a former smoker that quit in 1977. Per the  notes, does not drink any alcohol. Objective:   /74   Pulse 86   Temp 97.6 °F (36.4 °C) (Oral)   Resp 16   Ht 5' 7\" (1.702 m)   Wt 172 lb 6.4 oz (78.2 kg)   SpO2 94%   BMI 27.00 kg/m²     Intake/Output Summary (Last 24 hours) at 10/16/2022 5327  Last data filed at 10/15/2022 1324  Gross per 24 hour   Intake 425 ml   Output --   Net 425 ml       Medications:   Scheduled Meds:   metoprolol succinate  25 mg Oral QPM    influenza virus vaccine  0.5 mL IntraMUSCular Once    memantine  10 mg Oral BID    rivaroxaban  15 mg Oral Daily    levothyroxine  50 mcg Oral QAM AC    vitamin D  50,000 Units Oral Weekly    midodrine  5 mg Oral TID WC      Infusions:     PRN Meds:  haloperidol lactate, albuterol sulfate HFA, acetaminophen, polyethylene glycol, bisacodyl     Physical Exam:  Vitals:    10/16/22 0745   BP: 108/74   Pulse: 86   Resp: 16   Temp: 97.6 °F (36.4 °C)   SpO2: 94%        General: AAO, NAD  Chest: Nontender  Cardiac: First and Second Heart Sounds are Normal, No Murmurs or Gallops noted  Lungs:Clear to auscultation and percussion. Abdomen: Soft, NT, ND, +BS  Extremities: No clubbing, no edema  Vascular:  Equal 2+ peripheral pulses.     Lab Data:  CBC:   Recent Labs     10/13/22  1939   WBC 6.4   HGB 11.8*   HCT 37.0   MCV 88.1        BMP:   Recent Labs     10/13/22  1939      K 4.5      CO2 31   PHOS 4.2   BUN 29*   CREATININE 1.3*     LIVER PROFILE:   Recent Labs     10/13/22  1939   AST 18   ALT 14   BILITOT 0.3   ALKPHOS 83     PT/INR:   Recent Labs     10/13/22  1939   PROTIME 24.7*   INR 1.90     APTT:   Recent Labs     10/13/22  1939   APTT 55.5*     BNP:  No results for input(s): BNP in the last 72 hours.       Assessment:  Patient Active Problem List    Diagnosis Date Noted    Generalized weakness 10/12/2022    Gait disturbance 10/12/2022    Uncontrolled pain 10/12/2022    Closed fracture of upper end of right fibula 10/12/2022    Orthostatic hypotension 10/12/2022    Acute kidney injury (YASSINE) with acute tubular necrosis (ATN) ([address] 10/12/2022    Hypokalemia 10/12/2022    Moderate vascular dementia with anxiety 10/12/2022    Hypertensive encephalopathy 10/10/2022    Hypertensive emergency 10/08/2022    Lupus ([address] 10/07/2022    Senile degeneration of brain ([address] 10/07/2022    Chronic renal disease, stage III [facility]) [[id number]] 10/07/2022    Syncope and collapse 09/18/2021    Other cardiomyopathies ([address] 07/06/2021    Fibromyalgia 04/05/2021    Episode of recurrent major depressive disorder ([address] 04/05/2021    SOB (shortness of breath) 09/08/2020    Other specified hypothyroidism 09/08/2020    OAB (overactive bladder) 06/08/2020    S/P biventricular cardiac pacemaker procedure 02/27/2020    A-fib ([address] 08/26/2019    Type 2 diabetes mellitus with diabetic polyneuropathy, without long-term current use of insulin ([address] 06/05/2019       Electronically signed by Franchesca Sainz PA-C on 10/16/2022 at 8:11 AM     Electronically signed by Scot Kimble MD on 10/16/22 at 9:25 AM EDT

## 2022-10-16 NOTE — PROGRESS NOTES
Ortho's as followed:  Lying: /92 p 66  Sitting: /107 p 65   Standing: /74 p 86- dizziness noted upon standing  MARLENE hose applied

## 2022-10-16 NOTE — PROGRESS NOTES
Dr. Cher Leija aware of Bp 190/98. Patient denies abnormal feelings. Per Dr. Cher Leija give midodrine and dont tx the high bp d/t orthohypotension.

## 2022-10-17 PROCEDURE — 6370000000 HC RX 637 (ALT 250 FOR IP): Performed by: INTERNAL MEDICINE

## 2022-10-17 PROCEDURE — 97110 THERAPEUTIC EXERCISES: CPT

## 2022-10-17 PROCEDURE — 1280000000 HC REHAB R&B

## 2022-10-17 PROCEDURE — 97535 SELF CARE MNGMENT TRAINING: CPT

## 2022-10-17 PROCEDURE — 97530 THERAPEUTIC ACTIVITIES: CPT

## 2022-10-17 PROCEDURE — 97116 GAIT TRAINING THERAPY: CPT

## 2022-10-17 PROCEDURE — 6370000000 HC RX 637 (ALT 250 FOR IP): Performed by: PHYSICAL MEDICINE & REHABILITATION

## 2022-10-17 PROCEDURE — 94150 VITAL CAPACITY TEST: CPT

## 2022-10-17 PROCEDURE — 99232 SBSQ HOSP IP/OBS MODERATE 35: CPT | Performed by: PHYSICAL MEDICINE & REHABILITATION

## 2022-10-17 PROCEDURE — 94761 N-INVAS EAR/PLS OXIMETRY MLT: CPT

## 2022-10-17 RX ADMIN — MIDODRINE HYDROCHLORIDE 5 MG: 5 TABLET ORAL at 17:40

## 2022-10-17 RX ADMIN — METOPROLOL SUCCINATE 25 MG: 25 TABLET, EXTENDED RELEASE ORAL at 17:40

## 2022-10-17 RX ADMIN — MIDODRINE HYDROCHLORIDE 5 MG: 5 TABLET ORAL at 12:32

## 2022-10-17 RX ADMIN — ERGOCALCIFEROL 50000 UNITS: 1.25 CAPSULE ORAL at 08:13

## 2022-10-17 RX ADMIN — LEVOTHYROXINE SODIUM 50 MCG: 0.07 TABLET ORAL at 05:57

## 2022-10-17 RX ADMIN — MEMANTINE 10 MG: 10 TABLET ORAL at 08:13

## 2022-10-17 RX ADMIN — METOPROLOL SUCCINATE 25 MG: 25 TABLET, EXTENDED RELEASE ORAL at 10:49

## 2022-10-17 RX ADMIN — METOPROLOL TARTRATE 12.5 MG: 25 TABLET, FILM COATED ORAL at 04:19

## 2022-10-17 RX ADMIN — MEMANTINE 10 MG: 10 TABLET ORAL at 21:37

## 2022-10-17 RX ADMIN — RIVAROXABAN 15 MG: 15 TABLET, FILM COATED ORAL at 08:13

## 2022-10-17 RX ADMIN — ACETAMINOPHEN 650 MG: 325 TABLET ORAL at 09:47

## 2022-10-17 RX ADMIN — MIDODRINE HYDROCHLORIDE 5 MG: 5 TABLET ORAL at 08:14

## 2022-10-17 ASSESSMENT — PAIN DESCRIPTION - LOCATION
LOCATION: FOOT;LEG
LOCATION: FOOT;LEG

## 2022-10-17 ASSESSMENT — PAIN SCALES - GENERAL
PAINLEVEL_OUTOF10: 0
PAINLEVEL_OUTOF10: 5
PAINLEVEL_OUTOF10: 3

## 2022-10-17 ASSESSMENT — PAIN DESCRIPTION - DESCRIPTORS
DESCRIPTORS: ACHING
DESCRIPTORS: ACHING;DISCOMFORT

## 2022-10-17 ASSESSMENT — PAIN DESCRIPTION - ORIENTATION
ORIENTATION: RIGHT
ORIENTATION: RIGHT

## 2022-10-17 NOTE — PROGRESS NOTES
Daily Progress Note  Subjective:    Patient awake, alert and feeling ok  No CP, Sob and feeling better overall  Still gets dizzy with standing    Attending Note:  Overall doing ok  Please check BP sitting at 90 degree or standing don't treat her supine BP  Her BP drop significant and is symptomatic  Will keep her on Toprol and ProAmatine  Hx of PAFIB on AC  She has a BIV pacer working normal     Impression and Plan:     Orthostatic hypotension    Significant drop on check yesterday    Discussed with RN to check it again today    Need to check BP sitting or standing- it will be very high when supine    Continue ProAmatine and compression stockings for now    Hopefully will benefit with rehab    Likely the cause of her syncope     PAF      On Xarelto and Toprol      HR stable      Cont. Med. Tx. For now      H/O CHB     S/p biventricular pacer 2011    Pacer check stable    Will follow    Most Recent Echo       Last Echo 9/20/21        Summary          Left ventricular systolic function is normal.          Ejection fraction is visually estimated at 50-55%. Mild to moderate left ventricular hypertrophy. Grade I diastolic dysfunction. PPM wiring visualized within the right heart. Trace aortic regurgitation noted with color doppler. No evidence of any pericardial effusion. Atherosclerotic plaque noted in the abdominal aorta    Most Recent Stress test  Last stress test was done at the office on  05/23/2022, it was negative for any ischemia. EF was in the 60% range. PAST MEDICAL HISTORY:  PAF, CAD status post PCI in 2011, complete heart  block status post pacemaker and upgrade to a biventricular pacemaker in  2011, GERD, fibromyalgia, hypothyroidism.      PAST SURGICAL HISTORY:  PCI to the posterior lateral branch placed in  2011, appendectomy, bladder surgery, cholecystectomy, multiple  endoscopies, hysterectomy, pacemaker placement and then a BiV pacer  upgrade, Medtronic device placed in 2020. ALLERGIES:  AMITRIPTYLINE, ASPIRIN, CODEINE, ELAVIL, HYDROXYCHLOROQUINE,  IBUPROFEN, PREVNAR 13 VACCINE, and THEOPHYLLINE. FAMILY HISTORY:  Reviewed and noncontributory. SOCIAL HISTORY:  She is a former smoker that quit in 1977. Per the  notes, does not drink any alcohol. Objective:   BP (!) 151/65   Pulse 65   Temp 98.1 °F (36.7 °C) (Oral)   Resp 18   Ht 5' 7\" (1.702 m)   Wt 173 lb 1 oz (78.5 kg)   SpO2 98%   BMI 27.11 kg/m²     Intake/Output Summary (Last 24 hours) at 10/17/2022 1131  Last data filed at 10/17/2022 0854  Gross per 24 hour   Intake 1413 ml   Output --   Net 1413 ml       Medications:   Scheduled Meds:   metoprolol succinate  25 mg Oral QPM    influenza virus vaccine  0.5 mL IntraMUSCular Once    memantine  10 mg Oral BID    rivaroxaban  15 mg Oral Daily    levothyroxine  50 mcg Oral QAM AC    vitamin D  50,000 Units Oral Weekly    midodrine  5 mg Oral TID WC      Infusions:     PRN Meds:  haloperidol lactate, albuterol sulfate HFA, acetaminophen, polyethylene glycol, bisacodyl     Physical Exam:  Vitals:    10/17/22 0755   BP: (!) 151/65   Pulse: 65   Resp: 18   Temp: 98.1 °F (36.7 °C)   SpO2: 98%        General: AAO, NAD  Chest: Nontender  Cardiac: First and Second Heart Sounds are Normal, No Murmurs or Gallops noted  Lungs:Clear to auscultation and percussion. Abdomen: Soft, NT, ND, +BS  Extremities: No clubbing, no edema  Vascular:  Equal 2+ peripheral pulses. Lab Data:  CBC: No results for input(s): WBC, HGB, HCT, MCV, PLT in the last 72 hours. BMP: No results for input(s): NA, K, CL, CO2, PHOS, BUN, CREATININE, CA in the last 72 hours. LIVER PROFILE: No results for input(s): AST, ALT, LIPASE, BILIDIR, BILITOT, ALKPHOS in the last 72 hours. Invalid input(s): AMYLASE,  ALB  PT/INR: No results for input(s): PROTIME, INR in the last 72 hours. APTT: No results for input(s): APTT in the last 72 hours.   BNP:  No results for input(s): BNP in the last 72 hours.       Assessment:  Patient Active Problem List    Diagnosis Date Noted    Generalized weakness 10/12/2022    Gait disturbance 10/12/2022    Uncontrolled pain 10/12/2022    Closed fracture of upper end of right fibula 10/12/2022    Orthostatic hypotension 10/12/2022    Acute kidney injury (YASSINE) with acute tubular necrosis (ATN) ([facility] 10/12/2022    Hypokalemia 10/12/2022    Moderate vascular dementia with anxiety 10/12/2022    Hypertensive encephalopathy 10/10/2022    Hypertensive emergency 10/08/2022    Lupus ([address] 10/07/2022    Senile degeneration of brain ([facility] 10/07/2022    Chronic renal disease, stage III [facility]) [[id number]] 10/07/2022    Syncope and collapse 09/18/2021    Other cardiomyopathies ([address] 07/06/2021    Fibromyalgia 04/05/2021    Episode of recurrent major depressive disorder ([facility] 04/05/2021    SOB (shortness of breath) 09/08/2020    Other specified hypothyroidism 09/08/2020    OAB (overactive bladder) 06/08/2020    S/P biventricular cardiac pacemaker procedure 02/27/2020    A-fib ([facility] 08/26/2019    Type 2 diabetes mellitus with diabetic polyneuropathy, without long-term current use of insulin ([address] 06/05/2019       Electronically signed by Patricia Girard PA-C on 10/17/2022 at 11:31 AM    Electronically signed by Heena Quintana MD on 10/17/22 at 12:42 PM EDT

## 2022-10-17 NOTE — PROGRESS NOTES
[facility] Night Shift RN notes:  0320 /94, HR 66, checked multiple times including manual BP. Patient denied pain. Dr. Hernandez Settler was informed & also was informed that patient's BP was high this am but orthostatic was positive. Ordered 12.5mg Metoprolol x1 and was given. Will continue to monitor.

## 2022-10-17 NOTE — PROGRESS NOTES
Occupational Therapy    Physical Rehabilitation: OCCUPATIONAL THERAPY     [x] daily progress note       [] discharge       Patient Name:  Moncho Espinoza   :  1935 MRN: [id number]  Room:  [address] [place] [address] Date of Admission: 10/10/2022  Rehabilitation Diagnosis:   Hypertensive encephalopathy [I67.4]  Hypertensive encephalopathy [I67.4]       Date 10/17/2022       Day of ARU Week:  1   Time IN/OUT 3570-1240  6478-7703   Individual Tx Minutes 60+60   Group Tx Minutes    Co-Treat Minutes    Concurrent Tx Minutes    TOTAL Tx Time Mins 120   Variance Time    Variance Time []   Refusal due to:     []   Medical hold/reason:    []   Illness   []   Off Unit for test/procedure  []   Extra time needed to complete task  []   Therapeutic need  []   Other (specify):   Restrictions Restrictions/Precautions: General Precautions, Fall Risk (watch ortho BPs)         Communication with other providers: [x]   OK to see per nursing:     []   Spoke with team member regarding:      Subjective observations and cognitive status: AM: Pt resting in bed on approach; pt stated she did not feel well. Pt reported \"I didn't sleep well, there's pain in my knee going down to my foot and my blood pressure was really high this morning. \" With encouragement, pt agreeable to therapy. PM: Pt sitting up in recliner on approach; pleasant and agreeable to therapy session. Pain level/location:    /10       Location: knee, could not rate    Discharge recommendations  Anticipated discharge date:  10/19  Destination: []home alone   []home alone w assist prn   [] home w/ family    [x] Continuous supervision       []SNF    [] Assisted living     [] Other:   Continued therapy: [x]HHC OT  []OUTPATIENT  OT   [] No Further OT  Equipment needs:   Moncho Espinoza requires a 3 in 1 bedside commode due to being unable to use the toilet within the home  And confined to one level of the home.         ADLs:    Eating: Eating  Assistance Needed: Independent  Comment: X  CARE Score: 6  Discharge Goal: Independent       Oral Hygiene: Oral Hygiene  Assistance Needed: Independent  Comment: Seated at sink  CARE Score: 6  Discharge Goal: Independent    UB/LB Bathing: Shower/Bathe Self  Assistance Needed: Supervision or touching assistance  Comment: Seated for majority of sinkside ADL, SBA in stance to bathe perineal area  CARE Score: 4  Discharge Goal: Supervision or touching assistance    UB Dressing: Upper Body Dressing  Assistance Needed: Setup or clean-up assistance  Comment: S/U  CARE Score: 5  Discharge Goal: Set-up or clean-up assistance         LB Dressing: Lower Body Dressing  Assistance Needed: Supervision or touching assistance  Comment: Pt able to thread BLEs into depends and pants, SBA in stance to manage over hips  CARE Score: 4  Discharge Goal: Set-up or clean-up assistance    Donning and [place] Footwear: Putting On/Taking Off Footwear  Assistance Needed: Setup or clean-up assistance  Comment: S/U to don socks in figure 4 position  CARE Score: 5  Discharge Goal: Set-up or clean-up assistance      Toiletin [address] needed: Supervision or touching assistance  Comment: Supervision in stance for pants management and bladder hygiene  CARE Score: 4  Discharge Goal: Supervision or touching assistance      Toilet Transfers:   Supervision Toilet Transfer  Assistance needed: Supervision or touching assistance  Comment: Supervision c RW and grab bars  CARE Score: 4  Discharge Goal: Supervision or touching assistance  Device Used:    []   Standard Toilet         []   Grab Bars           []  Bedside Commode       []   Elevated Toilet          []   Other:        Bed Mobility:           []   Pt received out of bed   Supine --> Sit:  Supervision   Sit --> Supine:  Supervision      Transfers:    Sit--> Stand:  SBA c cues for hand placement   Stand --> Sit:   SBA  Stand-Pivot:   SBA  Other:    Assistive device required for transfer:   RW       Functional Mobility: 300 ft   Assistance:  SBA  Device:   []   Rolling Walker     []   Standard Walker []   Wheelchair        []   Winters Cruel       []   4-Wheeled Rebecca Fragmin         []   Cardiac Rebecca Fragmin       []   Other:        Homemaking Tasks:   Pt completed light laundry activity with SBA for safety and FWW/hand/body placement. Pt required SBA when standing to perform activity in standing position. Education/training for energy conservation provided. Pt completed activity to increase standing tolerance, dynamic balance, IADL performance, and return to PLOF. Additional Therapeutic activities/exercises completed this date:     [x]   ADL Training   []   Balance/Postural training     []   Bed/Transfer Training   []   Endurance Training   []   Neuromuscular Re-ed   []   Nu-step:  Time:        Level:         #Steps:       []   Rebounder:    []  Seated     []  Standing        []   Supine Ther Ex (reps/sets):     [x]   Seated Ther Ex (reps/sets): To increase UB strength for functional transfers and ADLs, Pt completed 3 sets of 10 reps c 28.8# on rickshaw. To increase BUE endurance for ADLs and transfers, pt completed 1 set x10 reps of the following 5 exercises using green theraband: alternating shoulder presses, chest presses, shoulder horizontal abduction, biceps curls; and simultaneous shoulder horizontal abduction/adduction. Educated pt on HEP at d/c to increase/maintain BUE endurance, pt verbalized understanding. []   Standing Ther Ex (reps/sets):     []   Other:      Comments: All intervention performed to increase pt's strength, endurance, ax tolerance, and balance in prep for increased I c ADL/IADLs and functional transfers/mobility.        Patient/Caregiver Education and Training:   []   [employer] Equipment Use  []   Bed Mobility/Transfer Technique/Safety  []   Energy Conservation Tips  []   Family training  []   Postural Awareness  []   Safety During Functional Activities  []   Reinforced Patient's Precautions   [] Progress was updated and reviewed in Rehabtracker with patient and/or family this         date. Treatment Plan for Next Session: Continue OT POC           Treatment/Activity Tolerance:   [x] Tolerated treatment with no adverse effects    [] Patient limited by fatigue  [] Patient limited by pain   [] Patient limited by medical complications:    [] Adverse reaction to Tx:   [] Significant change in status    Safety:       [x]  bed alarm set    [x]  chair alarm set    []  Pt refused alarms                []  Telesitter activated      [x]  Gait belt used during tx session      []other:       Number of Minutes/Billable Intervention  Therapeutic Exercise 30   ADL Self-care 60   Neuro Re-Ed    Therapeutic Activity 30   Group    Other:    TOTAL 120       Social History  Social/Functional History  Lives With: Alone  Type of Home: House  Home Layout: Two level, Able to Live on Main level with bedroom/bathroom, 1/2 bath on main level (reports she hasn't gone upstairs in years)  Home Access: Stairs to enter with rails  Entrance Stairs - Number of Steps: 3-4  Entrance Stairs - Rails: Both (however cannot reach both simultaneously)  Bathroom Shower/Tub: Tub/Shower unit (however sponge bathes at baseline)  Bathroom Toilet: Standard  Bathroom Accessibility: Walker accessible  Home Equipment: Elby José Miguel, standard  Has the patient had two or more falls in the past year or any fall with injury in the past year?: Yes (pt unable to give exact number but is confident it was more than 2)  Receives Help From: Santo Cotto)  ADL Assistance: Independent  Homemaking Assistance: Independent  Homemaking Responsibilities: Yes (pt reports being Ind but charting indicates pt is non compliant with medication)  Ambulation Assistance: Independent (with cane)  Transfer Assistance: Independent  Active : Yes  Mode of Transportation: Car  Occupation: Retired  Type of Occupation: Housewife, also factory work, .    was in the [address] [employer]: Has a cat Allyssa, housework, grocery, pt sets up a 7 day pill box, pt manages finances via autopay  Additional Comments: Pt typically sleeps in a flat, regular bed at home    Objective                                                                                    Goals:  (Update in navigator)  Short Term Goals  Time Frame for Short Term Goals: STGs=LTGs:  Long Term Goals  Time Frame for Long Term Goals : ~10 days or until d/c  Long Term Goal 1: Pt will complete grooming tasks Ind (seated, PRN)  Long Term Goal 2: Pt will complete total body bathing c S  Long Term Goal 3: Pt will complete UB dressing c setup  Long Term Goal 4: Pt will complete LB dressing c setup using AE PRN  Long Term Goal 5: Pt will doff/don footwear c setup using AE PRN  Additional Goals?: Yes  Long Term Goal 6: Pt will complete toileting c supervision  Long Term Goal 7: Pt will complete functional transfers (bed, chair, toilet, shower) c DME PRN and supervision  Long Term Goal 8: Pt will perform therex/therax to facilitate increased strength/endurance/ax tolerance (c emphasis on dynamic standing balance/tolerance >8 mins, BUE endurance) c SBA  Long Term Goal 9: Pt will complete simple homemaking tasks c DME PRN and S:        Plan of Care                                                                              Times per week: 5 days per week for a minimum of 60 minutes/day plus group as appropriate for 60 minutes.   Treatment to include Occupational Therapy Plan  Current Treatment Recommendations: Balance training, Functional mobility training, Endurance training, Safety education & training, Patient/Caregiver education & training, Equipment evaluation, education, & procurement, Self-Care / ADL, Home management training, Cognitive/Perceptual training    Electronically signed by   CARLOS Pederson,  10/17/2022, 2:25 PM

## 2022-10-17 NOTE — PROGRESS NOTES
Physical Therapy  . [x] daily progress note       [x] discharge       Patient Name:  Duane Norrie   :  1935 MRN: [id number]  Room:  [address] [place] [address] Date of Admission: 10/10/2022  Rehabilitation Diagnosis:   Hypertensive encephalopathy [I67.4]  Hypertensive encephalopathy [I67.4]       Date 10/17/2022       Day of ARU Week:  1   Time IN/OUT 1100/1200   Individual Tx Minutes 60   Group Tx Minutes    Co-Treat Minutes    Concurrent Tx Minutes    TOTAL Tx Time Mins 60   Variance Time    Variance Time []   Refusal due to:     []   Medical hold/reason:    []   Illness   []   Off Unit for test/procedure  []   Extra time needed to complete task  []   Therapeutic need  []   Other (specify):   Restrictions Restrictions/Precautions  Restrictions/Precautions: General Precautions, Fall Risk (watch ortho BPs)      Interdisciplinary communication [x]   Cleared for therapy per nursing     [x]   RN notified about issues during session:pain in LE, pt had just received Tylenol  []   RN updated on pt performance  []   Spoke with   []   Spoke with OT  []   Spoke with MD  []   Other:    Subjective observations and cognitive status: Pt in recliner, agreeable to therapy     Pain level/location:   4 /10       Location: R knee and lateral lower leg   Discharge recommendations  Anticipated discharge date:  10/19  Destination: []home alone   [x]home alone with assist PRN     [] home w/ family      [] Continuous supervision  []SNF    [] Assisted living     [] Other:  Continued therapy: [x]HHC PT  []OUTPATIENT PT   [] No Further PT  []SNF PT  Caregiver training recommended: []Yes  [] No   Equipment needs: 2ww     PT IRF-KATELYN scores and goals for discharge assessment:   Roll Left and Right  Assistance Needed: Independent  Comment: standard bed  CARE Score: 6  Discharge Goal: Independent    Sit to Lying  Assistance Needed: Independent  Comment: standard bed  CARE Score: 6  Discharge Goal: Independent    Lying to Sitting on Side of Bed  Assistance Needed: Independent  Comment: standard bed  CARE Score: 6  Discharge Goal: Independent    Sit to Stand  Assistance Needed: Independent  Comment: does not always make first attempt with correct process, but when cannot get up, changes to correct process without cue  CARE Score: 6  Discharge Goal: Independent    Chair/Bed-to-Chair Transfer  Assistance Needed: Supervision or touching assistance  Comment: supervision with cues for balance  CARE Score: 4  Discharge Goal: Supervision or touching assistance            Walk 10 Feet?   Walk 10 Feet?: Yes    1 Step  1 Step?: Yes      Walking Ability  Does the Patient Walk?: Yes    Walk 10 Feet  Assistance Needed: Supervision or touching assistance  Comment: supervision with 2ww  CARE Score: 4  Discharge Goal: Independent    Walk 50 Feet with Two Turns  Assistance Needed: Supervision or touching assistance  Comment: supervision with 2ww  CARE Score: 4  Discharge Goal: Independent    Walk 150 Feet  Assistance Needed: Supervision or touching assistance  Comment: supervision with 2ww  CARE Score: 4  Discharge Goal: Supervision or touching assistance    Walking 10 Feet on Uneven Surfaces  Assistance Needed: Supervision or touching assistance  Comment: supervision with 2ww  CARE Score: 4  Discharge Goal: Supervision or touching assistance  Repeated training with throw rugs and rain mats with pt each time catching back legs on rug, then correcting, but never able to prevent without max cues  1 Step (Curb)  Assistance Needed: Supervision or touching assistance  Comment: supervision with 2ww  CARE Score: 4  Discharge Goal: Supervision or touching assistance    4 Steps  Assistance Needed: Supervision or touching assistance  Comment: B rails with supervision and cues for sequence  CARE Score: 4  Discharge Goal: Supervision or touching assistance          Additional Therapeutic activities/exercises completed this date:     []   Nu-step:  Time:        Level:         #Steps: []   Rebounder:    []  Seated     []  Standing        []   Balance training         []   Postural training    []   Supine ther ex (reps/sets):     []   Seated ther ex (reps/sets):     []   Standing ther ex (reps/sets):     []   Other: Toileting activity completed with    []   Other:    Comments:      Patient/Caregiver Education and Training:   []   Role of PT  []   Education about Dx  []   Use of call light for assist   []   HEP provided and explained   [x]   Treatment plan reviewed  []   Home safety  []   Wheelchair mobility/management   []   Body mechanics  [x]   Bed Mobility/Transfer technique  [x]   Gait technique/sequencing  []   Proper use of assistive device/adaptive equipment  [x]   Stair training/Advanced mobility safety and technique  [x]   Reinforced patient's precautions/mobility while maintaining precautions  []   Postural awareness  []   Family/caregiver training  []   Progress was updated and reviewed in Rehabtracker with patient and/or family this date. []   Other:    Treatment Plan for Next Session: QI      Assessment: This pt demonstrated a positive  response to today's treatment as evidenced by some improvements in safety.      Treatment/Activity Tolerance:   [] Tolerated treatment with no adverse effects    [] Patient limited by fatigue  [x] Patient limited by pain   [] Patient limited by medical complications:    [] Adverse reaction to Tx:   [] Significant change in status    Safety:       []  bed alarm set    [x]  chair alarm set    []  Pt refused alarms                []  Telesitter activated      []  Gait belt used during tx session      []other:       Number of Minutes/Billable Intervention  Gait Training 45   Therapeutic Exercise    Neuro Re-Ed    Therapeutic Activity 15   Wheelchair Propulsion    Group    Other:    TOTAL 60     Social History  Social/Functional History  Lives With: Alone  Type of Home: House  Home Layout: Two level, Able to Live on Main level with bedroom/bathroom, 1/2 bath on main level (reports she hasn't gone upstairs in years)  Home Access: Stairs to enter with rails  Entrance Stairs - Number of Steps: 3-4  Entrance Stairs - Rails: Both (however cannot reach both simultaneously)  Bathroom Shower/Tub: Tub/Shower unit (however sponge bathes at baseline)  Bathroom Toilet: Standard  Bathroom Accessibility: Walker accessible  Home Equipment: Elby José Miguel, standard  Has the patient had two or more falls in the past year or any fall with injury in the past year?: Yes (pt unable to give exact number but is confident it was more than 2)  Receives Help From: Santo Cotto)  ADL Assistance: [address]: Independent  Homemaking Responsibilities: Yes (pt reports being Ind but charting indicates pt is non compliant with medication)  Ambulation Assistance: Independent (with cane)  Transfer Assistance: Independent  Active : Yes  Mode of Transportation: Car  Occupation: Retired  Type of Occupation: Housewife, 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  was in the [address] [place]: Has a cat Allyssa, housework, grocery, pt sets up a 7 day pill box, pt manages finances via autopay  Additional Comments: Pt typically sleeps in a flat, regular bed at home    Objective                                                                                    Goals:  (Update in navigator)  Short Term Goals  Time Frame for Short Term Goals: 10 tx days:  Short Term Goal 1: Pt will complete bed mobility (scooting, rolling R/L, and sup<->sit) Ind. Short Term Goal 2: Pt will sit->stand and car transfers using 2WW Mod Ind. Short Term Goal 3: Pt will complete stand->sit and stand turn transfers using 2WW with Sup. Short Term Goal 4: Pt will ambulate 10 ft and 50 ft with turns over level surface using 2WW Mod Ind. Short Term Goal 5: Pt will ambulate 10 ft of uneven surface and >/=150 ft of level surface using 2WW with SBA-Sup.   Additional Goals?: Yes  Short Term Goal 6: Pt will ascend/descend curb step using 2WW and 1 flight of stairs using railings following appropriate step-to pattern on ascent/descent with SBA-Sup. Short Term Goal 7: Pt will complete object retrieval from the floor with 2WW and reacher prn Mod Ind.:   :        Plan of Care                                                                              Times per week: 5 days per week for a minimum of 60 minutes/day plus group as appropriate for 60 minutes.   Treatment to include Current Treatment Recommendations: Strengthening, ROM, Balance training, Functional mobility training, Transfer training, Cognitive/Perceptual training, Endurance training, Gait training, Stair training, Pain management, Home exercise program, Safety education & training, Patient/Caregiver education & training, Equipment evaluation, education, & procurement, Therapeutic activities    Electronically signed by   Debbie Jensen PT,   10/17/2022, 4:04 PM

## 2022-10-17 NOTE — PROGRESS NOTES
PA  for cardiology Dameon Falk in to see pt. Asked us to hold midodrine until after orthostatic bp is checked. He will then determine if proamatine is to be given. Attempted to [place] [facility]- Dr. Marj Flores is now covering. He ordered that BP are to be checked on pt only when standing. He ordered to continue proamatine  at this time. . Current orthostatic bp is  as follows: 151/65 lying; 129/75 sitting; and 99/65 standing.  Fer Raines RN

## 2022-10-18 PROCEDURE — 99232 SBSQ HOSP IP/OBS MODERATE 35: CPT | Performed by: PHYSICAL MEDICINE & REHABILITATION

## 2022-10-18 PROCEDURE — 97542 WHEELCHAIR MNGMENT TRAINING: CPT

## 2022-10-18 PROCEDURE — 94761 N-INVAS EAR/PLS OXIMETRY MLT: CPT

## 2022-10-18 PROCEDURE — 6370000000 HC RX 637 (ALT 250 FOR IP): Performed by: INTERNAL MEDICINE

## 2022-10-18 PROCEDURE — 1280000000 HC REHAB R&B

## 2022-10-18 PROCEDURE — 97530 THERAPEUTIC ACTIVITIES: CPT

## 2022-10-18 PROCEDURE — 97116 GAIT TRAINING THERAPY: CPT

## 2022-10-18 PROCEDURE — 97535 SELF CARE MNGMENT TRAINING: CPT

## 2022-10-18 PROCEDURE — 94150 VITAL CAPACITY TEST: CPT

## 2022-10-18 RX ADMIN — LEVOTHYROXINE SODIUM 50 MCG: 0.07 TABLET ORAL at 05:11

## 2022-10-18 RX ADMIN — MEMANTINE 10 MG: 10 TABLET ORAL at 08:47

## 2022-10-18 RX ADMIN — RIVAROXABAN 15 MG: 15 TABLET, FILM COATED ORAL at 08:47

## 2022-10-18 RX ADMIN — MEMANTINE 10 MG: 10 TABLET ORAL at 22:04

## 2022-10-18 RX ADMIN — MIDODRINE HYDROCHLORIDE 5 MG: 5 TABLET ORAL at 08:47

## 2022-10-18 RX ADMIN — METOPROLOL SUCCINATE 25 MG: 25 TABLET, EXTENDED RELEASE ORAL at 17:19

## 2022-10-18 ASSESSMENT — PAIN DESCRIPTION - ORIENTATION
ORIENTATION: RIGHT
ORIENTATION: RIGHT

## 2022-10-18 ASSESSMENT — PAIN DESCRIPTION - LOCATION
LOCATION: FOOT;LEG
LOCATION: FOOT;LEG

## 2022-10-18 ASSESSMENT — PAIN SCALES - GENERAL
PAINLEVEL_OUTOF10: 3
PAINLEVEL_OUTOF10: 4

## 2022-10-18 NOTE — PROGRESS NOTES
Occupational Therapy    Physical Rehabilitation: OCCUPATIONAL THERAPY     [x] daily progress note       [x] discharge       Patient Name:  Ramob Valdes   :  1935 MRN: [id number]  Room:  [address] [place] [address] Date of Admission: 10/10/2022  Rehabilitation Diagnosis:   Hypertensive encephalopathy [I67.4]  Hypertensive encephalopathy [I67.4]       Date 10/18/2022       Day of ARU Week:  2   Time IN//935   Individual Tx Minutes 60   Group Tx Minutes    Co-Treat Minutes    Concurrent Tx Minutes    TOTAL Tx Time Mins 60   Variance Time    Variance Time []   Refusal due to:     []   Medical hold/reason:    []   Illness   []   Off Unit for test/procedure  []   Extra time needed to complete task  []   Therapeutic need  []   Other (specify):   Restrictions Restrictions/Precautions: General Precautions, Fall Risk (watch ortho BPs)         Communication with other providers: [x]   OK to see per nursing:     []   Spoke with team member regarding:      Subjective observations and cognitive status: Pt in bed on approach, agreeable to ADL session, declined full shower. During tx session educated pt that she is on medication to raise her blood pressure as the cardiologist suspects her low BP caused her syncopal episode at [facility] (per charting); also educated this is why compression hose are important for her to don each day. Pt verbalized no recall of this, stating \"Oh really?!\"    Discussed with pt concerns for her being at home alone and how there are tasks that she needs assist with to complete safely (d/t either cognition, needing walker, or both); pt was very dismissive, and every person she identified that could help she would also state how they are too busy too or she doesn't want to ask. Family has not been present during OT sessions to educate on pt's deficits, fluctuating BPs, and how this impacts pt's safety.       Pain level/location:    4/10       Location: [facility]   Discharge recommendations  Anticipated discharge date:  10/19  Destination: []home alone   []home alone w assist prn   [] home w/ family    [x] Continuous supervision  however this is not available at discharge     []SNF    [] Assisted living     [] Other:   Continued therapy: [x]HHC OT  []OUTPATIENT  OT   [] No Further OT  Equipment needs: C    Ingrid Riojas requires a 3 in 1 bedside commode due to being unable to use the toilet within the home  And confined to one level of the home. ADLs:     Eating: Eating  Assistance Needed: Independent  Comment: x  CARE Score: 6  Discharge Goal: Independent       Oral Hygiene: Oral Hygiene  Assistance Needed: Independent  Comment: seated  CARE Score: 6  Discharge Goal: Independent    UB/LB Bathing: Shower/Bathe Self  Assistance Needed: Supervision or touching assistance  Comment: supervision; min cues for thoroughness  CARE Score: 4  Discharge Goal: Supervision or touching assistance    UB Dressing: Upper Body Dressing  Assistance Needed: Setup or clean-up assistance  Comment: requires setup assist d/t decreased safety c walker  CARE Score: 5  Discharge Goal: Set-up or clean-up assistance         LB Dressing: Lower Body Dressing  Assistance Needed: Setup or clean-up assistance  Comment: able to doff/don Depends and pants, perform pants management, but requires setup assist d/t poor walker safety  CARE Score: 5  Discharge Goal: Set-up or clean-up assistance    Donning and [place] Footwear: Putting On/Taking Off Footwear  Assistance Needed: Setup or clean-up assistance  Comment: able to doff hospital socks, don compression hose and shoes  CARE Score: 5  Discharge Goal: Set-up or clean-up assistance      Toile[address] needed: Independent  Comment: mod I  CARE Score: 6  Discharge Goal: Supervision or touching assistance      Toilet Transfers:     Toilet Transfer  Assistance needed: Supervision or touching assistance  Comment: supervision c RW, required cue not to abandon walker during Tolerance:   [x] Tolerated treatment with no adverse effects    [] Patient limited by fatigue  [] Patient limited by pain   [] Patient limited by medical complications:    [] Adverse reaction to Tx:   [] Significant change in status    Safety:       []  bed alarm set    [x]  chair alarm set    []  Pt refused alarms                []  Telesitter activated      [x]  Gait belt used during tx session      []other:       Number of Minutes/Billable Intervention  Therapeutic Exercise    ADL Self-care 60   Neuro Re-Ed    Therapeutic Activity    Group    Other:    TOTAL 60       Social History  Social/Functional History  Lives With: Alone  Type of Home: House  Home Layout: Two level, Able to Live on Main level with bedroom/bathroom, 1/2 bath on main level (reports she hasn't gone upstairs in years)  Home Access: Stairs to enter with rails  Entrance Stairs - Number of Steps: 3-4  Entrance Stairs - Rails: Both (however cannot reach both simultaneously)  Bathroom Shower/Tub: Tub/Shower unit (however sponge bathes at baseline)  Bathroom Toilet: Standard  Bathroom Accessibility: Walker accessible  Home Equipment: [employer], [employer]  Has the patient had two or more falls in the past year or any fall with injury in the past year?: Yes (pt unable to give exact number but is confident it was more than 2)  Receives Help From: Martinez Richardson)  ADL Assistance: Independent  Homemaking Assistance: Independent  Homemaking Responsibilities: Yes (pt reports being Ind but charting indicates pt is non compliant with medication)  Ambulation Assistance: Independent (with cane)  Transfer Assistance: Independent  Active : Yes  Mode of Transportation: Car  Occupation: Retired  Type of Occupation: Housewife, also factory work, .    was in the [address]: Has a cat Allyssa, housework, grocery, pt sets up a 7 day pill box, pt manages finances via autopay  Additional Comments: Pt typically sleeps in a flat, regular

## 2022-10-18 NOTE — PROGRESS NOTES
Physical Therapy  [x] daily progress note       [x] discharge       Patient Name:  Naveed Wise   :  1935 MRN: [id number]  Room:  [address] [place] [address] Date of Admission: 10/10/2022  Rehabilitation Diagnosis:   Hypertensive encephalopathy [I67.4]  Hypertensive encephalopathy [I67.4]       Date 10/18/2022       Day of ARU Week:  2   Time IN//1135   Individual Tx Minutes 120   Group Tx Minutes    Co-Treat Minutes    Concurrent Tx Minutes    TOTAL Tx Time Mins 120   Variance Time    Variance Time []   Refusal due to:     []   Medical hold/reason:    []   Illness   []   Off Unit for test/procedure  []   Extra time needed to complete task  []   Therapeutic need  []   Other (specify):   Restrictions Restrictions/Precautions  Restrictions/Precautions: General Precautions, Fall Risk (watch ortho BPs)      Interdisciplinary communication [x]   Cleared for therapy per nursing     []   RN notified about issues during session  []   RN updated on pt performance  []   Spoke with   []   Spoke with OT  []   Spoke with MD  []   Other:    Subjective observations and cognitive status: Pt in chair, stating she did not feel great. Pt had symptomology of orthostatic hypotension and when pressures checked they were again very low. See flowsheet. Adapted treatment to allow more sitting time.       Pain level/location:  5  /10       Location: R lateral lower leg   Discharge recommendations  Anticipated discharge date:  10/19  Destination: []home alone   [x]home alone with assist PRN  , though  is recommended(pt refuses)   [] home w/ family      [] Continuous supervision  []SNF    [] Assisted living     [] Other:  Continued therapy: [x]HHC PT  []OUTPATIENT PT   [] No Further PT  []SNF PT  Caregiver training recommended: []Yes  [] No   Equipment needs: 2ww, standard w/c with anti-tippers     PT IRF-KATELYN scores and goals for discharge assessment:   Roll Left and Right  Assistance Needed: Independent  Comment: standard bed  CARE Score: 6  Discharge Goal: Independent  met  Sit to Lying  Assistance Needed: Independent  Comment: standard bed  CARE Score: 6  Discharge Goal: Independent  met  Lying to Sitting on Side of Bed  Assistance Needed: Independent  Comment: standard bed  CARE Score: 6  Discharge Goal: Independent  met  Sit to Stand  Assistance Needed: Supervision or touching assistance  Comment: Pt symptomatic with orthostatic hypotension today with poor ability to compensate safely, mod cues  CARE Score: 4  Discharge Goal: Independent  Part met  Chair/Bed-to-Chair Transfer  Assistance Needed: Supervision or touching assistance  Comment: supervision with cues for balance  CARE Score: 4  Discharge Goal: Supervision or touching assistance  met      Car Transfer  Assistance Needed: Supervision or touching assistance  Comment: supervision with cues for problem solving  CARE Score: 4  Discharge Goal: Independent  Part met  Walk 10 Feet? Walk 10 Feet?: Yes    1 Step  1 Step?: Yes    Picking Up Object  Assistance Needed: Supervision or touching assistance  Comment: supervision with 2ww and reacher with cues for safe use of both together.  Poor problem solving for how to carry item to trash can after picking up  CARE Score: 4  Discharge Goal: Supervision or touching assistance  met  Wheelchair Ability  Uses a Wheelchair and/or Scooter?: Yes    Wheel 50 Feet with Two Turns  Assistance Needed: Independent  Comment: pt varies B UE and LEs, recalls to lock and unlock brakes without cues  CARE Score: 6  met  Wheel 150 Feet  Assistance Needed: Independent  CARE Score: 6  Discharge Goal: Independent  met            Walking Ability  Does the Patient Walk?: Yes    Walk 10 Feet  Assistance Needed: Supervision or touching assistance  Comment: supervision with 2ww  CARE Score: 4  Discharge Goal: Independent  Part met  Walk 50 Feet with Two Turns  Assistance Needed: Supervision or touching assistance  Comment: supervision with 2ww  CARE Score: 4  Discharge Goal: Independent  Part met  Walk 150 Feet  Assistance Needed: Supervision or touching assistance  Comment: supervision with 2ww  CARE Score: 4  Discharge Goal: Supervision or touching assistance  met  Walking 10 Feet on Uneven Surfaces  Assistance Needed: Supervision or touching assistance  Comment: supervision on carpet with bean bags, CG on throw rugs  CARE Score: 4  Discharge Goal: Supervision or touching assistance  met  1 Step (Curb)  Assistance Needed: Supervision or touching assistance  Comment: supervision with 2ww, mod cues for sequence and safe walker placement  CARE Score: 4  Discharge Goal: Supervision or touching assistance  met  4 Steps  Assistance Needed: Supervision or touching assistance  Comment: performed both B rails and single rail as at home with SBA and max cues for sequence  CARE Score: 4  Discharge Goal: Supervision or touching assistance  met  12 Steps  Assistance Needed: Supervision or touching assistance  Comment: as 4 steps  CARE Score: 4  Discharge Goal: Supervision or touching assistance  met    Additional Therapeutic activities/exercises completed this date:     []   Nu-step:  Time:        Level:         #Steps:       []   Rebounder:    []  Seated     []  Standing        []   Balance training         []   Postural training    []   Supine ther ex (reps/sets):     []   Seated ther ex (reps/sets):     []   Standing ther ex (reps/sets):     []   Other:  Toileting activity completed with    []   Other:    Comments:      Patient/Caregiver Education and Training:   []   Role of PT  []   Education about Dx  []   Use of call light for assist   [x]   HEP provided and explained :supine and sitting handout issued  []   Treatment plan reviewed  [x]   Home safety: reviewed home safety for transporting objects with pt demonstrating poor problem solving and poor recall even after PT gave suggestions such as using covered tupperware in walker bag and positioning self to pass items from one surface to another. Also reviewed safety when having orthostatic hypotension episodes. Pt focused on feeling she wants cardiologist to \"fix it\" before she leaves. Shared with pt that cardiologist has related he feels she is going to have to manage it by symptoms and not push herself. Pt demonstrates poor insight into strategies. Provided handout with strategies and sx identification.   []   Wheelchair mobility/management   []   Body mechanics  [x]   Bed Mobility/Transfer technique  [x]   Gait technique/sequencing  [x]   Proper use of assistive device/adaptive equipment  [x]   Stair training/Advanced mobility safety and technique  [x]   Reinforced patient's precautions/mobility while maintaining precautions  []   Postural awareness  []   Family/caregiver training  []   Progress was updated and reviewed in Rehabtracker with patient and/or family this date. []   Other:    Treatment Plan for Next Session: n/a      Assessment: This pt demonstrated improvement in function and met some goals, but pt still requires supervision due to her decreased STM and problem solving related to dementia. This puts pt at high risk for falling either from unsafe gait or poor management of her orthostatic hypotension. It is recommended that she have 24/7 supervision, but pt declines to allow for this to be explored as she is afraid of losing her home and her cat. Brenda Costa is aware per CM and will be checking on her more frequently. Pt is recommended to use 2ww when not symptomatic for BP and to use the w/c when has symptoms.  Pt  to f/u with [facility]    Treatment/Activity Tolerance:   [] Tolerated treatment with no adverse effects    [] Patient limited by fatigue  [] Patient limited by pain   [x] Patient limited by medical complications: BP   [] Adverse reaction to Tx:   [] Significant change in status    Safety:       [x]  bed alarm set    []  chair alarm set    []  Pt refused alarms                [x]  Telesitter activated      [x]  Gait belt used during tx session      []other:       Number of Minutes/Billable Intervention  Gait Training 60   Therapeutic Exercise    Neuro Re-Ed    Therapeutic Activity 45   Wheelchair Propulsion 15   Group    Other:    TOTAL 120     Social History  Social/Functional History  Lives With: Alone  Type of Home: House  Home Layout: Two level, Able to Live on Main level with bedroom/bathroom, 1/2 bath on main level (reports she hasn't gone upstairs in years)  Home Access: Stairs to enter with rails  Entrance Stairs - Number of Steps: 3-4  Entrance Stairs - Rails: Both (however cannot reach both simultaneously)  Bathroom Shower/Tub: Tub/Shower unit (however sponge bathes at baseline)  Bathroom Toilet: Standard  Bathroom Accessibility: Walker accessible  Home Equipment: Rilla Beards, standard  Has the patient had two or more falls in the past year or any fall with injury in the past year?: Yes (pt unable to give exact number but is confident it was more than 2)  Receives Help From: Liana Villela)  ADL Assistance: [address] [place] [address] Avenue: Independent  Homemaking Responsibilities: Yes (pt reports being Ind but charting indicates pt is non compliant with medication)  Ambulation Assistance: Independent (with cane)  Transfer Assistance: Independent  Active : Yes  Mode of Transportation: Car  Occupation: Retired  Type of Occupation: Housewife, 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  was in the [address] [place]: Has a cat Allyssa, housework, grocery, pt sets up a 7 day pill box, pt manages finances via autopay  Additional Comments: Pt typically sleeps in a flat, regular bed at home    Objective                                                                                    Goals:  (Update in navigator)  Short Term Goals  Time Frame for Short Term Goals: 10 tx days:  Short Term Goal 1: Pt will complete bed mobility (scooting, rolling R/L, and sup<->sit) Ind.   Short Term Goal 2: Pt will sit->stand and car transfers using 2WW Mod Ind. Short Term Goal 3: Pt will complete stand->sit and stand turn transfers using 2WW with Sup. Short Term Goal 4: Pt will ambulate 10 ft and 50 ft with turns over level surface using 2WW Mod Ind. Short Term Goal 5: Pt will ambulate 10 ft of uneven surface and >/=150 ft of level surface using 2WW with SBA-Sup. Additional Goals?: Yes  Short Term Goal 6: Pt will ascend/descend curb step using 2WW and 1 flight of stairs using railings following appropriate step-to pattern on ascent/descent with SBA-Sup. Short Term Goal 7: Pt will complete object retrieval from the floor with 2WW and reacher prn Mod Ind.:   :        Plan of Care                                                                              Times per week: 5 days per week for a minimum of 60 minutes/day plus group as appropriate for 60 minutes.   Treatment to include Current Treatment Recommendations: Strengthening, ROM, Balance training, Functional mobility training, Transfer training, Cognitive/Perceptual training, Endurance training, Gait training, Stair training, Pain management, Home exercise program, Safety education & training, Patient/Caregiver education & training, Equipment evaluation, education, & procurement, Therapeutic activities    Electronically signed by   Joyce Irving PT,   10/18/2022, 12:34 PM

## 2022-10-18 NOTE — PROGRESS NOTES
Tosha Griffiths    : 1935  Acct #: [de-identified]  MRN: [id number]              PM&R Progress Note      Admitting diagnosis: Hypertensive encephalopathy ( [address])     Comorbid diagnoses impacting rehabilitation: Generalized weakness, gait disturbance, uncontrolled pain, orthostatic hypotension, acute kidney injury, paroxysmal atrial fibrillation, hypokalemia, right proximal fibular fracture, vascular dementia     Chief complaint: Positional dizziness remains an issue. Minimal knee pain. Prior (baseline) level of function: Independent.     Current level of function:         Current  IRF-KATELYN and Goals:   Occupational Therapy:    Short Term Goals  Time Frame for Short Term Goals: STGs=LTGs :   Long Term Goals  Time Frame for Long Term Goals : ~10 days or until d/c  Long Term Goal 1: Pt will complete grooming tasks Ind (seated, PRN)  Long Term Goal 2: Pt will complete total body bathing c S  Long Term Goal 3: Pt will complete UB dressing c setup  Long Term Goal 4: Pt will complete LB dressing c setup using AE PRN  Long Term Goal 5: Pt will doff/don footwear c setup using AE PRN  Additional Goals?: Yes  Long Term Goal 6: Pt will complete toileting c supervision  Long Term Goal 7: Pt will complete functional transfers (bed, chair, toilet, shower) c DME PRN and supervision  Long Term Goal 8: Pt will perform therex/therax to facilitate increased strength/endurance/ax tolerance (c emphasis on dynamic standing balance/tolerance >8 mins, BUE endurance) c SBA  Long Term Goal 9: Pt will complete simple homemaking tasks c DME PRN and S :                                       Eating: Eating  Assistance Needed: Independent  Comment: X  CARE Score: 6  Discharge Goal: Independent       Oral Hygiene: Oral Hygiene  Assistance Needed: Independent  Comment: Seated at sink  CARE Score: 6  Discharge Goal: Independent    UB/LB Bathing: Shower/Bathe Self  Assistance Needed: Supervision or touching assistance  Comment: Seated for majority of sinkside ADL, SBA in stance to bathe perineal area  CARE Score: 4  Discharge Goal: Supervision or touching assistance    UB Dressing: Upper Body Dressing  Assistance Needed: Setup or clean-up assistance  Comment: S/U  CARE Score: 5  Discharge Goal: Set-up or clean-up assistance         LB Dressing: Lower Body Dressing  Assistance Needed: Supervision or touching assistance  Comment: Pt able to thread BLEs into depends and pants, SBA in stance to manage over hips  CARE Score: 4  Discharge Goal: Set-up or clean-up assistance    Donning and [place] Footwear: Putting On/Taking Off Footwear  Assistance Needed: Setup or clean-up assistance  Comment: S/U to don socks in figure 4 position  CARE Score: 5  Discharge Goal: Set-up or clean-up assistance      Toiletin [address] needed: Supervision or touching assistance  Comment: Supervision in stance for pants management and bladder hygiene  CARE Score: 4  Discharge Goal: Supervision or touching assistance      Toilet Transfers: Toilet Transfer  Assistance needed: Supervision or touching assistance  Comment: Supervision c RW and franc keller  CARE Score: 4  Discharge Goal: Supervision or touching assistance    Physical Therapy:   Short Term Goals  Time Frame for Short Term Goals: 10 tx days:  Short Term Goal 1: Pt will complete bed mobility (scooting, rolling R/L, and sup<->sit) Ind. Short Term Goal 2: Pt will sit->stand and car transfers using 2WW Mod Ind. Short Term Goal 3: Pt will complete stand->sit and stand turn transfers using 2WW with Sup. Short Term Goal 4: Pt will ambulate 10 ft and 50 ft with turns over level surface using 2WW Mod Ind. Short Term Goal 5: Pt will ambulate 10 ft of uneven surface and >/=150 ft of level surface using 2WW with SBA-Sup.   Additional Goals?: Yes  Short Term Goal 6: Pt will ascend/descend curb step using 2WW and 1 flight of stairs using railings following appropriate step-to pattern on ascent/descent with SBA-Sup. Short Term Goal 7: Pt will complete object retrieval from the floor with 2WW and reacher prn Mod Ind.             Bed Mobility:   Sit to Lying  Assistance Needed: Independent  Comment: standard bed  CARE Score: 6  Discharge Goal: Independent  Roll Left and Right  Assistance Needed: Independent  Comment: standard bed  CARE Score: 6  Discharge Goal: Independent  Lying to Sitting on Side of Bed  Assistance Needed: Independent  Comment: standard bed  CARE Score: 6  Discharge Goal: Independent    Transfers:    Sit to Stand  Assistance Needed: Independent  Comment: does not always make first attempt with correct process, but when cannot get up, changes to correct process without cue  CARE Score: 6  Discharge Goal: Independent  Chair/Bed-to-Chair Transfer  Assistance Needed: Supervision or touching assistance  Comment: supervision with cues for balance  CARE Score: 4  Discharge Goal: Supervision or touching assistance     Car Transfer  Assistance Needed: Supervision or touching assistance  Comment: CGA with 2WW  CARE Score: 4  Discharge Goal: Independent    Ambulation:    Walking Ability  Does the Patient Walk?: Yes     Walk 10 Feet  Assistance Needed: Supervision or touching assistance  Comment: supervision with 2ww  CARE Score: 4  Discharge Goal: Independent     Walk 50 Feet with Two Turns  Assistance Needed: Supervision or touching assistance  Comment: supervision with 2ww  CARE Score: 4  Discharge Goal: Independent     Walk 150 Feet  Assistance Needed: Supervision or touching assistance  Comment: supervision with 2ww  CARE Score: 4  Discharge Goal: Supervision or touching assistance     Walking 10 Feet on Uneven Surfaces  Assistance Needed: Supervision or touching assistance  Comment: supervision with 2ww  CARE Score: 4  Discharge Goal: Supervision or touching assistance     1 Step (Curb)  Assistance Needed: Supervision or touching assistance  Comment: supervision with 2ww  CARE Score: 4  Discharge Goal: Supervision or touching assistance     4 Steps  Assistance Needed: Supervision or touching assistance  Comment: B rails with supervision and cues for sequence  CARE Score: 4  Discharge Goal: Supervision or touching assistance     12 Steps  Assistance Needed: Supervision or touching assistance  Comment: 1 (deemed usual performance per team huddle)  CARE Score: 4  Discharge Goal: Supervision or touching assistance       Wheelchair:  w/c Ability: Wheelchair Ability  Uses a Wheelchair and/or Scooter?: No                Balance:        Object: Picking Up Object  Assistance Needed: Supervision or touching assistance  Comment: CG-SBA with reacher, min cues  CARE Score: 4  Discharge Goal: Supervision or touching assistance    I      Exam:    Blood pressure 129/69, pulse 60, temperature 98.2 °F (36.8 °C), temperature source Oral, resp. rate 18, height 5' 7\" (1.702 m), weight 173 lb 1 oz (78.5 kg), SpO2 98 %, not currently breastfeeding. General: Semirecumbent in bed. Resting quietly after gait training. Verbalizes complaints of fatigue. HEENT: MMM. Neck supple. Guarded cervical rotation bilaterally. Pulmonary: Shallow respirations without rhonchi or wheezes. Cardiac: Occasional premature beat. Rate controlled. Abdomen: Patient's abdomen is soft and nondistended. Bowel sounds were present throughout. There was no rebound, guarding or masses noted. Upper extremities: She was able to bring both hands up to meet mine. Fair  strength. No tremor. Lower extremities: 4 -/5 strength across the knees and ankles with fair control of her movements. No signs of DVT. Sitting balance was fair. Standing balance was poor.     Lab Results   Component Value Date    WBC 6.4 10/13/2022    HGB 11.8 (L) 10/13/2022    HCT 37.0 10/13/2022    MCV 88.1 10/13/2022     10/13/2022     Lab Results   Component Value Date    INR 1.90 10/13/2022    INR 1.02 02/25/2020    INR 1.00 11/08/2013    PROTIME 24.7 (H) 10/13/2022    PROTIME 12.3 02/25/2020    PROTIME 10.8 11/08/2013     Lab Results   Component Value Date    CREATININE 1.3 (H) 10/13/2022    BUN 29 (H) 10/13/2022     10/13/2022    K 4.5 10/13/2022     10/13/2022    CO2 31 10/13/2022     Lab Results   Component Value Date    ALT 14 10/13/2022    AST 18 10/13/2022    ALKPHOS 83 10/13/2022    BILITOT 0.3 10/13/2022       Expected length of stay  prior to a supervised level of function for discharge home with a walker and [facility] OT/PT is 10/19/2022. Recommendations:    Hypertensive encephalopathy with gait disturbance: She continues to benefit from frequent rest breaks and a gradual pace of activities during her daily occupational and physical therapy with speech-language pathology. Cardiology consulted to address her orthostatic hypotension with supine hypertension. Toprol and ProAmatine to minimize the blood pressure fluctuations. Continues to have symptomatic hypotension. Benefiting from adaptive equipment training, caregiver education and aggressive pulmonary hygiene measures. Tolerating the DVT prophylaxis, bowel and bladder retraining and cautious pain management. Outpatient follow-up with her PCP. Verbal cues and CGA-minimum physical assistance for transfers. DVT prophylaxis: The Xarelto she takes for her atrial fibrillation is protection against new DVT. I must periodically monitor her hemoglobin and platelet count while on this medication. Weightbearing activities are slowly improving daily. GI prophylaxis is available. No signs of acute blood loss. Orthostatic hypotension: Cardiology consulted to manage her symptomatic orthostatic hypotension. Supine hypertension has been an issue recently. Orthostatic blood pressure checks on a regular basis and the cardiologist is managing the use of the ProAmatine. Vital signs will be checked at rest and with activities. We are encouraging consistent oral intake.   Acute kidney injury: Encouraging oral hydration and trying to avoid wide fluctuations of blood pressure. Avoiding nephrotoxic medications when possible. Periodic check of her chemistries. Paroxysmal atrial fibrillation: She has a permanent pacemaker and interrogation should take place as an outpatient. For now she is on Toprol for rate control and Xarelto for anticoagulation. Daily weights to detect any decompensation of CHF. Right proximal fibular fracture: Weightbearing as tolerated. Cautious use of analgesics due to her confusion and hypotension. Cryotherapy. Minimal complaints of pain now. Vascular dementia: Namenda. Cymbalta. She requested to be taken off the Cymbalta. No ill effects noted.

## 2022-10-18 NOTE — PROGRESS NOTES
Daily Progress Note  Subjective:    Patient awake, alert and feeling ok  No CP, Sob and feeling better overall  Still gets dizzy with standing but improved per pt. Attending Note:  Overall stable  Going home tomorrow  Keep on current meds  Check BP standing only    Impression and Plan:     Orthostatic hypotension    Significant drop on check again yesterday    Rec. Daily checks    Need to check resting BP sitting or standing- it will be very high when supine    Continue ProAmatine and compression stockings for now    Hopefully will benefit with rehab    Likely the cause of her syncope     PAF      On Xarelto and Toprol      HR stable      Cont. Med. Tx. For now      H/O CHB     S/p biventricular pacer 2011     Pacer check stable     Will follow    Most Recent Echo   Last Echo 10/10/22   Summary   Left ventricular systolic function is normal.   Ejection fraction is visually estimated at 60%. Severe left ventricular hypertrophy. Grade II diastolic dysfunction. PPM visualized in right heart. No evidence of any pericardial effusion. Most Recent Stress test  Last stress test was done at the office on  05/23/2022, it was negative for any ischemia. EF was in the 60% range. PAST MEDICAL HISTORY:  PAF, CAD status post PCI in 2011, complete heart  block status post pacemaker and upgrade to a biventricular pacemaker in  2011, GERD, fibromyalgia, hypothyroidism. PAST SURGICAL HISTORY:  PCI to the posterior lateral branch placed in  2011, appendectomy, bladder surgery, cholecystectomy, multiple  endoscopies, hysterectomy, pacemaker placement and then a BiV pacer  upgrade, Medtronic device placed in 2020. ALLERGIES:  AMITRIPTYLINE, ASPIRIN, CODEINE, ELAVIL, HYDROXYCHLOROQUINE,  IBUPROFEN, PREVNAR 13 VACCINE, and THEOPHYLLINE. FAMILY HISTORY:  Reviewed and noncontributory. SOCIAL HISTORY:  She is a former smoker that quit in 1977. Per the  notes, does not drink any alcohol.       Objective: BP (!) 153/86   Pulse 67   Temp 97.3 °F (36.3 °C) (Oral)   Resp 16   Ht 5' 7\" (1.702 m)   Wt 173 lb 4.5 oz (78.6 kg)   SpO2 94%   BMI 27.14 kg/m²     Intake/Output Summary (Last 24 hours) at 10/18/2022 0825  Last data filed at 10/17/2022 1829  Gross per 24 hour   Intake 813 ml   Output --   Net 813 ml       Medications:   Scheduled Meds:   metoprolol succinate  25 mg Oral QPM    influenza virus vaccine  0.5 mL IntraMUSCular Once    memantine  10 mg Oral BID    rivaroxaban  15 mg Oral Daily    levothyroxine  50 mcg Oral QAM AC    vitamin D  50,000 Units Oral Weekly    midodrine  5 mg Oral TID WC      Infusions:     PRN Meds:  haloperidol lactate, albuterol sulfate HFA, acetaminophen, polyethylene glycol, bisacodyl     Physical Exam:  Vitals:    10/18/22 0720   BP: (!) 153/86   Pulse: 67   Resp: 16   Temp: 97.3 °F (36.3 °C)   SpO2: 94%        General: AAO, NAD  Chest: Nontender  Cardiac: First and Second Heart Sounds are Normal, No Murmurs or Gallops noted  Lungs:Clear to auscultation and percussion. Abdomen: Soft, NT, ND, +BS  Extremities: No clubbing, no edema  Vascular:  Equal 2+ peripheral pulses. Lab Data:  CBC: No results for input(s): WBC, HGB, HCT, MCV, PLT in the last 72 hours. BMP: No results for input(s): NA, K, CL, CO2, PHOS, BUN, CREATININE, CA in the last 72 hours. LIVER PROFILE: No results for input(s): AST, ALT, LIPASE, BILIDIR, BILITOT, ALKPHOS in the last 72 hours. Invalid input(s): AMYLASE,  ALB  PT/INR: No results for input(s): PROTIME, INR in the last 72 hours. APTT: No results for input(s): APTT in the last 72 hours. BNP:  No results for input(s): BNP in the last 72 hours.       Assessment:  Patient Active Problem List    Diagnosis Date Noted    Generalized weakness 10/12/2022    Gait disturbance 10/12/2022    Uncontrolled pain 10/12/2022    Closed fracture of upper end of right fibula 10/12/2022    Orthostatic hypotension 10/12/2022    Acute kidney injury (YASSINE) with acute tubular necrosis (ATN) (HCC) 10/12/2022    Hypokalemia 10/12/2022    Moderate vascular dementia with anxiety 10/12/2022    Hypertensive encephalopathy 10/10/2022    Hypertensive emergency 10/08/2022    Lupus ([facility] 10/07/2022    Senile degeneration of brain ([facility] 10/07/2022    Chronic renal disease, stage III [facility]) [[id number]] 10/07/2022    Syncope and collapse 09/18/2021    Other cardiomyopathies ([facility] 07/06/2021    Fibromyalgia 04/05/2021    Episode of recurrent major depressive disorder ([facility] 04/05/2021    SOB (shortness of breath) 09/08/2020    Other specified hypothyroidism 09/08/2020    OAB (overactive bladder) 06/08/2020    S/P biventricular cardiac pacemaker procedure 02/27/2020    A-fib ([facility] 08/26/2019    Type 2 diabetes mellitus with diabetic polyneuropathy, without long-term current use of insulin ([facility] 06/05/2019       Electronically signed by David Rodriguez PA-C on 10/18/2022 at 8:25 AM    Electronically signed by Laura Harry MD on 10/18/22 at 9:57 AM EDT

## 2022-10-18 NOTE — PROGRESS NOTES
Comprehensive Nutrition Assessment    Type and Reason for Visit:  Reassess    Nutrition Recommendations/Plan:   Continue current diet, carb controlled with low sodium   Will continue to offer oral nutrition supplement during stay  Will continue to follow up during stay      Malnutrition Assessment:  Malnutrition Status:  Insufficient data (10/11/22 0299)    Context:  Acute Illness       Nutrition Assessment:    Meal intake remains %. Patient reports good appetite, appreciates not having to cook own meals during stay. Remains on carb controlled diet with low sodium diet. Tries to follow low sodium at home. Reasonable intake during stay, will continue to follow at low nutrition risk at this time. Nutrition Related Findings:    resting in bed waiting on lunch Wound Type: None       Current Nutrition Intake & Therapies:    Average Meal Intake: %  Average Supplements Intake: %  ADULT DIET; Regular; 5 carb choices (75 gm/meal); Low Sodium (2 gm)  ADULT ORAL NUTRITION SUPPLEMENT; Breakfast, Dinner; Low Calorie/High Protein Oral Supplement    Anthropometric Measures:  Height: 5' 7\" (170.2 cm)  Ideal Body Weight (IBW): 135 lbs (61 kg)    Admission Body Weight: 175 lb 14.8 oz (79.8 kg)  Current Body Weight: 173 lb 4.5 oz (78.6 kg), 130.3 % IBW. Weight Source: Bed Scale  Current BMI (kg/m2): 27.1  Usual Body Weight: 178 lb 9.2 oz (81 kg) (hx last year 2021)  % Weight Change (Calculated): -1.5  Weight Adjustment For: No Adjustment                 BMI Categories: Overweight (BMI 25.0-29. 9)    Estimated Daily Nutrient Needs:  Energy Requirements Based On: Formula  Weight Used for Energy Requirements: Current  Energy (kcal/day): 1400  Weight Used for Protein Requirements: Ideal  Protein (g/day): 61-73 (1-1.2 g/kg)  Method Used for Fluid Requirements: ml/Kg  Fluid (ml/day): 1900 (25 ml/kg)    Nutrition Diagnosis:   No nutrition diagnosis at this time   Nutrition Interventions:   Food and/or Nutrient Delivery: Continue Current Diet, Continue Oral Nutrition Supplement  Nutrition Education/Counseling: No recommendation at this time  Coordination of Nutrition Care: Continue to monitor while inpatient  Plan of Care discussed with: patient    Goals:  Previous Goal Met: Progressing toward Goal(s)  Goals: PO intake 75% or greater, by next RD assessment       Nutrition Monitoring and Evaluation:   Behavioral-Environmental Outcomes: None Identified  Food/Nutrient Intake Outcomes: Food and Nutrient Intake, Supplement Intake  Physical Signs/Symptoms Outcomes: Meal Time Behavior, Skin, Weight, Biochemical Data    Discharge Planning:    Continue current diet     Beulah Campbell RD, LD  Contact: [phone]

## 2022-10-18 NOTE — PLAN OF CARE
Problem: Discharge Planning  Goal: Discharge to home or other facility with appropriate resources  10/18/2022 0003 by Mayela Baron RN  Outcome: Progressing  Flowsheets (Taken 10/17/2022 2135)  Discharge to home or other facility with appropriate resources: Identify discharge learning needs (meds, wound care, etc)  10/17/2022 1029 by Jhon Miller RN  Outcome: Progressing     Problem: Safety - Adult  Goal: Free from fall injury  10/18/2022 0003 by Mayela Baron RN  Outcome: Progressing  10/17/2022 1029 by Jhon Miller RN  Outcome: Progressing     Problem: ABCDS Injury Assessment  Goal: Absence of physical injury  10/18/2022 0003 by Mayela Baron RN  Outcome: Progressing  10/17/2022 1029 by Jhon Miller RN  Outcome: Progressing     Problem: Pain  Goal: Verbalizes/displays adequate comfort level or baseline comfort level  10/18/2022 0003 by Mayela Baron RN  Outcome: Progressing  Flowsheets (Taken 10/17/2022 1530 by Jhon Miller RN)  Verbalizes/displays adequate comfort level or baseline comfort level: Encourage patient to monitor pain and request assistance  10/17/2022 1029 by Jhon Miller RN  Outcome: Progressing

## 2022-10-19 ENCOUNTER — TELEPHONE (OUTPATIENT)
Dept: PHYSICAL MEDICINE AND REHAB | Age: 87
End: 2022-10-19

## 2022-10-19 ENCOUNTER — TELEPHONE (OUTPATIENT)
Dept: FAMILY MEDICINE CLINIC | Age: 87
End: 2022-10-19

## 2022-10-19 VITALS
DIASTOLIC BLOOD PRESSURE: 72 MMHG | OXYGEN SATURATION: 95 % | SYSTOLIC BLOOD PRESSURE: 139 MMHG | HEIGHT: 67 IN | RESPIRATION RATE: 16 BRPM | TEMPERATURE: 98.7 F | WEIGHT: 171.3 LBS | BODY MASS INDEX: 26.89 KG/M2 | HEART RATE: 65 BPM

## 2022-10-19 PROCEDURE — 6370000000 HC RX 637 (ALT 250 FOR IP): Performed by: PHYSICAL MEDICINE & REHABILITATION

## 2022-10-19 PROCEDURE — 94150 VITAL CAPACITY TEST: CPT

## 2022-10-19 PROCEDURE — 94761 N-INVAS EAR/PLS OXIMETRY MLT: CPT

## 2022-10-19 PROCEDURE — 6370000000 HC RX 637 (ALT 250 FOR IP): Performed by: INTERNAL MEDICINE

## 2022-10-19 PROCEDURE — 99239 HOSP IP/OBS DSCHRG MGMT >30: CPT | Performed by: PHYSICAL MEDICINE & REHABILITATION

## 2022-10-19 RX ORDER — ERGOCALCIFEROL 1.25 MG/1
50000 CAPSULE ORAL WEEKLY
Qty: 5 CAPSULE | Refills: 0 | Status: SHIPPED | OUTPATIENT
Start: 2022-10-24

## 2022-10-19 RX ORDER — MIDODRINE HYDROCHLORIDE 5 MG/1
5 TABLET ORAL
Qty: 90 TABLET | Refills: 0 | Status: SHIPPED | OUTPATIENT
Start: 2022-10-19

## 2022-10-19 RX ORDER — METOPROLOL SUCCINATE 25 MG/1
25 TABLET, EXTENDED RELEASE ORAL EVERY EVENING
Qty: 30 TABLET | Refills: 0 | Status: SHIPPED | OUTPATIENT
Start: 2022-10-19

## 2022-10-19 RX ADMIN — RIVAROXABAN 15 MG: 15 TABLET, FILM COATED ORAL at 08:31

## 2022-10-19 RX ADMIN — ACETAMINOPHEN 650 MG: 325 TABLET ORAL at 08:31

## 2022-10-19 RX ADMIN — MIDODRINE HYDROCHLORIDE 5 MG: 5 TABLET ORAL at 11:48

## 2022-10-19 RX ADMIN — LEVOTHYROXINE SODIUM 50 MCG: 0.07 TABLET ORAL at 06:14

## 2022-10-19 ASSESSMENT — PAIN SCALES - GENERAL: PAINLEVEL_OUTOF10: 4

## 2022-10-19 ASSESSMENT — PAIN DESCRIPTION - LOCATION: LOCATION: KNEE

## 2022-10-19 NOTE — CARE COORDINATION
ARU  Discharge Summary    D/C Date: 10/19/2022    Patient discharged to: Home    Transported by: Family    Referrals made to: [facility]    Additional information: Follow up appointments scheduled with Evangelist Yeung PA-C Friday Oct 21, 2022 2:30 PM, Kareem Braswell MD Thursday Nov 3, 2022 11:00 AM, Anshul Hastings MD Tuesday Nov 29, 2022 2:30 PM. Patient notified, AVS updated, BIMS completed.      Caregiver training: none requested
Faxed order for wheelchair to [facility]. Called tiffani Mckeon. He confirmed he is planning to pick patient up after 2 PM on 10/19/2022.
LSW met with patient following Care Conference. Left message for Malka Adam. Spoke with patients daughter-in-law Estefany Smith and left message for grandbrii Fay. LSW informed patient of recommendations for 2WW and a BSC. Patient agreeable to both. LSW then informed of recommendation for [facility] PT, OT, SW, and Nursing and patient is agreeable to all. Patient verbalized understanding and will choose an agency closer to discharge. Patient provided with list of Medicare participating [facility] in the geographic area of the patient served. Patient selected [facility] and was provided with a comparative data handout from [address] [place] [address] website. The patient (and/or Family) was educated on the quality outcomes for each provider. Patient (and/or Family) demonstrated understanding. Per patient/family request, referral made to [facility]. Eugenio returned call. He is able to provide transport home and plans to assist if needed. Not at a 24 hour supervision level. He stated that patient is not keen on the idea of letting people in her home to help. D/C Plan:  Estimated Date:  Oct 19  DME:  2WW, [facility] (Agency [facility])  HHC:  PT, OT, SW, Nursing (Agency [facility])  To:   Home alone (family will transport)
[facility] spoke with pt and is aware of discharge & pt agreeable to [facility].
[facility] spoke with pt and pt is agreeable to [facility] at discharge. Verified address and number. No wounds or IVs.  Please place inpatient consult to home health needs order in [facility] [place].
Spoke with Gayatri Mckay to discuss discharge plans and obtain [facility] choice. Gayatri Hagiftyveronika continues to consider if she wants [facility]. Has concerns with strangers in her home. She stated she would think about it this weekend. Discussed potential AL in the future. She said she is a loner and likes her space. Will follow up next week to finalize decision on [facility].
Spoke with patient to discuss discharge plans and obtain [facility] choice. She continues to consider if she wants HHC. Has concerns with strangers in her home. She has specific questions about HHC and has agreed for this LSW to make a referral to [facility]. Referral sent to [facility] via [employer]. Faxed DME order to [facility].
reported that her granddaughter in-law and grandson assist if needed. LSW met with patient and called emergency contact #1 for admission assessment. Patient lives alone in a 2-level home in [place]. There are 4 steps to enter and a flight of steps inside. Patient reports not going to the 2nd level without assistance. She uses the bathroom on the 1st floor. Patient has PCP and uses [facility] in [place] for prescriptions. Home DME includes a standard walker, cane, and no pre-existing HHC. Patient reports access to food, utilities, and shelter and feels safe in their environment. BIMS completed. Room whiteboard updated. Inés Harrington confirmed that patient lives alone and her grandson Neli Ramos assists at times if needed. Inés Harrington is a friend that lives down the street. She has gone to the grocery for the patient when needed. Plan is to D/C home alone, with HHC and DME as recommended by therapy staff. F/U to be set with PCP and family will transport home.     HARIS Portillo, AGUEDA, 10/11/2022, 1:23 PM

## 2022-10-19 NOTE — TELEPHONE ENCOUNTER
Dr. Shreyas Frost is discharging patient from ARU today and wanted me to let PCP/staff know that he stopped the Namenda medication at the recommendation of Cardiology due to Orthostasis.          Routed to Dr. Morro Adamson and Juve Raymundo, ERMIASN

## 2022-10-19 NOTE — DISCHARGE SUMMARY
Patient Name: Tu Son  Patient :  1935  Patient MRN:   [id number]      Admission Date:  10/10/2022  Discharge Date: 10/19/2022    Admitting diagnosis: Hypertensive encephalopathy ( [facility])     Comorbid diagnoses impacting rehabilitation: Generalized weakness, gait disturbance, uncontrolled pain, orthostatic hypotension, acute kidney injury, paroxysmal atrial fibrillation, hypokalemia, right proximal fibular fracture, vascular dementia    Discharging diagnosis: Hypertensive encephalopathy ([facility] 2.1)     Comorbid diagnoses impacting rehabilitation: Generalized weakness, gait disturbance, uncontrolled pain, orthostatic hypotension, acute kidney injury, paroxysmal atrial fibrillation, hypokalemia, right proximal fibular fracture, vascular dementia     History of present illness: The patient is an 27-year-old right-hand-dominant female who presented to our ED on 10/7/2022 after suffering a syncopal episode while shopping. No seizure activity was described. The patient does not have any significant recall of the events before or during the fall. Her loss of consciousness was described as minutes in length. In our ED, brain imaging was unremarkable but she was significantly hypertensive and hypokalemic. No arrhythmia aside from her baseline atrial fibrillation was identified. Since then she has had some fluctuation of heart rate up and down. She was diagnosed with acute kidney injury and a right proximal fibular fracture. Orthopedic consultation recommended no surgery and allowed weightbearing as tolerated. She struggled with forgetfulness (a recently progressive concern) and generalized weakness with dizziness. During her time on the rehab unit her participation was somewhat hampered by symptomatic orthostatic hypotension. Cardiology was consulted. Medications were adjusted.   The recommendation on this day of discharge is to stop Namenda and observe its impact on her symptomatic orthostasis. Prior (baseline) level of function: Independent.     Current level of function:         Current  [facility] and Goals:   Occupational Therapy:    Short Term Goals  Time Frame for Short Term Goals: STGs=LTGs :   Long Term Goals  Time Frame for Long Term Goals : ~10 days or until d/c  Long Term Goal 1: Pt will complete grooming tasks Ind (seated, PRN)  Long Term Goal 2: Pt will complete total body bathing c S  Long Term Goal 3: Pt will complete UB dressing c setup  Long Term Goal 4: Pt will complete LB dressing c setup using AE PRN  Long Term Goal 5: Pt will doff/don footwear c setup using AE PRN  Additional Goals?: Yes  Long Term Goal 6: Pt will complete toileting c supervision  Long Term Goal 7: Pt will complete functional transfers (bed, chair, toilet, shower) c DME PRN and supervision  Long Term Goal 8: Pt will perform therex/therax to facilitate increased strength/endurance/ax tolerance (c emphasis on dynamic standing balance/tolerance >8 mins, BUE endurance) c SBA  Long Term Goal 9: Pt will complete simple homemaking tasks c DME PRN and S :                                       Eating: Eating  Assistance Needed: Independent  Comment: x  CARE Score: 6  Discharge Goal: Independent       Oral Hygiene: Oral Hygiene  Assistance Needed: Independent  Comment: seated  CARE Score: 6  Discharge Goal: Independent    UB/LB Bathing: Shower/Bathe Self  Assistance Needed: Supervision or touching assistance  Comment: supervision; min cues for thoroughness  CARE Score: 4  Discharge Goal: Supervision or touching assistance    UB Dressing: Upper Body Dressing  Assistance Needed: Setup or clean-up assistance  Comment: requires setup assist d/t decreased safety c walker  CARE Score: 5  Discharge Goal: Set-up or clean-up assistance         LB Dressing: Lower Body Dressing  Assistance Needed: Setup or clean-up assistance  Comment: able to doff/don Depends and pants, perform pants management, but requires setup assist d/t poor walker safety  CARE Score: 5  Discharge Goal: Set-up or clean-up assistance    Donning and [place] Footwear: Putting On/Taking Off Footwear  Assistance Needed: Setup or clean-up assistance  Comment: able to doff hospital socks, don compression hose and shoes  CARE Score: 5  Discharge Goal: Set-up or clean-up assistance      Toiletin [address] needed: Independent  Comment: mod I  CARE Score: 6  Discharge Goal: Supervision or touching assistance      Toilet Transfers: Toilet Transfer  Assistance needed: Supervision or touching assistance  Comment: supervision c RW, required cue not to abandon walker during turn  CARE Score: 4  Discharge Goal: Supervision or touching assistance    Physical Therapy:   Short Term Goals  Time Frame for Short Term Goals: 10 tx days:  Short Term Goal 1: Pt will complete bed mobility (scooting, rolling R/L, and sup<->sit) Ind. Short Term Goal 2: Pt will sit->stand and car transfers using 2WW Mod Ind. Short Term Goal 3: Pt will complete stand->sit and stand turn transfers using 2WW with Sup. Short Term Goal 4: Pt will ambulate 10 ft and 50 ft with turns over level surface using 2WW Mod Ind. Short Term Goal 5: Pt will ambulate 10 ft of uneven surface and >/=150 ft of level surface using 2WW with SBA-Sup. Additional Goals?: Yes  Short Term Goal 6: Pt will ascend/descend curb step using 2WW and 1 flight of stairs using railings following appropriate step-to pattern on ascent/descent with SBA-Sup. Short Term Goal 7: Pt will complete object retrieval from the floor with 2WW and reacher prn Mod Ind.             Bed Mobility:   Sit to Lying  Assistance Needed: Independent  Comment: standard bed  CARE Score: 6  Discharge Goal: Independent  Roll Left and Right  Assistance Needed: Independent  Comment: standard bed  CARE Score: 6  Discharge Goal: Independent  Lying to Sitting on Side of Bed  Assistance Needed: Independent  Comment: standard bed  CARE Score: 6  Discharge Goal: Independent    Transfers:    Sit to Stand  Assistance Needed: Supervision or touching assistance  Comment: Pt symptomatic with orthostatic hypotension today with poor ability to compensate safely, mod cues  CARE Score: 4  Discharge Goal: Independent  Chair/Bed-to-Chair Transfer  Assistance Needed: Supervision or touching assistance  Comment: supervision with cues for balance  CARE Score: 4  Discharge Goal: Supervision or touching assistance     Car Transfer  Assistance Needed: Supervision or touching assistance  Comment: supervision with cues for problem solving  CARE Score: 4  Discharge Goal: Independent    Ambulation:    Walking Ability  Does the Patient Walk?: Yes     Walk 10 Feet  Assistance Needed: Supervision or touching assistance  Comment: supervision with 2ww  CARE Score: 4  Discharge Goal: Independent     Walk 50 Feet with Two Turns  Assistance Needed: Supervision or touching assistance  Comment: supervision with 2ww  CARE Score: 4  Discharge Goal: Independent     Walk 150 Feet  Assistance Needed: Supervision or touching assistance  Comment: supervision with 2ww  CARE Score: 4  Discharge Goal: Supervision or touching assistance     Walking 10 Feet on Uneven Surfaces  Assistance Needed: Supervision or touching assistance  Comment: supervision on carpet with bean bags, CG on throw rugs  CARE Score: 4  Discharge Goal: Supervision or touching assistance     1 Step (Curb)  Assistance Needed: Supervision or touching assistance  Comment: supervision with 2ww, mod cues for sequence and safe walker placement  CARE Score: 4  Discharge Goal: Supervision or touching assistance     4 Steps  Assistance Needed: Supervision or touching assistance  Comment: performed both B rails and single rail as at home with SBA and max cues for sequence  CARE Score: 4  Discharge Goal: Supervision or touching assistance     12 Steps  Assistance Needed: Supervision or touching assistance  Comment: as 4 steps  CARE Score: 4  Discharge Goal: Supervision or touching assistance       Wheelchair:  w/c Ability: Wheelchair Ability  Uses a Wheelchair and/or Scooter?: Yes  Wheel 50 Feet with Two Turns  Assistance Needed: Independent  Comment: pt varies B UE and LEs, recalls to lock and unlock brakes without cues  CARE Score: 6  Wheel 150 Feet  Assistance Needed: Independent  CARE Score: 6  Discharge Goal: Independent          Balance:        Object: Picking Up Object  Assistance Needed: Supervision or touching assistance  Comment: supervision with 2ww and reacher with cues for safe use of both together. Poor problem solving for how to carry item to trash can after picking up  CARE Score: 4  Discharge Goal: Supervision or touching assistance    I      Exam:    Blood pressure 139/72, pulse 65, temperature 98.7 °F (37.1 °C), temperature source Oral, resp. rate 16, height 5' 7\" (1.702 m), weight 171 lb 4.8 oz (77.7 kg), SpO2 95 %, not currently breastfeeding. General: Up in a bedside chair eating lunch. Alert. Complains of vague malaise without nausea or specific complaints. Alert and oriented. In no distress. HEENT: Gazing right and left. Clear speech. MMM. No JVD. Pulmonary: Unlabored respirations without wheezes or rales. Cardiac: Her rate is controlled. Infrequent premature beats. No murmur. Abdomen: Patient's abdomen is soft and nondistended. Bowel sounds were present throughout. There was no rebound, guarding or masses noted. Upper extremities: Functional coordination and  strength.  strength. No bruising or swelling. Lower extremities: No peripheral edema. Calf soft. No signs of DVT. 4+/5 strength bilaterally. Sitting balance was good.   Standing balance was fair-.    Lab Results   Component Value Date    WBC 6.4 10/13/2022    HGB 11.8 (L) 10/13/2022    HCT 37.0 10/13/2022    MCV 88.1 10/13/2022     10/13/2022     Lab Results   Component Value Date    INR 1.90 10/13/2022 INR 1.02 02/25/2020    INR 1.00 11/08/2013    PROTIME 24.7 (H) 10/13/2022    PROTIME 12.3 02/25/2020    PROTIME 10.8 11/08/2013     Lab Results   Component Value Date    CREATININE 1.3 (H) 10/13/2022    BUN 29 (H) 10/13/2022     10/13/2022    K 4.5 10/13/2022     10/13/2022    CO2 31 10/13/2022     Lab Results   Component Value Date    ALT 14 10/13/2022    AST 18 10/13/2022    ALKPHOS 83 10/13/2022    BILITOT 0.3 10/13/2022         The patient presented to the ARU with the above history requiring a multidisciplinary treatment plan including close medical supervision by the Candi Evans Director. The patient participated in the prescribed therapy treatment plan with reasonable compliance and progressive tolerance. They avoided significant medical complications. By the time of discharge the patient had become safer with adaptive equipment to transfer and toilet with a FWW with the supervision of family/caregivers. The patient was tolerating an oral diet without choking/coughing and was back to their baseline with regards to bowel and bladder control. Discharge instructions were reviewed with the patient. Her PCP cannot see her for 4 weeks. Therefore, a 1 week follow-up with the walk-in clinic will be arranged to supplement the 1 month follow-up with her PCP. Follow-up with cardiology in 2 weeks. No driving. [facility] PT/OT/RN will be arranged. High Risk Drug Classes:  Use and Indication    Is taking: Check if the patient is taking any medications (or has them prescribed) by pharmacological classification, not how it is used, in the following classes  Indication noted:  If column 1 is checked, check if there is an indication noted for all meds in the drug class Is taking  (check all that apply) Indication noted (check all that apply)   Antipsychotic [] []   Anticoagulant [x] [x]   Antibiotic [] []   Opioid [] []   Antiplatelet [] []   Hypoglycemic (including insulin) [] []   None of the above [] Medication List        START taking these medications      metoprolol succinate 25 MG extended release tablet  Commonly known as: TOPROL XL  Take 1 tablet by mouth every evening     midodrine 5 MG tablet  Commonly known as: PROAMATINE  Take 1 tablet by mouth 3 times daily (with meals)     Vitamin D (Ergocalciferol) 10524 units Caps  Take 50,000 Units by mouth once a week  Start taking on: October 24, 2022            CONTINUE taking these medications      albuterol sulfate  (90 Base) MCG/ACT inhaler  Commonly known as: Ventolin HFA  Inhale 2 puffs into the lungs 4 times daily as needed for Wheezing     levothyroxine 50 MCG tablet  Commonly known as: SYNTHROID  Take 1 tablet by mouth every morning (before breakfast) TAKE ONE TABLET BY MOUTH DAILY     rivaroxaban 15 MG Tabs tablet  Commonly known as: XARELTO  Take 1 tablet by mouth daily     TYLENOL ARTHRITIS PAIN PO            STOP taking these medications      amLODIPine 5 MG tablet  Commonly known as: NORVASC     DULoxetine 30 MG extended release capsule  Commonly known as: CYMBALTA     lisinopril 5 MG tablet  Commonly known as: PRINIVIL;ZESTRIL     memantine 10 MG tablet  Commonly known as: NAMENDA     metoprolol tartrate 50 MG tablet  Commonly known as: LOPRESSOR     PARoxetine 10 MG tablet  Commonly known as: Paxil               Where to Get Your Medications        You can get these medications from any pharmacy    Bring a paper prescription for each of these medications  metoprolol succinate 25 MG extended release tablet  midodrine 5 MG tablet  Vitamin D (Ergocalciferol) 04996 units Caps           CONDITION ON DISCHARGE: Stable. The prognosis is fair for further improvements in ADL's and safety with adapted gait/transfers. Record review, patient exam, discharge instructions, medication reconciliation and summary for this discharge visit took more than 30 minutes.

## 2022-10-19 NOTE — PROGRESS NOTES
Discharge instruction given to patient and grandson with prescription and signed. Patient propelled by W/C to grandson's auto. [facility]SC and other idems also transport to grandson's car.  Discharge completed as ordered

## 2022-10-19 NOTE — PROGRESS NOTES
Alexandria Most    : 1935  Acct #: [de-identified]  MRN: [id number]              PM&R Progress Note      Admitting diagnosis: Hypertensive encephalopathy ( [address])     Comorbid diagnoses impacting rehabilitation: Generalized weakness, gait disturbance, uncontrolled pain, orthostatic hypotension, acute kidney injury, paroxysmal atrial fibrillation, hypokalemia, right proximal fibular fracture, vascular dementia    Chief complaint: Mixed feelings about getting home. Occasional dizziness. Fatigue with activity. Wants to sleep in her own bed. Prior (baseline) level of function: Independent.     Current level of function:         Current  [facility]-KAT[facility] and Goals:   Occupational Therapy:    Short Term Goals  Time Frame for Short Term Goals: STGs=LTGs :   Long Term Goals  Time Frame for Long Term Goals : ~10 days or until d/c  Long Term Goal 1: Pt will complete grooming tasks Ind (seated, PRN)  Long Term Goal 2: Pt will complete total body bathing c S  Long Term Goal 3: Pt will complete UB dressing c setup  Long Term Goal 4: Pt will complete LB dressing c setup using AE PRN  Long Term Goal 5: Pt will doff/don footwear c setup using AE PRN  Additional Goals?: Yes  Long Term Goal 6: Pt will complete toileting c supervision  Long Term Goal 7: Pt will complete functional transfers (bed, chair, toilet, shower) c DME PRN and supervision  Long Term Goal 8: Pt will perform therex/therax to facilitate increased strength/endurance/ax tolerance (c emphasis on dynamic standing balance/tolerance >8 mins, BUE endurance) c SBA  Long Term Goal 9: Pt will complete simple homemaking tasks c DME PRN and S :                                       Eating: Eating  Assistance Needed: Independent  Comment: x  CARE Score: 6  Discharge Goal: Independent       Oral Hygiene: Oral Hygiene  Assistance Needed: Independent  Comment: seated  CARE Score: 6  Discharge Goal: Independent    UB/LB Bathing: Shower/Bathe Self  Assistance Needed: Supervision or touching assistance  Comment: supervision; min cues for thoroughness  CARE Score: 4  Discharge Goal: Supervision or touching assistance    UB Dressing: Upper Body Dressing  Assistance Needed: Setup or clean-up assistance  Comment: requires setup assist d/t decreased safety c walker  CARE Score: 5  Discharge Goal: Set-up or clean-up assistance         LB Dressing: Lower Body Dressing  Assistance Needed: Setup or clean-up assistance  Comment: able to doff/don Depends and pants, perform pants management, but requires setup assist d/t poor walker safety  CARE Score: 5  Discharge Goal: Set-up or clean-up assistance    Donning and [place] Footwear: Putting On/Taking Off Footwear  Assistance Needed: Setup or clean-up assistance  Comment: able to doff hospital socks, don compression hose and shoes  CARE Score: 5  Discharge Goal: Set-up or clean-up assistance      Toiletin [address] needed: Independent  Comment: mod I  CARE Score: 6  Discharge Goal: Supervision or touching assistance      Toilet Transfers: Toilet Transfer  Assistance needed: Supervision or touching assistance  Comment: supervision c RW, required cue not to abandon walker during turn  CARE Score: 4  Discharge Goal: Supervision or touching assistance    Physical Therapy:   Short Term Goals  Time Frame for Short Term Goals: 10 tx days:  Short Term Goal 1: Pt will complete bed mobility (scooting, rolling R/L, and sup<->sit) Ind. Short Term Goal 2: Pt will sit->stand and car transfers using 2WW Mod Ind. Short Term Goal 3: Pt will complete stand->sit and stand turn transfers using 2WW with Sup. Short Term Goal 4: Pt will ambulate 10 ft and 50 ft with turns over level surface using 2WW Mod Ind. Short Term Goal 5: Pt will ambulate 10 ft of uneven surface and >/=150 ft of level surface using 2WW with SBA-Sup.   Additional Goals?: Yes  Short Term Goal 6: Pt will ascend/descend curb step using 2WW and 1 flight of stairs using railings following appropriate step-to pattern on ascent/descent with SBA-Sup. Short Term Goal 7: Pt will complete object retrieval from the floor with 2WW and reacher prn Mod Ind.             Bed Mobility:   Sit to Lying  Assistance Needed: Independent  Comment: standard bed  CARE Score: 6  Discharge Goal: Independent  Roll Left and Right  Assistance Needed: Independent  Comment: standard bed  CARE Score: 6  Discharge Goal: Independent  Lying to Sitting on Side of Bed  Assistance Needed: Independent  Comment: standard bed  CARE Score: 6  Discharge Goal: Independent    Transfers:    Sit to Stand  Assistance Needed: Supervision or touching assistance  Comment: Pt symptomatic with orthostatic hypotension today with poor ability to compensate safely, mod cues  CARE Score: 4  Discharge Goal: Independent  Chair/Bed-to-Chair Transfer  Assistance Needed: Supervision or touching assistance  Comment: supervision with cues for balance  CARE Score: 4  Discharge Goal: Supervision or touching assistance     Car Transfer  Assistance Needed: Supervision or touching assistance  Comment: supervision with cues for problem solving  CARE Score: 4  Discharge Goal: Independent    Ambulation:    Walking Ability  Does the Patient Walk?: Yes     Walk 10 Feet  Assistance Needed: Supervision or touching assistance  Comment: supervision with 2ww  CARE Score: 4  Discharge Goal: Independent     Walk 50 Feet with Two Turns  Assistance Needed: Supervision or touching assistance  Comment: supervision with 2ww  CARE Score: 4  Discharge Goal: Independent     Walk 150 Feet  Assistance Needed: Supervision or touching assistance  Comment: supervision with 2ww  CARE Score: 4  Discharge Goal: Supervision or touching assistance     Walking 10 Feet on Uneven Surfaces  Assistance Needed: Supervision or touching assistance  Comment: supervision on carpet with bean bags, CG on throw rugs  CARE Score: 4  Discharge Goal: Supervision or touching assistance     1 Step (Curb)  Assistance Needed: Supervision or touching assistance  Comment: supervision with 2ww, mod cues for sequence and safe walker placement  CARE Score: 4  Discharge Goal: Supervision or touching assistance     4 Steps  Assistance Needed: Supervision or touching assistance  Comment: performed both B rails and single rail as at home with SBA and max cues for sequence  CARE Score: 4  Discharge Goal: Supervision or touching assistance     12 Steps  Assistance Needed: Supervision or touching assistance  Comment: as 4 steps  CARE Score: 4  Discharge Goal: Supervision or touching assistance       Wheelchair:  w/c Ability: Wheelchair Ability  Uses a Wheelchair and/or Scooter?: Yes  Wheel 50 Feet with Two Turns  Assistance Needed: Independent  Comment: pt varies B UE and LEs, recalls to lock and unlock brakes without cues  CARE Score: 6  Wheel 150 Feet  Assistance Needed: Independent  CARE Score: 6  Discharge Goal: Independent          Balance:        Object: Picking Up Object  Assistance Needed: Supervision or touching assistance  Comment: supervision with 2ww and reacher with cues for safe use of both together. Poor problem solving for how to carry item to trash can after picking up  CARE Score: 4  Discharge Goal: Supervision or touching assistance    I      Exam:    Blood pressure (!) 147/72, pulse 68, temperature 97.3 °F (36.3 °C), temperature source Oral, resp. rate 18, height 5' 7\" (1.702 m), weight 173 lb 4.5 oz (78.6 kg), SpO2 98 %, not currently breastfeeding. General: Up in a wheelchair. Fair trunk control for sitting upright. Alert. HEENT: Clear speech. MMM. No JVD. Pulmonary: Shallow respirations without wheezes or rales. Cardiac: Her rate is controlled. Infrequent premature beats. No murmur. Abdomen: Patient's abdomen is soft and nondistended. Bowel sounds were present throughout. There was no rebound, guarding or masses noted.     Upper extremities: She attempted to use her legs Detail Level: Detailed to reposition his wheelchair but her arms are without much help in that regard. Fair  strength. No bruising or swelling. Lower extremities: No significant edema. Calf soft. Heels clear. Sitting balance was good. Standing balance was fair-.    Lab Results   Component Value Date    WBC 6.4 10/13/2022    HGB 11.8 (L) 10/13/2022    HCT 37.0 10/13/2022    MCV 88.1 10/13/2022     10/13/2022     Lab Results   Component Value Date    INR 1.90 10/13/2022    INR 1.02 02/25/2020    INR 1.00 11/08/2013    PROTIME 24.7 (H) 10/13/2022    PROTIME 12.3 02/25/2020    PROTIME 10.8 11/08/2013     Lab Results   Component Value Date    CREATININE 1.3 (H) 10/13/2022    BUN 29 (H) 10/13/2022     10/13/2022    K 4.5 10/13/2022     10/13/2022    CO2 31 10/13/2022     Lab Results   Component Value Date    ALT 14 10/13/2022    AST 18 10/13/2022    ALKPHOS 83 10/13/2022    BILITOT 0.3 10/13/2022       Expected length of stay  prior to a supervised level of function for discharge home with a walker and [facility] OT/PT is 10/19/2022. Recommendations:    Hypertensive encephalopathy with gait disturbance: As we wrap up her inpatient rehab stay, we recommend supervision in the home and home health care to continue therapies. She has been resistant to both of these recommendations. Her reasoning and insight remain poor. Her family has not had much influence over her decision making. Although she is a bit stronger, she still has intermittent dizziness and some knee pain. She would benefit from ADL set up and medication supervision in the home. For home we recommend use of a wheelchair and a walker but she declines the wheelchair. Cardiology continues to address her orthostatic hypotension with supine hypertension. Toprol and ProAmatine to minimize the blood pressure fluctuations. Continues to have some symptomatic hypotension.   Benefiting from adaptive equipment training but there has been no one to offer Cpt Desired:  caregiver education to. Generally she is continent of bowel and bladder and minimal pain. Outpatient follow-up with her PCP. Verbal cues and SBA-CGA for transfers. DVT prophylaxis: The Xarelto she takes for her atrial fibrillation is protection against new DVT. I must periodically monitor her hemoglobin and platelet count while on this medication. Weightbearing activities are slowly improving daily. GI prophylaxis is available. No new bruising or swelling. Orthostatic hypotension: Cardiology consulted to manage her symptomatic orthostatic hypotension. Supine hypertension is excepted in order to avoid less symptomatic upright orthostasis. Orthostatic blood pressure checks on a regular basis and the cardiologist is managing the use of the ProAmatine. Vital signs will be checked at rest and with activities. We are encouraging consistent oral intake. Acute kidney injury: Encouraging oral hydration and trying to avoid wide fluctuations of blood pressure. Avoiding nephrotoxic medications when possible. Periodic check of her chemistries. Paroxysmal atrial fibrillation: She has a permanent pacemaker and interrogation should take place as an outpatient. For now she is on Toprol for rate control and Xarelto for anticoagulation. Daily weights to detect any decompensation of CHF. Right proximal fibular fracture: Weightbearing as tolerated. Cautious use of analgesics due to her confusion and hypotension. Cryotherapy. Minimal complaints of pain now. Vascular dementia: Namenda. Cymbalta. She requested to be taken off the Cymbalta. No ill effects noted. Showing: fungal hyphal elements: positive Koh Intro Text (From The.....): A KOH prep was ordered and evaluated from the Koh Procedure Text (Tissue Harvesting Technique): A 15-blade scalpel was used to scrape the skin. The skin scrapings were placed on a glass slide, covered with a coverslip and a KOH solution was applied.

## 2022-10-19 NOTE — PROGRESS NOTES
[facility] Discharge Assessment    Transportation  \"In the past 6-12 months, has lack of transportation kept you from medical appointments, meetings, work, or from getting things needed for daily living? \"Check all that apply:  [] A.  Yes, it has kept me from medical appointments or from getting my medications  [] B.  Yes, it has kept me from non-medical meetings, appointments, work, or from getting things that I need  [x] C.  No  [] X. Patient unable to respond    Provision of Current Reconciled Medication List to Subsequent Provider at Discharge     [] No, current reconciled medication list not provided to the subsequent provider. [x] Yes, current reconciled medication list provided to the subsequent provider. ie. If D/C to Acute hospital, SNF, home with home health, YES we should  (**Select route of transmission below**)      [x] Via Electronic Health Record   [] [employer]  [] Verbal (e.g. in person, telephone, video conferencing)  [x] Paper-based (e.g. fax, copies, printouts)   [] Other Methods (e.g. texting, email, CDs)    Provision of Current Reconciled Medication List to Patient at Discharge  [x] No, current reconciled medication list not provided to the patient, family and/or caregiver. ie. If D/C to Acute hospital, SNF, home with home health, click NO  [] Yes, current reconciled medication list provided to the patient, family and/or caregiver.   ie. if D/C home with Outpatient or no services, YES we should  (**Select route of transmission below**)     [] Via Electronic Health Record (e.g., electronic access to patient portal)   [] [employer]  [] Verbal (e.g. in person, telephone, video conferencing)  [] Paper-based (e.g. fax, copies, printouts)   [] Other Methods (e.g. texting, email, CDs)    Health Literacy  \"How often do you need to have someone help you when you read instructions, pamphlets, or other written material from your doctor or pharmacy? \"  []  0. Never  []  1. Rarely  []  2. Sometimes  [x]  3. Often  []  4. Always  []  8. Patient unable to respond    Signs and Symptoms of Delirium  A. Acute Onset Mental Status Change - Is there evidence of an acute change in mental status from the patient's baseline? [x] 0. No  [] 1. Yes    B. Inattention - Did the patient have difficulty focusing attention, for example being easily distractible or having difficulty keeping track of what was being said? [x]  0. Behavior not present  []  1. Behavior continuously present, does not fluctuate  []  2. Behavior present, fluctuates (comes and goes, changes in severity)    C. Disorganized thinking - Was the patient's thinking disorganized or incoherent (rambling or irrelevant conversation, unclear or illogical flow of ideas, or unpredictable switching from subject to subject)? [x]  0. Behavior not present  []  1. Behavior continuously present, does not fluctuate  []  2. Behavior present, fluctuates (comes and goes, changes in severity)    D. Altered level of consciousness - Did the patient have altered level of consciousness as indicated by any of the following criteria? Vigilant - startled easily to any sound or touch  Lethargic - repeatedly dozed off while being asked questions, but responded to voice or touch  Stuporous - very difficulty to arouse and keep aroused for the interview  Comatose - could not be aroused  [x]  0. Behavior not present  []  1. Behavior continuously present, does not fluctuate  []  2. Behavior present, fluctuates (comes and goes, changes in severity)    Mood    \"Over the last 2 weeks, have you been bothered by any of the following problems?\" 1. Symptom Presence    0 = No  1 = Yes  9 = No Response 2.  Symptom Frequency    0 = Never or 1 day  1 = 2-6 days (several days)  2 = 7-11 days (half or more of the days)  3 = 12-14 days (nearly every day)  **Leave blank if 'No Reponse'**      Enter scores in boxes Column 1 Column 2   Little interest or pleasure in doing things   [x] 0. No  [] 1. Yes  [] 9. No Response [x] 0. Never or one day  [] 1.  2-6 days (several days)  [] 2.  7-11 days (half or more of days)  [] 3.  12-14 days (nearly every day)     Feeling down, depressed, or hopeless   [x] 0. No  [] 1. Yes  [] 9. No Response [x] 0. Never or one day  [] 1.  2-6 days (several days)  [] 2.  7-11 days (half or more of days)  [] 3.  12-14 days (nearly every day)   **If either A or B in column 2 is coded 2 or 3, CONTINUE asking the questions below. If not, END the interview. **     Trouble falling or staying asleep, or sleeping too much   [x] 0. No  [] 1. Yes  [] 9. No Response [] 0. Never or one day  [] 1.  2-6 days (several days)  [] 2.  7-11 days (half or more of days)  [] 3.  12-14 days (nearly every day   Feeling tired or having little energy   [] 0. No  [x] 1. Yes  [] 9. No Response [x] 0. Never or one day  [] 1.  2-6 days (several days)  [] 2.  7-11 days (half or more of days)  [] 3.  12-14 days (nearly every day   Poor appetite or overeating     [x] 0. No  [] 1. Yes  [] 9. No Response [] 0. Never or one day  [] 1.  2-6 days (several days)  [] 2.  7-11 days (half or more of days)  [] 3.  12-14 days (nearly every day   Feeling bad about yourself - or that you are a failure or have let yourself or your family down   [x] 0. No  [] 1. Yes  [] 9. No Response [] 0. Never or one day  [] 1.  2-6 days (several days)  [] 2.  7-11 days (half or more of days)  [] 3.  12-14 days (nearly every day   Trouble concentrating on things, such as reading the newspaper or watching television   [x] 0. No  [] 1. Yes  [] 9. No Response [] 0. Never or one day  [] 1.  2-6 days (several days)  [] 2.  7-11 days (half or more of days)  [] 3.  12-14 days (nearly every day   Moving or speaking so slowly that other people could have noticed.   Or the opposite- being so fidgety or restless that you have been moving around a lot more than usual.   [x] 0. No  [] 1. Yes  [] 9. No Response [] 0. Never or one day  [] 1.  2-6 days (several days)  [] 2.  7-11 days (half or more of days)  [] 3.  12-14 days (nearly every day   Thoughts that you would be better off dead, or of hurting yourself in some way. [x] 0. No  [] 1. Yes  [] 9. No Response [] 0. Never or one day  [] 1.  2-6 days (several days)  [] 2.  7-11 days (half or more of days)  [] 3.  12-14 days (nearly every day     Social Isolation  \"How often do you feel lonely or isolated from those around you? \"  [] 0. Never  [x] 1. Rarely  [] 2. Sometimes  [] 3. Often  [] 4. Always  [] 8. Patient unable to respond    Pain Effect on Sleep  \"Over the past 5 days, how much of the time has pain made it hard for you to sleep at night? \"  []  0. Does not apply - I have not had any pain or hurting in the past 5 days  [x]  1. Rarely or not at all  []  2. Occasionally  []  3. Frequently  []  4. Almost constantly  []  8. Unable to answer  **If the patient answers \"0. Does not apply\" to this question, skip the next two \"Pain Effect. Valri Aura Chavarria \" questions**    Pain Interference with Therapy Activities  \"Over the past 5 days, how often have you limited your participation in rehabilitation therapy sessions due to pain? \"  []  0. Does not apply - I have not received rehabilitation therapy in the past 5 days  [x]  1. Rarely or not at all  []  2. Occasionally  []  3. Frequently  []  4. Almost constantly  []  8. Unable to answer    Pain Interference with Day-to-Day Activities: \"Over the past 5 days, how often have you limited your day-to-day activities (excluding rehabilitation therapy session)? \"  [x]  1. Rarely or not at all  []  2. Occasionally  []  3. Frequently  []  4. Almost constantly  []  8. Unable to answer    Nutritional Approaches  Last 7 Days - Check all of the following nutritional approaches that were received within the last 7 days:  []  A. Parenteral/IV feeding  []  B.   Feeding tube (e.g., nasogastric or abdominal (PEG))  []  C. Mechanically altered diet - requires change in texture of food or liquids (e.g., pureed food, thickened liquids)  []  D. Therapeutic diet (e.g., low salt, diabetic, low cholesterol)  [x]  Z. None of the above    At time of Discharge - Check all of the following nutritional approaches that were being received at discharge:  []  A. Parenteral/IV feeding  []  B. Feeding tube (e.g., nasogastric or abdominal (PEG))  []  C. Mechanically altered diet - requires change in texture of food or liquids (e.g., pureed food, thickened liquids)  [x]  D. Therapeutic diet (e.g., low salt, diabetic, low cholesterol  []  Z. None of the above      Special Treatments, Procedures, and Programs    Check all of the following treatments, procedures, and programs that apply at discharge. At Discharge (check all that apply)   Cancer Treatments   A1. Chemotherapy []           A2. IV []           A3. Oral []           A10. Other []   B1. Radiation []   Respiratory Therapies   C1. Oxygen Therapy []           C2. Continuous []           C3. Intermittent []           C4. High-concentration []   D1. Suctioning []           D2. Scheduled []           D3. As needed []   E1. Tracheostomy Care []   F1. Invasive Mechanical Ventilator (ventilator or respirator) []   G1. Non-invasive Mechanical Ventilator []           G2. BiPAP []           G3. CPAP []   Other   H1. IV Medications []           H2. Vasoactive medications []           H3. Antibiotics []           H4. Anticoagulation []           H10. Other []   I1. Transfusions []   J1. Dialysis []           J2. Hemodialysis []           J3. Peritoneal dialysis []   O1. IV access []           O2. Peripheral []           O3. Midline []           O4.  Central (PICC, tunneled, port) []      None of the above (select if no Cancer, Respiratory, or Other boxes are checked) [x]

## 2022-10-19 NOTE — PROGRESS NOTES
Daily Progress Note  Subjective:    Patient awake, alert and feeling ok  No CP, Sob and feeling better overall  Still gets dizzy with standing but improved per pt. Attending Note:    Stable from cardiac stand  Being d/c home  F/u in office in few weeks  Stop Namenda if possible   As it may be causing ortho BP    Impression and Plan:     Orthostatic hypotension    Significant drop on checks    Rec. Daily checks    Need to check resting BP sitting or standing- it will be very high when supine    Continue ProAmatine and compression stockings for now    Hopefully will benefit with rehab    Likely the cause of her syncope    Of note- her namenda may be contributing to her orthostasis- Will defer to primary team if can decrease dosing or wean her off this     PAF      On Xarelto and Toprol      HR stable      Cont. Med. Tx. For now      H/O CHB     S/p biventricular pacer 2011     Pacer check stable     Possible D/C today per notes  F/u at office in 2-3 weeks    Most Recent Echo   Last Echo 10/10/22   Summary   Left ventricular systolic function is normal.   Ejection fraction is visually estimated at 60%. Severe left ventricular hypertrophy. Grade II diastolic dysfunction. PPM visualized in right heart. No evidence of any pericardial effusion. Most Recent Stress test  Last stress test was done at the office on  05/23/2022, it was negative for any ischemia. EF was in the 60% range. PAST MEDICAL HISTORY:  PAF, CAD status post PCI in 2011, complete heart  block status post pacemaker and upgrade to a biventricular pacemaker in  2011, GERD, fibromyalgia, hypothyroidism. PAST SURGICAL HISTORY:  PCI to the posterior lateral branch placed in  2011, appendectomy, bladder surgery, cholecystectomy, multiple  endoscopies, hysterectomy, pacemaker placement and then a BiV pacer  upgrade, Medtronic device placed in 2020.      ALLERGIES:  AMITRIPTYLINE, ASPIRIN, CODEINE, ELAVIL, HYDROXYCHLOROQUINE,  IBUPROFEN, PREVNAR 13 VACCINE, and THEOPHYLLINE. FAMILY HISTORY:  Reviewed and noncontributory. SOCIAL HISTORY:  She is a former smoker that quit in 1977. Per the  notes, does not drink any alcohol. Objective:   BP (!) 157/78   Pulse 73   Temp 98.7 °F (37.1 °C) (Oral)   Resp 16   Ht 5' 7\" (1.702 m)   Wt 171 lb 4.8 oz (77.7 kg)   SpO2 95%   BMI 26.83 kg/m²     Intake/Output Summary (Last 24 hours) at 10/19/2022 0934  Last data filed at 10/19/2022 8498  Gross per 24 hour   Intake 1524 ml   Output 0 ml   Net 1524 ml       Medications:   Scheduled Meds:   metoprolol succinate  25 mg Oral QPM    memantine  10 mg Oral BID    rivaroxaban  15 mg Oral Daily    levothyroxine  50 mcg Oral QAM AC    vitamin D  50,000 Units Oral Weekly    midodrine  5 mg Oral TID WC      Infusions:     PRN Meds:  albuterol sulfate HFA, acetaminophen, polyethylene glycol, bisacodyl     Physical Exam:  Vitals:    10/19/22 0816   BP: (!) 157/78   Pulse: 73   Resp: 16   Temp:    SpO2: 95%        General: AAO, NAD  Chest: Nontender  Cardiac: First and Second Heart Sounds are Normal, No Murmurs or Gallops noted  Lungs:Clear to auscultation and percussion. Abdomen: Soft, NT, ND, +BS  Extremities: No clubbing, no edema  Vascular:  Equal 2+ peripheral pulses. Lab Data:  CBC: No results for input(s): WBC, HGB, HCT, MCV, PLT in the last 72 hours. BMP: No results for input(s): NA, K, CL, CO2, PHOS, BUN, CREATININE, CA in the last 72 hours. LIVER PROFILE: No results for input(s): AST, ALT, LIPASE, BILIDIR, BILITOT, ALKPHOS in the last 72 hours. Invalid input(s): AMYLASE,  ALB  PT/INR: No results for input(s): PROTIME, INR in the last 72 hours. APTT: No results for input(s): APTT in the last 72 hours. BNP:  No results for input(s): BNP in the last 72 hours.       Assessment:  Patient Active Problem List    Diagnosis Date Noted    Generalized weakness 10/12/2022    Gait disturbance 10/12/2022    Uncontrolled pain 10/12/2022    Closed fracture of upper end of right fibula 10/12/2022    Orthostatic hypotension 10/12/2022    Acute kidney injury (YASSINE) with acute tubular necrosis (ATN) ([facility] 10/12/2022    Hypokalemia 10/12/2022    Moderate vascular dementia with anxiety 10/12/2022    Hypertensive encephalopathy 10/10/2022    Hypertensive emergency 10/08/2022    Lupus ([facility] 10/07/2022    Senile degeneration of brain ([facility] 10/07/2022    Chronic renal disease, stage III [facility]) [914795] 10/07/2022    Syncope and collapse 09/18/2021    Other cardiomyopathies ([facility] 07/06/2021    Fibromyalgia 04/05/2021    Episode of recurrent major depressive disorder ([facility] 04/05/2021    SOB (shortness of breath) 09/08/2020    Other specified hypothyroidism 09/08/2020    OAB (overactive bladder) 06/08/2020    S/P biventricular cardiac pacemaker procedure 02/27/2020    A-fib ([facility] 08/26/2019    Type 2 diabetes mellitus with diabetic polyneuropathy, without long-term current use of insulin ([facility] 06/05/2019       Electronically signed by Beatris Boxer, PA-C on 10/19/2022 at 9:34 AM    Electronically signed by David Chou MD on 10/19/22 at 4:41 PM EDT

## 2022-10-20 ENCOUNTER — CARE COORDINATION (OUTPATIENT)
Dept: CASE MANAGEMENT | Age: 87
End: 2022-10-20

## 2022-10-20 DIAGNOSIS — N17.0 ACUTE KIDNEY INJURY (AKI) WITH ACUTE TUBULAR NECROSIS (ATN) (HCC): Primary | ICD-10-CM

## 2022-10-20 PROCEDURE — 1111F DSCHRG MED/CURRENT MED MERGE: CPT | Performed by: PHYSICIAN ASSISTANT

## 2022-10-20 NOTE — CARE COORDINATION
[facility] Care Transitions Initial Follow Up Call    Call within 2 business days of discharge: Yes    Care Transition Nurse contacted the patient by telephone to perform post hospital discharge assessment. Verified name and  with patient as identifiers. Provided introduction to self, and explanation of the Care Transition Nurse role. Patient: Tosha Griffiths Patient : 1935   MRN: [id number]  Reason for Admission: Hypertensive emergency, Hypertensive encephalopathy  Discharge Date: 10/19/22 RARS: Readmission Risk Score: 12      Last Discharge [address]       Date Complaint Diagnosis Description Type Department Provider    10/10/22   Admission (Discharged) Jess Lyons MD            Was this an external facility discharge? No Discharge Facility: [facility]     Challenges to be reviewed by the provider   Additional needs identified to be addressed with provider: No  none               Method of communication with provider: none. Contacted [facility]. Spoke with Milena Moreno who confirmed referral received. She states she will be seen either tomorrow or Saturday. Someone will contact her prior to visit. Contacted patient for initial care transition follow up. Ramonita Cee states that she is still very tired. Reports she continues to have right leg pain. States leg has been bothering her for a very long time. She denies leg swelling, redness, warmth, tenderness when touched. She is taking Tylenol for the pain which does provide relief. She denies having any dizziness/lightheadedness. Encouraged to avoid any quick or sudden movements and importance of sitting or standing for a few seconds to minutes before moving. She is using her walker when ambulating. She denies having any falls, syncopal or near syncopal episodes since returning home. Reports breathing is \"not too bad\". Denies distress. Typically will become short of breath with exertion. Encouraged not to overexert herself. States she is aware of her limits and will sit when needed. No c/o chest pain/discomfort, pressure, tightness. She reports having a runny nose and dry cough. Will continue to monitor. She informed CTN that family(grandson and his wife) will  groceries for her. Will also ask them to  her medications from the pharmacy. She states grandbrii had to rush out after dropping her off because he had to take his wife to the hospital.  She is drinking plenty of fluids. She is urinating and moving her bowels w/o issues. Last BM was 11/19/22. Reviewed medication list.  Has not picked up new medications as previously mentioned. Reviewed stopped medications. Ensured she does not mix up Metoprolol Succinate 25 mg (new medication) with Metoprolol Tartrate 50 mg (stopped medication). She verbalized understanding. Patient has a follow up scheduled at the [facility] tomorrow at 2:30 and with Cardiology on 11/3/22. Patient was driving prior to going to the hospital.  She states she does not plan to drive anytime soon. She plans to have her grandson and his wife or friend take her to the appointments. If they are unable to take her she states she will call a cab. Informed her that home health nurse will make a visit either tomorrow or Saturday but someone will call her first.  She verbalized understanding. Discussed signs/symptoms and when to contact providers with any new or worsening symptoms as well as with any questions or concerns that she may have. She verbalized understanding. She does not have any other questions or needs at this time. Care Transition Nurse reviewed discharge instructions, medical action plan, and red flags with patient who verbalized understanding. The patient was given an opportunity to ask questions and does not have any further questions or concerns at this time. Were discharge instructions available to patient? Yes.  Reviewed appropriate site of care based on symptoms and resources available to patient including: PCP  Specialist  Home health. The patient agrees to contact the PCP office for questions related to their healthcare. Advance Care Planning:   Does patient have an Advance Directive: not on file; education provided. Medication reconciliation was performed with patient, who verbalizes understanding of administration of home medications. Medications reviewed, 1111F entered: yes    Was patient discharged with a pulse oximeter? no    Non-face-to-face services provided:  Scheduled appointment with PCP-has appt at [facility] on 10/21/22  Scheduled appointment with [facility] appt on 11/3/22  Obtained and reviewed discharge summary and/or continuity of care documents  Education of patient/family/caregiver/guardian to support self-management-when to contact providers    Offered patient enrollment in the Remote Patient Monitoring (RPM) program for in-home monitoring: NA.    Care Transitions 24 Hour Call    Do you have a copy of your discharge instructions?: Yes  Do you have all of your prescriptions and are they filled?: No  Have you been contacted by a [id number] Pharmacist?: No  Have you scheduled your follow up appointment?: Yes (Comment: Either grandson or friend)  How are you going to get to your appointment?: Car - family or friend to transport  Do you feel like you have everything you need to keep you well at home?: Yes  Care Transitions Interventions         Follow Up  Future Appointments   Date Time Provider Gerry Esquivel   10/21/2022  2:30 PM Ted Robbins [address] [place] [address]   11/29/2022  2:30 PM Lilian Jefferson MD [facility]   6/29/2023  1:30 PM [facility] AWV LPN [facility]       Care Transition Nurse provided contact information. Plan for follow-up call in 3-5 days based on severity of symptoms and risk factors.   Plan for next call: symptom management-dizziness/lightheadedness, syncopal episodes, SOB, any new or worsening symptoms  medication management-was she able to get her new medications? Did family  groceries for her?     Liza Momin, RN   Care Transition Nurse

## 2022-10-24 ENCOUNTER — OFFICE VISIT (OUTPATIENT)
Dept: FAMILY MEDICINE CLINIC | Age: 87
End: 2022-10-24
Payer: MEDICARE

## 2022-10-24 ENCOUNTER — CARE COORDINATION (OUTPATIENT)
Dept: CASE MANAGEMENT | Age: 87
End: 2022-10-24

## 2022-10-24 VITALS
DIASTOLIC BLOOD PRESSURE: 74 MMHG | WEIGHT: 172 LBS | HEIGHT: 68 IN | TEMPERATURE: 97.3 F | BODY MASS INDEX: 26.07 KG/M2 | HEART RATE: 96 BPM | SYSTOLIC BLOOD PRESSURE: 132 MMHG | OXYGEN SATURATION: 96 %

## 2022-10-24 DIAGNOSIS — S82.831D CLOSED FRACTURE OF PROXIMAL END OF RIGHT FIBULA WITH ROUTINE HEALING, UNSPECIFIED FRACTURE MORPHOLOGY, SUBSEQUENT ENCOUNTER: ICD-10-CM

## 2022-10-24 DIAGNOSIS — E55.9 VITAMIN D DEFICIENCY: ICD-10-CM

## 2022-10-24 DIAGNOSIS — R55 SYNCOPE, UNSPECIFIED SYNCOPE TYPE: Primary | ICD-10-CM

## 2022-10-24 DIAGNOSIS — E03.8 OTHER SPECIFIED HYPOTHYROIDISM: ICD-10-CM

## 2022-10-24 DIAGNOSIS — N17.9 ACUTE KIDNEY INJURY (HCC): ICD-10-CM

## 2022-10-24 DIAGNOSIS — I48.91 ATRIAL FIBRILLATION, UNSPECIFIED TYPE (HCC): ICD-10-CM

## 2022-10-24 DIAGNOSIS — Z09 HOSPITAL DISCHARGE FOLLOW-UP: ICD-10-CM

## 2022-10-24 DIAGNOSIS — I95.1 ORTHOSTATIC HYPOTENSION: ICD-10-CM

## 2022-10-24 PROCEDURE — 99496 TRANSJ CARE MGMT HIGH F2F 7D: CPT | Performed by: NURSE PRACTITIONER

## 2022-10-24 PROCEDURE — 1111F DSCHRG MED/CURRENT MED MERGE: CPT | Performed by: NURSE PRACTITIONER

## 2022-10-24 RX ORDER — ALBUTEROL SULFATE 90 UG/1
2 AEROSOL, METERED RESPIRATORY (INHALATION) 4 TIMES DAILY PRN
Qty: 1 EACH | Refills: 0 | Status: SHIPPED | OUTPATIENT
Start: 2022-10-24

## 2022-10-24 NOTE — PROGRESS NOTES
Post-Discharge Transitional Care  Follow Up      Myles Castillo   YOB: 1935    Date of Office Visit:  10/24/2022  Date of Hospital Admission: 10/10/22  Date of Hospital Discharge: 10/19/22  Risk of hospital readmission (high >=14%. Medium >=10%) :Readmission Risk Score: 12      Care management risk score Rising risk (score 2-5) and Complex Care (Scores >=6): No Risk Score On File     Non face to face  following discharge, date last encounter closed (first attempt may have been earlier): 10/20/2022    Call initiated 2 business days of discharge: Yes    ASSESSMENT/PLAN:  1. Syncope, unspecified syncope type  Per hospital noted caused from orthostatic hypotension. 2. Orthostatic hypotension  Continue midodrine 5 mg TID with meals. 3. Closed fracture of proximal end of right fibula with routine healing, unspecified fracture morphology, subsequent encounter  Number given to patient for orthopedics to call and get follow up appointment. 4. Atrial fibrillation, unspecified type (HCC)  Continue Xarelto 15 mg daily. 5. Other specified hypothyroidism  Continue Synthroid 50 mcg daily. 6. Vitamin D deficiency  Continue Vitamin D 54372 units weekly. 7. Acute kidney injury ([facility]  Creatine 1.3 on 10/13/2022. Explained to patient to have CMP drawn to recheck creatine in the next several days. - Comprehensive Metabolic Panel; Future    8. Hospital discharge follow-up  - MT DISCHARGE MEDS RECONCILED W/ CURRENT OUTPATIENT MED LIST       Medical Decision Making: high complexity  Return if symptoms worsen or fail to improve. Subjective:   HPI:  Follow up of Hospital problems/diagnosis(es): Patient is here for a hospital follow up. Patient went to the ER on 10/7/2022 with syncope. Patient states she was standing and talking to some people when she passed out. Per ED notes she was caught by bystanders and lost consciousness for about 20 minutes.  While in the hospital she was by orthopedics for a proximal fibula fracture. Nonoperative management recommended. She was also seen by cardiology. She was noted to be very orthostatic and symptomatic while in the hospital. Her medications were adjusted and midodrine was added. She was transferred to acute rehab. Patient then was transferred to acute rehab and discharged on 10/19/2022. Patient states she is doing well. Patient denies any dizziness or syncope. Inpatient course: Discharge summary reviewed- see chart. Interval history/Current status: Stable    Patient Active Problem List   Diagnosis    Type 2 diabetes mellitus with diabetic polyneuropathy, without long-term current use of insulin ([facility])    A-fib ([facility])    S/P biventricular cardiac pacemaker procedure    OAB (overactive bladder)    SOB (shortness of breath)    Other specified hypothyroidism    Fibromyalgia    Episode of recurrent major depressive disorder ([address]    Other cardiomyopathies ([address]    Syncope and collapse    Lupus ([address]    Senile degeneration of brain ([address]    Chronic renal disease, stage III ([address] [274602]    Hypertensive emergency    Hypertensive encephalopathy    Generalized weakness    Gait disturbance    Uncontrolled pain    Closed fracture of upper end of right fibula    Orthostatic hypotension    Acute kidney injury (YASSINE) with acute tubular necrosis (ATN) ([facility])    Hypokalemia    Moderate vascular dementia with anxiety       Medications listed as ordered at the time of discharge from hospital     Medication List            Accurate as of October 24, 2022 11:59 PM. If you have any questions, ask your nurse or doctor.                 CONTINUE taking these medications      albuterol sulfate  (90 Base) MCG/ACT inhaler  Commonly known as: Ventolin HFA  Inhale 2 puffs into the lungs 4 times daily as needed for Wheezing     levothyroxine 50 MCG tablet  Commonly known as: SYNTHROID  Take 1 tablet by mouth every morning (before breakfast) TAKE ONE TABLET BY MOUTH DAILY metoprolol succinate 25 MG extended release tablet  Commonly known as: TOPROL XL  Take 1 tablet by mouth every evening     midodrine 5 MG tablet  Commonly known as: PROAMATINE  Take 1 tablet by mouth 3 times daily (with meals)     rivaroxaban 15 MG Tabs tablet  Commonly known as: XARELTO  Take 1 tablet by mouth daily     TYLENOL ARTHRITIS PAIN PO     Vitamin D (Ergocalciferol) 29930 units Caps  Take 50,000 Units by mouth once a week               Where to Get Your Medications        These medications were sent to [facility] [id number] [facility], [address] [facility] [phone]  [facility]      Phone: [phone]   albuterol sulfate  (90 Base) MCG/ACT inhaler           Medications marked \"taking\" at this time  Outpatient Medications Marked as Taking for the 10/24/22 encounter (Office Visit) with TYLER Faust CNP   Medication Sig Dispense Refill    albuterol sulfate HFA (VENTOLIN HFA) 108 (90 Base) MCG/ACT inhaler Inhale 2 puffs into the lungs 4 times daily as needed for Wheezing 1 each 0    metoprolol succinate (TOPROL XL) 25 MG extended release tablet Take 1 tablet by mouth every evening 30 tablet 0    midodrine (PROAMATINE) 5 MG tablet Take 1 tablet by mouth 3 times daily (with meals) 90 tablet 0    Ergocalciferol (VITAMIN D) 17756 units CAPS Take 50,000 Units by mouth once a week 5 capsule 0    levothyroxine (SYNTHROID) 50 MCG tablet Take 1 tablet by mouth every morning (before breakfast) TAKE ONE TABLET BY MOUTH DAILY 90 tablet 1    rivaroxaban (XARELTO) 15 MG TABS tablet Take 1 tablet by mouth daily 90 tablet 1    Acetaminophen (TYLENOL ARTHRITIS PAIN PO) Take by mouth          Medications patient taking as of now reconciled against medications ordered at time of hospital discharge: Yes    Review of Systems   Constitutional:  Negative for appetite change, chills, fatigue and fever.    HENT:  Negative for congestion, postnasal drip, rhinorrhea, sinus pressure, sinus pain, sneezing and sore throat. Respiratory:  Negative for cough, chest tightness, shortness of breath and wheezing. Cardiovascular:  Negative for chest pain and palpitations. Gastrointestinal:  Negative for diarrhea, nausea and vomiting. Skin:  Negative for rash. Neurological:  Negative for dizziness, weakness, light-headedness, numbness and headaches. Objective:    /74   Pulse 96   Temp 97.3 °F (36.3 °C)   Ht 5' 8\" (1.727 m)   Wt 172 lb (78 kg)   SpO2 96%   BMI 26.15 kg/m²   Physical Exam  Constitutional:       Appearance: Normal appearance. HENT:      Head: Normocephalic. Cardiovascular:      Rate and Rhythm: Normal rate and regular rhythm. Heart sounds: Normal heart sounds. Pulmonary:      Effort: Pulmonary effort is normal.      Breath sounds: Normal breath sounds. Musculoskeletal:      Cervical back: Neck supple. Skin:     General: Skin is warm and dry. Neurological:      Mental Status: She is alert and oriented to person, place, and time. Psychiatric:         Mood and Affect: Mood normal.         Behavior: Behavior normal.           An electronic signature was used to authenticate this note.   --Medardo Kennedy, APRN - CNP

## 2022-10-24 NOTE — CARE COORDINATION
[facility] Care Transitions Follow Up Call    Care Transition Nurse contacted the patient by telephone to follow up after admission on 10/7/22. Verified name and  with patient as identifiers. Patient: Salomon Older  Patient : 1935   MRN: [id number]  Reason for Admission: Hypertensive emergency  Discharge Date: 10/19/22 RARS: Readmission Risk Score: 12      Needs to be reviewed by the provider   Additional needs identified to be addressed with provider: No  none             Method of communication with provider: none. Contacted patient for care transition follow up. Sanjiv Bartlett states that she feels \"crappy\" today. Reports her right leg is still bothering her, having back pain, stomach pain and headaches. She denies having any chest pain/discomfort, increased shortness of breath, nausea/vomiting, dizziness/lightheadedness. Informed her that she has an appointment with Becki Landaverde at the [facility] today. She states she was unaware of the appointment. States she doesn't want to go to the appointment. We discussed the importance of following up with the provider and encouraged to go to her appointment. She states HENRIK will have to call Gemma Roberts, granddaughter to check with her. She confirmed that Gemma Roberts did  her new medications and picked up groceries as well. She gave HENRIK Frost's phone number and requested a call back after speaking with Margot. CTN agreed to call her back. CTN attempted to call Gemma Roberts, as patient requested. Left message with reason for call and request for call back. Received incoming call from Gemma Roberts. She states Sanjiv Bartlett does have an appointment today. She states she spoke with her grandmother this morning and told her about the appointment. HENRIK informed her that patient was not aware and told her that she is not feeling well enough to go to the appointment. Gemma Roberts states she will take her today. She did confirm that patient is taking her medications.   She calls patient everyday to remind her to take her medications. She does not have any questions or needs at this time. CTN called Selvin Chamberlain back. Informed her that Heath Palacios is aware of the appointment and will take her today. Selvin Chamberlain states she does not want to go because her back and leg is hurting her. Discussed the benefits of going to the appointment so the NP can assess her leg and back. Patient became upset and states that she should've stayed longer in the hospital so she could work with PT. Informed her that she will be working with PT at home. She confirmed the therapist called her and will be making a visit to see her. She continues to use her walker when ambulating. Informed her that Heath Palacios will be calling her shortly. She verbalized understanding. States she has to get ready and eat something before heading out. Call was then disconnected. Addressed changes since last contact:   having headache, back pain  Discussed follow-up appointments. If no appointment was previously scheduled, appointment scheduling offered: Yes. Is follow up appointment scheduled within 7 days of discharge? No.    Follow Up  Future Appointments   Date Time Provider Gerry Esquivel   10/24/2022  3:00 PM TYLER Granda - NIC [facility]   11/29/2022  2:30 PM Carrington Henriquez MD [facility]   6/29/2023  1:30 PM [facility] FPS AWV LPN [facility]       Care Transition Nurse reviewed red flags with patient and discussed any barriers to care and/or understanding of plan of care after discharge. Discussed appropriate site of care based on symptoms and resources available to patient including: PCP.  The patient agrees to contact the PCP office for questions related to    Patients top risk factors for readmission: functional cognitive ability, functional physical ability, falls, lack of knowledge about disease, medical condition-Hypertensive emergency, Chronic renal disease, YASSINE, DM, Moderate vascular dementia with anxiety, and support system  Interventions to address risk factors: Education of patient/family/caregiver/guardian to support self-management-when to contact provider    Offered patient enrollment in the Remote Patient Monitoring (RPM) program for in-home monitoring: NA.     Care Transitions Subsequent and Final Call    Subsequent and Final Calls  Care Transitions Interventions    Registered Dietician: Declined    Other Interventions:             Care Transition Nurse provided contact information for future needs. Plan for follow-up call in 3-5 days based on severity of symptoms and risk factors.   Plan for next call: symptom management-headaches, dizziness/lightheadedness, back pain, leg pain, any new or worsening symptoms    Narendra Peguero RN

## 2022-10-25 ASSESSMENT — ENCOUNTER SYMPTOMS
NAUSEA: 0
CHEST TIGHTNESS: 0
SINUS PRESSURE: 0
SHORTNESS OF BREATH: 0
COUGH: 0
WHEEZING: 0
SORE THROAT: 0
DIARRHEA: 0
SINUS PAIN: 0
RHINORRHEA: 0
VOMITING: 0

## 2022-10-27 ENCOUNTER — CARE COORDINATION (OUTPATIENT)
Dept: CASE MANAGEMENT | Age: 87
End: 2022-10-27

## 2022-10-27 NOTE — CARE COORDINATION
[facility] Care Transitions Follow Up Call      Patient: Rambo Valdes  Patient : 1935   MRN: [id number]  Reason for Admission: Hypertensive emergency  Discharge Date: 10/19/22 RARS: Readmission Risk Score: 12    Attempted to contact patient for care transition follow up. Unable to reach patient. Phone line rang multiple times with no option to leave a message. Will try again at a later time.       Devon Huynh RN

## 2022-10-28 ENCOUNTER — CARE COORDINATION (OUTPATIENT)
Dept: CASE MANAGEMENT | Age: 87
End: 2022-10-28

## 2022-10-28 NOTE — CARE COORDINATION
site of care based on symptoms and resources available to patient including: PCP. The patient agrees to contact the PCP office for questions related to their healthcare. Patients top risk factors for readmission: Hypertensive emergency, Chronic renal disease, YASSINE, DM, Moderate vascular dementia with anxiety, falls, lack of knowledge about disease  Interventions to address risk factors: Education of patient/family/caregiver/guardian to support self-management-when to contact provider    Offered patient enrollment in the Remote Patient Monitoring (RPM) program for in-home monitoring: NA.     Care Transitions Subsequent and Final Call    Subsequent and Final Calls  Do you have any ongoing symptoms?: Yes  Patient-reported symptoms: Pain, Shortness of Breath  Have your medications changed?: No  Do you currently have any active services?: Yes  Are you currently active with any services?: Home Health  Do you have any needs or concerns that I can assist you with?: No  Care Transitions Interventions    Registered Dietician: Declined    Other Interventions:             Care Transition Nurse provided contact information for future needs. Plan for follow-up call in 5-7 days based on severity of symptoms and risk factors.   Plan for next call: symptom management-back pain, knee pain, falls, headaches, dizziness/lightheadedness    Keith Brooks RN

## 2022-11-04 ENCOUNTER — CARE COORDINATION (OUTPATIENT)
Dept: CASE MANAGEMENT | Age: 87
End: 2022-11-04

## 2022-11-04 NOTE — CARE COORDINATION
[address] Care Transitions Follow Up Call    Care Transition Nurse contacted the patient by telephone to follow up after admission on 10/7/22. Verified name and  with patient as identifiers. Patient: Duane Norrie  Patient : 1935   MRN: [id number]  Reason for Admission: Hypertensive emergency  Discharge Date: 10/19/22 RARS: Readmission Risk Score: 12      Needs to be reviewed by the provider   Additional needs identified to be addressed with provider: No  none             Method of communication with provider: none. Contacted patient for care transition follow up. Sherry Oseguera states that she is tired today. States her knee is bothering her and c/o headache. She informed CTN that she hasn't taken Tylenol as of yet but plans to take after she finishes eating. Continues to become short of breath when overexerting herself. States she is trying to take it easy but sometimes she has things that she has to do. Denies having any c/o chest pain/discomfort, nausea/vomiting. She states that she does not check her BP but does have a BP kit at home. Encouraged to start checking her BP daily. She states the home health nurse should be making a visit today at 2:30. Advised she get the BP kit and have her home health nurse show her how to use it. She verbalized understanding. She informed CTN that she would like to rest before the nurse comes. Briefly discussed when to contact providers with any new or worsening symptoms. She verbalized understanding. She does not have any questions or needs at this time. Addressed changes since last contact:  none  Discussed follow-up appointments. If no appointment was previously scheduled, appointment scheduling offered: Yes. Is follow up appointment scheduled within 7 days of discharge?  No.    Follow Up  Future Appointments   Date Time Provider Gerry Esquivel   2022  2:30 PM Oseas Larson MD [facility]   2023  1:30 PM [facility] AWV LPN [facility]       Care Transition Nurse reviewed red flags with patient and discussed any barriers to care and/or understanding of plan of care after discharge. Discussed appropriate site of care based on symptoms and resources available to patient including: PCP. The patient agrees to contact the PCP office for questions related to their healthcare. Patients top risk factors for readmission: Hypertensive emergency, Chronic renal disease, YASSINE, DM, Moderate vascular dementia with anxiety, falls, Cognitive impairment, lack of knowledge about disease, support system. Interventions to address risk factors: Education of patient/family/caregiver/guardian to support self-management-when to contact provider    Offered patient enrollment in the Remote Patient Monitoring (RPM) program for in-home monitoring: NA.     Care Transitions Subsequent and Final Call    Subsequent and Final Calls  Do you have any ongoing symptoms?: Yes  Patient-reported symptoms: Shortness of Breath, Other, Pain  Have your medications changed?: No  Do you have any questions related to your medications?: No  Do you currently have any active services?: Yes  Are you currently active with any services?: Home Health  Do you have any needs or concerns that I can assist you with?: No  Care Transitions Interventions    Registered Dietician: Declined    Other Interventions:             Care Transition Nurse provided contact information for future needs. Plan for follow-up call in 7-10 days based on severity of symptoms and risk factors.   Plan for next call: symptom management-Knee pain, headache,SOB, any new or worsening symptoms    Toreyus Arielle RN

## 2022-11-10 ENCOUNTER — CARE COORDINATION (OUTPATIENT)
Dept: CASE MANAGEMENT | Age: 87
End: 2022-11-10

## 2022-11-10 NOTE — CARE COORDINATION
[facility] Care Transitions Follow Up Call    Care Transition Nurse contacted the patient by telephone to follow up after admission on 10/7/22. Verified name and  with patient as identifiers. Patient: Moncho Espinoza  Patient : 1935   MRN: [id number]  Reason for Admission: Hypertensive emergency  Discharge Date: 10/19/22 RARS: Readmission Risk Score: 12      Needs to be reviewed by the provider   Additional needs identified to be addressed with provider: No  none             Method of communication with provider: none. Contacted patient for care transition follow up. Tushar Kovacs states that she is doing alright. States she told home health not to make visits anymore. CTN will follow up on this. She states she is able to do the exercises on her own. Reports she is doing the leg exercises that they told her to do which does help \"some\" with her knee pain. Denies having any c/o chest pain/discomfort, increased shortness of breath. States she continues to have headaches at times. States she had a bad headache the other day but doing better today. When asking about her BP, she states that she hasn't checked it. She informed CTN that she doesn't know where she placed her BP machine. States her grandson is making a visit today and plans to bring his. Discussed importance of monitoring her BP and when to contact provider. Tushar Kovacs states she's been having \"some cough and runny nose\". Denies having any fever or chills. Advised to monitor symptoms and if they continue or worsen to call provider. She verbalized understanding. No questions or needs at this time. Addressed changes since last contact:  some cough and runny nose  Discussed follow-up appointments. If no appointment was previously scheduled, appointment scheduling offered: Yes. Is follow up appointment scheduled within 7 days of discharge?  No.    Follow Up  Future Appointments   Date Time Provider Gerry Esquivel   2022  2:30 PM Dionicio David MD [address] [place] [facility]   6/29/2023  1:30 PM [address] [place] [facility] AWV LPN [address] [address] [facility]       Care Transition Nurse reviewed red flags with patient and discussed any barriers to care and/or understanding of plan of care after discharge. Discussed appropriate site of care based on symptoms and resources available to patient including: PCP. The patient agrees to contact the PCP office for questions related to their healthcare. Patients top risk factors for readmission: Hypertensive emergency, Chronic renal disease, YASSINE, DM, Moderate vascular dementia with anxiety, falls, Cognitive impairment, lack of knowledge about disease, support system. Interventions to address risk factors: Education of patient/family/caregiver/guardian to support self-management-when to contact providers    Offered patient enrollment in the Remote Patient Monitoring (RPM) program for in-home monitoring: NA.     Care Transitions Subsequent and Final Call    Subsequent and Final Calls  Do you have any ongoing symptoms?: Yes  Patient-reported symptoms: Other, Cough  Have your medications changed?: No  Do you have any questions related to your medications?: No  Do you currently have any active services?: Yes  Are you currently active with any services?: Home Health  Do you have any needs or concerns that I can assist you with?: No  Care Transitions Interventions    Registered Dietician: Declined    Other Interventions:             Care Transition Nurse provided contact information for future needs. Plan for follow-up call in 7-10 days based on severity of symptoms and risk factors.   Plan for next call: symptom management-cough, congestion, BP, HA, any new or worsening symptoms    Devon Huynh RN

## 2022-11-17 ENCOUNTER — CARE COORDINATION (OUTPATIENT)
Dept: CASE MANAGEMENT | Age: 87
End: 2022-11-17

## 2022-11-17 ENCOUNTER — TELEPHONE (OUTPATIENT)
Dept: FAMILY MEDICINE CLINIC | Age: 87
End: 2022-11-17

## 2022-11-17 NOTE — CARE COORDINATION
[facility] Care Transitions Follow Up Call    Care Transition Nurse contacted the patient by telephone to follow up after admission on 10/7/22. Verified name and  with patient as identifiers. Patient: Rambo Valdes  Patient : 1935   MRN: [id number]  Reason for Admission: Hypertensive emergency  Discharge Date: 10/19/22 RARS: Readmission Risk Score: 12      Needs to be reviewed by the provider   Additional needs identified to be addressed with provider: No  none             Method of communication with provider: none. Contacted patient for final care transition follow up. Lars Howell states that she is doing fine. States she is taking it one day at a time. Reports she continues to have \"headaches which come and go\". She is taking Tylenol as needed. She denies having any c/o chest pain/discomfort, pressure, tightness, increased shortness of breath, dizziness/lightheadedness, nausea/vomiting, vision changes. She informed CTN that she has not checked her BP. She confirmed she has a BP cuff at home. We discussed importance of monitoring BP and advised to keep a BP log. She verbalized understanding. She denies having any falls, syncopal or near syncopal episodes since returning home. Reports her right knee continues to bother her but she has been doing her exercises which has been helping. She also reports the swelling in the right foot has decreased significantly. She is up and moving around. She uses either her cane or walker when ambulating. She has stopped driving since being hospitalized. She plans to discuss her driving at her appointment on 22. We discussed in length signs/symptoms of when to contact providers with any new or worsening symptoms as well as to report her BP if they are running too low or elevated. She verbalized understanding. She confirmed she is taking all of her medications. She does not have any other questions or needs at this time. No further outreach. Addressed changes since last contact:   Plans to stop [address] services  Discussed follow-up appointments. If no appointment was previously scheduled, appointment scheduling offered: Yes. Is follow up appointment scheduled within 7 days of discharge? No.    Follow Up  Future Appointments   Date Time Provider Gerry Esquivel   11/29/2022  2:30 PM Ayanna Dumas MD [facility]   6/29/2023  1:30 PM [facility] AWV LPN [facility]       Care Transition Nurse reviewed discharge instructions, medical action plan, and red flags with patient and discussed any barriers to care and/or understanding of plan of care after discharge. Discussed appropriate site of care based on symptoms and resources available to patient including: PCP  Specialist. The patient agrees to contact the PCP office for questions related to their healthcare. Patients top risk factors for readmission: functional cognitive ability, functional physical ability, lack of knowledge about disease, and medical condition-Chronic renal disease, YASSINE, DM, Moderate vascular dementia with anxiety, falls  Interventions to address risk factors: Education of patient/family/caregiver/guardian to support self-management-signs/symptoms and when to contact providers    Offered patient enrollment in the Remote Patient Monitoring (RPM) program for in-home monitoring: NA.     Care Transitions Subsequent and Final Call    Subsequent and Final Calls  Do you have any ongoing symptoms?: Yes  Patient-reported symptoms: Other  Have your medications changed?: No  Do you have any questions related to your medications?: No  Do you currently have any active services?: Yes  Are you currently active with any services?: Home Health  Do you have any needs or concerns that I can assist you with?: No  Care Transitions Interventions    Registered Dietician: Declined    Other Interventions:             Care Transition Nurse provided contact information for future needs.  No further follow-up call indicated based on severity of symptoms and risk factors.     Keshawn Linda RN

## 2023-10-10 ENCOUNTER — OFFICE VISIT (OUTPATIENT)
Dept: FAMILY MEDICINE CLINIC | Age: 88
End: 2023-10-10
Payer: MEDICARE

## 2023-10-10 VITALS
RESPIRATION RATE: 16 BRPM | BODY MASS INDEX: 26.15 KG/M2 | DIASTOLIC BLOOD PRESSURE: 62 MMHG | HEIGHT: 68 IN | SYSTOLIC BLOOD PRESSURE: 126 MMHG | HEART RATE: 82 BPM | OXYGEN SATURATION: 98 %

## 2023-10-10 DIAGNOSIS — N30.01 ACUTE CYSTITIS WITH HEMATURIA: ICD-10-CM

## 2023-10-10 DIAGNOSIS — G31.1 SENILE DEGENERATION OF BRAIN (HCC): ICD-10-CM

## 2023-10-10 DIAGNOSIS — R30.0 DYSURIA: Primary | ICD-10-CM

## 2023-10-10 LAB
BILIRUBIN, POC: NORMAL
BLOOD URINE, POC: NORMAL
CLARITY, POC: CLEAR
COLOR, POC: YELLOW
GLUCOSE URINE, POC: NORMAL
KETONES, POC: NORMAL
LEUKOCYTE EST, POC: NORMAL
NITRITE, POC: NORMAL
PH, POC: 5.5
PROTEIN, POC: NORMAL
SPECIFIC GRAVITY, POC: 1.01
UROBILINOGEN, POC: 0.2

## 2023-10-10 PROCEDURE — G8484 FLU IMMUNIZE NO ADMIN: HCPCS | Performed by: PHYSICIAN ASSISTANT

## 2023-10-10 PROCEDURE — 81002 URINALYSIS NONAUTO W/O SCOPE: CPT | Performed by: PHYSICIAN ASSISTANT

## 2023-10-10 PROCEDURE — 1036F TOBACCO NON-USER: CPT | Performed by: PHYSICIAN ASSISTANT

## 2023-10-10 PROCEDURE — 1124F ACP DISCUSS-NO DSCNMKR DOCD: CPT | Performed by: PHYSICIAN ASSISTANT

## 2023-10-10 PROCEDURE — 99214 OFFICE O/P EST MOD 30 MIN: CPT | Performed by: PHYSICIAN ASSISTANT

## 2023-10-10 PROCEDURE — G8427 DOCREV CUR MEDS BY ELIG CLIN: HCPCS | Performed by: PHYSICIAN ASSISTANT

## 2023-10-10 PROCEDURE — G8417 CALC BMI ABV UP PARAM F/U: HCPCS | Performed by: PHYSICIAN ASSISTANT

## 2023-10-10 PROCEDURE — 1090F PRES/ABSN URINE INCON ASSESS: CPT | Performed by: PHYSICIAN ASSISTANT

## 2023-10-10 RX ORDER — NITROFURANTOIN 25; 75 MG/1; MG/1
100 CAPSULE ORAL 2 TIMES DAILY
Qty: 10 CAPSULE | Refills: 0 | Status: SHIPPED | OUTPATIENT
Start: 2023-10-10 | End: 2023-10-15

## 2023-10-10 NOTE — PROGRESS NOTES
bruising, erythema or rash. Neurological:      General: No focal deficit present. Mental Status: She is alert. Coordination: Coordination normal.      Gait: Gait normal.      Comments: Mild disorientation about recent events, oriented to self, place but not time   Psychiatric:         Mood and Affect: Mood normal.         Behavior: Behavior normal.         ASSESSMENT & PLAN    1. Dysuria    - POCT Urinalysis no Micro - trace blood and leuk esterase  - nitrofurantoin, macrocrystal-monohydrate, (MACROBID) 100 MG capsule; Take 1 capsule by mouth 2 times daily for 5 days  Dispense: 10 capsule; Refill: 0    2. 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 Acute exacerbation related to UTI    Return in about 2 weeks (around 10/24/2023). Electronically signed by DEMIAN Licea on 10/10/2023      Comment: Please note this report has been produced using speech recognition software and may contain errors related to that system including errors in grammar, punctuation, and spelling, as well as words and phrases that may be inappropriate. If there are any questions or concerns please feel free to contact the dictating provider for clarification.

## 2023-10-24 ENCOUNTER — OFFICE VISIT (OUTPATIENT)
Dept: FAMILY MEDICINE CLINIC | Age: 88
End: 2023-10-24
Payer: MEDICARE

## 2023-10-24 VITALS
OXYGEN SATURATION: 98 % | DIASTOLIC BLOOD PRESSURE: 73 MMHG | BODY MASS INDEX: 23.19 KG/M2 | HEIGHT: 68 IN | SYSTOLIC BLOOD PRESSURE: 123 MMHG | WEIGHT: 153 LBS | RESPIRATION RATE: 16 BRPM | HEART RATE: 74 BPM

## 2023-10-24 DIAGNOSIS — N30.01 ACUTE CYSTITIS WITH HEMATURIA: ICD-10-CM

## 2023-10-24 DIAGNOSIS — R44.3 HALLUCINATIONS: ICD-10-CM

## 2023-10-24 DIAGNOSIS — F03.B2 MODERATE DEMENTIA WITH PSYCHOTIC DISTURBANCE, UNSPECIFIED DEMENTIA TYPE (HCC): Primary | ICD-10-CM

## 2023-10-24 PROCEDURE — G8427 DOCREV CUR MEDS BY ELIG CLIN: HCPCS | Performed by: PHYSICIAN ASSISTANT

## 2023-10-24 PROCEDURE — 1124F ACP DISCUSS-NO DSCNMKR DOCD: CPT | Performed by: PHYSICIAN ASSISTANT

## 2023-10-24 PROCEDURE — 1036F TOBACCO NON-USER: CPT | Performed by: PHYSICIAN ASSISTANT

## 2023-10-24 PROCEDURE — G8420 CALC BMI NORM PARAMETERS: HCPCS | Performed by: PHYSICIAN ASSISTANT

## 2023-10-24 PROCEDURE — G8484 FLU IMMUNIZE NO ADMIN: HCPCS | Performed by: PHYSICIAN ASSISTANT

## 2023-10-24 PROCEDURE — 1090F PRES/ABSN URINE INCON ASSESS: CPT | Performed by: PHYSICIAN ASSISTANT

## 2023-10-24 PROCEDURE — 99215 OFFICE O/P EST HI 40 MIN: CPT | Performed by: PHYSICIAN ASSISTANT

## 2023-10-24 NOTE — PROGRESS NOTES
PA on 10/24/2023      Comment: Please note this report has been produced using speech recognition software and may contain errors related to that system including errors in grammar, punctuation, and spelling, as well as words and phrases that may be inappropriate. If there are any questions or concerns please feel free to contact the dictating provider for clarification.

## 2023-10-26 ENCOUNTER — TELEPHONE (OUTPATIENT)
Dept: FAMILY MEDICINE CLINIC | Age: 88
End: 2023-10-26

## 2023-10-26 NOTE — TELEPHONE ENCOUNTER
----- Message from Troy Hastings sent at 10/25/2023  3:36 PM EDT -----  Subject: Message to Provider    QUESTIONS  Information for Provider? An MRI was ordered for this patient but her   pacemaker is NOT compatible. She needs a CT scan of the brain ordered   instead. Please order asap and call to confirm when done. Call   granddayadiel Cage at [phone].  ---------------------------------------------------------------------------  --------------  [address] [place]  [phone]; OK to leave message on voicemail  ---------------------------------------------------------------------------  --------------  SCRIPT ANSWERS  Relationship to Patient? Other/Third Party  Representative Name? Amanda Best  Is the representative on the Communication Release of Information (BRIANA)   form in Epic?  Yes

## 2023-10-29 DIAGNOSIS — F03.B2 MODERATE DEMENTIA WITH PSYCHOTIC DISTURBANCE, UNSPECIFIED DEMENTIA TYPE (HCC): Primary | ICD-10-CM

## 2023-11-02 ENCOUNTER — HOSPITAL ENCOUNTER (OUTPATIENT)
Dept: CT IMAGING | Age: 88
Discharge: HOME | End: 2023-11-02
Payer: MEDICARE

## 2023-11-02 DIAGNOSIS — F03.B2 MODERATE DEMENTIA WITH PSYCHOTIC DISTURBANCE, UNSPECIFIED DEMENTIA TYPE (HCC): ICD-10-CM

## 2023-11-02 PROCEDURE — 70450 CT HEAD/BRAIN W/O DYE: CPT

## 2023-11-03 ENCOUNTER — TELEPHONE (OUTPATIENT)
Dept: FAMILY MEDICINE CLINIC | Age: 88
End: 2023-11-03

## 2023-11-07 NOTE — TELEPHONE ENCOUNTER
Daughter notified and verbalized understanding. Daughter would like to know what to do next? Says the patient is still having hallucinations of her dead . Daughter has concerns as her mother's memory is getting worse and she is becoming more confused.

## 2024-02-13 ENCOUNTER — TELEPHONE (OUTPATIENT)
Dept: FAMILY MEDICINE CLINIC | Age: 89
End: 2024-02-13

## 2024-02-13 NOTE — TELEPHONE ENCOUNTER
Spoke with pt's grandson, Esvin(on communication form) and he was wanting to schedule VV appt with [facility]. Esvin reports that pt is not able to go anywhere d/t fall and dementia. Esvin reports that pt went to ED in [place] Thursday or Friday d/t a fall.  Also requesting an order for a hospital bed. Also pt c/o dysuria. Scheduled VV appt for tomorrow at 0840. Advised Esvin that I would send message to [facility]. Esvin verbalized understanding. Please advise.

## 2024-02-14 ENCOUNTER — TELEMEDICINE (OUTPATIENT)
Dept: FAMILY MEDICINE CLINIC | Age: 89
End: 2024-02-14
Payer: MEDICARE

## 2024-02-14 DIAGNOSIS — G31.1 SENILE DEGENERATION OF BRAIN (HCC): ICD-10-CM

## 2024-02-14 DIAGNOSIS — M62.81 GENERALIZED MUSCLE WEAKNESS: ICD-10-CM

## 2024-02-14 DIAGNOSIS — N30.00 ACUTE CYSTITIS WITHOUT HEMATURIA: Primary | ICD-10-CM
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
## 2024-02-14 PROCEDURE — 1124F ACP DISCUSS-NO DSCNMKR DOCD: CPT | Performed by: PHYSICIAN ASSISTANT

## 2024-02-14 PROCEDURE — 1090F PRES/ABSN URINE INCON ASSESS: CPT | Performed by: PHYSICIAN ASSISTANT

## 2024-02-14 PROCEDURE — 99214 OFFICE O/P EST MOD 30 MIN: CPT | Performed by: PHYSICIAN ASSISTANT

## 2024-02-14 PROCEDURE — G8428 CUR MEDS NOT DOCUMENT: HCPCS | Performed by: PHYSICIAN ASSISTANT

## 2024-02-14 RX ORDER — CEPHALEXIN 500 MG/1
500 CAPSULE ORAL 4 TIMES DAILY
Qty: 28 CAPSULE | Refills: 0 | Status: ON HOLD | OUTPATIENT
Start: 2024-02-14 | End: 2024-02-21

## 2024-02-14 NOTE — PROGRESS NOTES
Namenda [Memantine]      Possible hypotension/orthostasis    Plaquenil [Hydroxychloroquine Sulfate]     Prevnar 13 [Pneumococcal 13-Kristal Conj Vacc]     Theophyllines        PHYSICAL EXAM    There were no vitals taken for this visit.    Physical Exam  Constitutional:       General: She is not in acute distress.     Appearance: Normal appearance. She is normal weight. She is not ill-appearing, toxic-appearing or diaphoretic.   HENT:      Head: Normocephalic and atraumatic.      Nose: Nose normal.   Eyes:      General: No scleral icterus.        Right eye: No discharge.         Left eye: No discharge.      Extraocular Movements: Extraocular movements intact.      Conjunctiva/sclera: Conjunctivae normal.      Pupils: Pupils are equal, round, and reactive to light.   Neurological:      General: No focal deficit present.      Mental Status: She is alert. Mental status is at baseline. She is disoriented.      Cranial Nerves: No cranial nerve deficit.   Psychiatric:         Mood and Affect: Mood normal.         Behavior: Behavior normal.         Thought Content: Thought content normal.         Judgment: Judgment normal.         ASSESSMENT & PLAN    1. Fall, subsequent encounter  Declined need for pt ot      Cary RED Rogers was evaluated today and a DME order was entered for a semi-electric hospital bed because she requires assistance for positioning needs not possible in an ordinary bed, complexity of body positioning needs.  Patient requires frequent and immediate positioning changes for relief of pain and care needs related to medical diagnoses.  Patient needs variability of bed height requirements to perform patient transfers and for personal cares, ambulating, grooming, dressing upper body, dressing lower body, and taking own medications.  Current body  .  The need for this equipment was discussed with the patient and she understands and is in agreement.        2. Acute cystitis without hematuria  Keflex QID    3.

## 2024-02-15 ENCOUNTER — TELEPHONE (OUTPATIENT)
Dept: FAMILY MEDICINE CLINIC | Age: 89
End: 2024-02-15

## 2024-02-15 DIAGNOSIS — G31.1 SENILE DEGENERATION OF BRAIN (HCC): ICD-10-CM
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
## 2024-02-15 NOTE — TELEPHONE ENCOUNTER
Called in concerned about red area on patient, per communication release spoke with her.  Stated that she is concerned it is a infection area.  Area is red, painful to touch, quarter size on right buttocks with no drainage.  Contact wants to see if provider can do virtual visit or accept picture for evaluation d/t difficulty of getting patient into office.  Provider notified, ordered to tell contact to keep area clean and dry, apply barrier cream and to send picture in for evaluation.  Contact notified, no questions at this time.

## 2024-02-18 ENCOUNTER — HOSPITAL ENCOUNTER (INPATIENT)
Age: 89
LOS: 2 days | Discharge: HOME HEALTH | DRG: 884 | End: 2024-02-20
Attending: STUDENT IN AN ORGANIZED HEALTH CARE EDUCATION/TRAINING PROGRAM | Admitting: STUDENT IN AN ORGANIZED HEALTH CARE EDUCATION/TRAINING PROGRAM
Payer: MEDICARE

## 2024-02-18 DIAGNOSIS — R53.1 GENERALIZED WEAKNESS: Primary | ICD-10-CM

## 2024-02-18 PROCEDURE — 1200000000 HC SEMI PRIVATE

## 2024-02-18 RX ORDER — POTASSIUM CHLORIDE 20 MEQ/1
40 TABLET, EXTENDED RELEASE ORAL PRN
Status: DISCONTINUED | OUTPATIENT
Start: 2024-02-18 | End: 2024-02-20 | Stop reason: HOSPADM

## 2024-02-18 RX ORDER — ACETAMINOPHEN 650 MG/1
650 SUPPOSITORY RECTAL EVERY 6 HOURS PRN
Status: DISCONTINUED | OUTPATIENT
Start: 2024-02-18 | End: 2024-02-20 | Stop reason: HOSPADM

## 2024-02-18 RX ORDER — ACETAMINOPHEN 325 MG/1
650 TABLET ORAL EVERY 6 HOURS PRN
Status: DISCONTINUED | OUTPATIENT
Start: 2024-02-18 | End: 2024-02-20 | Stop reason: HOSPADM

## 2024-02-18 RX ORDER — SODIUM CHLORIDE 0.9 % (FLUSH) 0.9 %
5-40 SYRINGE (ML) INJECTION EVERY 12 HOURS SCHEDULED
Status: DISCONTINUED | OUTPATIENT
Start: 2024-02-18 | End: 2024-02-20 | Stop reason: HOSPADM

## 2024-02-18 RX ORDER — SODIUM CHLORIDE 0.9 % (FLUSH) 0.9 %
5-40 SYRINGE (ML) INJECTION PRN
Status: DISCONTINUED | OUTPATIENT
Start: 2024-02-18 | End: 2024-02-20 | Stop reason: HOSPADM

## 2024-02-18 RX ORDER — MAGNESIUM SULFATE IN WATER 40 MG/ML
2000 INJECTION, SOLUTION INTRAVENOUS PRN
Status: DISCONTINUED | OUTPATIENT
Start: 2024-02-18 | End: 2024-02-20 | Stop reason: HOSPADM

## 2024-02-18 RX ORDER — POTASSIUM CHLORIDE 7.45 MG/ML
10 INJECTION INTRAVENOUS PRN
Status: DISCONTINUED | OUTPATIENT
Start: 2024-02-18 | End: 2024-02-20 | Stop reason: HOSPADM

## 2024-02-18 RX ORDER — ONDANSETRON 2 MG/ML
4 INJECTION INTRAMUSCULAR; INTRAVENOUS EVERY 6 HOURS PRN
Status: DISCONTINUED | OUTPATIENT
Start: 2024-02-18 | End: 2024-02-20 | Stop reason: HOSPADM

## 2024-02-18 RX ORDER — SODIUM CHLORIDE 9 MG/ML
INJECTION, SOLUTION INTRAVENOUS PRN
Status: DISCONTINUED | OUTPATIENT
Start: 2024-02-18 | End: 2024-02-20 | Stop reason: HOSPADM

## 2024-02-18 RX ORDER — ONDANSETRON 4 MG/1
4 TABLET, ORALLY DISINTEGRATING ORAL EVERY 8 HOURS PRN
Status: DISCONTINUED | OUTPATIENT
Start: 2024-02-18 | End: 2024-02-20 | Stop reason: HOSPADM

## 2024-02-18 ASSESSMENT — PAIN DESCRIPTION - ORIENTATION: ORIENTATION: RIGHT;LEFT

## 2024-02-18 ASSESSMENT — PAIN DESCRIPTION - LOCATION: LOCATION: ABDOMEN

## 2024-02-18 ASSESSMENT — PAIN - FUNCTIONAL ASSESSMENT: PAIN_FUNCTIONAL_ASSESSMENT: PREVENTS OR INTERFERES SOME ACTIVE ACTIVITIES AND ADLS

## 2024-02-18 ASSESSMENT — PAIN DESCRIPTION - DESCRIPTORS: DESCRIPTORS: ACHING;DISCOMFORT

## 2024-02-18 ASSESSMENT — PAIN SCALES - WONG BAKER: WONGBAKER_NUMERICALRESPONSE: 8

## 2024-02-19 ENCOUNTER — APPOINTMENT (OUTPATIENT)
Dept: CT IMAGING | Age: 89
DRG: 884 | End: 2024-02-19
Attending: STUDENT IN AN ORGANIZED HEALTH CARE EDUCATION/TRAINING PROGRAM
Payer: MEDICARE

## 2024-02-19 LAB
ALBUMIN SERPL-MCNC: 3.5 GM/DL (ref 3.4–5)
ALP BLD-CCNC: 92 IU/L (ref 40–128)
ALT SERPL-CCNC: 20 U/L (ref 10–40)
ANION GAP SERPL CALCULATED.3IONS-SCNC: 16 MMOL/L (ref 7–16)
AST SERPL-CCNC: 30 IU/L (ref 15–37)
BASOPHILS ABSOLUTE: 0 K/CU MM
BASOPHILS RELATIVE PERCENT: 0.2 % (ref 0–1)
BILIRUB SERPL-MCNC: 0.8 MG/DL (ref 0–1)
BUN SERPL-MCNC: 18 MG/DL (ref 6–23)
CALCIUM SERPL-MCNC: 8.2 MG/DL (ref 8.3–10.6)
CHLORIDE BLD-SCNC: 98 MMOL/L (ref 99–110)
CO2: 22 MMOL/L (ref 21–32)
CREAT SERPL-MCNC: 0.9 MG/DL (ref 0.6–1.1)
DIFFERENTIAL TYPE: ABNORMAL
EOSINOPHILS ABSOLUTE: 0 K/CU MM
EOSINOPHILS RELATIVE PERCENT: 0.2 % (ref 0–3)
GFR SERPL CREATININE-BSD FRML MDRD: >60 ML/MIN/1.73M2
GLUCOSE BLD-MCNC: 96 MG/DL (ref 70–99)
GLUCOSE SERPL-MCNC: 94 MG/DL (ref 70–99)
HCT VFR BLD CALC: 42.1 % (ref 37–47)
HEMOGLOBIN: 12.7 GM/DL (ref 12.5–16)
IMMATURE NEUTROPHIL %: 0 % (ref 0–0.43)
LACTATE: 1 MMOL/L (ref 0.5–1.9)
LYMPHOCYTES ABSOLUTE: 0.9 K/CU MM
LYMPHOCYTES RELATIVE PERCENT: 17.2 % (ref 24–44)
MCH RBC QN AUTO: 26.9 PG (ref 27–31)
MCHC RBC AUTO-ENTMCNC: 30.2 % (ref 32–36)
MCV RBC AUTO: 89.2 FL (ref 78–100)
MONOCYTES ABSOLUTE: 0.7 K/CU MM
MONOCYTES RELATIVE PERCENT: 14.6 % (ref 0–4)
NUCLEATED RBC %: 0 %
PDW BLD-RTO: 13.9 % (ref 11.7–14.9)
PLATELET # BLD: 150 K/CU MM (ref 140–440)
PMV BLD AUTO: 10 FL (ref 7.5–11.1)
POTASSIUM SERPL-SCNC: 3.5 MMOL/L (ref 3.5–5.1)
RBC # BLD: 4.72 M/CU MM (ref 4.2–5.4)
REASON FOR REJECTION: NORMAL
REJECTED TEST: NORMAL
SEGMENTED NEUTROPHILS ABSOLUTE COUNT: 3.4 K/CU MM
SEGMENTED NEUTROPHILS RELATIVE PERCENT: 67.8 % (ref 36–66)
SODIUM BLD-SCNC: 136 MMOL/L (ref 135–145)
TOTAL IMMATURE NEUTOROPHIL: 0 K/CU MM
TOTAL NUCLEATED RBC: 0 K/CU MM
TOTAL PROTEIN: 5.4 GM/DL (ref 6.4–8.2)
TSH SERPL DL<=0.005 MIU/L-ACNC: 1.54 UIU/ML (ref 0.27–4.2)
WBC # BLD: 5 K/CU MM (ref 4–10.5)

## 2024-02-19 PROCEDURE — 83735 ASSAY OF MAGNESIUM: CPT

## 2024-02-19 PROCEDURE — 36415 COLL VENOUS BLD VENIPUNCTURE: CPT

## 2024-02-19 PROCEDURE — 2580000003 HC RX 258: Performed by: STUDENT IN AN ORGANIZED HEALTH CARE EDUCATION/TRAINING PROGRAM

## 2024-02-19 PROCEDURE — 80053 COMPREHEN METABOLIC PANEL: CPT

## 2024-02-19 PROCEDURE — 84443 ASSAY THYROID STIM HORMONE: CPT

## 2024-02-19 PROCEDURE — 85025 COMPLETE CBC W/AUTO DIFF WBC: CPT

## 2024-02-19 PROCEDURE — 82962 GLUCOSE BLOOD TEST: CPT

## 2024-02-19 PROCEDURE — 74176 CT ABD & PELVIS W/O CONTRAST: CPT

## 2024-02-19 PROCEDURE — 97166 OT EVAL MOD COMPLEX 45 MIN: CPT

## 2024-02-19 PROCEDURE — 82306 VITAMIN D 25 HYDROXY: CPT

## 2024-02-19 PROCEDURE — 97530 THERAPEUTIC ACTIVITIES: CPT

## 2024-02-19 PROCEDURE — 6360000002 HC RX W HCPCS: Performed by: STUDENT IN AN ORGANIZED HEALTH CARE EDUCATION/TRAINING PROGRAM

## 2024-02-19 PROCEDURE — 1200000000 HC SEMI PRIVATE

## 2024-02-19 PROCEDURE — 83605 ASSAY OF LACTIC ACID: CPT

## 2024-02-19 PROCEDURE — 6370000000 HC RX 637 (ALT 250 FOR IP): Performed by: STUDENT IN AN ORGANIZED HEALTH CARE EDUCATION/TRAINING PROGRAM

## 2024-02-19 PROCEDURE — 97535 SELF CARE MNGMENT TRAINING: CPT

## 2024-02-19 PROCEDURE — 97162 PT EVAL MOD COMPLEX 30 MIN: CPT

## 2024-02-19 PROCEDURE — 94761 N-INVAS EAR/PLS OXIMETRY MLT: CPT

## 2024-02-19 PROCEDURE — 93005 ELECTROCARDIOGRAM TRACING: CPT | Performed by: STUDENT IN AN ORGANIZED HEALTH CARE EDUCATION/TRAINING PROGRAM

## 2024-02-19 PROCEDURE — 82607 VITAMIN B-12: CPT

## 2024-02-19 PROCEDURE — 82746 ASSAY OF FOLIC ACID SERUM: CPT

## 2024-02-19 RX ORDER — POLYETHYLENE GLYCOL 3350 17 G/17G
17 POWDER, FOR SOLUTION ORAL 2 TIMES DAILY
Status: DISCONTINUED | OUTPATIENT
Start: 2024-02-19 | End: 2024-02-20 | Stop reason: HOSPADM

## 2024-02-19 RX ORDER — CEPHALEXIN 250 MG/1
500 CAPSULE ORAL 4 TIMES DAILY
Status: DISCONTINUED | OUTPATIENT
Start: 2024-02-19 | End: 2024-02-20 | Stop reason: HOSPADM

## 2024-02-19 RX ORDER — BISACODYL 10 MG
10 SUPPOSITORY, RECTAL RECTAL ONCE
Status: COMPLETED | OUTPATIENT
Start: 2024-02-19 | End: 2024-02-19

## 2024-02-19 RX ORDER — METOPROLOL SUCCINATE 25 MG/1
25 TABLET, EXTENDED RELEASE ORAL EVERY EVENING
Status: DISCONTINUED | OUTPATIENT
Start: 2024-02-19 | End: 2024-02-20 | Stop reason: HOSPADM

## 2024-02-19 RX ORDER — DOCUSATE SODIUM 100 MG/1
200 CAPSULE, LIQUID FILLED ORAL 2 TIMES DAILY
Status: DISCONTINUED | OUTPATIENT
Start: 2024-02-19 | End: 2024-02-20 | Stop reason: HOSPADM

## 2024-02-19 RX ORDER — MORPHINE SULFATE 2 MG/ML
2 INJECTION, SOLUTION INTRAMUSCULAR; INTRAVENOUS EVERY 4 HOURS PRN
Status: DISCONTINUED | OUTPATIENT
Start: 2024-02-19 | End: 2024-02-20 | Stop reason: HOSPADM

## 2024-02-19 RX ORDER — LEVOTHYROXINE SODIUM 0.05 MG/1
50 TABLET ORAL
Status: DISCONTINUED | OUTPATIENT
Start: 2024-02-19 | End: 2024-02-20 | Stop reason: HOSPADM

## 2024-02-19 RX ORDER — MINERAL OIL 100 G/100G
1 OIL RECTAL ONCE
Status: COMPLETED | OUTPATIENT
Start: 2024-02-19 | End: 2024-02-19

## 2024-02-19 RX ORDER — ALBUTEROL SULFATE 90 UG/1
2 AEROSOL, METERED RESPIRATORY (INHALATION) 4 TIMES DAILY PRN
Status: DISCONTINUED | OUTPATIENT
Start: 2024-02-19 | End: 2024-02-20 | Stop reason: HOSPADM

## 2024-02-19 RX ADMIN — CEPHALEXIN 500 MG: 250 CAPSULE ORAL at 16:34

## 2024-02-19 RX ADMIN — SODIUM CHLORIDE, PRESERVATIVE FREE 10 ML: 5 INJECTION INTRAVENOUS at 08:58

## 2024-02-19 RX ADMIN — CEPHALEXIN 500 MG: 250 CAPSULE ORAL at 13:19

## 2024-02-19 RX ADMIN — RIVAROXABAN 15 MG: 15 TABLET, FILM COATED ORAL at 08:57

## 2024-02-19 RX ADMIN — HYDROMORPHONE HYDROCHLORIDE 0.25 MG: 1 INJECTION, SOLUTION INTRAMUSCULAR; INTRAVENOUS; SUBCUTANEOUS at 16:10

## 2024-02-19 RX ADMIN — BISACODYL 10 MG: 10 SUPPOSITORY RECTAL at 08:57

## 2024-02-19 RX ADMIN — CEPHALEXIN 500 MG: 250 CAPSULE ORAL at 08:57

## 2024-02-19 RX ADMIN — ACETAMINOPHEN 650 MG: 325 TABLET ORAL at 13:19

## 2024-02-19 RX ADMIN — METOPROLOL SUCCINATE 25 MG: 25 TABLET, EXTENDED RELEASE ORAL at 16:34

## 2024-02-19 RX ADMIN — LEVOTHYROXINE SODIUM 50 MCG: 0.05 TABLET ORAL at 08:57

## 2024-02-19 RX ADMIN — Medication 1 ENEMA: at 16:36

## 2024-02-19 RX ADMIN — CEPHALEXIN 500 MG: 250 CAPSULE ORAL at 21:01

## 2024-02-19 RX ADMIN — SODIUM CHLORIDE, PRESERVATIVE FREE 10 ML: 5 INJECTION INTRAVENOUS at 21:01

## 2024-02-19 ASSESSMENT — PAIN SCALES - GENERAL
PAINLEVEL_OUTOF10: 0
PAINLEVEL_OUTOF10: 0

## 2024-02-19 ASSESSMENT — PAIN DESCRIPTION - DESCRIPTORS
DESCRIPTORS: ACHING
DESCRIPTORS: DISCOMFORT

## 2024-02-19 ASSESSMENT — PAIN SCALES - WONG BAKER: WONGBAKER_NUMERICALRESPONSE: 0

## 2024-02-19 ASSESSMENT — PAIN DESCRIPTION - LOCATION
LOCATION: GENERALIZED
LOCATION: RECTUM

## 2024-02-19 NOTE — CARE COORDINATION
Chart reviewed and per nursing charting, patient is confused. Call to patients Esvin nixon. Esvin informed this CM that he is the primary contact for pt as her  and children passed away in 2006. Per Esvin, pt was living home alone until last Thursday (2/15/24) when he moved her into his home after a fall. Esvin stated that patient was recently started with [facility] for nursing, PT & OT. Esvin states that he believes RN was trying to get referral to wound care clinic for pt. Pt has the following DME currently: walker, wheelchair, pressure redistribution mattress and lift sheet. Esvin stated that the home is one level with a walk in shower available. Per Esvin, patient had been able to ambulate well throughout the home prior to the fall and family was working with her to increase ambulation prior to coming to hospital. CM discussed therapy recommendation of SNF with Esvin. Esvin declines SNF placement at this time stating that they feel that HHC and family assist will be sufficient for patient. Esvin did state that family has had to push pt further since recent move but with encouragement, patient does better. Discharge plan is for patient to return home w/ family and previous [facility] services. Perfect serve sent to [place] liaison. CM will continue to follow.        02/19/24 2166   Service Assessment   Patient Orientation Other (see comment)  (hx of dementia)   Cognition Dementia / Early Alzheimer's   History Provided By Child/Family  (tiffani - Esvin)   Primary Caregiver Family   Support Systems Family Members   Patient's Healthcare Decision Maker is: Legal Next of Kin   PCP Verified by CM Yes   Last Visit to PCP Within last 3 months   Prior Functional Level Assistance with the following:;Bathing;Dressing;Toileting;Feeding;Cooking;Housework;Shopping;Mobility   Can patient return to prior living arrangement Yes   Ability to make needs known: Good   Family able to assist with home

## 2024-02-19 NOTE — PLAN OF CARE

## 2024-02-19 NOTE — PLAN OF CARE
Problem: Confusion  Goal: Confusion, delirium, dementia, or psychosis is improved or at baseline  Description: INTERVENTIONS:  1. Assess for possible contributors to thought disturbance, including medications, impaired vision or hearing, underlying metabolic abnormalities, dehydration, psychiatric diagnoses, and notify attending LIP  2. [place] high risk fall precautions, as indicated  3. Provide frequent short contacts to provide reality reorientation, refocusing and direction  4. Decrease environmental stimuli, including noise as appropriate  5. Monitor and intervene to maintain adequate nutrition, hydration, elimination, sleep and activity  6. If unable to ensure safety without constant attention obtain sitter and review sitter guidelines with assigned personnel  7. Initiate Psychosocial CNS and Spiritual Care consult, as indicated  2/18/2024 2359 by Kallie Blackburn RN  Outcome: Not Progressing     Problem: Confusion  Goal: Confusion, delirium, dementia, or psychosis is improved or at baseline  Description: INTERVENTIONS:  1. Assess for possible contributors to thought disturbance, including medications, impaired vision or hearing, underlying metabolic abnormalities, dehydration, psychiatric diagnoses, and notify attending LIP  2. [place] high risk fall precautions, as indicated  3. Provide frequent short contacts to provide reality reorientation, refocusing and direction  4. Decrease environmental stimuli, including noise as appropriate  5. Monitor and intervene to maintain adequate nutrition, hydration, elimination, sleep and activity  6. If unable to ensure safety without constant attention obtain sitter and review sitter guidelines with assigned personnel  7. Initiate Psychosocial CNS and Spiritual Care consult, as indicated  2/18/2024 2359 by Kallie Blackburn, RN  Outcome: Not Progressing

## 2024-02-19 NOTE — H&P
ED Resident Physician Note      Patient : Alejandra Noguera Age: 20 year old Sex: female   MRN: [id number] Encounter Date: 2020      History     Chief Complaint   Patient presents with   • Abdominal Pain       HPI  Pt is a 20yoF  (1 prior miscarriage) with PMH ovarian cysts, crohns (diagnosed , was on medicines until 2016, no longer follows with GI) otherwise well here today for evaluation of a day and a half of fatigue, lower abdominal/suprapubic pain radiating to left lower back.  Patient also endorsing 3 bouts of loose stool today-nonbloody, nonmelanotic.  No reported fevers or chills.  She does endorse severe fatigue and mild, gradual onset, bilateral retro-orbital headache.  No nausea or vomiting.  Slightly decreased p.o. intake, but otherwise has decent appetite.  No chest pain or shortness of breath.  No known sick contacts.  No lightheadedness or syncope.  No leg swelling.  No dysuria, hematuria, urinary frequency.  No trauma to the abdomen or flanks. No vaginal bleeding or discharge  No drug use or smoking.     Allergies   Allergen Reactions   • Banana   (Food And Med) Other (See Comments)       Discharge Medication List as of 2020  1:28 AM          Discharge Medication List as of 2020  1:28 AM      New Prescriptions    Details   metroNIDAZOLE (Flagyl) 500 MG tablet Take 1 tablet by mouth every 8 hours for 7 days.Normal, Disp-30 tablet,R-0             Past Medical History:   Diagnosis Date   • Ovarian cyst    • Vaginitis        No past surgical history on file.    No family history on file.    Social History     Tobacco Use   • Smoking status: Never Smoker   • Smokeless tobacco: Never Used   Substance Use Topics   • Alcohol use: Never     Frequency: Never   • Drug use: Yes     Types: Marijuana       Review of Systems  Constitutional:  No fever, no chills.   Skin:  No rash, no pruritus.    ENMT:  No sore throat, no nasal congestion, No ear pain,    Respiratory:  No cough, No shortness of  breath,    Cardiovascular: no chest pressure, No palpitations,    Gastrointestinal:+abdominal pain , no nausea, no vomiting, +diarreha  Genitourinary: No dysuria, no hematuria.    Neurologic: +headache, no dizziness.    Hematologic/Lymphatic: no abnormal bruIsing or bleeding  Musculoskeletal: No tenderness, no decreased range of motion.  no swelling .  Additional review of systems information: All other systems reviewed and otherwise negative.     Physical Exam     ED Triage Vitals [11/01/20 2148]   ED Triage Vitals Group      Temp 98.6 °F (37 °C)      Heart Rate (!) 112      Resp 16      /58      SpO2 99 %      EtCO2 mmHg       Height       Weight 121 lb 4.1 oz (55 kg)      Weight Scale Used       BMI (Calculated)       IBW/kg (Calculated)        Physical Exam  Vitals signs and nursing note reviewed. Exam conducted with a chaperone present.   Constitutional:       General: She is not in acute distress.     Appearance: Normal appearance. She is not toxic-appearing.   HENT:      Head: Atraumatic.      Nose: No congestion.      Mouth/Throat:      Mouth: Mucous membranes are moist.      Pharynx: Oropharynx is clear.   Eyes:      General: No scleral icterus.     Extraocular Movements: Extraocular movements intact.      Conjunctiva/sclera: Conjunctivae normal.   Cardiovascular:      Rate and Rhythm: Normal rate and regular rhythm.      Pulses: Normal pulses.      Heart sounds: Normal heart sounds.   Pulmonary:      Effort: Pulmonary effort is normal. No respiratory distress.      Breath sounds: Normal breath sounds. No wheezing.   Abdominal:      General: Abdomen is flat. Bowel sounds are normal. There is no distension. There are no signs of injury.      Palpations: Abdomen is soft.      Tenderness: There is abdominal tenderness in the suprapubic area. There is no guarding or rebound. Negative signs include Hoffmann's sign and McBurney's sign.      Hernia: No hernia is present.   Genitourinary:     General: Normal  DAILY 10/7/22 2/18/24  Timothy Smith MD   rivaroxaban (XARELTO) 15 MG TABS tablet Take 1 tablet by mouth daily 10/7/22   Timothy Smith MD   Acetaminophen (TYLENOL ARTHRITIS PAIN PO) Take by mouth    Provider, MD Oscar       Physical Exam:      General: NAD  Eyes: EOMI  ENT: neck supple  Cardiovascular: Regular rate.  Respiratory: Clear to auscultation  Gastrointestinal: Soft, nondistended, mild generalized abdominal tenderness  Genitourinary: no suprapubic tenderness  Musculoskeletal: No edema  Skin: warm, dry  Neuro: Alert.  Oriented x 2  Psych: Mood appropriate.       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    Thyroid disease     Ulcer in the past    Gastric     PSHX:  has a past surgical history that includes Cholecystectomy; Bladder surgery; Eye surgery; Hysterectomy; Appendectomy; Ventricular cardiac pacer insertion (2011); Colonoscopy (11/4/14); and pacemaker placement (Left, 02/20/2020).  Allergies:   Allergies   Allergen Reactions    Amitriptyline     Aspirin     Codeine     Elavil [Amitriptyline Hcl]     Hydroxychloroquine     Ibuprofen     Namenda [Memantine]      Possible hypotension/orthostasis    Plaquenil [Hydroxychloroquine Sulfate]     Prevnar 13 [Pneumococcal 13-Kristal Conj Vacc]     Theophyllines      Fam HX:family history includes Cancer in her brother, father, mother, and sister; Heart Disease in her father.  Soc HX:   Social History     Socioeconomic History    Marital status:    Tobacco Use    Smoking status: Former     Current packs/day: 0.00     Types: Cigarettes     Quit  vulva.      Exam position: Supine.      Pubic Area: No rash.       Vagina: Normal.      Cervix: No cervical motion tenderness or discharge.      Uterus: Not tender.       Adnexa:         Right: No tenderness or fullness.          Left: No tenderness or fullness.     Musculoskeletal:         General: No swelling or deformity.   Skin:     General: Skin is warm and dry.      Findings: No rash.   Neurological:      General: No focal deficit present.      Mental Status: She is alert. Mental status is at baseline.      Sensory: No sensory deficit.      Motor: No weakness.   Psychiatric:         Mood and Affect: Mood normal.         ED Course     Procedures    Lab Results     Results for orders placed or performed during the hospital encounter of 11/01/20   CBC with Automated Differential   Result Value Ref Range    WBC 3.8 (L) 4.2 - 11.0 K/mcL    RBC 3.75 (L) 4.00 - 5.20 mil/mcL    HGB 10.8 (L) 12.0 - 15.5 g/dL    HCT 34.0 (L) 36.0 - 46.5 %    MCV 90.7 78.0 - 100.0 fl    MCH 28.8 26.0 - 34.0 pg    MCHC 31.8 (L) 32.0 - 36.5 g/dL    RDW-CV 13.7 11.0 - 15.0 %     140 - 450 K/mcL    NRBC 0 0 /100 WBC    DIFF TYPE AUTOMATED DIFFERENTIAL     Neutrophil 49 %    LYMPH 30 %    MONO 21 %    EOSIN 0 %    BASO 0 %    Percent Immature Granuloctyes 0 %    Absolute Neutrophil 1.8 1.8 - 8.0 K/mcL    Absolute Lymph 1.1 (L) 1.2 - 5.2 K/mcL    Absolute Mono 0.8 0.3 - 0.9 K/mcL    Absolute Eos 0.0 (L) 0.1 - 0.5 K/mcL    Absolute Baso 0.0 0.0 - 0.3 K/mcL    Absolute Immature Granulocytes 0.0 0 - 0.2 K/mcl   Comprehensive Metabolic Panel   Result Value Ref Range    Sodium 137 135 - 145 mmol/L    Potassium 4.7 3.4 - 5.1 mmol/L    Chloride 107 98 - 107 mmol/L    Carbon Dioxide 26 21 - 32 mmol/L    Anion Gap 9 (L) 10 - 20 mmol/L    Glucose 92 65 - 99 mg/dL    BUN 8 6 - 20 mg/dL    Creatinine 0.88 0.51 - 0.95 mg/dL    GFR Estimate,  >90     GFR Estimate, Non African American >90     BUN/Creatinine Ratio 9 7 - 25    CALCIUM  9.7 8.4 - 10.2 mg/dL    TOTAL BILIRUBIN 0.5 0.2 - 1.0 mg/dL    AST/SGOT 26 <38 Units/L    ALT/SGPT 13 <64 Units/L    ALK PHOSPHATASE 59 45 - 117 Units/L    TOTAL PROTEIN 7.7 6.4 - 8.2 g/dL    Albumin 3.6 3.6 - 5.1 g/dL    GLOBULIN 4.1 (H) 2.0 - 4.0 g/dL    A/G Ratio, Serum 0.9 (L) 1.0 - 2.4   Lipase   Result Value Ref Range    Lipase 126 73 - 393 Units/L   Urinalysis & Reflex Microscopy With Culture If Indicated   Result Value Ref Range    COLOR YELLOW YELLOW    APPEARANCE HAZY     GLUCOSE(URINE) NEGATIVE NEGATIVE mg/dL    BILIRUBIN NEGATIVE NEGATIVE    KETONES NEGATIVE NEGATIVE mg/dL    SPECIFIC GRAVITY 1.017 1.005 - 1.030    BLOOD NEGATIVE NEGATIVE    pH 7.0 5.0 - 7.0 Units    PROTEIN(URINE) NEGATIVE NEGATIVE mg/dL    UROBILINOGEN 0.2 0.0 - 1.0 mg/dL    NITRITE NEGATIVE NEGATIVE    LEUKOCYTE ESTERASE NEGATIVE NEGATIVE    SPECIMEN TYPE URINE CLEAN CATCH    Urine Pregnancy Test Clinitek Status (POC)   Result Value Ref Range    HCG Point of Care NEGATIVE NEGATIVE   Thyroid Stimulating Hormone Reflex   Result Value Ref Range    TSH 0.563 0.463 - 4.130 mcUnits/mL   WET MOUNT   Result Value Ref Range    YEAST NONE SEEN NONE SEEN    Trichomonas NONE SEEN NONE SEEN    Clue Cells PRESENT (A) NONE SEEN       EKG Results     EKG by my interpretation: n/a    Radiology Results     US PELVIS COMPLETE NON OB TRANSVAGINAL   Final Result   Left-sided dominant ovarian follicles      Electronically Signed by: CHRISTA SOLANO M.D.   Signed on: 11/02/2020 01:02 AM      CT LUMBAR SPINE 2D REFORMATTED   Final Result   Unremarkable spine.        Electronically Signed by: CHRISTA SOLANO M.D.   Signed on: 11/02/2020 12:35 AM      CT ABDOMEN PELVIS W CONTRAST - IV contrast only   Final Result   No acute disease.       Electronically Signed by: CHRISTA SOLANO M.D.   Signed on: 11/02/2020 12:31 AM           ED Medication Orders (From admission, onward)    Ordered Start     Status Ordering Provider    11/01/20 2308 11/01/20 2308   lactated ringers bolus 1,000 mL  ONCE      Last MAR action: New Bag CELE BLISS    11/01/20 2308 11/01/20 2309  ketorolac injection 15 mg  ONCE      Last MAR action: Given CELE BLISS          MDM    IV was established.  Pulse oximetry on room air is interpreted as normal.   Cardiac telemetry tracing by my interpretation normal sinus rhythm     Pt is nontoxic appearing, without evidence of peritonitis on exam - thus low concern for emergent abdominal pathologies such as SBO, perforated viscus, ischemic colitis or mesenteric ischemia, AAA, cholangitis, intraperitoneal bleeding, bacterial peritonitis    Pt does not have significant vaginal discharge, stigmata of blood loss anemia or systemic illness, prior pre-syncope or syncope, vaginal bleeding, severe pelvic pain on exam thus I have very low concern for emergent pelvic pathology such as PID, ToA, Ectopic pregnancy, Ovarian Torsion       The pt was masked on arrival  Myself and the nurses wore masks, eye protection and gloves for this encounter due to COVID-19 pandemic  Clinical Impression     ED Diagnosis   1. Bacterial vaginosis         Disposition        Discharge 11/2/2020  1:10 AM  Alejandra Noguera discharge to home/self care.        I have reviewed all diagnostic test results and discussed them and their implications with the patient.  The patient and their family had the opportunity to ask questions all of which were answered to their satisfaction.     Cele Bliss MD   11/2/2020 11:01 PM                      Cele Bliss MD  Resident  11/02/20 0253

## 2024-02-19 NOTE — CONSULTS
[facility] ACUTE CARE PHYSICAL THERAPY EVALUATION  Cary Rogers, 1935, 3009/3009-A, 2/19/2024    History  [place]:  There were no encounter diagnoses.  Patient  has a past medical history of A-fib (HCC), Arthritis, CAD (coronary artery disease), Eye abnormality, Fibromyalgia, GERD (gastroesophageal reflux disease), Hx of Doppler echocardiogram, MI (myocardial infarction) (HCC), Thyroid disease, and Ulcer.  Patient  has a past surgical history that includes Cholecystectomy; Bladder surgery; Eye surgery; Hysterectomy; Appendectomy; Ventricular cardiac pacer insertion (2011); Colonoscopy (11/4/14); and pacemaker placement (Left, 02/20/2020).    Discharge Recommendation: SNF    Subjective:    Patient states:  \"It hurts so bad.\"      Pain:  reports stomach pain, but did not state numerical value.      Communication with other providers:  Handoff to RN, co-eval with FALGUNI Qureshi for safety and tolerance    Restrictions: General Precautions, Fall Risk    Home Setup/Prior level of function  Social/Functional History  Lives With: Spouse (children)  Type of Home: House  Home Layout: Two level, Able to Live on Main level with bedroom/bathroom  Home Access: Stairs to enter with rails  Entrance Stairs - Number of Steps: 3  Bathroom Shower/Tub: Tub/Shower unit  Bathroom Toilet: Standard  Home Equipment:  (reports \"not sure\" in regards to utilizing or having access to DME at home)  Has the patient had two or more falls in the past year or any fall with injury in the past year?: Yes  ADL Assistance: Independent  Homemaking Assistance: Independent  Homemaking Responsibilities: Yes  Ambulation Assistance: Independent  Transfer Assistance: Independent  Active :  (\"not too much\")    *patient presenting as a poor historian on this date impacting the accuracy of information    Examination of body systems (includes body structures/functions, activity/participation limitations):  Observation:  in semi-fowlers position

## 2024-02-20 ENCOUNTER — TELEPHONE (OUTPATIENT)
Dept: FAMILY MEDICINE CLINIC | Age: 89
End: 2024-02-20

## 2024-02-20 VITALS
WEIGHT: 151.9 LBS | BODY MASS INDEX: 23.02 KG/M2 | TEMPERATURE: 97.9 F | DIASTOLIC BLOOD PRESSURE: 100 MMHG | SYSTOLIC BLOOD PRESSURE: 158 MMHG | RESPIRATION RATE: 18 BRPM | HEIGHT: 68 IN | HEART RATE: 65 BPM | OXYGEN SATURATION: 99 %

## 2024-02-20 LAB
25(OH)D3 SERPL-MCNC: 18.11 NG/ML
EKG ATRIAL RATE: 61 BPM
EKG DIAGNOSIS: NORMAL
EKG Q-T INTERVAL: 538 MS
EKG QRS DURATION: 144 MS
EKG QTC CALCULATION (BAZETT): 538 MS
EKG R AXIS: -68 DEGREES
EKG T AXIS: 116 DEGREES
EKG VENTRICULAR RATE: 60 BPM
FOLATE SERPL-MCNC: 15.9 NG/ML (ref 3.1–17.5)
VITAMIN B-12: 1026 PG/ML (ref 211–911)

## 2024-02-20 PROCEDURE — 92610 EVALUATE SWALLOWING FUNCTION: CPT

## 2024-02-20 PROCEDURE — 94761 N-INVAS EAR/PLS OXIMETRY MLT: CPT

## 2024-02-20 PROCEDURE — 93010 ELECTROCARDIOGRAM REPORT: CPT | Performed by: INTERNAL MEDICINE

## 2024-02-20 PROCEDURE — 6370000000 HC RX 637 (ALT 250 FOR IP): Performed by: STUDENT IN AN ORGANIZED HEALTH CARE EDUCATION/TRAINING PROGRAM

## 2024-02-20 PROCEDURE — 2580000003 HC RX 258: Performed by: STUDENT IN AN ORGANIZED HEALTH CARE EDUCATION/TRAINING PROGRAM

## 2024-02-20 RX ORDER — POLYETHYLENE GLYCOL 3350 17 G/17G
17 POWDER, FOR SOLUTION ORAL DAILY
Qty: 10 PACKET | Refills: 0 | Status: SHIPPED | OUTPATIENT
Start: 2024-02-20 | End: 2024-03-01

## 2024-02-20 RX ORDER — PSEUDOEPHEDRINE HCL 30 MG
200 TABLET ORAL 2 TIMES DAILY
Qty: 20 CAPSULE | Refills: 0 | Status: SHIPPED | OUTPATIENT
Start: 2024-02-20

## 2024-02-20 RX ADMIN — POLYETHYLENE GLYCOL (3350) 17 G: 17 POWDER, FOR SOLUTION ORAL at 08:38

## 2024-02-20 RX ADMIN — CEPHALEXIN 500 MG: 250 CAPSULE ORAL at 08:38

## 2024-02-20 RX ADMIN — LEVOTHYROXINE SODIUM 50 MCG: 0.05 TABLET ORAL at 08:38

## 2024-02-20 RX ADMIN — RIVAROXABAN 15 MG: 15 TABLET, FILM COATED ORAL at 08:38

## 2024-02-20 RX ADMIN — COLLAGENASE SANTYL: 250 OINTMENT TOPICAL at 08:40

## 2024-02-20 RX ADMIN — CEPHALEXIN 500 MG: 250 CAPSULE ORAL at 14:22

## 2024-02-20 RX ADMIN — SODIUM CHLORIDE, PRESERVATIVE FREE 10 ML: 5 INJECTION INTRAVENOUS at 08:39

## 2024-02-20 RX ADMIN — DOCUSATE SODIUM 200 MG: 100 CAPSULE, LIQUID FILLED ORAL at 08:38

## 2024-02-20 ASSESSMENT — PAIN SCALES - GENERAL: PAINLEVEL_OUTOF10: 0

## 2024-02-20 NOTE — PROGRESS NOTES
V2.0  [facility] Hospitalist Progress Note      Name:  Cary Rogers /Age/Sex: 1935  (88 y.o. female)   MRN & CSN:  [id number] & [id number] Encounter Date/Time: 2024 12:30 PM EST    Location:  [address] [place] [address]-A PCP: Dewayne Jernigan PA       Hospital Day: 3    Assessment and Plan:   Cary Rogers is a 88 y.o. female with A-fib, PPM implantation, CAD, GERD, hypothyroidism, OA, CKD stage 3 and recently diagnosed urinary tract infection presented as a transfer from [facility] due to generalized weakness.     Generalized weakness  Likely secondary to age-related debility, COVID-19 (no oxygen requirement) and recent UTI  No focal symptoms or lateralization  Conservative management of COVID-19 infection  Continue Keflex for recent UTI, follow urine studies  Rest of the management below     Recurrent falls  Ambulatory dysfunction secondary to above  EKG report from  ER reviewed paced rhythm  CT head, C, T, L-spine report reviewed no acute fracture or dislocation, chronic superior endplate compressive deformities of L3 and L4.  CT chest abdomen and pelvis with no acute abnormalities, moderate fecal stasis within the rectum.  Fall precautions and PT/OT  TSH normal, check vitamin B12 and folate, vitamin D    Vitamin D deficiency  - Vitamin D 18.  On replacement protocol at home.     Constipation with fecal impaction  Seen on CT abdomen/pelvis  Will initiate bowel regimen and disimpact  - Had bowel movements with Colace and GlycoLax.     Chronic A-fib  PPM implantation  Continue home Toprol-XL and Xarelto     CKD stage III  Creatinine 1.05, at baseline  Monitor BMP     Hypothyroidism  Continue home Synthroid     Dementia without behavioral disturbance       Diet ADULT DIET; Regular    DVT Prophylaxis [] Lovenox, [] Heparin, [] Eliquis, [] Xarelto  [] SCDs    [] ***   Code Status Full Code   Disposition Expected Disposition: ***  Estimated Date of Discharge: ***  Patient requires continued admission due to ***   Home O2 
  Facility/Department: [address]   CLINICAL BEDSIDE SWALLOW EVALUATION    NAME: Cary Rogers  : 1935  MRN: [id number]    IMPRESSIONS AND RECOMMENDATIONS: Cary Rogers was referred for a bedside swallow evaluation following admission to [facility] with generalized weakness, COVID +, recurrent falls. Medical hx includes afib, CKD, hypothyroidism, dementia. No known history of dysphagia prior to admission.    Pt seen for evaluation seated upright in bed, alert, cooperative. Oral mechanism examination grossly WFL without asymmetry. Pt presented with PO trials of thin liquids via cup/straw, puree, regular solids. Oral stage WFL with intact labial seal, slow/adequate mastication, intact clearance. Pharyngeal stage appears WFL without s/s aspiration.    Recommend regular diet/thin liquids. No further acute SLP needs identified.      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  ONSET DATE: this admission    Recent Chest Xray/CT of Chest: see chart    Date of Eval: 2024  Evaluating Therapist: Liseth Wagner SLP    Current Diet level:  Current Liquid Diet : Thin;Clear    Primary Complaint  denies dysphagia    Pain:  Pain 
4 Eyes Skin Assessment     NAME:  Cary Rogers  YOB: 1935  MEDICAL RECORD NUMBER:  [id number]    The patient is being assessed for  Admission    I agree that at least one RN has performed a thorough Head to Toe Skin Assessment on the patient. ALL assessment sites listed below have been assessed.      Areas assessed by both nurses:    Head, Face, Ears, Shoulders, Back, Chest, Arms, Elbows, Hands, Sacrum. Buttock, Coccyx, Ischium, Legs. Feet and Heels, and Under Medical Devices         Does the Patient have a Wound? Yes wound(s) were present on assessment. LDA wound assessment was Initiated and completed by RN       Erlin Prevention initiated by RN: Yes  Wound Care Orders initiated by RN: Yes    Pressure Injury (Stage 3,4, Unstageable, DTI, NWPT, and Complex wounds) if present, place Wound referral order by RN under : Yes    New Ostomies, if present place, Ostomy referral order under : No     Nurse 1 eSignature: Electronically signed by Kallie Blackburn RN on 2/19/24 at 4:40 AM EST    **SHARE this note so that the co-signing nurse can place an eSignature**    Nurse 2 eSignature: Electronically signed by Kallie Blackburn RN on 2/19/24 at 4:46 AM EST   
Discharge education completed with pt and gordyswati, pt demonstrated appropriate teach back, IV's removed, pt has prescription called in to pharmacy, medication's and side effect education completed, pt has discharge paperwork, pt denies any needs or questions at this time.    
Patient in bed at this time groaning and moaning.   Patient complains of belly pain, while holding stomach.  Patient unable to describe pain at this time.   All vital signs WN. Bowel sounds hypo active.  Notified on call provider Maik SCOTT.   New orders obtained (see orders in chart).  
Pt arely came in and asked to speak with the nurse. When I Came in the room the daughter asked why the pt needed a speech eval. I explained the doctor was concerned for aspiration pneumonia due to CT results. She asked why she was in isolation. I explained the pt had Covid. She stated the hospital calls everything covid. I explained we test for that and she is confirmed positive. She asked about PT and OT. I explained they are suggesting SNF. I then tried to walk the pt to the bathroom. She could barely make it to the chair. The special air mattress was placed on the bed, the pt was returned to the bed and cleaned of incontinence of stool.   
Pt ate 100% of her meal, she did better ambulating this afternoon than this morning, she had a large BM. The daughter would like the patient to come home this evening. Dr. Shoemaker notified  
activity/participation limitations):  Observation:  Semi-fowlers in bed upon arrival, agreeable to therapy  Vision: Impaired; partially blind, decreased peripheral vision   Hearing: WFL  Cardiopulmonary:  On room air, tele, vitals remained stable throughout session      Body Systems and functions:  ROM R/L: WFL    Strength R/L:  BUE 4/5,   4/5  Sensation: WFL  Tone: Normal  Coordination: WFL  Perception: WFL    Cognitive and Psychosocial Functioning:  Overall cognitive status:  Pt is A&Ox2, very pained, and required cues to attend to task. Pt has impaired insight, decreased safety awareness, and decreased problem solving capability. Pt has a history of dementia.   Affect: Pained        Activities of Daily Living (ADLs):  Feeding: Set Up  Grooming: SBA   UB bathing: Tony   LB bathing: maxA   UB dressing: Tony   LB dressing: maxA (pt required assist to don 1 sock after failed attempt while seated EOB, for managing diaper on/off while in stand)  Toileting: maxA (pt required assist for hygiene and clothing management)     Pt ADL function inferred from observation of gross motor function, and assessment of mobility, balance, posture, safety awareness, cognition, and activity tolerance.     AM-PAC 6 click short form for inpatient daily activity:   How much help from another person does the patient currently need... Unable  Dep A Lot  Max A A Lot   Mod A A Little  Min A A Little   CGA  SBA None   Mod I  Indep  Sup   1.  Putting on and taking off regular lower body clothing? [] 1    [x] 2   [] 2   [] 3   [] 3   [] 4      2. Bathing (including washing, rinsing, drying)? [] 1   [] 2   [x] 2 [] 3 [] 3 [] 4   3. Toileting, which includes using toilet, bedpan, or urinal? [] 1    [x] 2   [] 2   [] 3   [] 3   [] 4     4. Putting on and taking off regular upper body clothing? [] 1   [] 2   [] 2   [x] 3   [] 3    [] 4      5. Taking care of personal grooming such as brushing teeth? [] 1   [] 2    [] 2 [] 3    [x] 3   [] 4    
monitored include Xarelto, method of monitoring is monitoring for signs of overt bleeding and CBC    History from:     Patient     History of Present Illness:     Chief Complaint: Recurrent falls, generalized weakness    Patient reports abdominal pain and RLQ.  CT abdomen/pelvis with concern for fecal impaction and aspiration pneumonitis.    Review of Systems:      Pertinent positives and negatives discussed in HPI     Objective:     Intake/Output Summary (Last 24 hours) at 2/19/2024 1452  Last data filed at 2/19/2024 0204  Gross per 24 hour   Intake 10 ml   Output --   Net 10 ml      Vitals:   Vitals:    02/19/24 0923 02/19/24 0945 02/19/24 1105 02/19/24 1411   BP:  (!) 173/82  (!) 153/75   Pulse: 60 64 63 60   Resp: 18   20   Temp:    97.3 °F (36.3 °C)   TempSrc:    Oral   SpO2: 98% 95%  99%   Weight:       Height:           Medications Prior to Admission     Prior to Admission medications    Medication Sig Start Date End Date Taking? Authorizing Provider   cephALEXin (KEFLEX) 500 MG capsule Take 1 capsule by mouth 4 times daily for 7 days 2/14/24 2/21/24  Dewayne Jernigan PA   albuterol sulfate HFA (VENTOLIN HFA) 108 (90 Base) MCG/ACT inhaler Inhale 2 puffs into the lungs 4 times daily as needed for Wheezing 10/24/22   Rob Stokes APRN - CNP   metoprolol succinate (TOPROL XL) 25 MG extended release tablet Take 1 tablet by mouth every evening 10/19/22   FABIEN Strickland MD   Ergocalciferol (VITAMIN D) 12499 units CAPS Take 50,000 Units by mouth once a week 10/24/22   FABIEN Strickland MD   levothyroxine (SYNTHROID) 50 MCG tablet Take 1 tablet by mouth every morning (before breakfast) TAKE ONE TABLET BY MOUTH DAILY 10/7/22 2/18/24  Timothy Smith MD   rivaroxaban (XARELTO) 15 MG TABS tablet Take 1 tablet by mouth daily 10/7/22   Timothy Smith MD   Acetaminophen (TYLENOL ARTHRITIS PAIN PO) Take by mouth    ProviderOscar MD       Physical Exam:      General: NAD  Eyes: EOMI  ENT: neck

## 2024-02-20 NOTE — PLAN OF CARE
Problem: Pain  Goal: Verbalizes/displays adequate comfort level or baseline comfort level  Outcome: Adequate for Discharge     Problem: ABCDS Injury Assessment  Goal: Absence of physical injury  Outcome: Adequate for Discharge     Problem: Confusion  Goal: Confusion, delirium, dementia, or psychosis is improved or at baseline  Description: INTERVENTIONS:  1. Assess for possible contributors to thought disturbance, including medications, impaired vision or hearing, underlying metabolic abnormalities, dehydration, psychiatric diagnoses, and notify attending LIP  2. Traver high risk fall precautions, as indicated  3. Provide frequent short contacts to provide reality reorientation, refocusing and direction  4. Decrease environmental stimuli, including noise as appropriate  5. Monitor and intervene to maintain adequate nutrition, hydration, elimination, sleep and activity  6. If unable to ensure safety without constant attention obtain sitter and review sitter guidelines with assigned personnel  7. Initiate Psychosocial CNS and Spiritual Care consult, as indicated  Outcome: Adequate for Discharge     Problem: Skin/Tissue Integrity  Goal: Absence of new skin breakdown  Description: 1.  Monitor for areas of redness and/or skin breakdown  2.  Assess vascular access sites hourly  3.  Every 4-6 hours minimum:  Change oxygen saturation probe site  4.  Every 4-6 hours:  If on nasal continuous positive airway pressure, respiratory therapy assess nares and determine need for appliance change or resting period.  Outcome: Adequate for Discharge     Problem: Safety - Adult  Goal: Free from fall injury  Outcome: Adequate for Discharge     Problem: Chronic Conditions and Co-morbidities  Goal: Patient's chronic conditions and co-morbidity symptoms are monitored and maintained or improved  Outcome: Adequate for Discharge

## 2024-02-20 NOTE — DISCHARGE INSTRUCTIONS
Medications: see computerized discharge medication list  Activity: up with assistance  Diet:  ADULT DIET; Regular  Wound Care: - rt buttock cleanse with NS apply Santyl ointment apply collagen (Puracol) cover with silicone foam border change daily and prn. Rt back cluster cleanse with NS apply collagen (Puracol) cover with silicone foam border change every 2 days and prn.   Disposition: home with home care  Discharged Condition: Stable

## 2024-02-20 NOTE — DISCHARGE SUMMARY
deficits  Psych: Mood appropriate.      Imaging   CT ABDOMEN PELVIS WO CONTRAST Additional Contrast? None    Result Date: 2/19/2024  EXAMINATION: CT OF THE ABDOMEN AND PELVIS WITHOUT CONTRAST 2/19/2024 6:47 am TECHNIQUE: CT of the abdomen and pelvis was performed without the administration of intravenous contrast. Multiplanar reformatted images are provided for review. Automated exposure control, iterative reconstruction, and/or weight based adjustment of the mA/kV was utilized to reduce the radiation dose to as low as reasonably achievable. COMPARISON: None. HISTORY: ORDERING SYSTEM PROVIDED HISTORY: abdominal pain TECHNOLOGIST PROVIDED HISTORY: Reason for exam:->abdominal pain Additional Contrast?->None Reason for Exam: abdominal pain Initial evaluation. FINDINGS: Lower Chest: Mild patchy opacification is seen within the lower lobes, right more than left.  There is question debris within the right lower lobe bronchus. Organs: The patient is status post cholecystectomy.  The unenhanced liver, spleen, pancreas and adrenal glands demonstrate no convincing acute abnormality.  No evidence of a contour distorting renal mass.  There is no hydronephrosis or perinephric stranding.  No renal stones identified. GI/Bowel: A moderate amount of stool is seen distending the rectum.  Fluid is seen within the remaining colon, which can be seen with diarrhea.  No evidence to suggest a bowel obstruction.  No evidence of acute appendicitis. Pelvis: The urinary bladder demonstrates no acute abnormality.  The patient appears to be status post hysterectomy. Peritoneum/Retroperitoneum: There is diffuse atherosclerosis of the abdominal aorta, which appears normal in caliber.  No bulky retroperitoneal or mesenteric adenopathy.  No free fluid or free air within the abdomen or pelvis. Bones/Soft Tissues: The osseous structures appear osteopenic.  There is minimal superior endplate depression of the S1 vertebral body with associated

## 2024-02-20 NOTE — CARE COORDINATION
Discharge order noted. Perfect serve sent to Dr. Shoemaker requesting [facility] order. Perfect serve sent to Viktoria [facility] liaison, notifying her of discharge.

## 2024-02-21 ENCOUNTER — CARE COORDINATION (OUTPATIENT)
Dept: CASE MANAGEMENT | Age: 89
End: 2024-02-21

## 2024-02-21 ENCOUNTER — TELEPHONE (OUTPATIENT)
Dept: FAMILY MEDICINE CLINIC | Age: 89
End: 2024-02-21

## 2024-02-21 DIAGNOSIS — U07.1 COVID-19: Primary | ICD-10-CM

## 2024-02-21 PROCEDURE — 1111F DSCHRG MED/CURRENT MED MERGE: CPT | Performed by: PHYSICIAN ASSISTANT

## 2024-02-21 NOTE — TELEPHONE ENCOUNTER
[facility] Transitions Initial Follow Up Call    Call within 2 business days of discharge: Yes     Patient: Cary Rogers Patient : 1935   MRN: [id number]  Reason for Admission: weakness  Discharge Date: 24 RARS: Readmission Risk Score: 13       Spoke with: patient    Discharge department/facility: [facility]    Non-face-to-face services provided:  Scheduled Virtual Hospital F/U per patient request. Provider approved.    Follow Up  Future Appointments   Date Time Provider Department Center   2024 10:40 AM Dewayne Jernigan PA [facility] TONIO Giles LPN

## 2024-02-21 NOTE — CARE COORDINATION
Care Transitions Initial Follow Up Call    Call within 2 business days of discharge: Yes    Patient Current Location:  [place]    Care Transition Nurse contacted Margot Melba, by telephone to perform post hospital discharge assessment. Verified Patient name and  with GrandBlaynemendez as identifiers. Provided introduction to self, and explanation of the Care Transition Nurse role.     Patient: Cary Rogers Patient : 1935   MRN: [id number]  Reason for Admission: Covid19, Gen Weakness, Recurrent Falls, Fecal Impaction, Recent UTI   Discharge Date: 24 RARS: Readmission Risk Score: 13  Facility: [facility] 24-24    Last Discharge Facility       Date Complaint Diagnosis Description Type Department Provider    24  Generalized weakness Admission (Discharged) [facility] 3E Kae Shoemaker MD          Challenges to be reviewed by the provider   Additional needs identified to be addressed with provider: No       Method of communication with provider: none.    Reports Patient doing \"better\". Reports decreased weakness, no falls since home. Fall safety interventions reviewed. Reports Patient w/ wound rt buttocks which is \"shrinking\"--advised wound care as per AVS pg 9, v/u. Advised t&r q2hrs. Reports Patient has pressure relieving mattress. Reports voiding qs, no urinary c/o. Reports Patient completing Keflex from recent UTI. Reports + bms since home. Advised increased water intake. Reports appetite and fluid intake good. Reports recovering well from Covid19--only w/ occasional cough. Denies fever, cheills, sob, ac distress. Patient lives w/ GrandDtr who assists her as needed as Patient w/ dementia. Has walker, w/c, lift sheet. Active w/ [facility].  Denies additional resource needs.     Care Transition Nurse reviewed discharge instructions, medical action plan, and red flags with Demond who verbalized understanding. She was given an opportunity to ask questions and does not have any further questions or concerns at this

## 2024-02-22 ENCOUNTER — TELEMEDICINE (OUTPATIENT)
Dept: FAMILY MEDICINE CLINIC | Age: 89
End: 2024-02-22
Payer: MEDICARE
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
## 2024-02-22 DIAGNOSIS — Z09 HOSPITAL DISCHARGE FOLLOW-UP: Primary | ICD-10-CM

## 2024-02-22 DIAGNOSIS — U07.1 COVID-19: ICD-10-CM

## 2024-02-22 DIAGNOSIS — R26.2 DIFFICULTY IN WALKING: ICD-10-CM

## 2024-02-22 DIAGNOSIS — K59.00 CONSTIPATION, UNSPECIFIED CONSTIPATION TYPE: ICD-10-CM

## 2024-02-22 DIAGNOSIS — N30.01 ACUTE CYSTITIS WITH HEMATURIA: ICD-10-CM

## 2024-02-22 DIAGNOSIS — M62.81 GENERALIZED MUSCLE WEAKNESS: ICD-10-CM

## 2024-02-22 PROCEDURE — 1123F ACP DISCUSS/DSCN MKR DOCD: CPT | Performed by: PHYSICIAN ASSISTANT

## 2024-02-22 PROCEDURE — G8428 CUR MEDS NOT DOCUMENT: HCPCS | Performed by: PHYSICIAN ASSISTANT

## 2024-02-22 PROCEDURE — 1090F PRES/ABSN URINE INCON ASSESS: CPT | Performed by: PHYSICIAN ASSISTANT

## 2024-02-22 PROCEDURE — 1111F DSCHRG MED/CURRENT MED MERGE: CPT | Performed by: PHYSICIAN ASSISTANT

## 2024-02-22 PROCEDURE — 99214 OFFICE O/P EST MOD 30 MIN: CPT | Performed by: PHYSICIAN ASSISTANT

## 2024-02-22 NOTE — PROGRESS NOTES
Post-Discharge Transitional Care  Follow Up      Cary Rogers   YOB: 1935    Date of Office Visit:  2/22/2024  Date of Hospital Admission: 2/18/24  Date of Hospital Discharge: 2/20/24  Risk of hospital readmission (high >=14%. Medium >=10%) :Readmission Risk Score: 13      Care management risk score Rising risk (score 2-5) and Complex Care (Scores >=6): No Risk Score On File     Non face to face  following discharge, date last encounter closed (first attempt may have been earlier): 02/21/2024    Call initiated 2 business days of discharge: Yes    ASSESSMENT/PLAN:   Hospital discharge follow-up  -     RI DISCHARGE MEDS RECONCILED W/ CURRENT OUTPATIENT MED LIST  Difficulty in walking  Work with pt/ot  Acute cystitis with hematuria  Finish abx and monitor  Fall, subsequent encounter  Generalized muscle weakness  Constipation, unspecified constipation type  Resolved, continue good fluid intake  COVID-19  Still coughing but improving, monitor pulse O2    Medical Decision Making: low complexity  Return in 2 months (on 4/22/2024).    On this date 2/22/2024 I have spent 32 minutes reviewing previous notes, test results and face to face with the patient discussing the diagnosis and importance of compliance with the treatment plan as well as documenting on the day of the visit.       Subjective:   HPI:  Follow up of Hospital problems/diagnosis(es): UTI, COVID 19, constipation, falls, generalized weakness, age related debility    Inpatient course: Discharge summary reviewed- see chart.    Interval history/Current status:   Occupational and Physical Therapy coming today between 11 and 1    Still coughing but a lot better. Pt says it burnt when she peed, still has some abx to take    Pt is moving her bowels well, starting to walk better. Wants to come in in person next time    Patient Active Problem List   Diagnosis    Type 2 diabetes mellitus with diabetic polyneuropathy, without long-term current use of

## 2024-02-23 ENCOUNTER — CARE COORDINATION (OUTPATIENT)
Dept: CASE MANAGEMENT | Age: 89
End: 2024-02-23

## 2024-02-23 NOTE — CARE COORDINATION
Care Transitions Follow Up Call    Patient Current Location:  Home: [id number] [id number]    [facility] Care Coordinator contacted the family by telephone to follow up after admission on 2024.  Verified name and  with family and caregiver as identifiers.    Patient: Cary Rogers  Patient : 1935   MRN: <[id number]>  Reason for Admission: Covid19, Gen Weakness, Recurrent Falls, Fecal Impaction, Recent UTI    Discharge Date: 24 RARS: Readmission Risk Score: 13      Needs to be reviewed by the provider   Additional needs identified to be addressed with provider: No  none             Method of communication with provider: none.    Spoke with patients granddaughter Margot. She is now living with her. Appetite and fluid intake is improving. No bladder or bowel problems. Denies fever, chills, cp, weakness, falls, fatigue or palpitations. She has sob on exertion. Wound on her buttocks is healing well. Home care is active. She has a nurse and PT coming. Patient is taking medication with much encouragement. She can be uncooperative. No questions, needs or concerns at this time.      Addressed changes since last contact:  none  Discussed follow-up appointments. If no appointment was previously scheduled, appointment scheduling offered: No.   Is follow up appointment scheduled within 7 days of discharge? Yes.    Follow Up  No future appointments.  External follow up appointment(s):     N Care Coordinator reviewed red flags with family and caregiver and discussed any barriers to care and/or understanding of plan of care after discharge. Discussed appropriate site of care based on symptoms and resources available to patient including: PCP  Specialist  Urgent care clinics  [facility]. The family and caregiver agrees to contact the PCP office for questions related to their healthcare.     Advance Care Planning:   not on file.     Healthcare Decision Maker:    Primary Decision Maker:

## 2024-02-27 ENCOUNTER — HOSPITAL ENCOUNTER (OUTPATIENT)
Age: 89
Setting detail: SPECIMEN
Discharge: HOME | End: 2024-02-27
Payer: MEDICARE

## 2024-02-27 LAB
ALBUMIN SERPL-MCNC: 3.6 GM/DL (ref 3.4–5)
ALP BLD-CCNC: 126 IU/L (ref 40–128)
ALT SERPL-CCNC: 26 U/L (ref 10–40)
ANION GAP SERPL CALCULATED.3IONS-SCNC: 12 MMOL/L (ref 7–16)
AST SERPL-CCNC: 34 IU/L (ref 15–37)
BASOPHILS ABSOLUTE: 0 K/CU MM
BASOPHILS RELATIVE PERCENT: 0.5 % (ref 0–1)
BILIRUB SERPL-MCNC: 0.7 MG/DL (ref 0–1)
BUN SERPL-MCNC: 18 MG/DL (ref 6–23)
CALCIUM SERPL-MCNC: 8.7 MG/DL (ref 8.3–10.6)
CHLORIDE BLD-SCNC: 102 MMOL/L (ref 99–110)
CO2: 28 MMOL/L (ref 21–32)
CREAT SERPL-MCNC: 1.1 MG/DL (ref 0.6–1.1)
DIFFERENTIAL TYPE: ABNORMAL
EOSINOPHILS ABSOLUTE: 0 K/CU MM
EOSINOPHILS RELATIVE PERCENT: 0.5 % (ref 0–3)
GFR SERPL CREATININE-BSD FRML MDRD: 48 ML/MIN/1.73M2
GLUCOSE SERPL-MCNC: 79 MG/DL (ref 70–99)
HCT VFR BLD CALC: 39.4 % (ref 37–47)
HEMOGLOBIN: 11.9 GM/DL (ref 12.5–16)
IMMATURE NEUTROPHIL %: 0.2 % (ref 0–0.43)
LYMPHOCYTES ABSOLUTE: 1.3 K/CU MM
LYMPHOCYTES RELATIVE PERCENT: 23.4 % (ref 24–44)
MCH RBC QN AUTO: 26.7 PG (ref 27–31)
MCHC RBC AUTO-ENTMCNC: 30.2 % (ref 32–36)
MCV RBC AUTO: 88.3 FL (ref 78–100)
MONOCYTES ABSOLUTE: 0.6 K/CU MM
MONOCYTES RELATIVE PERCENT: 11 % (ref 0–4)
NUCLEATED RBC %: 0 %
PDW BLD-RTO: 14 % (ref 11.7–14.9)
PLATELET # BLD: 215 K/CU MM (ref 140–440)
PMV BLD AUTO: 10.8 FL (ref 7.5–11.1)
POTASSIUM SERPL-SCNC: 3.6 MMOL/L (ref 3.5–5.1)
RBC # BLD: 4.46 M/CU MM (ref 4.2–5.4)
SEGMENTED NEUTROPHILS ABSOLUTE COUNT: 3.5 K/CU MM
SODIUM BLD-SCNC: 142 MMOL/L (ref 135–145)
TOTAL IMMATURE NEUTOROPHIL: 0.01 K/CU MM
TOTAL NUCLEATED RBC: 0 K/CU MM
TOTAL PROTEIN: 5.9 GM/DL (ref 6.4–8.2)
WBC # BLD: 5.5 K/CU MM (ref 4–10.5)

## 2024-02-27 PROCEDURE — 85025 COMPLETE CBC W/AUTO DIFF WBC: CPT

## 2024-02-27 PROCEDURE — 80053 COMPREHEN METABOLIC PANEL: CPT

## 2024-03-01 ENCOUNTER — CARE COORDINATION (OUTPATIENT)
Dept: CASE MANAGEMENT | Age: 89
End: 2024-03-01

## 2024-03-01 NOTE — CARE COORDINATION
[facility] Care Transitions Follow Up Call    3/1/2024    Patient: Cary Rogers  Patient : 1935   MRN: [id number]  Reason for Admission: Covid19, Gen Weakness, Recurrent Falls, Fecal Impaction, Recent UTI   Discharge Date: 24 RARS: Readmission Risk Score: 13         Spoke with: arnold''    [facility] attempted outreach for care transition follow up call. Left HIPPA compliant message and contact information for call back.     Care Transitions Subsequent and Final Call    Subsequent and Final Calls  Are you currently active with any services?: Home Health  Care Transitions Interventions     Other Services: Completed (Comment: [facility])      Transportation Support: Declined     Registered Dietician: Declined DME Assistance: Declined     Senior Services: Declined    Other Interventions:             Follow Up  No future appointments.    Shagufta Lee LPN

## 2024-03-05 ENCOUNTER — CARE COORDINATION (OUTPATIENT)
Dept: CASE MANAGEMENT | Age: 89
End: 2024-03-05

## 2024-03-05 NOTE — CARE COORDINATION
Care Transitions Follow Up Call    Patient Current Location:  [place]    Care Transition Nurse contacted Demond Frost, by telephone to follow up after admission on 24-24.  Verified Patient name and  as identifiers.    Patient: Cary Rogers  Patient : 1935   MRN: [id number]  Reason for Admission:  Covid19, Gen Weakness, Recurrent Falls, Fecal Impaction, Recent UTI    Discharge Date: 24 RARS: Readmission Risk Score: 13  Facility: [facility]     Needs to be reviewed by the provider   Additional needs identified to be addressed with provider: No     Method of communication with provider: none.    Reports Patient doing well, at baseline w/ dementia. Reports well recovered from recent Covid19 and UTI. Was seen by PCP 24, no new orders. Reports appetite, fluid intake good w/ b&b wnl. Reports no urinary c/o, voiding qs. Reports + bms. Reports no further falls. Remains active w/ [facility]. Denies additional resource needs.     Follow Up  No future appointments.    Care Transition Nurse reviewed medical action plan and red flags with  Demond  and discussed any barriers to care and/or understanding of plan of care after discharge. Discussed appropriate site of care based on symptoms and resources available to patient including: PCP  Specialist  Urgent care clinics  [facility] . Agrees to contact PCP office for questions related to Patient healthcare.     Offered patient enrollment in the Remote Patient Monitoring (RPM) program for in-home monitoring: n/a as Patient w/ advanced dementia      Care Transitions Subsequent and Final Call    Schedule Follow Up Appointment with PCP: Declined  Subsequent and Final Calls  Do you have any ongoing symptoms?: No  Have your medications changed?: No  Do you have any questions related to your medications?: No  Do you currently have any active services?: Yes  Are you currently active with any services?: Home Health  Do you have any needs or concerns that I can

## 2024-03-08 ENCOUNTER — TELEPHONE (OUTPATIENT)
Dept: FAMILY MEDICINE CLINIC | Age: 89
End: 2024-03-08

## 2024-03-08 DIAGNOSIS — R44.3 HALLUCINATIONS: ICD-10-CM

## 2024-03-08 DIAGNOSIS — M62.81 GENERALIZED MUSCLE WEAKNESS: Primary | ICD-10-CM

## 2024-03-08 NOTE — TELEPHONE ENCOUNTER
called stating that pt. has been aving the following symptoms for 4 days now: increased agitation, hallucinations and restless. No BP med's on board d/t agitation BP currently 215/114. Patient not on cranberry supplements, no UA completed as of yet. orders? Caller was NOT child, was physical therapist.    Instructed granddaughter to take patient to ER per provider.

## 2024-03-12 ENCOUNTER — TELEPHONE (OUTPATIENT)
Dept: FAMILY MEDICINE CLINIC | Age: 89
End: 2024-03-12

## 2024-03-13 ENCOUNTER — CARE COORDINATION (OUTPATIENT)
Dept: CASE MANAGEMENT | Age: 89
End: 2024-03-13

## 2024-03-14 ENCOUNTER — CARE COORDINATION (OUTPATIENT)
Dept: CASE MANAGEMENT | Age: 89
End: 2024-03-14

## 2024-03-14 NOTE — CARE COORDINATION
Care Transitions Outreach Attempt    Patient: Cary Rogers Patient : 1935 MRN: [id number]  Reason for Admission:  Covid19, Gen Weakness, Recurrent Falls, Fecal Impaction, Recent UTI    Discharge Date: 24       RARS: Readmission Risk Score: 13  Facility: [facility]     Last Discharge Facility       Date Complaint Diagnosis Description Type Department Provider    24  Generalized weakness Admission (Discharged) [facility] 3E Kae Shoemaker MD        Noted following upcoming appointments from discharge chart review:   [facility] follow up appointment(s): No future appointments.    Attempt to reach for Care Transition follow up call unsuccessful. HIPAA compliant message left for Margot Lagos (as Patient living with her) requesting call back. No MyChart.

## 2024-03-15 ENCOUNTER — CARE COORDINATION (OUTPATIENT)
Dept: CASE MANAGEMENT | Age: 89
End: 2024-03-15

## 2024-03-15 NOTE — CARE COORDINATION
Care Transitions Follow Up Call    Patient: Cary Rogers  Patient : 1935   MRN: <[id number]>  Reason for Admission:  Covid19, Gen Weakness, Recurrent Falls, Fecal Impaction, Recent UTI   Discharge Date: 24 RARS: Readmission Risk Score: 13      Attempted to contact patient for follow up transition call. Left voicemail message to return call with an update on condition since discharge. Contact information provided. Will continue to follow up.        Care Transitions Subsequent and Final Call    Subsequent and Final Calls  Are you currently active with any services?: Home Health  Care Transitions Interventions     Other Services: Completed (Comment: [facility])      Transportation Support: Declined     Registered Dietician: Declined DME Assistance: Declined     Senior Services: Declined    Other Interventions:             Mary Rayo LPN

## 2024-03-19 ENCOUNTER — CARE COORDINATION (OUTPATIENT)
Dept: CASE MANAGEMENT | Age: 89
End: 2024-03-19

## 2024-03-19 NOTE — CARE COORDINATION
Care Transitions Outreach Attempt      Patient: Cary Rogers Patient : 1935 MRN: [id number]  Reason for Admission:  Covid19, Gen Weakness, Recurrent Falls, Fecal Impaction, Recent UTI    Discharge Date: 24       RARS: Readmission Risk Score: 13  [facility]     Last Discharge Facility       Date Complaint Diagnosis Description Type Department Provider    24  Generalized weakness Admission (Discharged) [facility] 3E Kae Shoemaker MD        Noted following upcoming appointments from discharge chart review:   [facility] follow up appointment(s): No future appointments.    2nd unsuccessful attempt to reach for Care Transition follow up call. HIPAA compliant message left requesting call back. CT program closed per protocol, no further outreach scheduled.

## 2024-04-10 ENCOUNTER — TELEPHONE (OUTPATIENT)
Dept: FAMILY MEDICINE CLINIC | Age: 89
End: 2024-04-10

## 2024-04-10 NOTE — TELEPHONE ENCOUNTER
Lung sounds clear. coughing up clear mucus, going on 1.5 weeks. no fevers. no SOB, spo2@95 on room air. Would like albuterol inahler if possible and maybe steroid if indicated.     Had covid in feb.

## 2024-04-11 ENCOUNTER — TELEMEDICINE (OUTPATIENT)
Dept: FAMILY MEDICINE CLINIC | Age: 89
End: 2024-04-11
Payer: MEDICARE

## 2024-04-11 DIAGNOSIS — F03.B2 MODERATE DEMENTIA WITH PSYCHOTIC DISTURBANCE, UNSPECIFIED DEMENTIA TYPE (HCC): ICD-10-CM

## 2024-04-11 DIAGNOSIS — R05.1 ACUTE COUGH: Primary | ICD-10-CM

## 2024-04-11 PROBLEM — F33.1 MODERATE EPISODE OF RECURRENT MAJOR DEPRESSIVE DISORDER (HCC): Status: ACTIVE | Noted: 2021-04-05

## 2024-04-11 PROCEDURE — G8420 CALC BMI NORM PARAMETERS: HCPCS | Performed by: PHYSICIAN ASSISTANT

## 2024-04-11 PROCEDURE — 1123F ACP DISCUSS/DSCN MKR DOCD: CPT | Performed by: PHYSICIAN ASSISTANT

## 2024-04-11 PROCEDURE — 1090F PRES/ABSN URINE INCON ASSESS: CPT | Performed by: PHYSICIAN ASSISTANT

## 2024-04-11 PROCEDURE — G8427 DOCREV CUR MEDS BY ELIG CLIN: HCPCS | Performed by: PHYSICIAN ASSISTANT

## 2024-04-11 PROCEDURE — 99214 OFFICE O/P EST MOD 30 MIN: CPT | Performed by: PHYSICIAN ASSISTANT

## 2024-04-11 PROCEDURE — 1036F TOBACCO NON-USER: CPT | Performed by: PHYSICIAN ASSISTANT

## 2024-04-11 RX ORDER — AZITHROMYCIN 250 MG/1
TABLET, FILM COATED ORAL
Qty: 6 TABLET | Refills: 0 | Status: SHIPPED | OUTPATIENT
Start: 2024-04-11 | End: 2024-04-12 | Stop reason: CLARIF

## 2024-04-11 RX ORDER — METOPROLOL TARTRATE 50 MG/1
50 TABLET, FILM COATED ORAL 2 TIMES DAILY
COMMUNITY
Start: 2024-04-06

## 2024-04-11 RX ORDER — ALBUTEROL SULFATE 90 UG/1
2 AEROSOL, METERED RESPIRATORY (INHALATION) 4 TIMES DAILY PRN
Qty: 1 EACH | Refills: 0 | Status: SHIPPED | OUTPATIENT
Start: 2024-04-11 | End: 2024-04-12 | Stop reason: CLARIF

## 2024-04-11 SDOH — ECONOMIC STABILITY: FOOD INSECURITY: WITHIN THE PAST 12 MONTHS, YOU WORRIED THAT YOUR FOOD WOULD RUN OUT BEFORE YOU GOT MONEY TO BUY MORE.: NEVER TRUE

## 2024-04-11 SDOH — ECONOMIC STABILITY: HOUSING INSECURITY
IN THE LAST 12 MONTHS, WAS THERE A TIME WHEN YOU DID NOT HAVE A STEADY PLACE TO SLEEP OR SLEPT IN A SHELTER (INCLUDING NOW)?: NO

## 2024-04-11 SDOH — ECONOMIC STABILITY: INCOME INSECURITY: HOW HARD IS IT FOR YOU TO PAY FOR THE VERY BASICS LIKE FOOD, HOUSING, MEDICAL CARE, AND HEATING?: NOT VERY HARD

## 2024-04-11 SDOH — ECONOMIC STABILITY: FOOD INSECURITY: WITHIN THE PAST 12 MONTHS, THE FOOD YOU BOUGHT JUST DIDN'T LAST AND YOU DIDN'T HAVE MONEY TO GET MORE.: NEVER TRUE

## 2024-04-11 ASSESSMENT — PATIENT HEALTH QUESTIONNAIRE - PHQ9
2. FEELING DOWN, DEPRESSED OR HOPELESS: NOT AT ALL
1. LITTLE INTEREST OR PLEASURE IN DOING THINGS: NOT AT ALL
9. THOUGHTS THAT YOU WOULD BE BETTER OFF DEAD, OR OF HURTING YOURSELF: NOT AT ALL
10. IF YOU CHECKED OFF ANY PROBLEMS, HOW DIFFICULT HAVE THESE PROBLEMS MADE IT FOR YOU TO DO YOUR WORK, TAKE CARE OF THINGS AT HOME, OR GET ALONG WITH OTHER PEOPLE: VERY DIFFICULT
6. FEELING BAD ABOUT YOURSELF - OR THAT YOU ARE A FAILURE OR HAVE LET YOURSELF OR YOUR FAMILY DOWN: SEVERAL DAYS
5. POOR APPETITE OR OVEREATING: NOT AT ALL
8. MOVING OR SPEAKING SO SLOWLY THAT OTHER PEOPLE COULD HAVE NOTICED. OR THE OPPOSITE, BEING SO FIGETY OR RESTLESS THAT YOU HAVE BEEN MOVING AROUND A LOT MORE THAN USUAL: NEARLY EVERY DAY
SUM OF ALL RESPONSES TO PHQ9 QUESTIONS 1 & 2: 0
7. TROUBLE CONCENTRATING ON THINGS, SUCH AS READING THE NEWSPAPER OR WATCHING TELEVISION: NEARLY EVERY DAY
4. FEELING TIRED OR HAVING LITTLE ENERGY: NOT AT ALL
SUM OF ALL RESPONSES TO PHQ QUESTIONS 1-9: 10
SUM OF ALL RESPONSES TO PHQ QUESTIONS 1-9: 10
3. TROUBLE FALLING OR STAYING ASLEEP: NEARLY EVERY DAY
SUM OF ALL RESPONSES TO PHQ QUESTIONS 1-9: 10
SUM OF ALL RESPONSES TO PHQ QUESTIONS 1-9: 10

## 2024-04-11 NOTE — PROGRESS NOTES
normal.      Pupils: Pupils are equal, round, and reactive to light.   Neurological:      General: No focal deficit present.      Mental Status: She is alert. Mental status is at baseline. She is disoriented.      Cranial Nerves: No cranial nerve deficit.   Psychiatric:         Mood and Affect: Mood normal.         Behavior: Behavior normal.         Thought Content: Thought content normal.         Judgment: Judgment normal.         ASSESSMENT & PLAN    1. Acute cough    - albuterol sulfate HFA (VENTOLIN HFA) 108 (90 Base) MCG/ACT inhaler; Inhale 2 puffs into the lungs 4 times daily as needed for Wheezing  Dispense: 1 each; Refill: 0  - azithromycin (ZITHROMAX) 250 MG tablet; 500mg on day 1 followed by 250mg on days 2 - 5  Dispense: 6 tablet; Refill: 0    2. Moderate dementia with psychotic disturbance, unspecified dementia type (HCC)  Seems to be worsening, shoulld follow up in office        No follow-ups on file.         Electronically signed by DEMIAN Stroud on 4/11/2024    Cary Rogers, was evaluated through a synchronous (real-time) audio-video encounter. The patient (or guardian if applicable) is aware that this is a billable service. Verbal consent to proceed has been obtained within the past 12 months. The visit was conducted pursuant to the emergency declaration under the [place] Act and the National Emergencies Act, 1135 waiver authority and the Coronavirus Preparedness and Response Supplemental Appropriations Act.  Patient identification was verified, and a caregiver was present when appropriate. The patient was located in a state where the provider was credentialed to provide care.    Total time spent for this encounter: Not billed by time    --DEMIAN Stroud on 4/11/2024 at 12:39 PM    An electronic signature was used to authenticate this note.    Cary Rogers, was evaluated through a synchronous (real-time) audio-video encounter. The patient (or guardian if applicable) is aware that this is a

## 2024-04-12 ENCOUNTER — TELEPHONE (OUTPATIENT)
Dept: FAMILY MEDICINE CLINIC | Age: 89
End: 2024-04-12

## 2024-04-12 RX ORDER — ALBUTEROL SULFATE 90 UG/1
2 AEROSOL, METERED RESPIRATORY (INHALATION) 4 TIMES DAILY PRN
Qty: 1 EACH | Refills: 0 | Status: SHIPPED | OUTPATIENT
Start: 2024-04-12

## 2024-04-12 RX ORDER — AZITHROMYCIN 250 MG/1
TABLET, FILM COATED ORAL
Qty: 6 TABLET | Refills: 0 | Status: SHIPPED | OUTPATIENT
Start: 2024-04-12 | End: 2024-04-22

## 2024-04-12 NOTE — TELEPHONE ENCOUNTER
Spoke with pt's granddaughter, Margot(on communication form) and she advised that Dewayne sent Rx's to pharmacy yesterday and they need to go to [facility] and not [facility]. Resent Rx's to correct pharmacy

## 2024-05-03 ENCOUNTER — TELEPHONE (OUTPATIENT)
Dept: FAMILY MEDICINE CLINIC | Age: 89
End: 2024-05-03

## 2024-05-03 DIAGNOSIS — R26.9 GAIT DISTURBANCE: ICD-10-CM

## 2024-05-03 DIAGNOSIS — R53.1 GENERALIZED WEAKNESS: Primary | ICD-10-CM

## 2024-05-06 NOTE — TELEPHONE ENCOUNTER
Cary Rogers was evaluated today and a DME order was entered for a wheeled walker with seat because she requires this to successfully complete daily living tasks of eating, bathing, toileting, personal cares, ambulating, grooming, hygiene, dressing upper body, dressing lower body, meal preparation, and taking own medications.  A wheeled walker with seat is necessary due to the patient's unsteady gait, upper body weakness, inability to  and ambulation device, ambulating only short distances by pushing a walker, and the need to sit for a short time before resuming ambulation.  These tasks cannot be completed with a lesser ambulation device such as a cane, crutch, or standard walker.  The need for this equipment was discussed with the patient and she understands and is in agreement.      Information per last face to face visit 4/11/24

## 2024-05-07 NOTE — TELEPHONE ENCOUNTER
Spoke with pt's grandson, Esvin(on Communication form) and he was checking on the status of the walker with seat order. Advised grandbrii that Dewayne sent us a message yesterday and advised that he did place order. Faxed order, along with office note, demographics to [facility] per Flavia request.

## 2024-05-07 NOTE — CONSULTS
Neurology Service Consult Note  [facility]  Patient Name: Sueanne Goodpasture  : 1935        Subjective:   Reason for consult: \"I guess I passed out\"  80 y.o. right-handed female with PMH listed below [facility] after patient had a LOC episode while at the Veterinary office with her cat. Patient reports she was standing there discussing the plan of care and future cost of treatment for the cat which was over $800 and patient then passed out. She states she does not remember feeling lightheaded or dizzy prior. She does not remember having tunnel vision, headache, focal weakness. Patient reports she recently has been under more stress than usual with her grand children    Patient reports she is very unsteady on her feet, she has diabetes, she has never been diagnosed with PN but states she does feel numbness in her feet at times and has to use a cane. Cardiology was concerned about a dementia    Patient has charted that she has hx of A. 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  Thyroid disease     Ulcer in the past    Gastric    :   Past Surgical History:   Procedure Laterality Date    APPENDECTOMY      BLADDER SURGERY      CHOLECYSTECTOMY      COLONOSCOPY  14    normal, biopsy    EYE SURGERY      HYSTERECTOMY      PACEMAKER PLACEMENT Left 2020    bivi pacer upgrade-Medtronic Percepta Quad CRT-P MRI [facility]      Dr. Moisés Adame.  med - following MI     Medications:  Scheduled Meds:   levothyroxine  50 mcg Ozempic Pending    Insurance response  Prescription Drug Insurance: Prime  Notes: Prior authorization submitted - will update provider when decision has been made by insurance.        Oral QAM AC    fluticasone  2 spray Each Nostril Daily    sodium chloride flush  5-40 mL IntraVENous 2 times per day    enoxaparin  40 mg SubCUTAneous Daily    amLODIPine  5 mg Oral Daily    lisinopril  5 mg Oral Daily    carvedilol  25 mg Oral BID WC     Continuous Infusions:   sodium chloride       PRN Meds:.albuterol sulfate HFA, sodium chloride flush, sodium chloride, ondansetron **OR** ondansetron, polyethylene glycol, acetaminophen **OR** acetaminophen    Allergies   Allergen Reactions    Amitriptyline     Aspirin     Codeine     Elavil [Amitriptyline Hcl]     Hydroxychloroquine     Ibuprofen     Plaquenil [Hydroxychloroquine Sulfate]     Theophyllines      Social History     Socioeconomic History    Marital status:      Spouse name: Not on file    Number of children: Not on file    Years of education: Not on file    Highest education level: Not on file   Occupational History    Not on file   Tobacco Use    Smoking status: Former Smoker     Quit date: 1977     Years since quittin.7    Smokeless tobacco: Never Used   Substance and Sexual Activity    Alcohol use: No    Drug use: No    Sexual activity: Not on file   Other Topics Concern    Not on file   Social History Narrative    Not on file     Social Determinants of Health     Financial Resource Strain:     Difficulty of Paying Living Expenses:    Food Insecurity:     Worried About Running Out of Food in the Last Year:     [address] in the Last Year:    Transportation Needs:     Lack of Transportation (Medical):      Lack of Transportation (Non-Medical):    Physical Activity:     Days of Exercise per Week:     Minutes of Exercise per Session:    Stress:     Feeling of Stress :    Social Connections:     Frequency of Communication with Friends and Family:     Frequency of Social Gatherings with Friends and Family:     Attends Orthodoxy Services:     Active Member of Clubs or Organizations:     Attends conversational tone, palate elevates symmetrically, shoulder elevation symmetric and tongue protrudes midline with movement side to side. Motor Exam:       Strength 5/5 UE's/LE's b/l  Tone and bulk normal   No pronator drift    Deep Tendon Reflexes: 1/4 biceps, triceps, brachioradialis, patellar, and achilles b/l; flexor plantar responses b/l    Sensation: Intact light touch/pinprick/vibration UE's/LE's b/l--->all decrease in stocking distribution BLE     Coordination/Cerebellum:       Tremors--none      Rapidly alternating movements: no dysdiadochokinesia b/l                Heel-to-Shin: no dysmetria b/l      Finger-to-Nose: no dysmetria b/l    Gait and stance:      Gait: moderate romberg       LABS:     Recent Labs     09/17/21  1813   WBC 6.3      K 3.5      CO2 26   BUN 19   CREATININE 1.0   GLUCOSE 120*     TSH pending  B12 pending       IMAGING:      CT head non acute  Personally reviewed and agree with radiologist impression     CUS:  The right internal carotid artery demonstrates 0-50% stenosis.       The left internal carotid artery demonstrates 0-50% stenosis.       Bilateral vertebral arteries are patent with flow in the normal direction           ASSESSMENT/PLAN:     3 80year old female with reported LOC, description of events not consistent with central etiology, there is concern for dementia, we can address this in our office with full neurocognitive exam.   2. Falls and unsteady gait secondary to BLE diabetic peripheral neuropathy, outpatient work up needed as well. 1. Neuro Exam:  1. BLE stocking distribution sensation loss  2. MMSE 30/30  2. Neurodiagnostics:  1. CUS unremarkable  2. CT head non acute  3. B12 and TSH pending   3. Medications:  1. None to add at this time  4. PT/OT/ST:  1. Outpatient balance therapy   5. Follow up:  1. Our office EMG   2. Cardiology to evaluate A. Fib         Thank you for allowing us to participate in the care of your patient.   If there are any

## 2024-06-20 ENCOUNTER — APPOINTMENT (OUTPATIENT)
Dept: GENERAL RADIOLOGY | Age: 89
End: 2024-06-20
Payer: MEDICARE

## 2024-06-20 ENCOUNTER — APPOINTMENT (OUTPATIENT)
Dept: CT IMAGING | Age: 89
End: 2024-06-20
Payer: MEDICARE

## 2024-06-20 ENCOUNTER — HOSPITAL ENCOUNTER (OUTPATIENT)
Age: 89
Setting detail: OBSERVATION
Discharge: NURSING, REHAB OR LONG-TERM CARE | End: 2024-06-24
Attending: EMERGENCY MEDICINE | Admitting: STUDENT IN AN ORGANIZED HEALTH CARE EDUCATION/TRAINING PROGRAM
Payer: MEDICARE

## 2024-06-20 DIAGNOSIS — F01.50 VASCULAR DEMENTIA, UNSPECIFIED DEMENTIA SEVERITY, UNSPECIFIED WHETHER BEHAVIORAL, PSYCHOTIC, OR MOOD DISTURBANCE OR ANXIETY (HCC): ICD-10-CM
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
## 2024-06-20 DIAGNOSIS — S09.90XA CLOSED HEAD INJURY, INITIAL ENCOUNTER: ICD-10-CM

## 2024-06-20 DIAGNOSIS — R55 SYNCOPE AND COLLAPSE: Primary | ICD-10-CM

## 2024-06-20 DIAGNOSIS — M54.2 ACUTE NECK PAIN: ICD-10-CM

## 2024-06-20 LAB
ALBUMIN SERPL-MCNC: 4 GM/DL (ref 3.4–5)
ALP BLD-CCNC: 123 IU/L (ref 40–129)
ALT SERPL-CCNC: 24 U/L (ref 10–40)
ANION GAP SERPL CALCULATED.3IONS-SCNC: 13 MMOL/L (ref 7–16)
AST SERPL-CCNC: 31 IU/L (ref 15–37)
BASOPHILS ABSOLUTE: 0.1 K/CU MM
BASOPHILS RELATIVE PERCENT: 0.9 % (ref 0–1)
BILIRUB SERPL-MCNC: 1 MG/DL (ref 0–1)
BUN SERPL-MCNC: 30 MG/DL (ref 6–23)
CALCIUM SERPL-MCNC: 9.4 MG/DL (ref 8.3–10.6)
CHLORIDE BLD-SCNC: 98 MMOL/L (ref 99–110)
CO2: 27 MMOL/L (ref 21–32)
CREAT SERPL-MCNC: 1.2 MG/DL (ref 0.6–1.1)
DIFFERENTIAL TYPE: ABNORMAL
EOSINOPHILS ABSOLUTE: 0 K/CU MM
EOSINOPHILS RELATIVE PERCENT: 0.4 % (ref 0–3)
GFR, ESTIMATED: 43 ML/MIN/1.73M2
GLUCOSE SERPL-MCNC: 116 MG/DL (ref 70–99)
HCT VFR BLD CALC: 45 % (ref 37–47)
HEMOGLOBIN: 14.5 GM/DL (ref 12.5–16)
IMMATURE NEUTROPHIL %: 0.4 % (ref 0–0.43)
LIPASE: 34 IU/L (ref 13–60)
LYMPHOCYTES ABSOLUTE: 1 K/CU MM
LYMPHOCYTES RELATIVE PERCENT: 17.4 % (ref 24–44)
MAGNESIUM: 1.8 MG/DL (ref 1.8–2.4)
MCH RBC QN AUTO: 28 PG (ref 27–31)
MCHC RBC AUTO-ENTMCNC: 32.2 % (ref 32–36)
MCV RBC AUTO: 87 FL (ref 78–100)
MONOCYTES ABSOLUTE: 0.4 K/CU MM
MONOCYTES RELATIVE PERCENT: 7.5 % (ref 0–4)
NEUTROPHILS ABSOLUTE: 4.1 K/CU MM
NEUTROPHILS RELATIVE PERCENT: 73.4 % (ref 36–66)
NUCLEATED RBC %: 0 %
PDW BLD-RTO: 14 % (ref 11.7–14.9)
PLATELET # BLD: 203 K/CU MM (ref 140–440)
PMV BLD AUTO: 10.1 FL (ref 7.5–11.1)
POTASSIUM SERPL-SCNC: 3.4 MMOL/L (ref 3.5–5.1)
RBC # BLD: 5.17 M/CU MM (ref 4.2–5.4)
SODIUM BLD-SCNC: 138 MMOL/L (ref 135–145)
TOTAL IMMATURE NEUTOROPHIL: 0.02 K/CU MM
TOTAL NUCLEATED RBC: 0 K/CU MM
TOTAL PROTEIN: 6.9 GM/DL (ref 6.4–8.2)
TROPONIN, HIGH SENSITIVITY: 34 NG/L (ref 0–14)
WBC # BLD: 5.6 K/CU MM (ref 4–10.5)

## 2024-06-20 PROCEDURE — 70450 CT HEAD/BRAIN W/O DYE: CPT

## 2024-06-20 PROCEDURE — 85025 COMPLETE CBC W/AUTO DIFF WBC: CPT

## 2024-06-20 PROCEDURE — 84484 ASSAY OF TROPONIN QUANT: CPT

## 2024-06-20 PROCEDURE — G0378 HOSPITAL OBSERVATION PER HR: HCPCS

## 2024-06-20 PROCEDURE — 80053 COMPREHEN METABOLIC PANEL: CPT

## 2024-06-20 PROCEDURE — 93005 ELECTROCARDIOGRAM TRACING: CPT | Performed by: EMERGENCY MEDICINE

## 2024-06-20 PROCEDURE — 99285 EMERGENCY DEPT VISIT HI MDM: CPT

## 2024-06-20 PROCEDURE — 71045 X-RAY EXAM CHEST 1 VIEW: CPT

## 2024-06-20 PROCEDURE — 83735 ASSAY OF MAGNESIUM: CPT

## 2024-06-20 PROCEDURE — 83690 ASSAY OF LIPASE: CPT

## 2024-06-20 PROCEDURE — 72125 CT NECK SPINE W/O DYE: CPT

## 2024-06-20 RX ORDER — METOPROLOL SUCCINATE 25 MG/1
25 TABLET, EXTENDED RELEASE ORAL DAILY
Status: ON HOLD | COMMUNITY
Start: 2024-02-22 | End: 2024-06-24 | Stop reason: HOSPADM

## 2024-06-20 RX ORDER — ACETAMINOPHEN 325 MG/1
650 TABLET ORAL ONCE
Status: COMPLETED | OUTPATIENT
Start: 2024-06-20 | End: 2024-06-21

## 2024-06-20 RX ORDER — POTASSIUM CHLORIDE 7.45 MG/ML
10 INJECTION INTRAVENOUS ONCE
Status: COMPLETED | OUTPATIENT
Start: 2024-06-20 | End: 2024-06-21

## 2024-06-20 RX ORDER — LEVOTHYROXINE SODIUM 0.05 MG/1
50 TABLET ORAL DAILY
Status: ON HOLD | COMMUNITY
Start: 2024-02-22 | End: 2024-06-24 | Stop reason: HOSPADM

## 2024-06-20 ASSESSMENT — PAIN DESCRIPTION - LOCATION: LOCATION: NECK

## 2024-06-20 ASSESSMENT — PAIN SCALES - GENERAL: PAINLEVEL_OUTOF10: 3

## 2024-06-20 ASSESSMENT — PAIN - FUNCTIONAL ASSESSMENT: PAIN_FUNCTIONAL_ASSESSMENT: 0-10

## 2024-06-21 ENCOUNTER — TELEPHONE (OUTPATIENT)
Dept: FAMILY MEDICINE CLINIC | Age: 89
End: 2024-06-21

## 2024-06-21 ENCOUNTER — APPOINTMENT (OUTPATIENT)
Dept: GENERAL RADIOLOGY | Age: 89
End: 2024-06-21
Attending: STUDENT IN AN ORGANIZED HEALTH CARE EDUCATION/TRAINING PROGRAM
Payer: MEDICARE

## 2024-06-21 ENCOUNTER — CARE COORDINATION (OUTPATIENT)
Dept: CARE COORDINATION | Facility: CLINIC | Age: 89
End: 2024-06-21

## 2024-06-21 LAB
ANION GAP SERPL CALCULATED.3IONS-SCNC: 11 MMOL/L (ref 7–16)
BASOPHILS ABSOLUTE: 0 K/CU MM
BASOPHILS RELATIVE PERCENT: 0.7 % (ref 0–1)
BUN SERPL-MCNC: 32 MG/DL (ref 6–23)
CALCIUM SERPL-MCNC: 8.9 MG/DL (ref 8.3–10.6)
CHLORIDE BLD-SCNC: 101 MMOL/L (ref 99–110)
CO2: 29 MMOL/L (ref 21–32)
CREAT SERPL-MCNC: 1.2 MG/DL (ref 0.6–1.1)
DIFFERENTIAL TYPE: ABNORMAL
EKG DIAGNOSIS: NORMAL
EKG Q-T INTERVAL: 488 MS
EKG QRS DURATION: 134 MS
EKG QTC CALCULATION (BAZETT): 154 MS
EKG R AXIS: -60 DEGREES
EKG T AXIS: 110 DEGREES
EKG VENTRICULAR RATE: 6 BPM
EOSINOPHILS ABSOLUTE: 0 K/CU MM
EOSINOPHILS RELATIVE PERCENT: 0.2 % (ref 0–3)
GFR, ESTIMATED: 43 ML/MIN/1.73M2
GLUCOSE SERPL-MCNC: 144 MG/DL (ref 70–99)
HCT VFR BLD CALC: 39.1 % (ref 37–47)
HEMOGLOBIN: 12.4 GM/DL (ref 12.5–16)
IMMATURE NEUTROPHIL %: 0.3 % (ref 0–0.43)
LYMPHOCYTES ABSOLUTE: 1 K/CU MM
LYMPHOCYTES RELATIVE PERCENT: 16.1 % (ref 24–44)
MCH RBC QN AUTO: 27.4 PG (ref 27–31)
MCHC RBC AUTO-ENTMCNC: 31.7 % (ref 32–36)
MCV RBC AUTO: 86.3 FL (ref 78–100)
MONOCYTES ABSOLUTE: 0.3 K/CU MM
MONOCYTES RELATIVE PERCENT: 5.1 % (ref 0–4)
NEUTROPHILS ABSOLUTE: 4.7 K/CU MM
NEUTROPHILS RELATIVE PERCENT: 77.6 % (ref 36–66)
NUCLEATED RBC %: 0 %
PDW BLD-RTO: 13.9 % (ref 11.7–14.9)
PLATELET # BLD: 183 K/CU MM (ref 140–440)
PMV BLD AUTO: 10.1 FL (ref 7.5–11.1)
POTASSIUM SERPL-SCNC: 3.9 MMOL/L (ref 3.5–5.1)
RBC # BLD: 4.53 M/CU MM (ref 4.2–5.4)
SODIUM BLD-SCNC: 141 MMOL/L (ref 135–145)
TOTAL IMMATURE NEUTOROPHIL: 0.02 K/CU MM
TOTAL NUCLEATED RBC: 0 K/CU MM
TROPONIN, HIGH SENSITIVITY: 32 NG/L (ref 0–14)
TSH SERPL DL<=0.005 MIU/L-ACNC: 1.11 UIU/ML (ref 0.27–4.2)
WBC # BLD: 6.1 K/CU MM (ref 4–10.5)

## 2024-06-21 PROCEDURE — 84484 ASSAY OF TROPONIN QUANT: CPT

## 2024-06-21 PROCEDURE — 85025 COMPLETE CBC W/AUTO DIFF WBC: CPT

## 2024-06-21 PROCEDURE — 6370000000 HC RX 637 (ALT 250 FOR IP): Performed by: STUDENT IN AN ORGANIZED HEALTH CARE EDUCATION/TRAINING PROGRAM

## 2024-06-21 PROCEDURE — 6360000002 HC RX W HCPCS: Performed by: STUDENT IN AN ORGANIZED HEALTH CARE EDUCATION/TRAINING PROGRAM

## 2024-06-21 PROCEDURE — 94761 N-INVAS EAR/PLS OXIMETRY MLT: CPT

## 2024-06-21 PROCEDURE — G0378 HOSPITAL OBSERVATION PER HR: HCPCS

## 2024-06-21 PROCEDURE — 2580000003 HC RX 258: Performed by: STUDENT IN AN ORGANIZED HEALTH CARE EDUCATION/TRAINING PROGRAM

## 2024-06-21 PROCEDURE — 97116 GAIT TRAINING THERAPY: CPT

## 2024-06-21 PROCEDURE — 74018 RADEX ABDOMEN 1 VIEW: CPT

## 2024-06-21 PROCEDURE — 97535 SELF CARE MNGMENT TRAINING: CPT

## 2024-06-21 PROCEDURE — 36415 COLL VENOUS BLD VENIPUNCTURE: CPT

## 2024-06-21 PROCEDURE — 97166 OT EVAL MOD COMPLEX 45 MIN: CPT

## 2024-06-21 PROCEDURE — 6370000000 HC RX 637 (ALT 250 FOR IP): Performed by: EMERGENCY MEDICINE

## 2024-06-21 PROCEDURE — 93010 ELECTROCARDIOGRAM REPORT: CPT | Performed by: INTERNAL MEDICINE

## 2024-06-21 PROCEDURE — 84443 ASSAY THYROID STIM HORMONE: CPT

## 2024-06-21 PROCEDURE — 97162 PT EVAL MOD COMPLEX 30 MIN: CPT

## 2024-06-21 PROCEDURE — 80048 BASIC METABOLIC PNL TOTAL CA: CPT

## 2024-06-21 PROCEDURE — 96365 THER/PROPH/DIAG IV INF INIT: CPT

## 2024-06-21 RX ORDER — SODIUM CHLORIDE 0.9 % (FLUSH) 0.9 %
5-40 SYRINGE (ML) INJECTION EVERY 12 HOURS SCHEDULED
Status: DISCONTINUED | OUTPATIENT
Start: 2024-06-21 | End: 2024-06-24 | Stop reason: HOSPADM

## 2024-06-21 RX ORDER — ACETAMINOPHEN 325 MG/1
650 TABLET ORAL EVERY 6 HOURS PRN
Status: DISCONTINUED | OUTPATIENT
Start: 2024-06-21 | End: 2024-06-24 | Stop reason: HOSPADM

## 2024-06-21 RX ORDER — SODIUM CHLORIDE 9 MG/ML
INJECTION, SOLUTION INTRAVENOUS PRN
Status: DISCONTINUED | OUTPATIENT
Start: 2024-06-21 | End: 2024-06-24 | Stop reason: HOSPADM

## 2024-06-21 RX ORDER — MAGNESIUM SULFATE IN WATER 40 MG/ML
2000 INJECTION, SOLUTION INTRAVENOUS PRN
Status: DISCONTINUED | OUTPATIENT
Start: 2024-06-21 | End: 2024-06-24 | Stop reason: HOSPADM

## 2024-06-21 RX ORDER — SODIUM CHLORIDE, SODIUM LACTATE, POTASSIUM CHLORIDE, CALCIUM CHLORIDE 600; 310; 30; 20 MG/100ML; MG/100ML; MG/100ML; MG/100ML
INJECTION, SOLUTION INTRAVENOUS CONTINUOUS
Status: DISCONTINUED | OUTPATIENT
Start: 2024-06-21 | End: 2024-06-21

## 2024-06-21 RX ORDER — LEVOTHYROXINE SODIUM 0.05 MG/1
50 TABLET ORAL DAILY
Status: DISCONTINUED | OUTPATIENT
Start: 2024-06-21 | End: 2024-06-24 | Stop reason: HOSPADM

## 2024-06-21 RX ORDER — ACETAMINOPHEN 650 MG/1
650 SUPPOSITORY RECTAL EVERY 6 HOURS PRN
Status: DISCONTINUED | OUTPATIENT
Start: 2024-06-21 | End: 2024-06-24 | Stop reason: HOSPADM

## 2024-06-21 RX ORDER — SODIUM CHLORIDE, SODIUM LACTATE, POTASSIUM CHLORIDE, CALCIUM CHLORIDE 600; 310; 30; 20 MG/100ML; MG/100ML; MG/100ML; MG/100ML
INJECTION, SOLUTION INTRAVENOUS CONTINUOUS
Status: DISPENSED | OUTPATIENT
Start: 2024-06-21 | End: 2024-06-21

## 2024-06-21 RX ORDER — POTASSIUM CHLORIDE 7.45 MG/ML
10 INJECTION INTRAVENOUS PRN
Status: DISCONTINUED | OUTPATIENT
Start: 2024-06-21 | End: 2024-06-24 | Stop reason: HOSPADM

## 2024-06-21 RX ORDER — ONDANSETRON 4 MG/1
4 TABLET, ORALLY DISINTEGRATING ORAL EVERY 8 HOURS PRN
Status: DISCONTINUED | OUTPATIENT
Start: 2024-06-21 | End: 2024-06-21

## 2024-06-21 RX ORDER — METOPROLOL SUCCINATE 25 MG/1
25 TABLET, EXTENDED RELEASE ORAL DAILY
Status: DISCONTINUED | OUTPATIENT
Start: 2024-06-21 | End: 2024-06-24 | Stop reason: HOSPADM

## 2024-06-21 RX ORDER — SODIUM CHLORIDE 0.9 % (FLUSH) 0.9 %
5-40 SYRINGE (ML) INJECTION PRN
Status: DISCONTINUED | OUTPATIENT
Start: 2024-06-21 | End: 2024-06-24 | Stop reason: HOSPADM

## 2024-06-21 RX ORDER — LANOLIN ALCOHOL/MO/W.PET/CERES
3 CREAM (GRAM) TOPICAL NIGHTLY PRN
Status: DISCONTINUED | OUTPATIENT
Start: 2024-06-21 | End: 2024-06-24 | Stop reason: HOSPADM

## 2024-06-21 RX ORDER — ONDANSETRON 2 MG/ML
4 INJECTION INTRAMUSCULAR; INTRAVENOUS EVERY 6 HOURS PRN
Status: DISCONTINUED | OUTPATIENT
Start: 2024-06-21 | End: 2024-06-21

## 2024-06-21 RX ORDER — METOPROLOL SUCCINATE 25 MG/1
25 TABLET, EXTENDED RELEASE ORAL EVERY EVENING
Status: DISCONTINUED | OUTPATIENT
Start: 2024-06-21 | End: 2024-06-21

## 2024-06-21 RX ORDER — POLYETHYLENE GLYCOL 3350 17 G/17G
17 POWDER, FOR SOLUTION ORAL DAILY PRN
Status: DISCONTINUED | OUTPATIENT
Start: 2024-06-21 | End: 2024-06-24 | Stop reason: HOSPADM

## 2024-06-21 RX ADMIN — ACETAMINOPHEN 650 MG: 325 TABLET ORAL at 00:10

## 2024-06-21 RX ADMIN — SODIUM CHLORIDE, PRESERVATIVE FREE 10 ML: 5 INJECTION INTRAVENOUS at 20:17

## 2024-06-21 RX ADMIN — SODIUM CHLORIDE, POTASSIUM CHLORIDE, SODIUM LACTATE AND CALCIUM CHLORIDE: 600; 310; 30; 20 INJECTION, SOLUTION INTRAVENOUS at 02:03

## 2024-06-21 RX ADMIN — METOPROLOL SUCCINATE 25 MG: 25 TABLET, EXTENDED RELEASE ORAL at 13:46

## 2024-06-21 RX ADMIN — LEVOTHYROXINE SODIUM 50 MCG: 0.05 TABLET ORAL at 05:59

## 2024-06-21 RX ADMIN — POTASSIUM CHLORIDE 10 MEQ: 7.46 INJECTION, SOLUTION INTRAVENOUS at 00:15

## 2024-06-21 RX ADMIN — SODIUM CHLORIDE, POTASSIUM CHLORIDE, SODIUM LACTATE AND CALCIUM CHLORIDE: 600; 310; 30; 20 INJECTION, SOLUTION INTRAVENOUS at 13:45

## 2024-06-21 RX ADMIN — RIVAROXABAN 15 MG: 15 TABLET, FILM COATED ORAL at 09:51

## 2024-06-21 ASSESSMENT — PAIN SCALES - GENERAL
PAINLEVEL_OUTOF10: 6
PAINLEVEL_OUTOF10: 5

## 2024-06-21 ASSESSMENT — PAIN DESCRIPTION - LOCATION
LOCATION: RIB CAGE
LOCATION: HEAD;NECK

## 2024-06-21 ASSESSMENT — PAIN DESCRIPTION - ORIENTATION: ORIENTATION: LEFT

## 2024-06-21 NOTE — ED NOTES
Patient needs urine collection.  
Pt arrives to ED via EMS w/ c/o LOC. Pt states they don't remember even going to the restroom, which house, pt states they can't remember. Pt cannot remember the year- states it's 2023, pt has dementia at baseline. Pt is pleasant and cooperative.  
TAPAN inCarmen Lind on receiving unit notified.  
AUTO DIFFERENTIAL - Abnormal; Notable for the following components:    Neutrophils % 73.4 (*)     Lymphocytes % 17.4 (*)     Monocytes % 7.5 (*)     All other components within normal limits        Background  History:   Past Medical History:   Diagnosis Date    A-fib ([facility]     Arthritis     CAD (coronary artery disease)     Eye abnormality     Left Eye - Hx torn Retina and Cyst; Partial Blindness in Left Eye    Fibromyalgia     GERD (gastroesophageal reflux disease)     Hx of Doppler echocardiogram 01/16/2020    EF 45%     MI (myocardial infarction) ([facility]) 2011    No Chest Pains - MI with collapse and pacemaker insertion.    Thyroid disease     Ulcer in the past    Gastric       Assessment    Vitals:    Level of Consciousness: Alert (0)   Vitals:    06/20/24 2049 06/20/24 2101 06/20/24 2105 06/20/24 2132   BP: 119/77 (!) 148/80 (!) 148/80 (!) 145/69   Pulse: 62 63 64 60   Resp: 15 14 18 16   Temp:       TempSrc:       SpO2: 100% 96% 96% 99%   Weight:       Height:         PO Status: Regular  O2 Flow Rate: O2 Device: None (Room air)    Cardiac Rhythm:   Last documented pain medication administered:   NIH Score: NIH     Active LDA's:   Peripheral IV 06/20/24 Right Antecubital (Active)   Site Assessment Clean, dry & intact 06/20/24 2022   Line Status Brisk blood return;Flushed;Specimen collected 06/20/24 2022   Line Care Connections checked and tightened 06/20/24 2022   Phlebitis Assessment No symptoms 06/20/24 2022   Infiltration Assessment 0 06/20/24 2022   Dressing Status New dressing applied;Clean, dry & intact 06/20/24 2022   Dressing Type Transparent 06/20/24 2022       Pertinent or High Risk Medications/Drips: no   If Yes, please provide details:   Blood Product Administration: no  If Yes, please provide details:     Recommendation    Incomplete orders inpatient orders  Additional Comments:  cardiac device noted  If any further questions, please call Sending RN at 1903    Electronically signed by: Electronically

## 2024-06-21 NOTE — FLOWSHEET NOTE
Patient ambulated with this nurse to bathroom and back to bed with walker and gait belt. Confused, but redirectable.

## 2024-06-21 NOTE — CARE COORDINATION
Response from [facility] 3DW Post Acute Services:   Unfortunately, we are not able to process and approve this 3DW due to not having ACO physician signoff.  Pt cannot admit under the 3DW at this time.    Dr. Luong is on PTO  PCP is also on PTO and not a Physician  Provider covering PCP is a PA , not a Physician therefore is not eligible to signoff as the requirement is an ACO physician must review and approve per CMS.  If patient is d/c we can attempt to admit to SNF Monday from home again.     Unless pt is safe to discharge to home with HHC, pt will need to wait until MONDAY for 3DW review by ACO physician.

## 2024-06-21 NOTE — CARE COORDINATION
06/21/24 1452   Service Assessment   Patient Orientation Other (see comment)  (confused)   Cognition Dementia / Early Alzheimer's   History Provided By Medical Record;Child/Family  (grandbrii Mcallister)   Primary Caregiver Family   Support Systems Family Members   PCP Verified by CM Yes   Last Visit to PCP Within last 6 months   Prior Functional Level Assistance with the following:   Current Functional Level Assistance with the following:   Can patient return to prior living arrangement Yes   Ability to make needs known: Good   Family able to assist with home care needs: Yes   Would you like for me to discuss the discharge plan with any other family members/significant others, and if so, who? Yes   Financial Resources Medicare;Other (Comment)  ()   Social/Functional History   Lives With Family  (Grandson/family)   Active  No   Patient's  Info family   Mode of Transportation Car   Condition of Participation: Discharge Planning   The Patient and/or Patient Representative was provided with a Choice of Provider? Patient   The Patient and/Or Patient Representative agree with the Discharge Plan? Yes   Freedom of Choice list was provided with basic dialogue that supports the patient's individualized plan of care/goals, treatment preferences, and shares the quality data associated with the providers?  Yes     Reviewed chart, discussed in IDR and spoke with pt's nurse Isabella.  Pt very confused. I then called her Grandson Esvin.  He states pt lives with him,  she has been not wanting to get up and move or do for herself.   He is interested in short term rehab at [facility].  Discussed 3 Day Waiver program and facilities that can be used. They are interested in [facility].  Called Referral to [place] at [facility].  Also send email to MD for 3-day waiver approval.       1630  Unable to get 3day Waiver approved, pt will need to stay till Monday when Waiver can be approved by a ACO Physician.    Updated [place] at [facility] and

## 2024-06-21 NOTE — TELEPHONE ENCOUNTER
Patient currently at hospital, looking to get a 3 day waiver to go to skilled nursing facility.  If he could put quick note in system stating if he agrees recommendations to go to skilled nursing facility.      Has to be ACO provider sign off and attending is not- for Medicare waiver to do so.

## 2024-06-21 NOTE — CONSULTS
[facility] Continence Nurse  Consult Note       Cary Rogers  AGE: 89 y.o.   GENDER: female  : 1935  TODAY'S DATE:  2024    Subjective:     Reason for CWOCN Evaluation and Assessment: wound assessment      Cary Rogers is a 89 y.o. female referred by:   [x] Physician  [] Nursing  [] Other:     Wound Identification:  Wound Type: pressure, traumatic, and MASD  Contributing Factors: diabetes, chronic pressure, decreased mobility, and incontinence of urine        PAST MEDICAL HISTORY        Diagnosis Date    A-fib ([facility])     Arthritis     CAD (coronary artery disease)     Eye abnormality     Left Eye - Hx torn Retina and Cyst; Partial Blindness in Left Eye    Fibromyalgia     GERD (gastroesophageal reflux disease)     Hx of Doppler echocardiogram 2020    EF 45%     MI (myocardial infarction) ([facility])     No Chest Pains - MI with collapse and pacemaker insertion.    Thyroid disease     Ulcer in the past    Gastric       PAST SURGICAL HISTORY    Past Surgical History:   Procedure Laterality Date    APPENDECTOMY      BLADDER SURGERY      CHOLECYSTECTOMY      COLONOSCOPY  14    normal, biopsy    EYE SURGERY      HYSTERECTOMY (CERVIX STATUS UNKNOWN)      PACEMAKER PLACEMENT Left 2020    bivi pacer upgrade-Medtronic Percepta Quad CRT-P MRI [facility]     VENTRICULAR CARDIAC PACEMAKER INSERTION      Dr. JULI Shoemaker - following MI       FAMILY HISTORY    Family History   Problem Relation Age of Onset    Cancer Mother     Cancer Father     Heart Disease Father     Cancer Sister     Cancer Brother        SOCIAL HISTORY    Social History     Tobacco Use    Smoking status: Former     Current packs/day: 0.00     Types: Cigarettes     Quit date: 1977     Years since quittin.5    Smokeless tobacco: Never   Substance Use Topics    Alcohol use: No    Drug use: No       ALLERGIES    Allergies   Allergen Reactions    Amitriptyline     Aspirin     Codeine     Elavil [Amitriptyline Hcl]     
29    Electronically signed by:    Nat Jasso, PT  6/21/2024, 4:36 PM

## 2024-06-21 NOTE — ED PROVIDER NOTES
no acute distress.  She has dementia and poor story alert and oriented to self and birthday.  Patient complained of pain in the head and neck but head normocephalic atraumatic neck paracervical muscle tenderness noted no crepitus step-off or deformity,    Differential diagnose include vasovagal syncope, mechanical fall, closed head injury, cervical fracture, urinary tract infection, pneumonia    Patient ordered cardiac monitor laboratory studies EKG chest x-ray urinalysis    EKG per my interpretation paced rhythm ventricular rate of 61, QRS duration 134, QT/QTc 488/491, LVH left axis deviation. patient with chronic kidney disease creatinine 1.2 BUN 30, potassium 3.4.  Elevated troponin 34 but no chest pain likely due to chronic kidney disease.  CT of the head no acute intracranial abnormality, CT cervical spine no acute fracture or subluxation.  Chest x-ray per my interpretation no obvious infiltrates or effusions.  Patient was ordered Tylenol 650 mg p.o. for her head and neck pain.  Patient will need to be admitted for further evaluation treatment management.  Hospitalist consulted.    Clinical Impression:  1. Syncope and collapse    2. Fall, initial encounter    3. Closed head injury, initial encounter    4. Acute neck pain    5. Vascular dementia, unspecified dementia severity, unspecified whether behavioral, psychotic, or mood disturbance or anxiety ([facility])          ED Provider Disposition Time  DISPOSITION Decision To Admit 06/20/2024 09:53:57 PM      Comment: Please note this report has been produced using speech recognition software and may contain errors related to that system including errors in grammar, punctuation, and spelling, as well as words and phrases that may be inappropriate.  Efforts were made to edit the dictations.        Shahla Cruz,   06/20/24 6331

## 2024-06-21 NOTE — CARE COORDINATION
[facility] ACO SNF 3DW Verification    The patient is an attributed [facility] beneficiary. PAC Team not able to process and approve 3DW due to not having ACO Physician signoff.    HARIS David, LISW  PAC-Team

## 2024-06-21 NOTE — ED TRIAGE NOTES
Patient presents from home for syncopal episode. Per grandson patient was on the toilet and passed out having a bowel movement.     Patient lives at home with grandson and his family.     Medication list reviewed with grandson over the phone.

## 2024-06-22 LAB
25(OH)D3 SERPL-MCNC: 16.56 NG/ML
ANION GAP SERPL CALCULATED.3IONS-SCNC: 10 MMOL/L (ref 7–16)
BUN SERPL-MCNC: 24 MG/DL (ref 6–23)
CALCIUM SERPL-MCNC: 9 MG/DL (ref 8.3–10.6)
CHLORIDE BLD-SCNC: 107 MMOL/L (ref 99–110)
CO2: 28 MMOL/L (ref 21–32)
CREAT SERPL-MCNC: 1 MG/DL (ref 0.6–1.1)
GFR, ESTIMATED: 54 ML/MIN/1.73M2
GLUCOSE SERPL-MCNC: 90 MG/DL (ref 70–99)
POTASSIUM SERPL-SCNC: 4.1 MMOL/L (ref 3.5–5.1)
SODIUM BLD-SCNC: 145 MMOL/L (ref 135–145)

## 2024-06-22 PROCEDURE — 2580000003 HC RX 258: Performed by: STUDENT IN AN ORGANIZED HEALTH CARE EDUCATION/TRAINING PROGRAM

## 2024-06-22 PROCEDURE — 82306 VITAMIN D 25 HYDROXY: CPT

## 2024-06-22 PROCEDURE — 6370000000 HC RX 637 (ALT 250 FOR IP): Performed by: STUDENT IN AN ORGANIZED HEALTH CARE EDUCATION/TRAINING PROGRAM

## 2024-06-22 PROCEDURE — 94761 N-INVAS EAR/PLS OXIMETRY MLT: CPT

## 2024-06-22 PROCEDURE — 36415 COLL VENOUS BLD VENIPUNCTURE: CPT

## 2024-06-22 PROCEDURE — G0378 HOSPITAL OBSERVATION PER HR: HCPCS

## 2024-06-22 PROCEDURE — 97535 SELF CARE MNGMENT TRAINING: CPT

## 2024-06-22 PROCEDURE — 97116 GAIT TRAINING THERAPY: CPT

## 2024-06-22 PROCEDURE — 97530 THERAPEUTIC ACTIVITIES: CPT

## 2024-06-22 PROCEDURE — 80048 BASIC METABOLIC PNL TOTAL CA: CPT

## 2024-06-22 RX ORDER — HYDROXYZINE HYDROCHLORIDE 10 MG/1
10 TABLET, FILM COATED ORAL ONCE
Status: COMPLETED | OUTPATIENT
Start: 2024-06-23 | End: 2024-06-23

## 2024-06-22 RX ORDER — ERGOCALCIFEROL 1.25 MG/1
50000 CAPSULE ORAL WEEKLY
Status: DISCONTINUED | OUTPATIENT
Start: 2024-06-22 | End: 2024-06-24 | Stop reason: HOSPADM

## 2024-06-22 RX ADMIN — METOPROLOL SUCCINATE 25 MG: 25 TABLET, EXTENDED RELEASE ORAL at 07:39

## 2024-06-22 RX ADMIN — RIVAROXABAN 15 MG: 15 TABLET, FILM COATED ORAL at 07:39

## 2024-06-22 RX ADMIN — SODIUM CHLORIDE, PRESERVATIVE FREE 10 ML: 5 INJECTION INTRAVENOUS at 07:39

## 2024-06-22 RX ADMIN — LEVOTHYROXINE SODIUM 50 MCG: 0.05 TABLET ORAL at 05:58

## 2024-06-22 RX ADMIN — ERGOCALCIFEROL 50000 UNITS: 1.25 CAPSULE ORAL at 09:15

## 2024-06-22 RX ADMIN — SODIUM CHLORIDE, PRESERVATIVE FREE 10 ML: 5 INJECTION INTRAVENOUS at 20:22

## 2024-06-22 ASSESSMENT — PAIN SCALES - WONG BAKER: WONGBAKER_NUMERICALRESPONSE: HURTS A LITTLE BIT

## 2024-06-22 ASSESSMENT — PAIN DESCRIPTION - LOCATION: LOCATION: BACK

## 2024-06-22 ASSESSMENT — PAIN DESCRIPTION - ORIENTATION: ORIENTATION: LOWER

## 2024-06-23 PROCEDURE — 2580000003 HC RX 258: Performed by: STUDENT IN AN ORGANIZED HEALTH CARE EDUCATION/TRAINING PROGRAM

## 2024-06-23 PROCEDURE — G0378 HOSPITAL OBSERVATION PER HR: HCPCS

## 2024-06-23 PROCEDURE — 6370000000 HC RX 637 (ALT 250 FOR IP): Performed by: STUDENT IN AN ORGANIZED HEALTH CARE EDUCATION/TRAINING PROGRAM

## 2024-06-23 PROCEDURE — 94761 N-INVAS EAR/PLS OXIMETRY MLT: CPT

## 2024-06-23 PROCEDURE — 6360000002 HC RX W HCPCS: Performed by: NURSE PRACTITIONER

## 2024-06-23 PROCEDURE — 81003 URINALYSIS AUTO W/O SCOPE: CPT

## 2024-06-23 PROCEDURE — 96375 TX/PRO/DX INJ NEW DRUG ADDON: CPT

## 2024-06-23 PROCEDURE — 6370000000 HC RX 637 (ALT 250 FOR IP): Performed by: NURSE PRACTITIONER

## 2024-06-23 RX ORDER — HYDRALAZINE HYDROCHLORIDE 20 MG/ML
10 INJECTION INTRAMUSCULAR; INTRAVENOUS ONCE
Status: COMPLETED | OUTPATIENT
Start: 2024-06-23 | End: 2024-06-23

## 2024-06-23 RX ORDER — HYDROCODONE BITARTRATE AND ACETAMINOPHEN 5; 325 MG/1; MG/1
1 TABLET ORAL ONCE
Status: COMPLETED | OUTPATIENT
Start: 2024-06-23 | End: 2024-06-23

## 2024-06-23 RX ADMIN — HYDRALAZINE HYDROCHLORIDE 10 MG: 20 INJECTION, SOLUTION INTRAMUSCULAR; INTRAVENOUS at 03:31

## 2024-06-23 RX ADMIN — METOPROLOL SUCCINATE 25 MG: 25 TABLET, EXTENDED RELEASE ORAL at 11:29

## 2024-06-23 RX ADMIN — HYDROCODONE BITARTRATE AND ACETAMINOPHEN 1 TABLET: 5; 325 TABLET ORAL at 03:50

## 2024-06-23 RX ADMIN — SODIUM CHLORIDE, PRESERVATIVE FREE 10 ML: 5 INJECTION INTRAVENOUS at 21:46

## 2024-06-23 RX ADMIN — RIVAROXABAN 15 MG: 15 TABLET, FILM COATED ORAL at 11:30

## 2024-06-23 RX ADMIN — HYDROXYZINE HYDROCHLORIDE 10 MG: 10 TABLET, FILM COATED ORAL at 00:05

## 2024-06-23 RX ADMIN — LEVOTHYROXINE SODIUM 50 MCG: 0.05 TABLET ORAL at 06:45

## 2024-06-23 ASSESSMENT — PAIN - FUNCTIONAL ASSESSMENT: PAIN_FUNCTIONAL_ASSESSMENT: PREVENTS OR INTERFERES SOME ACTIVE ACTIVITIES AND ADLS

## 2024-06-23 ASSESSMENT — PAIN SCALES - WONG BAKER
WONGBAKER_NUMERICALRESPONSE: HURTS A LITTLE BIT

## 2024-06-23 ASSESSMENT — PAIN DESCRIPTION - ORIENTATION: ORIENTATION: LOWER

## 2024-06-23 ASSESSMENT — PAIN DESCRIPTION - LOCATION: LOCATION: BACK;HEAD

## 2024-06-23 ASSESSMENT — PAIN DESCRIPTION - DESCRIPTORS: DESCRIPTORS: ACHING;DISCOMFORT;SHARP

## 2024-06-23 NOTE — PLAN OF CARE
Problem: Discharge Planning  Goal: Discharge to home or other facility with appropriate resources  Outcome: Progressing     Problem: Skin/Tissue Integrity  Goal: Absence of new skin breakdown  Description: 1.  Monitor for areas of redness and/or skin breakdown  2.  Assess vascular access sites hourly  3.  Every 4-6 hours minimum:  Change oxygen saturation probe site  4.  Every 4-6 hours:  If on nasal continuous positive airway pressure, respiratory therapy assess nares and determine need for appliance change or resting period.  Outcome: Progressing     Problem: Safety - Adult  Goal: Free from fall injury  Outcome: Progressing     Problem: ABCDS Injury Assessment  Goal: Absence of physical injury  Outcome: Progressing     Problem: Pain  Goal: Verbalizes/displays adequate comfort level or baseline comfort level  Outcome: Progressing     Problem: Chronic Conditions and Co-morbidities  Goal: Patient's chronic conditions and co-morbidity symptoms are monitored and maintained or improved  Outcome: Progressing     Problem: Nutrition Deficit:  Goal: Optimize nutritional status  Outcome: Progressing

## 2024-06-24 ENCOUNTER — CARE COORDINATION (OUTPATIENT)
Dept: CARE COORDINATION | Facility: CLINIC | Age: 89
End: 2024-06-24

## 2024-06-24 VITALS
DIASTOLIC BLOOD PRESSURE: 88 MMHG | SYSTOLIC BLOOD PRESSURE: 153 MMHG | HEIGHT: 67 IN | RESPIRATION RATE: 16 BRPM | WEIGHT: 175 LBS | BODY MASS INDEX: 27.47 KG/M2 | HEART RATE: 61 BPM | TEMPERATURE: 97.8 F | OXYGEN SATURATION: 99 %

## 2024-06-24 PROCEDURE — 2580000003 HC RX 258: Performed by: STUDENT IN AN ORGANIZED HEALTH CARE EDUCATION/TRAINING PROGRAM

## 2024-06-24 PROCEDURE — 6370000000 HC RX 637 (ALT 250 FOR IP): Performed by: STUDENT IN AN ORGANIZED HEALTH CARE EDUCATION/TRAINING PROGRAM

## 2024-06-24 PROCEDURE — G0378 HOSPITAL OBSERVATION PER HR: HCPCS

## 2024-06-24 PROCEDURE — 94761 N-INVAS EAR/PLS OXIMETRY MLT: CPT

## 2024-06-24 RX ADMIN — Medication 3 MG: at 02:10

## 2024-06-24 RX ADMIN — SODIUM CHLORIDE, PRESERVATIVE FREE 10 ML: 5 INJECTION INTRAVENOUS at 10:29

## 2024-06-24 RX ADMIN — METOPROLOL SUCCINATE 25 MG: 25 TABLET, EXTENDED RELEASE ORAL at 10:28

## 2024-06-24 RX ADMIN — LEVOTHYROXINE SODIUM 50 MCG: 0.05 TABLET ORAL at 05:16

## 2024-06-24 RX ADMIN — RIVAROXABAN 15 MG: 15 TABLET, FILM COATED ORAL at 10:29

## 2024-06-24 ASSESSMENT — PAIN SCALES - WONG BAKER
WONGBAKER_NUMERICALRESPONSE: HURTS A LITTLE BIT
WONGBAKER_NUMERICALRESPONSE: 0;2

## 2024-06-24 ASSESSMENT — PAIN DESCRIPTION - LOCATION: LOCATION: BACK

## 2024-06-24 ASSESSMENT — PAIN SCALES - GENERAL: PAINLEVEL_OUTOF10: 1

## 2024-06-24 NOTE — CARE COORDINATION
Pt d/c to [facility], setup with [place] for 1430. Update nurse Tracy, son Esvin and [place] at [facility].

## 2024-06-24 NOTE — PLAN OF CARE
Problem: Discharge Planning  Goal: Discharge to home or other facility with appropriate resources  6/24/2024 0302 by Pedro Luis Palma, RN  Outcome: Progressing  6/23/2024 1607 by Ivett Mejia RN  Outcome: Progressing     Problem: Skin/Tissue Integrity  Goal: Absence of new skin breakdown  Description: 1.  Monitor for areas of redness and/or skin breakdown  2.  Assess vascular access sites hourly  3.  Every 4-6 hours minimum:  Change oxygen saturation probe site  4.  Every 4-6 hours:  If on nasal continuous positive airway pressure, respiratory therapy assess nares and determine need for appliance change or resting period.  6/24/2024 0302 by Pedro Luis Palma, RN  Outcome: Progressing  6/23/2024 1607 by Ivett Mejia RN  Outcome: Progressing     Problem: Safety - Adult  Goal: Free from fall injury  6/23/2024 1607 by Ivett Mejia RN  Outcome: Progressing     Problem: ABCDS Injury Assessment  Goal: Absence of physical injury  6/23/2024 1607 by Ivett Mejia RN  Outcome: Progressing     Problem: Pain  Goal: Verbalizes/displays adequate comfort level or baseline comfort level  6/23/2024 1607 by Ivett Mejia RN  Outcome: Progressing     Problem: Chronic Conditions and Co-morbidities  Goal: Patient's chronic conditions and co-morbidity symptoms are monitored and maintained or improved  6/23/2024 1607 by Ivett Mejia RN  Outcome: Progressing     Problem: Nutrition Deficit:  Goal: Optimize nutritional status  6/23/2024 1607 by Ivett Mejia RN  Outcome: Progressing

## 2024-06-24 NOTE — CARE COORDINATION
[facility] ACO SNF 3DW Verification    Patient has met all required criteria and is eligible for admission to an approved SNF affiliate under the [facility] ACO 3-day rule waiver.  The ACO verification process is complete and case management has been notified of verification outcome.          Elyse Roldan MSW, LISW, LCSW  PAC- Team

## 2024-06-24 NOTE — PLAN OF CARE
Problem: Discharge Planning  Goal: Discharge to home or other facility with appropriate resources  6/24/2024 1406 by Yuridia Segura LPN  Outcome: Completed  6/24/2024 0302 by Pedro Luis Palma, RN  Outcome: Progressing     Problem: Skin/Tissue Integrity  Goal: Absence of new skin breakdown  Description: 1.  Monitor for areas of redness and/or skin breakdown  2.  Assess vascular access sites hourly  3.  Every 4-6 hours minimum:  Change oxygen saturation probe site  4.  Every 4-6 hours:  If on nasal continuous positive airway pressure, respiratory therapy assess nares and determine need for appliance change or resting period.  6/24/2024 1406 by Yuridia Segura LPN  Outcome: Completed  6/24/2024 0302 by Pedro Luis Palma RN  Outcome: Progressing     Problem: Safety - Adult  Goal: Free from fall injury  Outcome: Completed     Problem: ABCDS Injury Assessment  Goal: Absence of physical injury  Outcome: Completed     Problem: Pain  Goal: Verbalizes/displays adequate comfort level or baseline comfort level  Outcome: Completed     Problem: Chronic Conditions and Co-morbidities  Goal: Patient's chronic conditions and co-morbidity symptoms are monitored and maintained or improved  Outcome: Completed     Problem: Nutrition Deficit:  Goal: Optimize nutritional status  Outcome: Completed

## 2024-06-24 NOTE — PROGRESS NOTES
V2.0    [facility] Progress Note      Name:  Cary Rogers /Age/Sex: 1935  (89 y.o. female)   MRN & CSN:  [id number] & [id number] Encounter Date/Time: 2024 12:59 PM EDT   Location:  [address] [place], [place] [address] PCP: Dewayne Jernigan PA     Attending:Chris Saravia MD       Hospital Day: 3    Assessment and Recommendations   Cary Rogers is a 89 y.o. female who presents with Syncope, vasovagal      Plan:     # Syncope, likely vasovagal  # Non-MI troponin elevation  - Reported to have witnessed syncopal episode while having BM in bathroom. Unsure about hitting head. On Xarelto. Denied any typical chest pain. Hx limited due to patient's dementia. Previously admitted for orthostatic hypotension requiring Midodrine and compression stockings.   - Clinically mildly hypovolemic. Initial Tn mildly elevated. ECG without acute ischemic changes. CTH and C-spine non-acute.   -Did not have syncopal episode as per family they gave me history over the phone.     # YASSINE, mild  - Clinically hypovolemic. Cr 1.2, baseline ~ 0.9. UA ordered.  -Improved with fluid challenge.  Avoid nephrotoxins.     # Hypokalemia  - Mild.  - Repleted, follow-up repeat labs.     # Paroxysmal atrial fibrillation   - Continue Xarelto and Toprol-XL.      # CHB s/p BiV PPM in 2022     # Acquired hypothyroidism  - Continue Synthroid.  - Follow-up repeat TSH.     # Advanced dementia without behavioral disturbances  - Lives with family.   - A&Ox self and place. CTH non-acute.   - High-risk for delirium, continue precautions.      #Vitamin D deficiency  -Ordered 50,000 units daily.    Diet ADULT DIET; Regular  ADULT ORAL NUTRITION SUPPLEMENT; Breakfast, Dinner; Wound Healing Oral Supplement   DVT Prophylaxis [] Lovenox, [x]  Heparin, [] SCDs, [] Ambulation,  [] Eliquis, [x] Xarelto  [] Coumadin   Code Status Full Code   Disposition From: Home with son  Expected Disposition: Home health care versus SNF  Estimated Date of Discharge: Medically stable for discharge 
    V2.0    [facility] Progress Note      Name:  Cary Rogers /Age/Sex: 1935  (89 y.o. female)   MRN & CSN:  [id number] & [id number] Encounter Date/Time: 2024 12:59 PM EDT   Location:  [address] [place], [place] [address] PCP: Dewayne Jernigan PA     Attending:Chris Saravia MD       Hospital Day: 4    Assessment and Recommendations   Cary Rogers is a 89 y.o. female who presents with Syncope, vasovagal      Plan:     # Syncope, likely vasovagal  # Non-MI troponin elevation  - Reported to have witnessed syncopal episode while having BM in bathroom. Unsure about hitting head. On Xarelto. Denied any typical chest pain. Hx limited due to patient's dementia. Previously admitted for orthostatic hypotension requiring Midodrine and compression stockings.   - Clinically mildly hypovolemic. Initial Tn mildly elevated. ECG without acute ischemic changes. CTH and C-spine non-acute.   -Did not have syncopal episode as per family they gave me history over the phone.     # YASSINE, mild  - Clinically hypovolemic. Cr 1.2, baseline ~ 0.9. UA ordered.  -Improved with fluid challenge.  Avoid nephrotoxins.     # Hypokalemia  - Mild.  - Repleted, follow-up repeat labs.     # Paroxysmal atrial fibrillation   - Continue Xarelto and Toprol-XL.      # CHB s/p BiV PPM in 2022     # Acquired hypothyroidism  - Continue Synthroid.  - Follow-up repeat TSH.     # Advanced dementia without behavioral disturbances  - Lives with family.   - A&Ox self and place. CTH non-acute.   - High-risk for delirium, continue precautions.      #Vitamin D deficiency  -Ordered 50,000 units daily.    Diet ADULT DIET; Regular  ADULT ORAL NUTRITION SUPPLEMENT; Breakfast, Dinner; Wound Healing Oral Supplement   DVT Prophylaxis [] Lovenox, [x]  Heparin, [] SCDs, [] Ambulation,  [] Eliquis, [x] Xarelto  [] Coumadin   Code Status Full Code   Disposition From: Home with son  Expected Disposition: Home health care versus SNF  Estimated Date of Discharge: Medically stable for discharge 
  Physical Therapy Treatment Note  Name: Cary Rogers MRN: [id number] :   1935   Date:  2024   Admission Date: 2024 Room:  [address] [place], [place] [address]     Restrictions/Precautions:  general precautions, fall risk    Communication with other providers:  RN    Subjective:  Patient states:  \"Let it rip!\"  Pain:   Location, Type, Intensity (0/10 to 10/10):  denies pain    Objective:    Observation:  pt supine in bed upon entry and agreeable to session    Treatment, including education/measures:    Bed mobility: pt completed supine to sitting EOB SBA with cues for sequencing    Transfers: pt completed STS from EOB CGA, to/from commode x2 trials min A, and to chair CGA with cues for hand placement and sequencing    Gait: pt ambulated 10' + 100' with RW CGA/min A with decreased arnulfo, right drift, and poor obstacle navigation. Cues provided for walker management and pathway negotiation throughout    Balance: pt stood during dynamic UE activities CGA     Assessment / Impression:    Pt up in chair at end of session with needs in reach and alarm on    Patient's tolerance of treatment:  well   Adverse Reaction: n/a  Significant change in status and impact:  n/a  Barriers to improvement:  decreased endurance    Plan for Next Session:    Continue to address transfer training and gait training in future sessions    Time in:  1247  Time out:  1318  Timed treatment minutes:  25  Total treatment time:  31    Previously filed items:  Social/Functional History  Lives With: Family (Grandson/family)  Active : No  Patient's  Info: family  Mode of Transportation: Car        Short Term Goals  Time Frame for Short Term Goals: 1 week  Short Term Goal 1: pt to complete all bed mobility mod I  Short Term Goal 2: pt to complete all STS transfers to/from bed, commode, and chair SBA  Short Term Goal 3: pt to ambulate 100' with LRAD SBA    Electronically signed by:    Nat Jasso, PT  2024, 3:51 PM   
4 Eyes Skin Assessment     NAME:  Cary Rogers  YOB: 1935  MEDICAL RECORD NUMBER:  [id number]    The patient is being assessed for  Admission    I agree that at least one RN has performed a thorough Head to Toe Skin Assessment on the patient. ALL assessment sites listed below have been assessed.      Areas assessed by both nurses:    Head, Face, Ears, Shoulders, Back, Chest, Arms, Elbows, Hands, Sacrum. Buttock, Coccyx, Ischium, Legs. Feet and Heels, and Under Medical Devices         Does the Patient have a Wound? Yes wound(s) were present on assessment. LDA wound assessment was Initiated and completed by RN       Erlin Prevention initiated by RN: Yes  Wound Care Orders initiated by RN: Yes    Pressure Injury (Stage 3,4, Unstageable, DTI, NWPT, and Complex wounds) if present, place Wound referral order by RN under : Yes    New Ostomies, if present place, Ostomy referral order under : No     Nurse 1 eSignature: Electronically signed by MANJINDER BROWNING RN on 6/21/24 at 1:52 AM EDT    **SHARE this note so that the co-signing nurse can place an eSignature**    Nurse 2 eSignature: Electronically signed by Jessica Tobar LPN on 6/21/24 at 2:35 AM EDT   
Called [facility] and gave report to nurse Sonia.   
Comprehensive Nutrition Assessment    Type and Reason for Visit:  Initial, Wound, Consult (Erlin Score)    Nutrition Recommendations/Plan:   Offer wound healing oral nutrition supplement BID   Continue regular diet as able with snacks   Endorse adequate PO intakes/ONS Intake and record in I/O      Malnutrition Assessment:  Malnutrition Status:  No malnutrition (06/21/24 1306)    Context:  Social/Environmental Circumstances     Findings of the 6 clinical characteristics of malnutrition:  Energy Intake:  No significant decrease in energy intake  Weight Loss:  No significant weight loss     Body Fat Loss:  No significant body fat loss     Muscle Mass Loss:  Unable to assess    Fluid Accumulation:  No significant fluid accumulation     Strength:  Not Performed    Nutrition Assessment:    Admitted with syncope and collapse, vasovagal. Hx lupus, moderate dementia, HTN, CKD, DMII, fibromyalgia,acid reflux, and thyroid disease. Currently on regular diet, reports fair appetite. MARLENA po intake at visit, pt awaiting for lunch. Pt has no N/V/P. D/w pt about nutrition for wound healing, pt agreeable to trial supplements. Follow as moderate nutrition risk.    Nutrition Related Findings:    GFR 43, Glu 144, BUN 32, Cr 1.2 +synthroid Wound Type: Multiple, Pressure Injury, Unstageable (Traumatic wound; buttocks and knee)     Lives at home with grandson    Current Nutrition Intake & Therapies:    Average Meal Intake:  (fair per pt)  Average Supplements Intake: None Ordered  ADULT DIET; Regular  ADULT ORAL NUTRITION SUPPLEMENT; Breakfast, Dinner; Wound Healing Oral Supplement    Anthropometric Measures:  Height: 170.2 cm (5' 7\")  Ideal Body Weight (IBW): 135 lbs (61 kg)       Current Body Weight: 79.4 kg (175 lb 0.7 oz), 129.7 % IBW. Weight Source: Stated  Current BMI (kg/m2): 27.4  Usual Body Weight: 75.4 kg (166 lb 3.2 oz) (5/2023 office visit)  % Weight Change (Calculated): 5.3  Weight Adjustment For: No Adjustment            
I am familiar with patient from her being my outpatient partner's patient for many years.  In reviewing her current inpatient chart, I agree with patient transitioning to placement with Rehab.  
No syncope as per family. No episodes noted here.     Medically stable for Discharge  
Occupational Therapy    Occupational Therapy Treatment Note    Name: Cary Rogers MRN: [id number] :   1935   Date:  2024   Admission Date: 2024 Room:  [address] [place], [place] [address]       Restrictions/Precautions:          fall risk, general precautions, LITA    Communication with other providers: RN, PT    Subjective:  Patient states:  \"We can do the jitterbug\"  Pain:   Location, Type, Intensity (0/10 to 10/10):  denies    Objective:    Observation: supine in bed, agreeable. Incontinent of urine prior to arrival  Objective Measures:  vitals stable on room air    Treatment, including education:  Self Care Training:   Cues were given for safety, sequence, UE/LE placement, visual cues, and balance.    Activities performed today included UB/LB dressing tasks, toileting, hand hygiene at sink    STS to/from standard toilet with min A, Vcs for hand placement and alignment. SBA hygiene, min A clothing mgmt.   Min A to doff/don gown while seated. Min A to don/doff depends in sitting/standing to thread bilat Les through. SBA LB sponge bathing in sitting/standing.  CGA standing at the sink to wash hands and wash face.      Req frequent Vcs with all ADL tasks for attention to task, safety, and thoroughness.      Therapeutic Activity Training:   Therapeutic activity training was instructed today.  Cues were given for safety, sequence, UE/LE placement, awareness, and balance.    Activities performed today included bed mobility training, sup-sit, sit-stand, ambulation.    Supine to sitting EOB w/ SBA, VCS for initiation.   STS from EOB w/ CGA, Vcs for hand placement.   Ambulated to/from BR + functional household distance using RW w/ CGA - min A, Vcs for pathway, safety, posture, RW mgmt, and attention to task.   Stand to sit to recliner w/ CGA, Vcs for hand placement, alignment, and controlled descent to chair.     Left seated in recliner w/ all needs met, alarm on.     Assessment / Impression:    Patient's tolerance of treatment: 
Occupational Therapy  Attempted to see pt on this date for OT session. Pt eating breakfast at this time will attempt as able and appropriate.    Wanda MORALES  
Physical Therapy    Physical Therapy Treatment Note  Name: Cary Rogers MRN: [id number] :   1935   Date:  2024   Admission Date: 2024 Room:  [address] [place] [address]-A     PT session attempted: Pt. Getting ready to D/C. PT will f/u tomorow if pt. Remains in house.     Electronically signed by:    Kiel Hernandez PTA  2024, 2:15 PM    
Moderate  Prognosis: Good  Plan: 4x/week    Goals:  1. Pt will complete all aspects of bed mobility for EOB/OOB ADLs IND.  2. Pt will complete UB/LB bathing SUP.  3. Pt will complete all aspects of LB dressing SBA.  4. Pt will complete all functional transfers to and from bed, chair, toilet, shower chair SBA.  5. Pt will ambulate HH distance to bathroom for toileting SBA using RW.  6. Pt will complete all aspects of toileting task SBA.  7. Pt will complete oral hygiene/grooming routine in standing at sink SUP.  8. Pt will complete ther ex/ther act with focus on balance, sequencing, and safety awareness.    Time:   Time in: 1418  Time out: 1447  Timed treatment minutes: 9  Total time: 29    Electronically signed by:    CAMILLE Villela/FALGUNI Santacruz, OTR/L, MOT, [id number]    \"I, the qualified professional, was present and in the room for the entire session. The student participates in the delivery of services when the qualified practitioner is directing the service, making the skilled judgment, and is responsible for the assessment and treatment.\"  
10/24/2023 03:00 PM     Troponin:   Lab Results   Component Value Date/Time    TROPONINT <0.010 10/08/2022 09:26 AM    TROPONINT <0.010 10/07/2022 08:25 PM    TROPONINT <0.010 09/18/2021 04:08 AM     Lactic Acid: No results for input(s): \"LACTA\" in the last 72 hours.  BNP: No results for input(s): \"PROBNP\" in the last 72 hours.  UA:  Lab Results   Component Value Date/Time    NITRU NEGATIVE 10/08/2022 12:27 AM    NITRU Negative 06/04/2019 03:55 PM    COLORU yellow 10/10/2023 04:25 PM    COLORU YELLOW 10/08/2022 12:27 AM    PHUR 5.5 10/10/2023 04:25 PM    PHUR 6.0 06/04/2019 03:55 PM    WBCUA 3 10/08/2022 12:27 AM    RBCUA 1 10/08/2022 12:27 AM    MUCUS RARE 11/08/2013 01:45 PM    TRICHOMONAS NONE SEEN 10/08/2022 12:27 AM    BACTERIA RARE 10/08/2022 12:27 AM    CLARITYU clear 10/10/2023 04:25 PM    CLARITYU CLEAR 10/08/2022 12:27 AM    SPECGRAV 1.010 10/10/2023 04:25 PM    LEUKOCYTESUR small 10/10/2023 04:25 PM    LEUKOCYTESUR NEGATIVE 10/08/2022 12:27 AM    UROBILINOGEN 0.2 10/08/2022 12:27 AM    BILIRUBINUR neg 10/10/2023 04:25 PM    BLOODU small 10/10/2023 04:25 PM    BLOODU TRACE 10/08/2022 12:27 AM    GLUCOSEU neg 10/10/2023 04:25 PM    GLUCOSEU Negative 06/04/2019 03:55 PM    KETUA neg 10/10/2023 04:25 PM    KETUA NEGATIVE 10/08/2022 12:27 AM     Urine Cultures:   Lab Results   Component Value Date/Time    LABURIN  06/04/2019 03:55 PM     <10,000 CFU/ml mixed skin/urogenital ange. No further workup    LABURIN  06/04/2019 03:55 PM     25,000 CFU/ml  Susceptibility testing of penicillin and other beta-lactams is  not necessary for beta hemolytic Streptococci since resistant  strains have not been identified. (CLSI M100)         Organism:   Lab Results   Component Value Date/Time    ORG BHS Group B (Strep agalacticae) 06/04/2019 03:55 PM         Electronically signed by Chris Saravia MD on 6/21/2024 at 12:59 PM

## 2024-06-26 ENCOUNTER — HOSPITAL ENCOUNTER (OUTPATIENT)
Age: 89
Setting detail: SPECIMEN
Discharge: HOME | End: 2024-06-26

## 2024-06-26 LAB
ALBUMIN SERPL-MCNC: 3.4 GM/DL (ref 3.4–5)
ALP BLD-CCNC: 96 IU/L (ref 40–128)
ALT SERPL-CCNC: 16 U/L (ref 10–40)
ANION GAP SERPL CALCULATED.3IONS-SCNC: 9 MMOL/L (ref 7–16)
AST SERPL-CCNC: 18 IU/L (ref 15–37)
BASOPHILS ABSOLUTE: 0 K/CU MM
BASOPHILS RELATIVE PERCENT: 0.5 % (ref 0–1)
BILIRUB SERPL-MCNC: 0.4 MG/DL (ref 0–1)
BUN SERPL-MCNC: 36 MG/DL (ref 6–23)
CALCIUM SERPL-MCNC: 8.8 MG/DL (ref 8.3–10.6)
CHLORIDE BLD-SCNC: 107 MMOL/L (ref 99–110)
CO2: 30 MMOL/L (ref 21–32)
CREAT SERPL-MCNC: 1.1 MG/DL (ref 0.6–1.1)
DIFFERENTIAL TYPE: ABNORMAL
EOSINOPHILS ABSOLUTE: 0.1 K/CU MM
EOSINOPHILS RELATIVE PERCENT: 0.9 % (ref 0–3)
GFR, ESTIMATED: 48 ML/MIN/1.73M2
GLUCOSE SERPL-MCNC: 70 MG/DL (ref 70–99)
HCT VFR BLD CALC: 37.3 % (ref 37–47)
HEMOGLOBIN: 11.5 GM/DL (ref 12.5–16)
IMMATURE NEUTROPHIL %: 0.2 % (ref 0–0.43)
LYMPHOCYTES ABSOLUTE: 1.4 K/CU MM
LYMPHOCYTES RELATIVE PERCENT: 25 % (ref 24–44)
MCH RBC QN AUTO: 27.2 PG (ref 27–31)
MCHC RBC AUTO-ENTMCNC: 30.8 % (ref 32–36)
MCV RBC AUTO: 88.2 FL (ref 78–100)
MONOCYTES ABSOLUTE: 0.7 K/CU MM
MONOCYTES RELATIVE PERCENT: 12.3 % (ref 0–4)
NEUTROPHILS ABSOLUTE: 3.4 K/CU MM
NEUTROPHILS RELATIVE PERCENT: 61.1 % (ref 36–66)
NUCLEATED RBC %: 0 %
PDW BLD-RTO: 14.3 % (ref 11.7–14.9)
PLATELET # BLD: 204 K/CU MM (ref 140–440)
PMV BLD AUTO: 10.6 FL (ref 7.5–11.1)
POTASSIUM SERPL-SCNC: 4.3 MMOL/L (ref 3.5–5.1)
RBC # BLD: 4.23 M/CU MM (ref 4.2–5.4)
SODIUM BLD-SCNC: 146 MMOL/L (ref 135–145)
TOTAL IMMATURE NEUTOROPHIL: 0.01 K/CU MM
TOTAL NUCLEATED RBC: 0 K/CU MM
TOTAL PROTEIN: 5.9 GM/DL (ref 6.4–8.2)
WBC # BLD: 5.5 K/CU MM (ref 4–10.5)

## 2024-06-26 PROCEDURE — 80053 COMPREHEN METABOLIC PANEL: CPT

## 2024-06-26 PROCEDURE — 36415 COLL VENOUS BLD VENIPUNCTURE: CPT

## 2024-06-26 PROCEDURE — 85025 COMPLETE CBC W/AUTO DIFF WBC: CPT

## 2024-07-11 ENCOUNTER — APPOINTMENT (OUTPATIENT)
Dept: CT IMAGING | Age: 89
End: 2024-07-11
Payer: MEDICARE

## 2024-07-11 ENCOUNTER — APPOINTMENT (OUTPATIENT)
Dept: GENERAL RADIOLOGY | Age: 89
End: 2024-07-11
Payer: MEDICARE

## 2024-07-11 ENCOUNTER — HOSPITAL ENCOUNTER (EMERGENCY)
Age: 89
Discharge: NURSING, REHAB OR LONG-TERM CARE | End: 2024-07-12
Payer: MEDICARE

## 2024-07-11 VITALS
SYSTOLIC BLOOD PRESSURE: 165 MMHG | DIASTOLIC BLOOD PRESSURE: 87 MMHG | HEART RATE: 60 BPM | OXYGEN SATURATION: 99 % | RESPIRATION RATE: 19 BRPM | TEMPERATURE: 98 F

## 2024-07-11 DIAGNOSIS — M17.0 OSTEOARTHRITIS OF BOTH KNEES, UNSPECIFIED OSTEOARTHRITIS TYPE: ICD-10-CM
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
## 2024-07-11 DIAGNOSIS — S09.90XA CLOSED HEAD INJURY, INITIAL ENCOUNTER: ICD-10-CM

## 2024-07-11 DIAGNOSIS — M25.462 EFFUSION OF LEFT KNEE JOINT: ICD-10-CM

## 2024-07-11 LAB — GLUCOSE BLD-MCNC: 118 MG/DL (ref 70–99)

## 2024-07-11 PROCEDURE — 73564 X-RAY EXAM KNEE 4 OR MORE: CPT

## 2024-07-11 PROCEDURE — 72125 CT NECK SPINE W/O DYE: CPT

## 2024-07-11 PROCEDURE — 82962 GLUCOSE BLOOD TEST: CPT

## 2024-07-11 PROCEDURE — 70450 CT HEAD/BRAIN W/O DYE: CPT

## 2024-07-11 PROCEDURE — 99284 EMERGENCY DEPT VISIT MOD MDM: CPT

## 2024-07-11 PROCEDURE — 73030 X-RAY EXAM OF SHOULDER: CPT

## 2024-07-11 ASSESSMENT — PAIN DESCRIPTION - ORIENTATION: ORIENTATION: LEFT

## 2024-07-11 ASSESSMENT — PAIN DESCRIPTION - DESCRIPTORS: DESCRIPTORS: ACHING

## 2024-07-11 ASSESSMENT — PAIN - FUNCTIONAL ASSESSMENT: PAIN_FUNCTIONAL_ASSESSMENT: 0-10

## 2024-07-11 ASSESSMENT — PAIN DESCRIPTION - LOCATION: LOCATION: KNEE

## 2024-07-11 NOTE — ED TRIAGE NOTES
Pt to the ED via EMS with c/o having a fall. Pt is on xarelto. Per EMS, pt was found sitting down by nursing home staff. Pt denies hitting head, no LOC. Pt c/o left knee pain. Pt arrives A&Ox2, respirations even and unlabored.

## 2024-07-11 NOTE — ED PROVIDER NOTES
[facility] EMERGENCY DEPARTMENT  EMERGENCY DEPARTMENT ENCOUNTER        Pt Name: Cary Rogers  MRN: [id number]  Birthdate 1935  Date of evaluation: 7/11/2024  Provider: Manuel Cardona PA-C  PCP: Dewayne Jernigan PA      TOI. I have evaluated this patient.        CHIEF COMPLAINT      Chief Complaint   Patient presents with    Fall     On xarelto       HISTORY OF PRESENT ILLNESS:     History from : Patient and nursing, [facility] nurse on duty    Limitations to history : Dementia    Cary Rogers is a 89 y.o. female who presents via EMS from [facility].  Patient describes that she was sent here but unsure why and she does describe mild headache mentions a head injury .patient does mention some right shoulder pain, and that she has some ongoing knee pain but does not feel that it is worse since the fall but has been worse this week.  otherwise denying any concerns Nursing had reported that patient had fallen and is anticoagulated.  Nurse reports that EMS was mentioning concern for left knee pain patient does not recall falling.      I did speak with nursing staff at [facility] rehab who is familiar with patient.  Mention that patient had been accompanied with the aid he was clearing the room when patient was in the wheelchair.  And then a brief few seconds moment later she was found on the ground.  He mentions that he had a discussion with the patient who had   reported potential head injury.  We did give Tylenol prior to arrival did mention that nursing as well as there NP provider had seen the patient and there was not have any injury or trauma but given patient's concern of a head injury as well as anticoagulation and they sent patient to the emergency department.  The do confirm there is no loss of consciousness, nausea, vomiting, they do describe patient with a history of dementia at baseline.    Nursing Notes were all reviewed and agreed  obtained from the  vertex to the foramen magnum.  Spiral, high resolution axial unenhanced images were obtained from the skull base to the cervicothoracic junction with sagittal and coronal planar reconstructions.  All CT scans at this facility use dose modulation, iterative reconstruction, and/or weight based dosing when appropriate to reduce radiation dose to as low as reasonably achievable. COMPARISON:  Multiple CTs of the brain and cervical spine most recently 6/20/2024 RESULT: BRAIN:  Acute change:   No evidence of an acute contusion or other acute parenchymal process.   Hemorrhage:    No evidence of acute intracranial hemorrhage.   Mass lesion / Mass effect:   There is no evidence of an intracranial mass or extraaxial fluid collection.  No significant mass effect.   Chronic change:   Extensive confluent foci of low attenuation are present in the supratentorial white matter which is nonspecifice but likely represents extensive microvascular ischemia. Parenchyma:  There is moderate generalized volume loss. 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 Ventricles:   Ventricular enlargement concordant with the degree of parenchymal volume loss. Soft Tissues:  No significant superficial soft tissue swelling. Facial bones:  No evidence of an acute fracture in the visualized facial bones. Orbits: Bilateral lens replacements. Paranasal Sinuses:  The paranasal sinuses are clear. Mastoid air cells: Clear. CERVICAL: Counting reference:  Craniocervical junction.   Alignment:    Straightening of the cervical lordosis. Atlantoaxial interval is maintained. Vertebral body heights and disc views are within normal limits. Craniocervical junction:    Craniocervical junction is normal. Osseous structures/fracture:    No evidence of a lytic or blastic process in the visualized spine.  No evidence of acute or chronic fracture. Cervical soft tissues:    The paraspinal soft tissues planes are maintained. Diffuse osteopenia.

## 2024-07-12 NOTE — DISCHARGE INSTRUCTIONS
Follow-up with your primary care team for reevaluation of any persisting symptoms reevaluation of head injury, and your history of syncope.    Follow-up with your primary care provider for reevaluation of your blood pressure which was elevated today, as well as your knee x-rays that show some arthritis and a small left knee effusion    Return with any difficulty breathing, vomiting worsening symptoms or any new concerns

## 2024-08-28 ENCOUNTER — HOSPITAL ENCOUNTER (OUTPATIENT)
Age: 89
Setting detail: SPECIMEN
Discharge: HOME | End: 2024-08-28
Payer: MEDICARE

## 2024-08-28 LAB
BACTERIA: ABNORMAL /HPF
BILIRUBIN, URINE: NEGATIVE MG/DL
BLOOD, URINE: ABNORMAL
CALCIUM OXALATE CRYSTALS: ABNORMAL /HPF
CLARITY, UA: ABNORMAL
COLOR, UA: YELLOW
GLUCOSE URINE: NEGATIVE MG/DL
KETONES, URINE: NEGATIVE MG/DL
LEUKOCYTE ESTERASE, URINE: ABNORMAL
MUCUS: ABNORMAL HPF
NITRITE URINE, QUANTITATIVE: NEGATIVE
PH, URINE: 5 (ref 5–8)
PROTEIN UA: NEGATIVE MG/DL
RBC URINE: 20 /HPF (ref 0–6)
SPECIFIC GRAVITY UA: 1.02 (ref 1–1.03)
SQUAMOUS EPITHELIAL: 15 /HPF
TRICHOMONAS: ABNORMAL /HPF
UROBILINOGEN, URINE: 0.2 MG/DL (ref 0.2–1)
WBC UA: 8 /HPF (ref 0–5)
YEAST: ABNORMAL /HPF

## 2024-08-28 PROCEDURE — 81001 URINALYSIS AUTO W/SCOPE: CPT

## 2024-09-04 ENCOUNTER — HOSPITAL ENCOUNTER (OUTPATIENT)
Age: 89
Setting detail: SPECIMEN
Discharge: HOME | End: 2024-09-04

## 2024-09-04 PROCEDURE — 81003 URINALYSIS AUTO W/O SCOPE: CPT

## 2024-09-05 ENCOUNTER — APPOINTMENT (OUTPATIENT)
Dept: CT IMAGING | Age: 89
End: 2024-09-05
Payer: MEDICARE

## 2024-09-05 ENCOUNTER — HOSPITAL ENCOUNTER (INPATIENT)
Age: 89
LOS: 9 days | Discharge: NURSING, REHAB OR LONG-TERM CARE | End: 2024-09-14
Attending: STUDENT IN AN ORGANIZED HEALTH CARE EDUCATION/TRAINING PROGRAM | Admitting: STUDENT IN AN ORGANIZED HEALTH CARE EDUCATION/TRAINING PROGRAM
Payer: MEDICARE

## 2024-09-05 ENCOUNTER — HOSPITAL ENCOUNTER (OUTPATIENT)
Age: 89
Setting detail: SPECIMEN
Discharge: HOME | End: 2024-09-05
Payer: MEDICARE

## 2024-09-05 ENCOUNTER — APPOINTMENT (OUTPATIENT)
Dept: GENERAL RADIOLOGY | Age: 89
End: 2024-09-05
Payer: MEDICARE

## 2024-09-05 ENCOUNTER — APPOINTMENT (OUTPATIENT)
Dept: GENERAL RADIOLOGY | Age: 89
End: 2024-09-05
Attending: STUDENT IN AN ORGANIZED HEALTH CARE EDUCATION/TRAINING PROGRAM
Payer: MEDICARE

## 2024-09-05 DIAGNOSIS — Z79.01 CHRONIC ANTICOAGULATION: ICD-10-CM

## 2024-09-05 DIAGNOSIS — I48.91 ATRIAL FIBRILLATION, UNSPECIFIED TYPE (HCC): ICD-10-CM

## 2024-09-05 DIAGNOSIS — K62.89 PROCTITIS: ICD-10-CM

## 2024-09-05 DIAGNOSIS — R79.89 ELEVATED TROPONIN: ICD-10-CM

## 2024-09-05 DIAGNOSIS — R79.89 ELEVATED SERUM CREATININE: ICD-10-CM

## 2024-09-05 DIAGNOSIS — K92.2 GASTROINTESTINAL HEMORRHAGE, UNSPECIFIED GASTROINTESTINAL HEMORRHAGE TYPE: ICD-10-CM

## 2024-09-05 DIAGNOSIS — D62 ACUTE BLOOD LOSS ANEMIA: Primary | ICD-10-CM

## 2024-09-05 LAB
ALBUMIN SERPL-MCNC: 3.4 GM/DL (ref 3.4–5)
ALBUMIN SERPL-MCNC: 3.4 GM/DL (ref 3.4–5)
ALP BLD-CCNC: 74 IU/L (ref 40–128)
ALP BLD-CCNC: 75 IU/L (ref 40–128)
ALT SERPL-CCNC: 10 U/L (ref 10–40)
ALT SERPL-CCNC: 10 U/L (ref 10–40)
ANION GAP SERPL CALCULATED.3IONS-SCNC: 10 MMOL/L (ref 7–16)
ANION GAP SERPL CALCULATED.3IONS-SCNC: 11 MMOL/L (ref 7–16)
AST SERPL-CCNC: 16 IU/L (ref 15–37)
AST SERPL-CCNC: 17 IU/L (ref 15–37)
BASOPHILS ABSOLUTE: 0 K/CU MM
BASOPHILS RELATIVE PERCENT: 0.4 % (ref 0–1)
BILIRUB SERPL-MCNC: 0.2 MG/DL (ref 0–1)
BILIRUB SERPL-MCNC: 0.3 MG/DL (ref 0–1)
BILIRUBIN, URINE: NEGATIVE MG/DL
BLOOD, URINE: NEGATIVE
BUN SERPL-MCNC: 37 MG/DL (ref 6–23)
BUN SERPL-MCNC: 38 MG/DL (ref 6–23)
CALCIUM SERPL-MCNC: 8.1 MG/DL (ref 8.3–10.6)
CALCIUM SERPL-MCNC: 8.3 MG/DL (ref 8.3–10.6)
CHLORIDE BLD-SCNC: 110 MMOL/L (ref 99–110)
CHLORIDE BLD-SCNC: 111 MMOL/L (ref 99–110)
CLARITY, UA: CLEAR
CO2: 23 MMOL/L (ref 21–32)
CO2: 23 MMOL/L (ref 21–32)
COLOR, UA: YELLOW
COMMENT UA: NORMAL
CREAT SERPL-MCNC: 1.2 MG/DL (ref 0.6–1.1)
CREAT SERPL-MCNC: 1.2 MG/DL (ref 0.6–1.1)
DIFFERENTIAL TYPE: ABNORMAL
EOSINOPHILS ABSOLUTE: 0.1 K/CU MM
EOSINOPHILS RELATIVE PERCENT: 1.5 % (ref 0–3)
FERRITIN: 46 NG/ML (ref 15–150)
FOLATE SERPL-MCNC: 15.6 NG/ML (ref 3.1–17.5)
GFR, ESTIMATED: 43 ML/MIN/1.73M2
GFR, ESTIMATED: 43 ML/MIN/1.73M2
GLUCOSE SERPL-MCNC: 103 MG/DL (ref 70–99)
GLUCOSE SERPL-MCNC: 79 MG/DL (ref 70–99)
GLUCOSE URINE: NEGATIVE MG/DL
HCT VFR BLD CALC: 20.4 % (ref 37–47)
HCT VFR BLD CALC: 20.6 % (ref 37–47)
HEMOGLOBIN: 6.1 GM/DL (ref 12.5–16)
HEMOGLOBIN: 6.2 GM/DL (ref 12.5–16)
IMMATURE NEUTROPHIL %: 0.2 % (ref 0–0.43)
INR BLD: 2.5 INDEX
KETONES, URINE: NEGATIVE MG/DL
LEUKOCYTE ESTERASE, URINE: NEGATIVE
LYMPHOCYTES ABSOLUTE: 1.5 K/CU MM
LYMPHOCYTES RELATIVE PERCENT: 33.8 % (ref 24–44)
MCH RBC QN AUTO: 28.5 PG (ref 27–31)
MCH RBC QN AUTO: 28.6 PG (ref 27–31)
MCHC RBC AUTO-ENTMCNC: 29.9 % (ref 32–36)
MCHC RBC AUTO-ENTMCNC: 30.1 % (ref 32–36)
MCV RBC AUTO: 94.9 FL (ref 78–100)
MCV RBC AUTO: 95.3 FL (ref 78–100)
MONOCYTES ABSOLUTE: 0.5 K/CU MM
MONOCYTES RELATIVE PERCENT: 10.6 % (ref 0–4)
NEUTROPHILS ABSOLUTE: 2.4 K/CU MM
NEUTROPHILS RELATIVE PERCENT: 53.5 % (ref 36–66)
NITRITE URINE, QUANTITATIVE: NEGATIVE
NUCLEATED RBC %: 0 %
PDW BLD-RTO: 17.6 % (ref 11.7–14.9)
PDW BLD-RTO: 17.6 % (ref 11.7–14.9)
PH, URINE: 5.5 (ref 5–8)
PLATELET # BLD: 194 K/CU MM (ref 140–440)
PLATELET # BLD: 200 K/CU MM (ref 140–440)
PMV BLD AUTO: 10.2 FL (ref 7.5–11.1)
PMV BLD AUTO: 9.9 FL (ref 7.5–11.1)
POTASSIUM SERPL-SCNC: 3.4 MMOL/L (ref 3.5–5.1)
POTASSIUM SERPL-SCNC: 4.3 MMOL/L (ref 3.5–5.1)
PROTEIN UA: NEGATIVE MG/DL
PROTHROMBIN TIME: 27.6 SECONDS (ref 11.7–14.5)
RBC # BLD: 2.14 M/CU MM (ref 4.2–5.4)
RBC # BLD: 2.17 M/CU MM (ref 4.2–5.4)
RETICULOCYTE COUNT PCT: 5.2 % (ref 0.2–2.2)
SODIUM BLD-SCNC: 143 MMOL/L (ref 135–145)
SODIUM BLD-SCNC: 145 MMOL/L (ref 135–145)
SPECIFIC GRAVITY UA: 1.01 (ref 1–1.03)
TOTAL IMMATURE NEUTOROPHIL: 0.01 K/CU MM
TOTAL NUCLEATED RBC: 0 K/CU MM
TOTAL PROTEIN: 5.6 GM/DL (ref 6.4–8.2)
TOTAL PROTEIN: 5.6 GM/DL (ref 6.4–8.2)
TROPONIN, HIGH SENSITIVITY: 25 NG/L (ref 0–14)
UROBILINOGEN, URINE: 0.2 MG/DL (ref 0.2–1)
VITAMIN B-12: 537.5 PG/ML (ref 211–911)
WBC # BLD: 4.5 K/CU MM (ref 4–10.5)
WBC # BLD: 4.7 K/CU MM (ref 4–10.5)

## 2024-09-05 PROCEDURE — 83550 IRON BINDING TEST: CPT

## 2024-09-05 PROCEDURE — 82728 ASSAY OF FERRITIN: CPT

## 2024-09-05 PROCEDURE — 85025 COMPLETE CBC W/AUTO DIFF WBC: CPT

## 2024-09-05 PROCEDURE — 86900 BLOOD TYPING SEROLOGIC ABO: CPT

## 2024-09-05 PROCEDURE — 83540 ASSAY OF IRON: CPT

## 2024-09-05 PROCEDURE — 71045 X-RAY EXAM CHEST 1 VIEW: CPT

## 2024-09-05 PROCEDURE — P9016 RBC LEUKOCYTES REDUCED: HCPCS

## 2024-09-05 PROCEDURE — 99285 EMERGENCY DEPT VISIT HI MDM: CPT

## 2024-09-05 PROCEDURE — 36415 COLL VENOUS BLD VENIPUNCTURE: CPT

## 2024-09-05 PROCEDURE — 86850 RBC ANTIBODY SCREEN: CPT

## 2024-09-05 PROCEDURE — 85045 AUTOMATED RETICULOCYTE COUNT: CPT

## 2024-09-05 PROCEDURE — 85610 PROTHROMBIN TIME: CPT

## 2024-09-05 PROCEDURE — 85027 COMPLETE CBC AUTOMATED: CPT

## 2024-09-05 PROCEDURE — 6370000000 HC RX 637 (ALT 250 FOR IP): Performed by: STUDENT IN AN ORGANIZED HEALTH CARE EDUCATION/TRAINING PROGRAM

## 2024-09-05 PROCEDURE — 82746 ASSAY OF FOLIC ACID SERUM: CPT

## 2024-09-05 PROCEDURE — 84145 PROCALCITONIN (PCT): CPT

## 2024-09-05 PROCEDURE — 86922 COMPATIBILITY TEST ANTIGLOB: CPT

## 2024-09-05 PROCEDURE — 80053 COMPREHEN METABOLIC PANEL: CPT

## 2024-09-05 PROCEDURE — 73502 X-RAY EXAM HIP UNI 2-3 VIEWS: CPT

## 2024-09-05 PROCEDURE — 82607 VITAMIN B-12: CPT

## 2024-09-05 PROCEDURE — 74174 CTA ABD&PLVS W/CONTRAST: CPT

## 2024-09-05 PROCEDURE — 73564 X-RAY EXAM KNEE 4 OR MORE: CPT

## 2024-09-05 PROCEDURE — 2140000000 HC CCU INTERMEDIATE R&B

## 2024-09-05 PROCEDURE — 6360000004 HC RX CONTRAST MEDICATION: Performed by: STUDENT IN AN ORGANIZED HEALTH CARE EDUCATION/TRAINING PROGRAM

## 2024-09-05 PROCEDURE — 84484 ASSAY OF TROPONIN QUANT: CPT

## 2024-09-05 PROCEDURE — 30233N1 TRANSFUSION OF NONAUTOLOGOUS RED BLOOD CELLS INTO PERIPHERAL VEIN, PERCUTANEOUS APPROACH: ICD-10-PCS | Performed by: STUDENT IN AN ORGANIZED HEALTH CARE EDUCATION/TRAINING PROGRAM

## 2024-09-05 PROCEDURE — 86901 BLOOD TYPING SEROLOGIC RH(D): CPT

## 2024-09-05 PROCEDURE — 93005 ELECTROCARDIOGRAM TRACING: CPT | Performed by: STUDENT IN AN ORGANIZED HEALTH CARE EDUCATION/TRAINING PROGRAM

## 2024-09-05 RX ORDER — PANTOPRAZOLE SODIUM 40 MG/10ML
40 INJECTION, POWDER, LYOPHILIZED, FOR SOLUTION INTRAVENOUS 2 TIMES DAILY
Status: DISCONTINUED | OUTPATIENT
Start: 2024-09-06 | End: 2024-09-14 | Stop reason: HOSPADM

## 2024-09-05 RX ORDER — METOPROLOL SUCCINATE 25 MG/1
25 TABLET, EXTENDED RELEASE ORAL EVERY EVENING
Status: DISCONTINUED | OUTPATIENT
Start: 2024-09-05 | End: 2024-09-14 | Stop reason: HOSPADM

## 2024-09-05 RX ORDER — POTASSIUM CHLORIDE 1500 MG/1
20 TABLET, EXTENDED RELEASE ORAL ONCE
Status: COMPLETED | OUTPATIENT
Start: 2024-09-05 | End: 2024-09-06

## 2024-09-05 RX ORDER — SODIUM CHLORIDE 0.9 % (FLUSH) 0.9 %
5-40 SYRINGE (ML) INJECTION EVERY 12 HOURS SCHEDULED
Status: DISCONTINUED | OUTPATIENT
Start: 2024-09-05 | End: 2024-09-14 | Stop reason: HOSPADM

## 2024-09-05 RX ORDER — PANTOPRAZOLE SODIUM 40 MG/10ML
80 INJECTION, POWDER, LYOPHILIZED, FOR SOLUTION INTRAVENOUS ONCE
Status: COMPLETED | OUTPATIENT
Start: 2024-09-05 | End: 2024-09-08

## 2024-09-05 RX ORDER — SODIUM CHLORIDE 0.9 % (FLUSH) 0.9 %
5-40 SYRINGE (ML) INJECTION PRN
Status: DISCONTINUED | OUTPATIENT
Start: 2024-09-05 | End: 2024-09-14 | Stop reason: HOSPADM

## 2024-09-05 RX ORDER — METRONIDAZOLE 500 MG/100ML
500 INJECTION, SOLUTION INTRAVENOUS ONCE
Status: DISCONTINUED | OUTPATIENT
Start: 2024-09-05 | End: 2024-09-05

## 2024-09-05 RX ORDER — 0.9 % SODIUM CHLORIDE 0.9 %
1000 INTRAVENOUS SOLUTION INTRAVENOUS ONCE
Status: DISCONTINUED | OUTPATIENT
Start: 2024-09-05 | End: 2024-09-05

## 2024-09-05 RX ORDER — IOPAMIDOL 755 MG/ML
80 INJECTION, SOLUTION INTRAVASCULAR
Status: COMPLETED | OUTPATIENT
Start: 2024-09-05 | End: 2024-09-05

## 2024-09-05 RX ORDER — LANOLIN ALCOHOL/MO/W.PET/CERES
3 CREAM (GRAM) TOPICAL NIGHTLY PRN
Status: DISCONTINUED | OUTPATIENT
Start: 2024-09-05 | End: 2024-09-14 | Stop reason: HOSPADM

## 2024-09-05 RX ORDER — LEVOTHYROXINE SODIUM 50 UG/1
50 TABLET ORAL
Status: DISCONTINUED | OUTPATIENT
Start: 2024-09-06 | End: 2024-09-14 | Stop reason: HOSPADM

## 2024-09-05 RX ORDER — ONDANSETRON 4 MG/1
4 TABLET, ORALLY DISINTEGRATING ORAL EVERY 8 HOURS PRN
Status: DISCONTINUED | OUTPATIENT
Start: 2024-09-05 | End: 2024-09-14 | Stop reason: HOSPADM

## 2024-09-05 RX ORDER — SODIUM CHLORIDE 9 MG/ML
INJECTION, SOLUTION INTRAVENOUS PRN
Status: DISCONTINUED | OUTPATIENT
Start: 2024-09-05 | End: 2024-09-14 | Stop reason: HOSPADM

## 2024-09-05 RX ORDER — ACETAMINOPHEN 325 MG/1
650 TABLET ORAL EVERY 6 HOURS SCHEDULED
Status: DISCONTINUED | OUTPATIENT
Start: 2024-09-05 | End: 2024-09-14 | Stop reason: HOSPADM

## 2024-09-05 RX ORDER — ONDANSETRON 2 MG/ML
4 INJECTION INTRAMUSCULAR; INTRAVENOUS EVERY 6 HOURS PRN
Status: DISCONTINUED | OUTPATIENT
Start: 2024-09-05 | End: 2024-09-14 | Stop reason: HOSPADM

## 2024-09-05 RX ADMIN — POLYETHYLENE GLYCOL 3350, SODIUM SULFATE ANHYDROUS, SODIUM BICARBONATE, SODIUM CHLORIDE, POTASSIUM CHLORIDE 4000 ML: 236; 22.74; 6.74; 5.86; 2.97 POWDER, FOR SOLUTION ORAL at 23:15

## 2024-09-05 RX ADMIN — IOPAMIDOL 80 ML: 755 INJECTION, SOLUTION INTRAVENOUS at 19:36

## 2024-09-05 ASSESSMENT — PAIN SCALES - GENERAL: PAINLEVEL_OUTOF10: 0

## 2024-09-06 ENCOUNTER — APPOINTMENT (OUTPATIENT)
Dept: CT IMAGING | Age: 89
End: 2024-09-06
Payer: MEDICARE

## 2024-09-06 ENCOUNTER — ANESTHESIA EVENT (OUTPATIENT)
Dept: ENDOSCOPY | Age: 89
End: 2024-09-06
Payer: MEDICARE

## 2024-09-06 PROBLEM — D62 ACUTE BLOOD LOSS ANEMIA: Status: ACTIVE | Noted: 2024-09-06

## 2024-09-06 LAB
ALBUMIN SERPL-MCNC: 3.1 GM/DL (ref 3.4–5)
ALP BLD-CCNC: 72 IU/L (ref 40–129)
ALT SERPL-CCNC: 11 U/L (ref 10–40)
ANION GAP SERPL CALCULATED.3IONS-SCNC: 8 MMOL/L (ref 7–16)
AST SERPL-CCNC: 15 IU/L (ref 15–37)
BASOPHILS ABSOLUTE: 0 K/CU MM
BASOPHILS RELATIVE PERCENT: 0.5 % (ref 0–1)
BILIRUB SERPL-MCNC: 0.6 MG/DL (ref 0–1)
BILIRUBIN, URINE: NEGATIVE MG/DL
BLOOD, URINE: NEGATIVE
BUN SERPL-MCNC: 38 MG/DL (ref 6–23)
CALCIUM SERPL-MCNC: 7.9 MG/DL (ref 8.3–10.6)
CHLORIDE BLD-SCNC: 113 MMOL/L (ref 99–110)
CLARITY, UA: CLEAR
CO2: 22 MMOL/L (ref 21–32)
COLOR, UA: YELLOW
COMMENT UA: NORMAL
CREAT SERPL-MCNC: 1.2 MG/DL (ref 0.6–1.1)
DIFFERENTIAL TYPE: ABNORMAL
EOSINOPHILS ABSOLUTE: 0 K/CU MM
EOSINOPHILS RELATIVE PERCENT: 0.5 % (ref 0–3)
GFR, ESTIMATED: 43 ML/MIN/1.73M2
GLUCOSE BLD-MCNC: 120 MG/DL (ref 70–99)
GLUCOSE SERPL-MCNC: 82 MG/DL (ref 70–99)
GLUCOSE URINE: NEGATIVE MG/DL
HCT VFR BLD CALC: 21.1 % (ref 37–47)
HCT VFR BLD CALC: 22.4 % (ref 37–47)
HCT VFR BLD CALC: 26.6 % (ref 37–47)
HCT VFR BLD CALC: 26.9 % (ref 37–47)
HCT VFR BLD CALC: 31.2 % (ref 37–47)
HEMOCCULT STL QL: POSITIVE
HEMOGLOBIN: 6.6 GM/DL (ref 12.5–16)
HEMOGLOBIN: 7.1 GM/DL (ref 12.5–16)
HEMOGLOBIN: 8.5 GM/DL (ref 12.5–16)
HEMOGLOBIN: 8.6 GM/DL (ref 12.5–16)
HEMOGLOBIN: 9.4 GM/DL (ref 12.5–16)
IMMATURE NEUTROPHIL %: 0.2 % (ref 0–0.43)
INR BLD: 2.1 INDEX
IRON: 35 UG/DL (ref 37–145)
KETONES, URINE: NEGATIVE MG/DL
LEUKOCYTE ESTERASE, URINE: NEGATIVE
LYMPHOCYTES ABSOLUTE: 1.5 K/CU MM
LYMPHOCYTES RELATIVE PERCENT: 26.3 % (ref 24–44)
MCH RBC QN AUTO: 28.4 PG (ref 27–31)
MCHC RBC AUTO-ENTMCNC: 31.7 % (ref 32–36)
MCV RBC AUTO: 89.6 FL (ref 78–100)
MONOCYTES ABSOLUTE: 0.6 K/CU MM
MONOCYTES RELATIVE PERCENT: 10.8 % (ref 0–4)
NEUTROPHILS ABSOLUTE: 3.5 K/CU MM
NEUTROPHILS RELATIVE PERCENT: 61.7 % (ref 36–66)
NITRITE URINE, QUANTITATIVE: NEGATIVE
NUCLEATED RBC %: 0 %
PCT TRANSFERRIN: 12 % (ref 10–44)
PDW BLD-RTO: 18.2 % (ref 11.7–14.9)
PH, URINE: 5 (ref 5–8)
PLATELET # BLD: 180 K/CU MM (ref 140–440)
PMV BLD AUTO: 9.6 FL (ref 7.5–11.1)
POTASSIUM SERPL-SCNC: 4 MMOL/L (ref 3.5–5.1)
PROCALCITONIN SERPL-MCNC: 0.04 NG/ML
PROTEIN UA: NEGATIVE MG/DL
PROTHROMBIN TIME: 24.2 SECONDS (ref 11.7–14.5)
RBC # BLD: 2.5 M/CU MM (ref 4.2–5.4)
SODIUM BLD-SCNC: 143 MMOL/L (ref 135–145)
SPECIFIC GRAVITY UA: 1.02 (ref 1–1.03)
TOTAL IMMATURE NEUTOROPHIL: 0.01 K/CU MM
TOTAL IRON BINDING CAPACITY: 282 UG/DL (ref 250–450)
TOTAL NUCLEATED RBC: 0 K/CU MM
TOTAL PROTEIN: 4.6 GM/DL (ref 6.4–8.2)
TROPONIN, HIGH SENSITIVITY: 26 NG/L (ref 0–14)
TSH SERPL DL<=0.005 MIU/L-ACNC: 1.58 UIU/ML (ref 0.27–4.2)
UNSATURATED IRON BINDING CAPACITY: 247 UG/DL (ref 110–370)
UROBILINOGEN, URINE: 0.2 MG/DL (ref 0.2–1)
WBC # BLD: 5.7 K/CU MM (ref 4–10.5)

## 2024-09-06 PROCEDURE — 2140000000 HC CCU INTERMEDIATE R&B

## 2024-09-06 PROCEDURE — 36430 TRANSFUSION BLD/BLD COMPNT: CPT

## 2024-09-06 PROCEDURE — 85018 HEMOGLOBIN: CPT

## 2024-09-06 PROCEDURE — 80053 COMPREHEN METABOLIC PANEL: CPT

## 2024-09-06 PROCEDURE — 81003 URINALYSIS AUTO W/O SCOPE: CPT

## 2024-09-06 PROCEDURE — 6360000002 HC RX W HCPCS: Performed by: STUDENT IN AN ORGANIZED HEALTH CARE EDUCATION/TRAINING PROGRAM

## 2024-09-06 PROCEDURE — 82272 OCCULT BLD FECES 1-3 TESTS: CPT

## 2024-09-06 PROCEDURE — 73700 CT LOWER EXTREMITY W/O DYE: CPT

## 2024-09-06 PROCEDURE — APPNB60 APP NON BILLABLE TIME 46-60 MINS: Performed by: LICENSED PRACTICAL NURSE

## 2024-09-06 PROCEDURE — 2580000003 HC RX 258: Performed by: STUDENT IN AN ORGANIZED HEALTH CARE EDUCATION/TRAINING PROGRAM

## 2024-09-06 PROCEDURE — 99222 1ST HOSP IP/OBS MODERATE 55: CPT | Performed by: INTERNAL MEDICINE

## 2024-09-06 PROCEDURE — 94761 N-INVAS EAR/PLS OXIMETRY MLT: CPT

## 2024-09-06 PROCEDURE — 6370000000 HC RX 637 (ALT 250 FOR IP): Performed by: LICENSED PRACTICAL NURSE

## 2024-09-06 PROCEDURE — 85014 HEMATOCRIT: CPT

## 2024-09-06 PROCEDURE — 36415 COLL VENOUS BLD VENIPUNCTURE: CPT

## 2024-09-06 PROCEDURE — 85610 PROTHROMBIN TIME: CPT

## 2024-09-06 PROCEDURE — 82962 GLUCOSE BLOOD TEST: CPT

## 2024-09-06 PROCEDURE — P9016 RBC LEUKOCYTES REDUCED: HCPCS

## 2024-09-06 PROCEDURE — 85025 COMPLETE CBC W/AUTO DIFF WBC: CPT

## 2024-09-06 PROCEDURE — 84484 ASSAY OF TROPONIN QUANT: CPT

## 2024-09-06 PROCEDURE — 6370000000 HC RX 637 (ALT 250 FOR IP): Performed by: STUDENT IN AN ORGANIZED HEALTH CARE EDUCATION/TRAINING PROGRAM

## 2024-09-06 PROCEDURE — 84443 ASSAY THYROID STIM HORMONE: CPT

## 2024-09-06 RX ADMIN — PIPERACILLIN AND TAZOBACTAM 3375 MG: 3; .375 INJECTION, POWDER, LYOPHILIZED, FOR SOLUTION INTRAVENOUS at 04:51

## 2024-09-06 RX ADMIN — METOPROLOL SUCCINATE 25 MG: 25 TABLET, FILM COATED, EXTENDED RELEASE ORAL at 18:13

## 2024-09-06 RX ADMIN — LEVOTHYROXINE SODIUM 50 MCG: 0.05 TABLET ORAL at 04:38

## 2024-09-06 RX ADMIN — PANTOPRAZOLE SODIUM 40 MG: 40 INJECTION, POWDER, FOR SOLUTION INTRAVENOUS at 20:21

## 2024-09-06 RX ADMIN — SODIUM PHOSPHATE, DIBASIC AND SODIUM PHOSPHATE, MONOBASIC 1 ENEMA: 7; 19 ENEMA RECTAL at 11:13

## 2024-09-06 RX ADMIN — PIPERACILLIN AND TAZOBACTAM 4500 MG: 4; .5 INJECTION, POWDER, FOR SOLUTION INTRAVENOUS at 01:37

## 2024-09-06 RX ADMIN — PANTOPRAZOLE SODIUM 40 MG: 40 INJECTION, POWDER, FOR SOLUTION INTRAVENOUS at 10:26

## 2024-09-06 RX ADMIN — ACETAMINOPHEN 650 MG: 325 TABLET ORAL at 04:38

## 2024-09-06 RX ADMIN — SODIUM CHLORIDE, PRESERVATIVE FREE 10 ML: 5 INJECTION INTRAVENOUS at 10:25

## 2024-09-06 RX ADMIN — PIPERACILLIN AND TAZOBACTAM 3375 MG: 3; .375 INJECTION, POWDER, LYOPHILIZED, FOR SOLUTION INTRAVENOUS at 22:00

## 2024-09-06 RX ADMIN — FERRIC CARBOXYMALTOSE INJECTION 750 MG: 50 INJECTION, SOLUTION INTRAVENOUS at 15:17

## 2024-09-06 RX ADMIN — SODIUM CHLORIDE, PRESERVATIVE FREE 10 ML: 5 INJECTION INTRAVENOUS at 20:21

## 2024-09-06 RX ADMIN — PIPERACILLIN AND TAZOBACTAM 3375 MG: 3; .375 INJECTION, POWDER, LYOPHILIZED, FOR SOLUTION INTRAVENOUS at 15:02

## 2024-09-06 RX ADMIN — ACETAMINOPHEN 650 MG: 325 TABLET ORAL at 18:12

## 2024-09-06 RX ADMIN — MELATONIN TAB 3 MG 3 MG: 3 TAB at 20:21

## 2024-09-06 RX ADMIN — ACETAMINOPHEN 650 MG: 325 TABLET ORAL at 12:21

## 2024-09-06 RX ADMIN — POTASSIUM CHLORIDE 20 MEQ: 1500 TABLET, EXTENDED RELEASE ORAL at 01:36

## 2024-09-06 ASSESSMENT — PAIN SCALES - GENERAL
PAINLEVEL_OUTOF10: 3
PAINLEVEL_OUTOF10: 0
PAINLEVEL_OUTOF10: 0
PAINLEVEL_OUTOF10: 3
PAINLEVEL_OUTOF10: 0
PAINLEVEL_OUTOF10: 0
PAINLEVEL_OUTOF10: 5

## 2024-09-06 ASSESSMENT — PAIN DESCRIPTION - ORIENTATION
ORIENTATION: RIGHT

## 2024-09-06 ASSESSMENT — PAIN DESCRIPTION - LOCATION
LOCATION: HIP
LOCATION: HIP
LOCATION: SHOULDER

## 2024-09-06 ASSESSMENT — PAIN DESCRIPTION - DESCRIPTORS
DESCRIPTORS: ACHING
DESCRIPTORS: ACHING
DESCRIPTORS: ACHING;SHARP

## 2024-09-06 ASSESSMENT — PAIN - FUNCTIONAL ASSESSMENT: PAIN_FUNCTIONAL_ASSESSMENT: PREVENTS OR INTERFERES SOME ACTIVE ACTIVITIES AND ADLS

## 2024-09-06 ASSESSMENT — PAIN DESCRIPTION - PAIN TYPE: TYPE: CHRONIC PAIN

## 2024-09-06 ASSESSMENT — PAIN DESCRIPTION - ONSET: ONSET: ON-GOING

## 2024-09-06 ASSESSMENT — PAIN DESCRIPTION - FREQUENCY: FREQUENCY: CONTINUOUS

## 2024-09-06 NOTE — ANESTHESIA PRE PROCEDURE
Clyde East MD   10 mL at 09/06/24 1025    sodium chloride flush 0.9 % injection 5-40 mL  5-40 mL IntraVENous PRN Clyde East MD        0.9 % sodium chloride infusion   IntraVENous PRN Clyde East MD        ondansetron (ZOFRAN-ODT) disintegrating tablet 4 mg  4 mg Oral Q8H PRN Clyde East MD        Or    ondansetron (ZOFRAN) injection 4 mg  4 mg IntraVENous Q6H PRN Clyde East MD        melatonin tablet 3 mg  3 mg Oral Nightly PRN Clyde East MD        pantoprazole (PROTONIX) injection 40 mg  40 mg IntraVENous BID Clyde East MD   40 mg at 09/06/24 1026       Allergies:    Allergies   Allergen Reactions    Amitriptyline     Aspirin     Codeine     Elavil [Amitriptyline Hcl]     Hydroxychloroquine     Ibuprofen     Namenda [Memantine]      Possible hypotension/orthostasis    Plaquenil [Hydroxychloroquine Sulfate]     Prevnar 13 [Pneumococcal 13-Kristal Conj Vacc]     Theophyllines        Problem List:    Patient Active Problem List   Diagnosis Code    Type 2 diabetes mellitus with diabetic polyneuropathy, without long-term current use of insulin ([facility]) E11.42    A-fib ([facility]) I48.91    S/P biventricular cardiac pacemaker procedure Z95.0    OAB (overactive bladder) N32.81    SOB (shortness of breath) R06.02    Other specified hypothyroidism E03.8    Fibromyalgia M79.7    Moderate episode of recurrent major depressive disorder ([facility]) F33.1    Other cardiomyopathies ([facility]) I42.8    Syncope and collapse R55    Lupus ([facility]) M32.9    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

## 2024-09-07 ENCOUNTER — ANESTHESIA (OUTPATIENT)
Dept: ENDOSCOPY | Age: 89
End: 2024-09-07
Payer: MEDICARE

## 2024-09-07 LAB
ABO + RH BLD: NORMAL
ABO/RH: NORMAL
ANTIBODY SCREEN: NEGATIVE
BASOPHILS # BLD: 0.03 K/UL
BASOPHILS NFR BLD: 1 % (ref 0–1)
BLOOD BANK COMMENT: NORMAL
BLOOD BANK SAMPLE EXPIRATION: NORMAL
BLOOD GROUP ANTIBODIES SERPL: NEGATIVE
COMPONENT: NORMAL
COMPONENT: NORMAL
CROSSMATCH RESULT: NORMAL
CROSSMATCH RESULT: NORMAL
EKG ATRIAL RATE: 63 BPM
EKG DIAGNOSIS: NORMAL
EKG Q-T INTERVAL: 496 MS
EKG QRS DURATION: 146 MS
EKG QTC CALCULATION (BAZETT): 503 MS
EKG R AXIS: 246 DEGREES
EKG T AXIS: 70 DEGREES
EKG VENTRICULAR RATE: 62 BPM
EOSINOPHIL # BLD: 0.14 K/UL
EOSINOPHILS RELATIVE PERCENT: 3 % (ref 0–3)
ERYTHROCYTE [DISTWIDTH] IN BLOOD BY AUTOMATED COUNT: 17.9 % (ref 11.7–14.9)
HCT VFR BLD AUTO: 25.8 % (ref 37–47)
HCT VFR BLD AUTO: 28.2 % (ref 37–47)
HCT VFR BLD AUTO: 28.3 % (ref 37–47)
HGB BLD-MCNC: 8.2 G/DL (ref 12.5–16)
HGB BLD-MCNC: 8.6 G/DL (ref 12.5–16)
HGB BLD-MCNC: 8.7 G/DL (ref 12.5–16)
IMM GRANULOCYTES # BLD AUTO: 0.01 K/UL
IMM GRANULOCYTES NFR BLD: 0 %
LYMPHOCYTES NFR BLD: 1.2 K/UL
LYMPHOCYTES RELATIVE PERCENT: 22 % (ref 24–44)
MCH RBC QN AUTO: 28.5 PG (ref 27–31)
MCHC RBC AUTO-ENTMCNC: 31.8 G/DL (ref 32–36)
MCV RBC AUTO: 89.6 FL (ref 78–100)
MONOCYTES NFR BLD: 0.67 K/UL
MONOCYTES NFR BLD: 12 % (ref 0–4)
NEUTROPHILS NFR BLD: 62 % (ref 36–66)
NEUTS SEG NFR BLD: 3.34 K/UL
PLATELET # BLD AUTO: 168 K/UL (ref 140–440)
PMV BLD AUTO: 9.8 FL (ref 7.5–11.1)
RBC # BLD AUTO: 2.88 M/UL (ref 4.2–5.4)
STATUS: NORMAL
STATUS: NORMAL
TRANSFUSION STATUS: NORMAL
TRANSFUSION STATUS: NORMAL
UNIT DIVISION: 0
UNIT DIVISION: 0
UNIT NUMBER: NORMAL
UNIT NUMBER: NORMAL
WBC OTHER # BLD: 5.4 K/UL (ref 4–10.5)

## 2024-09-07 PROCEDURE — 43235 EGD DIAGNOSTIC BRUSH WASH: CPT | Performed by: INTERNAL MEDICINE

## 2024-09-07 PROCEDURE — 93010 ELECTROCARDIOGRAM REPORT: CPT | Performed by: INTERNAL MEDICINE

## 2024-09-07 PROCEDURE — 85014 HEMATOCRIT: CPT

## 2024-09-07 PROCEDURE — 94761 N-INVAS EAR/PLS OXIMETRY MLT: CPT

## 2024-09-07 PROCEDURE — 85025 COMPLETE CBC W/AUTO DIFF WBC: CPT

## 2024-09-07 PROCEDURE — 36415 COLL VENOUS BLD VENIPUNCTURE: CPT

## 2024-09-07 PROCEDURE — 85018 HEMOGLOBIN: CPT

## 2024-09-07 PROCEDURE — 3609017100 HC EGD: Performed by: INTERNAL MEDICINE

## 2024-09-07 PROCEDURE — 2709999900 HC NON-CHARGEABLE SUPPLY: Performed by: INTERNAL MEDICINE

## 2024-09-07 PROCEDURE — 6360000002 HC RX W HCPCS: Performed by: NURSE ANESTHETIST, CERTIFIED REGISTERED

## 2024-09-07 PROCEDURE — 3700000000 HC ANESTHESIA ATTENDED CARE: Performed by: INTERNAL MEDICINE

## 2024-09-07 PROCEDURE — 0DJ08ZZ INSPECTION OF UPPER INTESTINAL TRACT, VIA NATURAL OR ARTIFICIAL OPENING ENDOSCOPIC: ICD-10-PCS | Performed by: INTERNAL MEDICINE

## 2024-09-07 PROCEDURE — 2580000003 HC RX 258: Performed by: STUDENT IN AN ORGANIZED HEALTH CARE EDUCATION/TRAINING PROGRAM

## 2024-09-07 PROCEDURE — 6360000002 HC RX W HCPCS: Performed by: STUDENT IN AN ORGANIZED HEALTH CARE EDUCATION/TRAINING PROGRAM

## 2024-09-07 PROCEDURE — 2580000003 HC RX 258: Performed by: NURSE ANESTHETIST, CERTIFIED REGISTERED

## 2024-09-07 PROCEDURE — 6370000000 HC RX 637 (ALT 250 FOR IP): Performed by: STUDENT IN AN ORGANIZED HEALTH CARE EDUCATION/TRAINING PROGRAM

## 2024-09-07 PROCEDURE — 3700000001 HC ADD 15 MINUTES (ANESTHESIA): Performed by: INTERNAL MEDICINE

## 2024-09-07 PROCEDURE — 2500000003 HC RX 250 WO HCPCS: Performed by: NURSE ANESTHETIST, CERTIFIED REGISTERED

## 2024-09-07 PROCEDURE — 2140000000 HC CCU INTERMEDIATE R&B

## 2024-09-07 RX ORDER — AMLODIPINE BESYLATE 5 MG/1
2.5 TABLET ORAL DAILY
Status: DISCONTINUED | OUTPATIENT
Start: 2024-09-07 | End: 2024-09-14 | Stop reason: HOSPADM

## 2024-09-07 RX ORDER — LIDOCAINE HYDROCHLORIDE 20 MG/ML
INJECTION, SOLUTION EPIDURAL; INFILTRATION; INTRACAUDAL; PERINEURAL PRN
Status: DISCONTINUED | OUTPATIENT
Start: 2024-09-07 | End: 2024-09-07 | Stop reason: SDUPTHER

## 2024-09-07 RX ORDER — PROPOFOL 10 MG/ML
INJECTION, EMULSION INTRAVENOUS PRN
Status: DISCONTINUED | OUTPATIENT
Start: 2024-09-07 | End: 2024-09-07 | Stop reason: SDUPTHER

## 2024-09-07 RX ORDER — SODIUM CHLORIDE 9 MG/ML
INJECTION, SOLUTION INTRAVENOUS CONTINUOUS PRN
Status: DISCONTINUED | OUTPATIENT
Start: 2024-09-07 | End: 2024-09-07 | Stop reason: SDUPTHER

## 2024-09-07 RX ADMIN — LEVOTHYROXINE SODIUM 50 MCG: 0.05 TABLET ORAL at 09:22

## 2024-09-07 RX ADMIN — METOPROLOL SUCCINATE 25 MG: 25 TABLET, FILM COATED, EXTENDED RELEASE ORAL at 17:02

## 2024-09-07 RX ADMIN — ACETAMINOPHEN 650 MG: 325 TABLET ORAL at 11:44

## 2024-09-07 RX ADMIN — LIDOCAINE HYDROCHLORIDE 100 MG: 20 INJECTION, SOLUTION EPIDURAL; INFILTRATION; INTRACAUDAL; PERINEURAL at 07:39

## 2024-09-07 RX ADMIN — PANTOPRAZOLE SODIUM 40 MG: 40 INJECTION, POWDER, FOR SOLUTION INTRAVENOUS at 19:44

## 2024-09-07 RX ADMIN — PIPERACILLIN AND TAZOBACTAM 3375 MG: 3; .375 INJECTION, POWDER, LYOPHILIZED, FOR SOLUTION INTRAVENOUS at 05:55

## 2024-09-07 RX ADMIN — SODIUM CHLORIDE: 9 INJECTION, SOLUTION INTRAVENOUS at 07:35

## 2024-09-07 RX ADMIN — PANTOPRAZOLE SODIUM 40 MG: 40 INJECTION, POWDER, FOR SOLUTION INTRAVENOUS at 09:22

## 2024-09-07 RX ADMIN — SODIUM CHLORIDE, PRESERVATIVE FREE 10 ML: 5 INJECTION INTRAVENOUS at 09:23

## 2024-09-07 RX ADMIN — AMLODIPINE BESYLATE 2.5 MG: 5 TABLET ORAL at 09:22

## 2024-09-07 RX ADMIN — SODIUM CHLORIDE, PRESERVATIVE FREE 10 ML: 5 INJECTION INTRAVENOUS at 19:44

## 2024-09-07 RX ADMIN — PIPERACILLIN AND TAZOBACTAM 3375 MG: 3; .375 INJECTION, POWDER, LYOPHILIZED, FOR SOLUTION INTRAVENOUS at 17:05

## 2024-09-07 RX ADMIN — PROPOFOL 50 MG: 10 INJECTION, EMULSION INTRAVENOUS at 07:39

## 2024-09-07 NOTE — ANESTHESIA POSTPROCEDURE EVALUATION
Department of Anesthesiology  Postprocedure Note    Patient: Cary Rogers  MRN: [id number]  YOB: 1935  Date of evaluation: 9/7/2024    Procedure Summary       Date: 09/07/24 Room / Location: [place] [id number] / [facility]    Anesthesia Start: 0734 Anesthesia Stop: 0750    Procedure: ESOPHAGOGASTRODUODENOSCOPY DIAGNOSTIC ONLY Diagnosis:       Gastrointestinal hemorrhage, unspecified gastrointestinal hemorrhage type      Drop in hemoglobin      (Gastrointestinal hemorrhage, unspecified gastrointestinal hemorrhage type [K92.2])      (Drop in hemoglobin [R71.0])    Surgeons: Navid Dolan MD Responsible Provider: Jovani Ludwig MD    Anesthesia Type: MAC ASA Status: 4            Anesthesia Type: No value filed.    Divya Phase I:  10    Divya Phase II:  10    Anesthesia Post Evaluation    Patient location during evaluation: bedside  Patient participation: complete - patient participated  Level of consciousness: awake and alert  Pain score: 0  Airway patency: patent  Nausea & Vomiting: no nausea and no vomiting  Cardiovascular status: hemodynamically stable  Respiratory status: acceptable, room air, spontaneous ventilation and nonlabored ventilation  Hydration status: euvolemic  Pain management: adequate        No notable events documented.

## 2024-09-08 LAB
ALBUMIN SERPL-MCNC: 3.2 G/DL (ref 3.4–5)
ALBUMIN/GLOB SERPL: 2.4 {RATIO} (ref 1.1–2.2)
ALP SERPL-CCNC: 73 U/L (ref 40–129)
ALT SERPL-CCNC: 12 U/L (ref 10–40)
ANION GAP SERPL CALCULATED.3IONS-SCNC: 10 MMOL/L (ref 9–17)
AST SERPL-CCNC: 19 U/L (ref 15–37)
BASOPHILS # BLD: 0.03 K/UL
BASOPHILS NFR BLD: 1 % (ref 0–1)
BILIRUB SERPL-MCNC: 0.6 MG/DL (ref 0–1)
BUN SERPL-MCNC: 21 MG/DL (ref 7–20)
CALCIUM SERPL-MCNC: 8 MG/DL (ref 8.3–10.6)
CHLORIDE SERPL-SCNC: 110 MMOL/L (ref 99–110)
CO2 SERPL-SCNC: 24 MMOL/L (ref 21–32)
CREAT SERPL-MCNC: 1.1 MG/DL (ref 0.6–1.2)
CRP SERPL HS-MCNC: 12.1 MG/L (ref 0–5)
EOSINOPHIL # BLD: 0.12 K/UL
EOSINOPHILS RELATIVE PERCENT: 2 % (ref 0–3)
ERYTHROCYTE [DISTWIDTH] IN BLOOD BY AUTOMATED COUNT: 18 % (ref 11.7–14.9)
GFR, ESTIMATED: 46 ML/MIN/1.73M2
GLUCOSE SERPL-MCNC: 77 MG/DL (ref 74–99)
HCT VFR BLD AUTO: 25.2 % (ref 37–47)
HCT VFR BLD AUTO: 25.8 % (ref 37–47)
HGB BLD-MCNC: 7.9 G/DL (ref 12.5–16)
HGB BLD-MCNC: 8 G/DL (ref 12.5–16)
IMM GRANULOCYTES # BLD AUTO: 0.02 K/UL
IMM GRANULOCYTES NFR BLD: 0 %
LYMPHOCYTES NFR BLD: 0.9 K/UL
LYMPHOCYTES RELATIVE PERCENT: 18 % (ref 24–44)
MCH RBC QN AUTO: 28.2 PG (ref 27–31)
MCHC RBC AUTO-ENTMCNC: 31 G/DL (ref 32–36)
MCV RBC AUTO: 90.8 FL (ref 78–100)
MONOCYTES NFR BLD: 0.66 K/UL
MONOCYTES NFR BLD: 13 % (ref 0–4)
NEUTROPHILS NFR BLD: 65 % (ref 36–66)
NEUTS SEG NFR BLD: 3.26 K/UL
PLATELET # BLD AUTO: 169 K/UL (ref 140–440)
PMV BLD AUTO: 10 FL (ref 7.5–11.1)
POTASSIUM SERPL-SCNC: 3.8 MMOL/L (ref 3.5–5.1)
PROT SERPL-MCNC: 4.5 G/DL (ref 6.4–8.2)
RBC # BLD AUTO: 2.84 M/UL (ref 4.2–5.4)
SODIUM SERPL-SCNC: 144 MMOL/L (ref 136–145)
WBC OTHER # BLD: 5 K/UL (ref 4–10.5)

## 2024-09-08 PROCEDURE — 2140000000 HC CCU INTERMEDIATE R&B

## 2024-09-08 PROCEDURE — 87324 CLOSTRIDIUM AG IA: CPT

## 2024-09-08 PROCEDURE — 6360000002 HC RX W HCPCS: Performed by: STUDENT IN AN ORGANIZED HEALTH CARE EDUCATION/TRAINING PROGRAM

## 2024-09-08 PROCEDURE — 80053 COMPREHEN METABOLIC PANEL: CPT

## 2024-09-08 PROCEDURE — 2580000003 HC RX 258: Performed by: STUDENT IN AN ORGANIZED HEALTH CARE EDUCATION/TRAINING PROGRAM

## 2024-09-08 PROCEDURE — 99232 SBSQ HOSP IP/OBS MODERATE 35: CPT | Performed by: INTERNAL MEDICINE

## 2024-09-08 PROCEDURE — 85018 HEMOGLOBIN: CPT

## 2024-09-08 PROCEDURE — 36415 COLL VENOUS BLD VENIPUNCTURE: CPT

## 2024-09-08 PROCEDURE — 86140 C-REACTIVE PROTEIN: CPT

## 2024-09-08 PROCEDURE — 85025 COMPLETE CBC W/AUTO DIFF WBC: CPT

## 2024-09-08 PROCEDURE — 85014 HEMATOCRIT: CPT

## 2024-09-08 PROCEDURE — 87449 NOS EACH ORGANISM AG IA: CPT

## 2024-09-08 PROCEDURE — 6370000000 HC RX 637 (ALT 250 FOR IP): Performed by: STUDENT IN AN ORGANIZED HEALTH CARE EDUCATION/TRAINING PROGRAM

## 2024-09-08 RX ADMIN — PIPERACILLIN AND TAZOBACTAM 3375 MG: 3; .375 INJECTION, POWDER, LYOPHILIZED, FOR SOLUTION INTRAVENOUS at 18:00

## 2024-09-08 RX ADMIN — PANTOPRAZOLE SODIUM 40 MG: 40 INJECTION, POWDER, FOR SOLUTION INTRAVENOUS at 19:43

## 2024-09-08 RX ADMIN — PANTOPRAZOLE SODIUM 80 MG: 40 INJECTION, POWDER, FOR SOLUTION INTRAVENOUS at 19:42

## 2024-09-08 RX ADMIN — LEVOTHYROXINE SODIUM 50 MCG: 0.05 TABLET ORAL at 05:51

## 2024-09-08 RX ADMIN — SODIUM CHLORIDE, PRESERVATIVE FREE 10 ML: 5 INJECTION INTRAVENOUS at 19:42

## 2024-09-08 RX ADMIN — PIPERACILLIN AND TAZOBACTAM 3375 MG: 3; .375 INJECTION, POWDER, LYOPHILIZED, FOR SOLUTION INTRAVENOUS at 01:31

## 2024-09-08 RX ADMIN — PANTOPRAZOLE SODIUM 40 MG: 40 INJECTION, POWDER, FOR SOLUTION INTRAVENOUS at 09:27

## 2024-09-08 RX ADMIN — ACETAMINOPHEN 650 MG: 325 TABLET ORAL at 17:51

## 2024-09-08 RX ADMIN — ONDANSETRON 4 MG: 2 INJECTION INTRAMUSCULAR; INTRAVENOUS at 17:51

## 2024-09-08 RX ADMIN — SODIUM CHLORIDE, PRESERVATIVE FREE 10 ML: 5 INJECTION INTRAVENOUS at 09:28

## 2024-09-08 RX ADMIN — PIPERACILLIN AND TAZOBACTAM 3375 MG: 3; .375 INJECTION, POWDER, LYOPHILIZED, FOR SOLUTION INTRAVENOUS at 09:35

## 2024-09-08 RX ADMIN — ACETAMINOPHEN 650 MG: 325 TABLET ORAL at 01:30

## 2024-09-08 RX ADMIN — ACETAMINOPHEN 650 MG: 325 TABLET ORAL at 05:51

## 2024-09-08 RX ADMIN — AMLODIPINE BESYLATE 2.5 MG: 5 TABLET ORAL at 09:27

## 2024-09-08 RX ADMIN — METOPROLOL SUCCINATE 25 MG: 25 TABLET, FILM COATED, EXTENDED RELEASE ORAL at 17:51

## 2024-09-08 ASSESSMENT — PAIN SCALES - WONG BAKER
WONGBAKER_NUMERICALRESPONSE: NO HURT

## 2024-09-08 ASSESSMENT — PAIN SCALES - GENERAL: PAINLEVEL_OUTOF10: 0

## 2024-09-09 ENCOUNTER — APPOINTMENT (OUTPATIENT)
Dept: CT IMAGING | Age: 89
End: 2024-09-09
Payer: MEDICARE

## 2024-09-09 PROBLEM — K62.89 PROCTITIS: Status: ACTIVE | Noted: 2024-09-09

## 2024-09-09 LAB
ALBUMIN SERPL-MCNC: 2.9 G/DL (ref 3.4–5)
ALBUMIN/GLOB SERPL: 1.9 {RATIO} (ref 1.1–2.2)
ALP SERPL-CCNC: 68 U/L (ref 40–129)
ALT SERPL-CCNC: 8 U/L (ref 10–40)
ANION GAP SERPL CALCULATED.3IONS-SCNC: 9 MMOL/L (ref 9–17)
AST SERPL-CCNC: 14 U/L (ref 15–37)
BASOPHILS # BLD: 0.04 K/UL
BASOPHILS NFR BLD: 1 % (ref 0–1)
BILIRUB SERPL-MCNC: 0.4 MG/DL (ref 0–1)
BUN SERPL-MCNC: 16 MG/DL (ref 7–20)
CALCIUM SERPL-MCNC: 8 MG/DL (ref 8.3–10.6)
CHLORIDE SERPL-SCNC: 111 MMOL/L (ref 99–110)
CO2 SERPL-SCNC: 24 MMOL/L (ref 21–32)
CREAT SERPL-MCNC: 1.1 MG/DL (ref 0.6–1.2)
CRP SERPL HS-MCNC: 8.5 MG/L (ref 0–5)
EOSINOPHIL # BLD: 0.11 K/UL
EOSINOPHILS RELATIVE PERCENT: 2 % (ref 0–3)
ERYTHROCYTE [DISTWIDTH] IN BLOOD BY AUTOMATED COUNT: 17.7 % (ref 11.7–14.9)
GFR, ESTIMATED: 45 ML/MIN/1.73M2
GLUCOSE SERPL-MCNC: 74 MG/DL (ref 74–99)
HCT VFR BLD AUTO: 23.5 % (ref 37–47)
HCT VFR BLD AUTO: 24.1 % (ref 37–47)
HGB BLD-MCNC: 7.2 G/DL (ref 12.5–16)
HGB BLD-MCNC: 7.4 G/DL (ref 12.5–16)
IMM GRANULOCYTES # BLD AUTO: 0.01 K/UL
IMM GRANULOCYTES NFR BLD: 0 %
LYMPHOCYTES NFR BLD: 1.07 K/UL
LYMPHOCYTES RELATIVE PERCENT: 24 % (ref 24–44)
MCH RBC QN AUTO: 28.1 PG (ref 27–31)
MCHC RBC AUTO-ENTMCNC: 30.6 G/DL (ref 32–36)
MCV RBC AUTO: 91.8 FL (ref 78–100)
MONOCYTES NFR BLD: 0.57 K/UL
MONOCYTES NFR BLD: 13 % (ref 0–4)
NEUTROPHILS NFR BLD: 60 % (ref 36–66)
NEUTS SEG NFR BLD: 2.71 K/UL
PLATELET # BLD AUTO: 168 K/UL (ref 140–440)
PMV BLD AUTO: 9.7 FL (ref 7.5–11.1)
POTASSIUM SERPL-SCNC: 3.5 MMOL/L (ref 3.5–5.1)
PROT SERPL-MCNC: 4.4 G/DL (ref 6.4–8.2)
RBC # BLD AUTO: 2.56 M/UL (ref 4.2–5.4)
SODIUM SERPL-SCNC: 143 MMOL/L (ref 136–145)
WBC OTHER # BLD: 4.5 K/UL (ref 4–10.5)

## 2024-09-09 PROCEDURE — 94761 N-INVAS EAR/PLS OXIMETRY MLT: CPT

## 2024-09-09 PROCEDURE — APPNB60 APP NON BILLABLE TIME 46-60 MINS: Performed by: LICENSED PRACTICAL NURSE

## 2024-09-09 PROCEDURE — 80053 COMPREHEN METABOLIC PANEL: CPT

## 2024-09-09 PROCEDURE — 86850 RBC ANTIBODY SCREEN: CPT

## 2024-09-09 PROCEDURE — 6360000004 HC RX CONTRAST MEDICATION: Performed by: INTERNAL MEDICINE

## 2024-09-09 PROCEDURE — 85025 COMPLETE CBC W/AUTO DIFF WBC: CPT

## 2024-09-09 PROCEDURE — 97166 OT EVAL MOD COMPLEX 45 MIN: CPT

## 2024-09-09 PROCEDURE — 2500000003 HC RX 250 WO HCPCS: Performed by: INTERNAL MEDICINE

## 2024-09-09 PROCEDURE — 97162 PT EVAL MOD COMPLEX 30 MIN: CPT

## 2024-09-09 PROCEDURE — 6360000002 HC RX W HCPCS: Performed by: STUDENT IN AN ORGANIZED HEALTH CARE EDUCATION/TRAINING PROGRAM

## 2024-09-09 PROCEDURE — 85014 HEMATOCRIT: CPT

## 2024-09-09 PROCEDURE — 86920 COMPATIBILITY TEST SPIN: CPT

## 2024-09-09 PROCEDURE — 86901 BLOOD TYPING SEROLOGIC RH(D): CPT

## 2024-09-09 PROCEDURE — 6370000000 HC RX 637 (ALT 250 FOR IP): Performed by: STUDENT IN AN ORGANIZED HEALTH CARE EDUCATION/TRAINING PROGRAM

## 2024-09-09 PROCEDURE — 86140 C-REACTIVE PROTEIN: CPT

## 2024-09-09 PROCEDURE — 36415 COLL VENOUS BLD VENIPUNCTURE: CPT

## 2024-09-09 PROCEDURE — 2580000003 HC RX 258: Performed by: STUDENT IN AN ORGANIZED HEALTH CARE EDUCATION/TRAINING PROGRAM

## 2024-09-09 PROCEDURE — 86900 BLOOD TYPING SEROLOGIC ABO: CPT

## 2024-09-09 PROCEDURE — 99232 SBSQ HOSP IP/OBS MODERATE 35: CPT | Performed by: INTERNAL MEDICINE

## 2024-09-09 PROCEDURE — 97535 SELF CARE MNGMENT TRAINING: CPT

## 2024-09-09 PROCEDURE — 85018 HEMOGLOBIN: CPT

## 2024-09-09 PROCEDURE — 2140000000 HC CCU INTERMEDIATE R&B

## 2024-09-09 PROCEDURE — 97116 GAIT TRAINING THERAPY: CPT

## 2024-09-09 PROCEDURE — 74174 CTA ABD&PLVS W/CONTRAST: CPT

## 2024-09-09 RX ORDER — IOPAMIDOL 755 MG/ML
75 INJECTION, SOLUTION INTRAVASCULAR
Status: DISCONTINUED | OUTPATIENT
Start: 2024-09-09 | End: 2024-09-14 | Stop reason: HOSPADM

## 2024-09-09 RX ORDER — IOPAMIDOL 755 MG/ML
75 INJECTION, SOLUTION INTRAVASCULAR
Status: COMPLETED | OUTPATIENT
Start: 2024-09-09 | End: 2024-09-09

## 2024-09-09 RX ORDER — SODIUM CHLORIDE 9 MG/ML
INJECTION, SOLUTION INTRAVENOUS PRN
Status: DISCONTINUED | OUTPATIENT
Start: 2024-09-09 | End: 2024-09-14 | Stop reason: HOSPADM

## 2024-09-09 RX ADMIN — BARIUM SULFATE 900 ML: 20 SUSPENSION ORAL at 13:32

## 2024-09-09 RX ADMIN — PIPERACILLIN AND TAZOBACTAM 3375 MG: 3; .375 INJECTION, POWDER, LYOPHILIZED, FOR SOLUTION INTRAVENOUS at 00:09

## 2024-09-09 RX ADMIN — PANTOPRAZOLE SODIUM 40 MG: 40 INJECTION, POWDER, FOR SOLUTION INTRAVENOUS at 08:07

## 2024-09-09 RX ADMIN — PIPERACILLIN AND TAZOBACTAM 3375 MG: 3; .375 INJECTION, POWDER, LYOPHILIZED, FOR SOLUTION INTRAVENOUS at 08:16

## 2024-09-09 RX ADMIN — PANTOPRAZOLE SODIUM 40 MG: 40 INJECTION, POWDER, FOR SOLUTION INTRAVENOUS at 23:31

## 2024-09-09 RX ADMIN — ACETAMINOPHEN 650 MG: 325 TABLET ORAL at 00:06

## 2024-09-09 RX ADMIN — SODIUM CHLORIDE, PRESERVATIVE FREE 10 ML: 5 INJECTION INTRAVENOUS at 08:10

## 2024-09-09 RX ADMIN — LEVOTHYROXINE SODIUM 50 MCG: 0.05 TABLET ORAL at 05:48

## 2024-09-09 RX ADMIN — ACETAMINOPHEN 650 MG: 325 TABLET ORAL at 05:47

## 2024-09-09 RX ADMIN — PIPERACILLIN AND TAZOBACTAM 3375 MG: 3; .375 INJECTION, POWDER, LYOPHILIZED, FOR SOLUTION INTRAVENOUS at 17:44

## 2024-09-09 RX ADMIN — MELATONIN TAB 3 MG 3 MG: 3 TAB at 23:30

## 2024-09-09 RX ADMIN — ACETAMINOPHEN 650 MG: 325 TABLET ORAL at 13:10

## 2024-09-09 RX ADMIN — SODIUM CHLORIDE, PRESERVATIVE FREE 10 ML: 5 INJECTION INTRAVENOUS at 23:30

## 2024-09-09 RX ADMIN — ACETAMINOPHEN 650 MG: 325 TABLET ORAL at 17:41

## 2024-09-09 RX ADMIN — IOPAMIDOL 75 ML: 755 INJECTION, SOLUTION INTRAVENOUS at 13:32

## 2024-09-09 RX ADMIN — ACETAMINOPHEN 650 MG: 325 TABLET ORAL at 23:30

## 2024-09-09 RX ADMIN — AMLODIPINE BESYLATE 2.5 MG: 5 TABLET ORAL at 08:07

## 2024-09-09 RX ADMIN — METOPROLOL SUCCINATE 25 MG: 25 TABLET, FILM COATED, EXTENDED RELEASE ORAL at 17:41

## 2024-09-09 ASSESSMENT — PAIN DESCRIPTION - LOCATION: LOCATION: BACK

## 2024-09-09 ASSESSMENT — PAIN SCALES - GENERAL
PAINLEVEL_OUTOF10: 0
PAINLEVEL_OUTOF10: 3
PAINLEVEL_OUTOF10: 0
PAINLEVEL_OUTOF10: 0

## 2024-09-09 ASSESSMENT — PAIN DESCRIPTION - DESCRIPTORS: DESCRIPTORS: ACHING

## 2024-09-09 ASSESSMENT — PAIN SCALES - WONG BAKER
WONGBAKER_NUMERICALRESPONSE: NO HURT

## 2024-09-09 ASSESSMENT — PAIN - FUNCTIONAL ASSESSMENT
PAIN_FUNCTIONAL_ASSESSMENT: ACTIVITIES ARE NOT PREVENTED

## 2024-09-09 ASSESSMENT — PAIN DESCRIPTION - ORIENTATION: ORIENTATION: LOWER

## 2024-09-10 LAB
ALBUMIN SERPL-MCNC: 2.9 G/DL (ref 3.4–5)
ALBUMIN/GLOB SERPL: 2.2 {RATIO} (ref 1.1–2.2)
ALP SERPL-CCNC: 67 U/L (ref 40–129)
ALT SERPL-CCNC: 8 U/L (ref 10–40)
ANION GAP SERPL CALCULATED.3IONS-SCNC: 8 MMOL/L (ref 9–17)
AST SERPL-CCNC: 13 U/L (ref 15–37)
BASOPHILS # BLD: 0.04 K/UL
BASOPHILS NFR BLD: 1 % (ref 0–1)
BILIRUB SERPL-MCNC: 0.3 MG/DL (ref 0–1)
BUN SERPL-MCNC: 13 MG/DL (ref 7–20)
CALCIUM SERPL-MCNC: 8.2 MG/DL (ref 8.3–10.6)
CHLORIDE SERPL-SCNC: 111 MMOL/L (ref 99–110)
CO2 SERPL-SCNC: 24 MMOL/L (ref 21–32)
CREAT SERPL-MCNC: 1.1 MG/DL (ref 0.6–1.2)
CRP SERPL HS-MCNC: 4.1 MG/L (ref 0–5)
EOSINOPHIL # BLD: 0.11 K/UL
EOSINOPHILS RELATIVE PERCENT: 3 % (ref 0–3)
ERYTHROCYTE [DISTWIDTH] IN BLOOD BY AUTOMATED COUNT: 17.9 % (ref 11.7–14.9)
GFR, ESTIMATED: 45 ML/MIN/1.73M2
GLUCOSE SERPL-MCNC: 68 MG/DL (ref 74–99)
HCT VFR BLD AUTO: 25.9 % (ref 37–47)
HGB BLD-MCNC: 7.3 G/DL (ref 12.5–16)
IMM GRANULOCYTES # BLD AUTO: 0.01 K/UL
IMM GRANULOCYTES NFR BLD: 0 %
LYMPHOCYTES NFR BLD: 1.03 K/UL
LYMPHOCYTES RELATIVE PERCENT: 25 % (ref 24–44)
MCH RBC QN AUTO: 28.9 PG (ref 27–31)
MCHC RBC AUTO-ENTMCNC: 28.2 G/DL (ref 32–36)
MCV RBC AUTO: 102.4 FL (ref 78–100)
MONOCYTES NFR BLD: 0.57 K/UL
MONOCYTES NFR BLD: 14 % (ref 0–4)
NEUTROPHILS NFR BLD: 58 % (ref 36–66)
NEUTS SEG NFR BLD: 2.39 K/UL
PLATELET # BLD AUTO: 172 K/UL (ref 140–440)
PMV BLD AUTO: 9.6 FL (ref 7.5–11.1)
POTASSIUM SERPL-SCNC: 3.7 MMOL/L (ref 3.5–5.1)
PROT SERPL-MCNC: 4.2 G/DL (ref 6.4–8.2)
RBC # BLD AUTO: 2.53 M/UL (ref 4.2–5.4)
SODIUM SERPL-SCNC: 143 MMOL/L (ref 136–145)
WBC OTHER # BLD: 4.2 K/UL (ref 4–10.5)

## 2024-09-10 PROCEDURE — APPNB45 APP NON BILLABLE 31-45 MINUTES: Performed by: LICENSED PRACTICAL NURSE

## 2024-09-10 PROCEDURE — 99232 SBSQ HOSP IP/OBS MODERATE 35: CPT | Performed by: INTERNAL MEDICINE

## 2024-09-10 PROCEDURE — 6370000000 HC RX 637 (ALT 250 FOR IP): Performed by: LICENSED PRACTICAL NURSE

## 2024-09-10 PROCEDURE — 94761 N-INVAS EAR/PLS OXIMETRY MLT: CPT

## 2024-09-10 PROCEDURE — 36415 COLL VENOUS BLD VENIPUNCTURE: CPT

## 2024-09-10 PROCEDURE — 80053 COMPREHEN METABOLIC PANEL: CPT

## 2024-09-10 PROCEDURE — 85025 COMPLETE CBC W/AUTO DIFF WBC: CPT

## 2024-09-10 PROCEDURE — 2580000003 HC RX 258: Performed by: STUDENT IN AN ORGANIZED HEALTH CARE EDUCATION/TRAINING PROGRAM

## 2024-09-10 PROCEDURE — 6370000000 HC RX 637 (ALT 250 FOR IP): Performed by: STUDENT IN AN ORGANIZED HEALTH CARE EDUCATION/TRAINING PROGRAM

## 2024-09-10 PROCEDURE — 2140000000 HC CCU INTERMEDIATE R&B

## 2024-09-10 PROCEDURE — 86140 C-REACTIVE PROTEIN: CPT

## 2024-09-10 PROCEDURE — 6360000002 HC RX W HCPCS: Performed by: STUDENT IN AN ORGANIZED HEALTH CARE EDUCATION/TRAINING PROGRAM

## 2024-09-10 RX ADMIN — PIPERACILLIN AND TAZOBACTAM 3375 MG: 3; .375 INJECTION, POWDER, LYOPHILIZED, FOR SOLUTION INTRAVENOUS at 02:18

## 2024-09-10 RX ADMIN — PIPERACILLIN AND TAZOBACTAM 3375 MG: 3; .375 INJECTION, POWDER, LYOPHILIZED, FOR SOLUTION INTRAVENOUS at 11:02

## 2024-09-10 RX ADMIN — PANTOPRAZOLE SODIUM 40 MG: 40 INJECTION, POWDER, FOR SOLUTION INTRAVENOUS at 22:16

## 2024-09-10 RX ADMIN — POLYETHYLENE GLYCOL 3350, SODIUM SULFATE ANHYDROUS, SODIUM BICARBONATE, SODIUM CHLORIDE, POTASSIUM CHLORIDE 4000 ML: 236; 22.74; 6.74; 5.86; 2.97 POWDER, FOR SOLUTION ORAL at 13:25

## 2024-09-10 RX ADMIN — LEVOTHYROXINE SODIUM 50 MCG: 0.05 TABLET ORAL at 08:08

## 2024-09-10 RX ADMIN — PIPERACILLIN AND TAZOBACTAM 3375 MG: 3; .375 INJECTION, POWDER, LYOPHILIZED, FOR SOLUTION INTRAVENOUS at 18:58

## 2024-09-10 RX ADMIN — ACETAMINOPHEN 650 MG: 325 TABLET ORAL at 18:45

## 2024-09-10 RX ADMIN — SODIUM CHLORIDE, PRESERVATIVE FREE 10 ML: 5 INJECTION INTRAVENOUS at 10:02

## 2024-09-10 RX ADMIN — ACETAMINOPHEN 650 MG: 325 TABLET ORAL at 08:08

## 2024-09-10 RX ADMIN — METOPROLOL SUCCINATE 25 MG: 25 TABLET, FILM COATED, EXTENDED RELEASE ORAL at 18:46

## 2024-09-10 RX ADMIN — SODIUM CHLORIDE, PRESERVATIVE FREE 10 ML: 5 INJECTION INTRAVENOUS at 22:16

## 2024-09-10 RX ADMIN — AMLODIPINE BESYLATE 2.5 MG: 5 TABLET ORAL at 08:54

## 2024-09-10 RX ADMIN — PANTOPRAZOLE SODIUM 40 MG: 40 INJECTION, POWDER, FOR SOLUTION INTRAVENOUS at 10:02

## 2024-09-10 RX ADMIN — ACETAMINOPHEN 650 MG: 325 TABLET ORAL at 13:22

## 2024-09-10 ASSESSMENT — PAIN SCALES - GENERAL
PAINLEVEL_OUTOF10: 0

## 2024-09-11 ENCOUNTER — ANESTHESIA EVENT (OUTPATIENT)
Dept: ENDOSCOPY | Age: 89
End: 2024-09-11
Payer: MEDICARE

## 2024-09-11 LAB
ALBUMIN SERPL-MCNC: 3.1 G/DL (ref 3.4–5)
ALBUMIN/GLOB SERPL: 2.3 {RATIO} (ref 1.1–2.2)
ALP SERPL-CCNC: 71 U/L (ref 40–129)
ALT SERPL-CCNC: 8 U/L (ref 10–40)
ANION GAP SERPL CALCULATED.3IONS-SCNC: 9 MMOL/L (ref 9–17)
AST SERPL-CCNC: 15 U/L (ref 15–37)
BASOPHILS # BLD: 0.05 K/UL
BASOPHILS NFR BLD: 1 % (ref 0–1)
BILIRUB SERPL-MCNC: 0.4 MG/DL (ref 0–1)
BUN SERPL-MCNC: 9 MG/DL (ref 7–20)
CALCIUM SERPL-MCNC: 8 MG/DL (ref 8.3–10.6)
CHLORIDE SERPL-SCNC: 107 MMOL/L (ref 99–110)
CO2 SERPL-SCNC: 27 MMOL/L (ref 21–32)
CREAT SERPL-MCNC: 1 MG/DL (ref 0.6–1.2)
EOSINOPHIL # BLD: 0.11 K/UL
EOSINOPHILS RELATIVE PERCENT: 2 % (ref 0–3)
ERYTHROCYTE [DISTWIDTH] IN BLOOD BY AUTOMATED COUNT: 17.8 % (ref 11.7–14.9)
GFR, ESTIMATED: 54 ML/MIN/1.73M2
GLUCOSE SERPL-MCNC: 71 MG/DL (ref 74–99)
HCT VFR BLD AUTO: 23.9 % (ref 37–47)
HGB BLD-MCNC: 7.3 G/DL (ref 12.5–16)
IMM GRANULOCYTES # BLD AUTO: 0.01 K/UL
IMM GRANULOCYTES NFR BLD: 0 %
LYMPHOCYTES NFR BLD: 1.16 K/UL
LYMPHOCYTES RELATIVE PERCENT: 25 % (ref 24–44)
MCH RBC QN AUTO: 28.4 PG (ref 27–31)
MCHC RBC AUTO-ENTMCNC: 30.5 G/DL (ref 32–36)
MCV RBC AUTO: 93 FL (ref 78–100)
MONOCYTES NFR BLD: 0.55 K/UL
MONOCYTES NFR BLD: 12 % (ref 0–4)
NEUTROPHILS NFR BLD: 59 % (ref 36–66)
NEUTS SEG NFR BLD: 2.72 K/UL
PLATELET # BLD AUTO: 176 K/UL (ref 140–440)
PMV BLD AUTO: 9.8 FL (ref 7.5–11.1)
POTASSIUM SERPL-SCNC: 3.5 MMOL/L (ref 3.5–5.1)
PROT SERPL-MCNC: 4.4 G/DL (ref 6.4–8.2)
RBC # BLD AUTO: 2.57 M/UL (ref 4.2–5.4)
SODIUM SERPL-SCNC: 143 MMOL/L (ref 136–145)
WBC OTHER # BLD: 4.6 K/UL (ref 4–10.5)

## 2024-09-11 PROCEDURE — 94761 N-INVAS EAR/PLS OXIMETRY MLT: CPT

## 2024-09-11 PROCEDURE — 97530 THERAPEUTIC ACTIVITIES: CPT

## 2024-09-11 PROCEDURE — 99232 SBSQ HOSP IP/OBS MODERATE 35: CPT | Performed by: INTERNAL MEDICINE

## 2024-09-11 PROCEDURE — 6360000002 HC RX W HCPCS: Performed by: STUDENT IN AN ORGANIZED HEALTH CARE EDUCATION/TRAINING PROGRAM

## 2024-09-11 PROCEDURE — 2140000000 HC CCU INTERMEDIATE R&B

## 2024-09-11 PROCEDURE — 36415 COLL VENOUS BLD VENIPUNCTURE: CPT

## 2024-09-11 PROCEDURE — 80053 COMPREHEN METABOLIC PANEL: CPT

## 2024-09-11 PROCEDURE — 85025 COMPLETE CBC W/AUTO DIFF WBC: CPT

## 2024-09-11 PROCEDURE — 6370000000 HC RX 637 (ALT 250 FOR IP): Performed by: STUDENT IN AN ORGANIZED HEALTH CARE EDUCATION/TRAINING PROGRAM

## 2024-09-11 PROCEDURE — 97116 GAIT TRAINING THERAPY: CPT

## 2024-09-11 PROCEDURE — 2580000003 HC RX 258: Performed by: STUDENT IN AN ORGANIZED HEALTH CARE EDUCATION/TRAINING PROGRAM

## 2024-09-11 RX ADMIN — SODIUM CHLORIDE, PRESERVATIVE FREE 10 ML: 5 INJECTION INTRAVENOUS at 08:23

## 2024-09-11 RX ADMIN — PIPERACILLIN AND TAZOBACTAM 3375 MG: 3; .375 INJECTION, POWDER, LYOPHILIZED, FOR SOLUTION INTRAVENOUS at 13:09

## 2024-09-11 RX ADMIN — AMLODIPINE BESYLATE 2.5 MG: 5 TABLET ORAL at 08:22

## 2024-09-11 RX ADMIN — PIPERACILLIN AND TAZOBACTAM 3375 MG: 3; .375 INJECTION, POWDER, LYOPHILIZED, FOR SOLUTION INTRAVENOUS at 00:40

## 2024-09-11 RX ADMIN — SODIUM CHLORIDE, PRESERVATIVE FREE 10 ML: 5 INJECTION INTRAVENOUS at 20:13

## 2024-09-11 RX ADMIN — METOPROLOL SUCCINATE 25 MG: 25 TABLET, FILM COATED, EXTENDED RELEASE ORAL at 18:07

## 2024-09-11 RX ADMIN — PANTOPRAZOLE SODIUM 40 MG: 40 INJECTION, POWDER, FOR SOLUTION INTRAVENOUS at 08:22

## 2024-09-11 RX ADMIN — PIPERACILLIN AND TAZOBACTAM 3375 MG: 3; .375 INJECTION, POWDER, LYOPHILIZED, FOR SOLUTION INTRAVENOUS at 20:27

## 2024-09-11 RX ADMIN — PANTOPRAZOLE SODIUM 40 MG: 40 INJECTION, POWDER, FOR SOLUTION INTRAVENOUS at 20:12

## 2024-09-11 RX ADMIN — ACETAMINOPHEN 650 MG: 325 TABLET ORAL at 18:05

## 2024-09-11 RX ADMIN — LEVOTHYROXINE SODIUM 50 MCG: 0.05 TABLET ORAL at 05:51

## 2024-09-11 RX ADMIN — ACETAMINOPHEN 650 MG: 325 TABLET ORAL at 13:01

## 2024-09-11 ASSESSMENT — PAIN SCALES - GENERAL: PAINLEVEL_OUTOF10: 4

## 2024-09-11 ASSESSMENT — PAIN - FUNCTIONAL ASSESSMENT: PAIN_FUNCTIONAL_ASSESSMENT: ACTIVITIES ARE NOT PREVENTED

## 2024-09-11 ASSESSMENT — PAIN DESCRIPTION - LOCATION: LOCATION: BACK

## 2024-09-11 ASSESSMENT — PAIN DESCRIPTION - ONSET: ONSET: ON-GOING

## 2024-09-11 ASSESSMENT — PAIN DESCRIPTION - PAIN TYPE: TYPE: CHRONIC PAIN

## 2024-09-11 ASSESSMENT — PAIN DESCRIPTION - DESCRIPTORS: DESCRIPTORS: ACHING

## 2024-09-11 ASSESSMENT — LIFESTYLE VARIABLES: SMOKING_STATUS: 0

## 2024-09-11 ASSESSMENT — PAIN DESCRIPTION - ORIENTATION: ORIENTATION: LOWER

## 2024-09-11 ASSESSMENT — PAIN DESCRIPTION - FREQUENCY: FREQUENCY: CONTINUOUS

## 2024-09-12 ENCOUNTER — ANESTHESIA (OUTPATIENT)
Dept: ENDOSCOPY | Age: 89
End: 2024-09-12
Payer: MEDICARE

## 2024-09-12 LAB
BASOPHILS # BLD: 0.03 K/UL
BASOPHILS NFR BLD: 1 % (ref 0–1)
EOSINOPHIL # BLD: 0.04 K/UL
EOSINOPHILS RELATIVE PERCENT: 1 % (ref 0–3)
ERYTHROCYTE [DISTWIDTH] IN BLOOD BY AUTOMATED COUNT: 17.8 % (ref 11.7–14.9)
HCT VFR BLD AUTO: 24.1 % (ref 37–47)
HGB BLD-MCNC: 7.5 G/DL (ref 12.5–16)
IMM GRANULOCYTES # BLD AUTO: 0.01 K/UL
IMM GRANULOCYTES NFR BLD: 0 %
LYMPHOCYTES NFR BLD: 1.06 K/UL
LYMPHOCYTES RELATIVE PERCENT: 22 % (ref 24–44)
MCH RBC QN AUTO: 28.6 PG (ref 27–31)
MCHC RBC AUTO-ENTMCNC: 31.1 G/DL (ref 32–36)
MCV RBC AUTO: 92 FL (ref 78–100)
MONOCYTES NFR BLD: 0.53 K/UL
MONOCYTES NFR BLD: 11 % (ref 0–4)
NEUTROPHILS NFR BLD: 66 % (ref 36–66)
NEUTS SEG NFR BLD: 3.23 K/UL
PLATELET # BLD AUTO: 175 K/UL (ref 140–440)
PMV BLD AUTO: 9.6 FL (ref 7.5–11.1)
RBC # BLD AUTO: 2.62 M/UL (ref 4.2–5.4)
WBC OTHER # BLD: 4.9 K/UL (ref 4–10.5)

## 2024-09-12 PROCEDURE — 94761 N-INVAS EAR/PLS OXIMETRY MLT: CPT

## 2024-09-12 PROCEDURE — 6370000000 HC RX 637 (ALT 250 FOR IP): Performed by: STUDENT IN AN ORGANIZED HEALTH CARE EDUCATION/TRAINING PROGRAM

## 2024-09-12 PROCEDURE — 6360000002 HC RX W HCPCS

## 2024-09-12 PROCEDURE — 85025 COMPLETE CBC W/AUTO DIFF WBC: CPT

## 2024-09-12 PROCEDURE — 6360000002 HC RX W HCPCS: Performed by: STUDENT IN AN ORGANIZED HEALTH CARE EDUCATION/TRAINING PROGRAM

## 2024-09-12 PROCEDURE — 2580000003 HC RX 258: Performed by: STUDENT IN AN ORGANIZED HEALTH CARE EDUCATION/TRAINING PROGRAM

## 2024-09-12 PROCEDURE — 45378 DIAGNOSTIC COLONOSCOPY: CPT | Performed by: INTERNAL MEDICINE

## 2024-09-12 PROCEDURE — 3609027000 HC COLONOSCOPY: Performed by: INTERNAL MEDICINE

## 2024-09-12 PROCEDURE — 3700000001 HC ADD 15 MINUTES (ANESTHESIA): Performed by: INTERNAL MEDICINE

## 2024-09-12 PROCEDURE — 36415 COLL VENOUS BLD VENIPUNCTURE: CPT

## 2024-09-12 PROCEDURE — 2709999900 HC NON-CHARGEABLE SUPPLY: Performed by: INTERNAL MEDICINE

## 2024-09-12 PROCEDURE — 2140000000 HC CCU INTERMEDIATE R&B

## 2024-09-12 PROCEDURE — 2580000003 HC RX 258

## 2024-09-12 PROCEDURE — 80053 COMPREHEN METABOLIC PANEL: CPT

## 2024-09-12 PROCEDURE — 3700000000 HC ANESTHESIA ATTENDED CARE: Performed by: INTERNAL MEDICINE

## 2024-09-12 PROCEDURE — 0DJD8ZZ INSPECTION OF LOWER INTESTINAL TRACT, VIA NATURAL OR ARTIFICIAL OPENING ENDOSCOPIC: ICD-10-PCS | Performed by: INTERNAL MEDICINE

## 2024-09-12 RX ORDER — PROPOFOL 10 MG/ML
INJECTION, EMULSION INTRAVENOUS
Status: DISCONTINUED | OUTPATIENT
Start: 2024-09-12 | End: 2024-09-12 | Stop reason: SDUPTHER

## 2024-09-12 RX ORDER — OXYCODONE AND ACETAMINOPHEN 5; 325 MG/1; MG/1
1 TABLET ORAL ONCE
Status: COMPLETED | OUTPATIENT
Start: 2024-09-12 | End: 2024-09-12

## 2024-09-12 RX ORDER — PHENYLEPHRINE HCL IN 0.9% NACL 1 MG/10 ML
SYRINGE (ML) INTRAVENOUS
Status: DISCONTINUED | OUTPATIENT
Start: 2024-09-12 | End: 2024-09-12 | Stop reason: SDUPTHER

## 2024-09-12 RX ORDER — LIDOCAINE HYDROCHLORIDE 20 MG/ML
INJECTION, SOLUTION INTRAVENOUS
Status: DISCONTINUED | OUTPATIENT
Start: 2024-09-12 | End: 2024-09-12 | Stop reason: SDUPTHER

## 2024-09-12 RX ORDER — SODIUM CHLORIDE, SODIUM LACTATE, POTASSIUM CHLORIDE, CALCIUM CHLORIDE 600; 310; 30; 20 MG/100ML; MG/100ML; MG/100ML; MG/100ML
INJECTION, SOLUTION INTRAVENOUS
Status: DISCONTINUED | OUTPATIENT
Start: 2024-09-12 | End: 2024-09-12 | Stop reason: SDUPTHER

## 2024-09-12 RX ADMIN — PANTOPRAZOLE SODIUM 40 MG: 40 INJECTION, POWDER, FOR SOLUTION INTRAVENOUS at 09:08

## 2024-09-12 RX ADMIN — LIDOCAINE HYDROCHLORIDE 60 MG: 20 INJECTION, SOLUTION INTRAVENOUS at 16:58

## 2024-09-12 RX ADMIN — PROPOFOL 190 MG: 10 INJECTION, EMULSION INTRAVENOUS at 16:58

## 2024-09-12 RX ADMIN — SODIUM CHLORIDE, POTASSIUM CHLORIDE, SODIUM LACTATE AND CALCIUM CHLORIDE: 600; 310; 30; 20 INJECTION, SOLUTION INTRAVENOUS at 16:48

## 2024-09-12 RX ADMIN — SODIUM CHLORIDE, PRESERVATIVE FREE 10 ML: 5 INJECTION INTRAVENOUS at 21:03

## 2024-09-12 RX ADMIN — SODIUM CHLORIDE, PRESERVATIVE FREE 10 ML: 5 INJECTION INTRAVENOUS at 09:07

## 2024-09-12 RX ADMIN — Medication 0.1 MG: at 17:19

## 2024-09-12 RX ADMIN — OXYCODONE HYDROCHLORIDE AND ACETAMINOPHEN 1 TABLET: 5; 325 TABLET ORAL at 18:14

## 2024-09-12 RX ADMIN — PIPERACILLIN AND TAZOBACTAM 3375 MG: 3; .375 INJECTION, POWDER, LYOPHILIZED, FOR SOLUTION INTRAVENOUS at 20:58

## 2024-09-12 RX ADMIN — AMLODIPINE BESYLATE 2.5 MG: 5 TABLET ORAL at 09:08

## 2024-09-12 RX ADMIN — Medication 0.1 MG: at 17:05

## 2024-09-12 RX ADMIN — Medication 0.1 MG: at 17:17

## 2024-09-12 RX ADMIN — LEVOTHYROXINE SODIUM 50 MCG: 0.05 TABLET ORAL at 05:32

## 2024-09-12 RX ADMIN — PIPERACILLIN AND TAZOBACTAM 3375 MG: 3; .375 INJECTION, POWDER, LYOPHILIZED, FOR SOLUTION INTRAVENOUS at 12:46

## 2024-09-12 RX ADMIN — Medication 0.1 MG: at 17:01

## 2024-09-12 RX ADMIN — Medication 0.1 MG: at 17:06

## 2024-09-12 RX ADMIN — ACETAMINOPHEN 650 MG: 325 TABLET ORAL at 05:32

## 2024-09-12 RX ADMIN — ACETAMINOPHEN 650 MG: 325 TABLET ORAL at 12:39

## 2024-09-12 RX ADMIN — Medication 0.1 MG: at 17:09

## 2024-09-12 RX ADMIN — Medication 0.1 MG: at 17:14

## 2024-09-12 RX ADMIN — Medication 0.1 MG: at 17:21

## 2024-09-12 RX ADMIN — Medication 0.1 MG: at 17:11

## 2024-09-12 RX ADMIN — PIPERACILLIN AND TAZOBACTAM 3375 MG: 3; .375 INJECTION, POWDER, LYOPHILIZED, FOR SOLUTION INTRAVENOUS at 05:27

## 2024-09-12 RX ADMIN — METOPROLOL SUCCINATE 25 MG: 25 TABLET, FILM COATED, EXTENDED RELEASE ORAL at 18:14

## 2024-09-12 RX ADMIN — PANTOPRAZOLE SODIUM 40 MG: 40 INJECTION, POWDER, FOR SOLUTION INTRAVENOUS at 21:00

## 2024-09-12 ASSESSMENT — PAIN SCALES - WONG BAKER
WONGBAKER_NUMERICALRESPONSE: HURTS LITTLE MORE
WONGBAKER_NUMERICALRESPONSE: NO HURT

## 2024-09-12 ASSESSMENT — PAIN DESCRIPTION - PAIN TYPE: TYPE: ACUTE PAIN

## 2024-09-12 ASSESSMENT — PAIN DESCRIPTION - LOCATION
LOCATION: BACK
LOCATION: HIP;LEG
LOCATION: HIP
LOCATION: HIP

## 2024-09-12 ASSESSMENT — PAIN DESCRIPTION - ORIENTATION
ORIENTATION: RIGHT

## 2024-09-12 ASSESSMENT — PAIN DESCRIPTION - DESCRIPTORS
DESCRIPTORS: ACHING;DISCOMFORT
DESCRIPTORS: ACHING;DISCOMFORT
DESCRIPTORS: ACHING
DESCRIPTORS: ACHING;DISCOMFORT

## 2024-09-12 ASSESSMENT — PAIN - FUNCTIONAL ASSESSMENT
PAIN_FUNCTIONAL_ASSESSMENT: 0-10
PAIN_FUNCTIONAL_ASSESSMENT: PREVENTS OR INTERFERES SOME ACTIVE ACTIVITIES AND ADLS

## 2024-09-12 ASSESSMENT — PAIN SCALES - GENERAL
PAINLEVEL_OUTOF10: 4
PAINLEVEL_OUTOF10: 6
PAINLEVEL_OUTOF10: 3

## 2024-09-12 ASSESSMENT — PAIN DESCRIPTION - ONSET: ONSET: ON-GOING

## 2024-09-12 ASSESSMENT — PAIN DESCRIPTION - FREQUENCY: FREQUENCY: CONTINUOUS

## 2024-09-13 LAB
ABO/RH: NORMAL
ALBUMIN SERPL-MCNC: 3 G/DL (ref 3.4–5)
ALBUMIN/GLOB SERPL: 2.4 {RATIO} (ref 1.1–2.2)
ALP SERPL-CCNC: 69 U/L (ref 40–129)
ALT SERPL-CCNC: 8 U/L (ref 10–40)
ANION GAP SERPL CALCULATED.3IONS-SCNC: 12 MMOL/L (ref 9–17)
ANTIBODY SCREEN: NEGATIVE
AST SERPL-CCNC: 19 U/L (ref 15–37)
BASOPHILS # BLD: 0.03 K/UL
BASOPHILS NFR BLD: 1 % (ref 0–1)
BILIRUB SERPL-MCNC: 0.4 MG/DL (ref 0–1)
BLOOD BANK COMMENT: NORMAL
BLOOD BANK DISPENSE STATUS: NORMAL
BLOOD BANK DISPENSE STATUS: NORMAL
BLOOD BANK SAMPLE EXPIRATION: NORMAL
BPU ID: NORMAL
BPU ID: NORMAL
BUN SERPL-MCNC: 7 MG/DL (ref 7–20)
CALCIUM SERPL-MCNC: 7.7 MG/DL (ref 8.3–10.6)
CHLORIDE SERPL-SCNC: 102 MMOL/L (ref 99–110)
CO2 SERPL-SCNC: 25 MMOL/L (ref 21–32)
COMPONENT: NORMAL
COMPONENT: NORMAL
CREAT SERPL-MCNC: 0.9 MG/DL (ref 0.6–1.2)
CROSSMATCH RESULT: NORMAL
CROSSMATCH RESULT: NORMAL
EOSINOPHIL # BLD: 0.06 K/UL
EOSINOPHILS RELATIVE PERCENT: 1 % (ref 0–3)
ERYTHROCYTE [DISTWIDTH] IN BLOOD BY AUTOMATED COUNT: 17.8 % (ref 11.7–14.9)
GFR, ESTIMATED: 56 ML/MIN/1.73M2
GLUCOSE SERPL-MCNC: 77 MG/DL (ref 74–99)
HCT VFR BLD AUTO: 23.5 % (ref 37–47)
HGB BLD-MCNC: 7.2 G/DL (ref 12.5–16)
IMM GRANULOCYTES # BLD AUTO: 0.01 K/UL
IMM GRANULOCYTES NFR BLD: 0 %
LYMPHOCYTES NFR BLD: 0.74 K/UL
LYMPHOCYTES RELATIVE PERCENT: 18 % (ref 24–44)
MAGNESIUM SERPL-MCNC: 1.7 MG/DL (ref 1.8–2.4)
MCH RBC QN AUTO: 28.5 PG (ref 27–31)
MCHC RBC AUTO-ENTMCNC: 30.6 G/DL (ref 32–36)
MCV RBC AUTO: 92.9 FL (ref 78–100)
MONOCYTES NFR BLD: 0.47 K/UL
MONOCYTES NFR BLD: 11 % (ref 0–4)
NEUTROPHILS NFR BLD: 69 % (ref 36–66)
NEUTS SEG NFR BLD: 2.92 K/UL
PLATELET # BLD AUTO: 155 K/UL (ref 140–440)
PMV BLD AUTO: 9.9 FL (ref 7.5–11.1)
POTASSIUM SERPL-SCNC: 3.1 MMOL/L (ref 3.5–5.1)
PROT SERPL-MCNC: 4.2 G/DL (ref 6.4–8.2)
RBC # BLD AUTO: 2.53 M/UL (ref 4.2–5.4)
SODIUM SERPL-SCNC: 139 MMOL/L (ref 136–145)
TRANSFUSION STATUS: NORMAL
TRANSFUSION STATUS: NORMAL
UNIT DIVISION: 0
UNIT DIVISION: 0
WBC OTHER # BLD: 4.2 K/UL (ref 4–10.5)

## 2024-09-13 PROCEDURE — 6370000000 HC RX 637 (ALT 250 FOR IP): Performed by: STUDENT IN AN ORGANIZED HEALTH CARE EDUCATION/TRAINING PROGRAM

## 2024-09-13 PROCEDURE — 94761 N-INVAS EAR/PLS OXIMETRY MLT: CPT

## 2024-09-13 PROCEDURE — 6360000002 HC RX W HCPCS: Performed by: STUDENT IN AN ORGANIZED HEALTH CARE EDUCATION/TRAINING PROGRAM

## 2024-09-13 PROCEDURE — 36415 COLL VENOUS BLD VENIPUNCTURE: CPT

## 2024-09-13 PROCEDURE — 97530 THERAPEUTIC ACTIVITIES: CPT

## 2024-09-13 PROCEDURE — 99223 1ST HOSP IP/OBS HIGH 75: CPT | Performed by: INTERNAL MEDICINE

## 2024-09-13 PROCEDURE — 2580000003 HC RX 258: Performed by: STUDENT IN AN ORGANIZED HEALTH CARE EDUCATION/TRAINING PROGRAM

## 2024-09-13 PROCEDURE — 97116 GAIT TRAINING THERAPY: CPT

## 2024-09-13 PROCEDURE — 85025 COMPLETE CBC W/AUTO DIFF WBC: CPT

## 2024-09-13 PROCEDURE — 97110 THERAPEUTIC EXERCISES: CPT

## 2024-09-13 PROCEDURE — 99232 SBSQ HOSP IP/OBS MODERATE 35: CPT | Performed by: INTERNAL MEDICINE

## 2024-09-13 PROCEDURE — 80053 COMPREHEN METABOLIC PANEL: CPT

## 2024-09-13 PROCEDURE — 2140000000 HC CCU INTERMEDIATE R&B

## 2024-09-13 PROCEDURE — 83735 ASSAY OF MAGNESIUM: CPT

## 2024-09-13 RX ORDER — POTASSIUM CHLORIDE 7.45 MG/ML
10 INJECTION INTRAVENOUS
Status: COMPLETED | OUTPATIENT
Start: 2024-09-13 | End: 2024-09-13

## 2024-09-13 RX ORDER — POTASSIUM CHLORIDE 1500 MG/1
40 TABLET, EXTENDED RELEASE ORAL ONCE
Status: DISCONTINUED | OUTPATIENT
Start: 2024-09-13 | End: 2024-09-14 | Stop reason: HOSPADM

## 2024-09-13 RX ORDER — CALCIUM GLUCONATE 20 MG/ML
1000 INJECTION, SOLUTION INTRAVENOUS ONCE
Status: COMPLETED | OUTPATIENT
Start: 2024-09-13 | End: 2024-09-13

## 2024-09-13 RX ORDER — MAGNESIUM SULFATE IN WATER 40 MG/ML
2000 INJECTION, SOLUTION INTRAVENOUS PRN
Status: DISCONTINUED | OUTPATIENT
Start: 2024-09-13 | End: 2024-09-14 | Stop reason: HOSPADM

## 2024-09-13 RX ORDER — POTASSIUM CHLORIDE 1500 MG/1
40 TABLET, EXTENDED RELEASE ORAL PRN
Status: DISCONTINUED | OUTPATIENT
Start: 2024-09-13 | End: 2024-09-14 | Stop reason: HOSPADM

## 2024-09-13 RX ORDER — POTASSIUM CHLORIDE 1500 MG/1
40 TABLET, EXTENDED RELEASE ORAL ONCE
Status: COMPLETED | OUTPATIENT
Start: 2024-09-13 | End: 2024-09-13

## 2024-09-13 RX ORDER — POTASSIUM CHLORIDE 7.45 MG/ML
10 INJECTION INTRAVENOUS PRN
Status: DISCONTINUED | OUTPATIENT
Start: 2024-09-13 | End: 2024-09-14 | Stop reason: HOSPADM

## 2024-09-13 RX ADMIN — PANTOPRAZOLE SODIUM 40 MG: 40 INJECTION, POWDER, FOR SOLUTION INTRAVENOUS at 19:59

## 2024-09-13 RX ADMIN — POTASSIUM CHLORIDE 40 MEQ: 1500 TABLET, EXTENDED RELEASE ORAL at 08:49

## 2024-09-13 RX ADMIN — ACETAMINOPHEN 650 MG: 325 TABLET ORAL at 11:39

## 2024-09-13 RX ADMIN — PANTOPRAZOLE SODIUM 40 MG: 40 INJECTION, POWDER, FOR SOLUTION INTRAVENOUS at 08:49

## 2024-09-13 RX ADMIN — POTASSIUM CHLORIDE 10 MEQ: 7.45 INJECTION INTRAVENOUS at 13:05

## 2024-09-13 RX ADMIN — ACETAMINOPHEN 650 MG: 325 TABLET ORAL at 06:28

## 2024-09-13 RX ADMIN — AMLODIPINE BESYLATE 2.5 MG: 5 TABLET ORAL at 08:49

## 2024-09-13 RX ADMIN — LEVOTHYROXINE SODIUM 50 MCG: 0.05 TABLET ORAL at 06:28

## 2024-09-13 RX ADMIN — PIPERACILLIN AND TAZOBACTAM 3375 MG: 3; .375 INJECTION, POWDER, LYOPHILIZED, FOR SOLUTION INTRAVENOUS at 04:50

## 2024-09-13 RX ADMIN — POTASSIUM CHLORIDE 10 MEQ: 7.45 INJECTION INTRAVENOUS at 09:05

## 2024-09-13 RX ADMIN — CALCIUM GLUCONATE 1000 MG: 20 INJECTION, SOLUTION INTRAVENOUS at 09:01

## 2024-09-13 RX ADMIN — METOPROLOL SUCCINATE 25 MG: 25 TABLET, FILM COATED, EXTENDED RELEASE ORAL at 17:12

## 2024-09-13 RX ADMIN — SODIUM CHLORIDE, PRESERVATIVE FREE 10 ML: 5 INJECTION INTRAVENOUS at 19:59

## 2024-09-13 RX ADMIN — POTASSIUM CHLORIDE 10 MEQ: 7.45 INJECTION INTRAVENOUS at 11:38

## 2024-09-13 RX ADMIN — ACETAMINOPHEN 650 MG: 325 TABLET ORAL at 17:12

## 2024-09-13 RX ADMIN — POTASSIUM CHLORIDE 10 MEQ: 7.45 INJECTION INTRAVENOUS at 10:32

## 2024-09-13 RX ADMIN — SODIUM CHLORIDE, PRESERVATIVE FREE 10 ML: 5 INJECTION INTRAVENOUS at 08:51

## 2024-09-13 ASSESSMENT — PAIN SCALES - WONG BAKER
WONGBAKER_NUMERICALRESPONSE: HURTS LITTLE MORE

## 2024-09-13 ASSESSMENT — PAIN DESCRIPTION - ORIENTATION
ORIENTATION: RIGHT
ORIENTATION: RIGHT

## 2024-09-13 ASSESSMENT — PAIN DESCRIPTION - DESCRIPTORS
DESCRIPTORS: ACHING;DISCOMFORT
DESCRIPTORS: ACHING;DISCOMFORT

## 2024-09-13 ASSESSMENT — PAIN SCALES - GENERAL: PAINLEVEL_OUTOF10: 5

## 2024-09-13 ASSESSMENT — PAIN DESCRIPTION - ONSET: ONSET: ON-GOING

## 2024-09-13 ASSESSMENT — PAIN - FUNCTIONAL ASSESSMENT: PAIN_FUNCTIONAL_ASSESSMENT: PREVENTS OR INTERFERES SOME ACTIVE ACTIVITIES AND ADLS

## 2024-09-13 ASSESSMENT — PAIN DESCRIPTION - PAIN TYPE: TYPE: ACUTE PAIN

## 2024-09-13 ASSESSMENT — PAIN DESCRIPTION - LOCATION
LOCATION: HIP
LOCATION: HIP

## 2024-09-13 ASSESSMENT — PAIN DESCRIPTION - FREQUENCY: FREQUENCY: CONTINUOUS

## 2024-09-14 VITALS
SYSTOLIC BLOOD PRESSURE: 125 MMHG | WEIGHT: 142.42 LBS | BODY MASS INDEX: 22.35 KG/M2 | OXYGEN SATURATION: 97 % | TEMPERATURE: 98.7 F | HEIGHT: 67 IN | HEART RATE: 60 BPM | DIASTOLIC BLOOD PRESSURE: 70 MMHG | RESPIRATION RATE: 12 BRPM

## 2024-09-14 LAB
ALBUMIN SERPL-MCNC: 3.1 G/DL (ref 3.4–5)
ALBUMIN SERPL-MCNC: 3.1 G/DL (ref 3.4–5)
ALBUMIN/GLOB SERPL: 1.9 {RATIO} (ref 1.1–2.2)
ALBUMIN/GLOB SERPL: 2.2 {RATIO} (ref 1.1–2.2)
ALP SERPL-CCNC: 71 U/L (ref 40–129)
ALP SERPL-CCNC: 75 U/L (ref 40–129)
ALT SERPL-CCNC: 9 U/L (ref 10–40)
ALT SERPL-CCNC: 9 U/L (ref 10–40)
ANION GAP SERPL CALCULATED.3IONS-SCNC: 10 MMOL/L (ref 9–17)
ANION GAP SERPL CALCULATED.3IONS-SCNC: 13 MMOL/L (ref 9–17)
AST SERPL-CCNC: 16 U/L (ref 15–37)
AST SERPL-CCNC: 23 U/L (ref 15–37)
BASOPHILS # BLD: 0.02 K/UL
BASOPHILS NFR BLD: 1 % (ref 0–1)
BILIRUB SERPL-MCNC: 0.3 MG/DL (ref 0–1)
BILIRUB SERPL-MCNC: 0.4 MG/DL (ref 0–1)
BUN SERPL-MCNC: 6 MG/DL (ref 7–20)
BUN SERPL-MCNC: 7 MG/DL (ref 7–20)
CALCIUM SERPL-MCNC: 7.8 MG/DL (ref 8.3–10.6)
CALCIUM SERPL-MCNC: 7.9 MG/DL (ref 8.3–10.6)
CHLORIDE SERPL-SCNC: 104 MMOL/L (ref 99–110)
CHLORIDE SERPL-SCNC: 106 MMOL/L (ref 99–110)
CO2 SERPL-SCNC: 25 MMOL/L (ref 21–32)
CO2 SERPL-SCNC: 26 MMOL/L (ref 21–32)
CREAT SERPL-MCNC: 0.8 MG/DL (ref 0.6–1.2)
CREAT SERPL-MCNC: 1 MG/DL (ref 0.6–1.2)
EOSINOPHIL # BLD: 0.06 K/UL
EOSINOPHILS RELATIVE PERCENT: 1 % (ref 0–3)
ERYTHROCYTE [DISTWIDTH] IN BLOOD BY AUTOMATED COUNT: 17.9 % (ref 11.7–14.9)
GFR, ESTIMATED: 54 ML/MIN/1.73M2
GFR, ESTIMATED: 64 ML/MIN/1.73M2
GLUCOSE SERPL-MCNC: 65 MG/DL (ref 74–99)
GLUCOSE SERPL-MCNC: 88 MG/DL (ref 74–99)
HCT VFR BLD AUTO: 26 % (ref 37–47)
HGB BLD-MCNC: 7.9 G/DL (ref 12.5–16)
IMM GRANULOCYTES # BLD AUTO: 0.01 K/UL
IMM GRANULOCYTES NFR BLD: 0 %
LYMPHOCYTES NFR BLD: 0.96 K/UL
LYMPHOCYTES RELATIVE PERCENT: 22 % (ref 24–44)
MAGNESIUM SERPL-MCNC: 1.9 MG/DL (ref 1.8–2.4)
MCH RBC QN AUTO: 28.4 PG (ref 27–31)
MCHC RBC AUTO-ENTMCNC: 30.4 G/DL (ref 32–36)
MCV RBC AUTO: 93.5 FL (ref 78–100)
MONOCYTES NFR BLD: 0.61 K/UL
MONOCYTES NFR BLD: 14 % (ref 0–4)
NEUTROPHILS NFR BLD: 61 % (ref 36–66)
NEUTS SEG NFR BLD: 2.63 K/UL
PLATELET # BLD AUTO: 181 K/UL (ref 140–440)
PMV BLD AUTO: 9.9 FL (ref 7.5–11.1)
POTASSIUM SERPL-SCNC: 3.1 MMOL/L (ref 3.5–5.1)
POTASSIUM SERPL-SCNC: 4.1 MMOL/L (ref 3.5–5.1)
PROT SERPL-MCNC: 4.6 G/DL (ref 6.4–8.2)
PROT SERPL-MCNC: 4.7 G/DL (ref 6.4–8.2)
RBC # BLD AUTO: 2.78 M/UL (ref 4.2–5.4)
SODIUM SERPL-SCNC: 141 MMOL/L (ref 136–145)
SODIUM SERPL-SCNC: 143 MMOL/L (ref 136–145)
WBC OTHER # BLD: 4.3 K/UL (ref 4–10.5)

## 2024-09-14 PROCEDURE — 6360000002 HC RX W HCPCS: Performed by: STUDENT IN AN ORGANIZED HEALTH CARE EDUCATION/TRAINING PROGRAM

## 2024-09-14 PROCEDURE — 6370000000 HC RX 637 (ALT 250 FOR IP): Performed by: STUDENT IN AN ORGANIZED HEALTH CARE EDUCATION/TRAINING PROGRAM

## 2024-09-14 PROCEDURE — 85025 COMPLETE CBC W/AUTO DIFF WBC: CPT

## 2024-09-14 PROCEDURE — 2580000003 HC RX 258: Performed by: STUDENT IN AN ORGANIZED HEALTH CARE EDUCATION/TRAINING PROGRAM

## 2024-09-14 PROCEDURE — 83735 ASSAY OF MAGNESIUM: CPT

## 2024-09-14 PROCEDURE — APPNB15 APP NON BILLABLE TIME 0-15 MINS

## 2024-09-14 PROCEDURE — 80053 COMPREHEN METABOLIC PANEL: CPT

## 2024-09-14 PROCEDURE — 36415 COLL VENOUS BLD VENIPUNCTURE: CPT

## 2024-09-14 RX ORDER — ASPIRIN 81 MG/1
81 TABLET, CHEWABLE ORAL DAILY
Status: DISCONTINUED | OUTPATIENT
Start: 2024-09-14 | End: 2024-09-14

## 2024-09-14 RX ORDER — AMLODIPINE BESYLATE 2.5 MG/1
2.5 TABLET ORAL DAILY
Qty: 30 TABLET | Refills: 3 | Status: SHIPPED | OUTPATIENT
Start: 2024-09-14

## 2024-09-14 RX ORDER — PANTOPRAZOLE SODIUM 40 MG/1
40 TABLET, DELAYED RELEASE ORAL
Qty: 60 TABLET | Refills: 5 | Status: SHIPPED | OUTPATIENT
Start: 2024-09-14

## 2024-09-14 RX ORDER — CLOPIDOGREL BISULFATE 75 MG/1
75 TABLET ORAL DAILY
Status: DISCONTINUED | OUTPATIENT
Start: 2024-09-14 | End: 2024-09-14 | Stop reason: HOSPADM

## 2024-09-14 RX ORDER — ASPIRIN 81 MG/1
81 TABLET ORAL DAILY
Qty: 90 TABLET | Refills: 0 | Status: SHIPPED | OUTPATIENT
Start: 2024-09-14 | End: 2024-09-14 | Stop reason: HOSPADM

## 2024-09-14 RX ORDER — CLOPIDOGREL BISULFATE 75 MG/1
75 TABLET ORAL DAILY
Qty: 30 TABLET | Refills: 3 | Status: SHIPPED | OUTPATIENT
Start: 2024-09-14

## 2024-09-14 RX ADMIN — ACETAMINOPHEN 650 MG: 325 TABLET ORAL at 05:33

## 2024-09-14 RX ADMIN — LEVOTHYROXINE SODIUM 50 MCG: 0.05 TABLET ORAL at 05:33

## 2024-09-14 RX ADMIN — CLOPIDOGREL BISULFATE 75 MG: 75 TABLET ORAL at 13:11

## 2024-09-14 RX ADMIN — PANTOPRAZOLE SODIUM 40 MG: 40 INJECTION, POWDER, FOR SOLUTION INTRAVENOUS at 09:25

## 2024-09-14 RX ADMIN — ACETAMINOPHEN 650 MG: 325 TABLET ORAL at 13:11

## 2024-09-14 RX ADMIN — SODIUM CHLORIDE, PRESERVATIVE FREE 10 ML: 5 INJECTION INTRAVENOUS at 09:25

## 2024-09-14 RX ADMIN — AMLODIPINE BESYLATE 2.5 MG: 5 TABLET ORAL at 09:25

## 2024-09-14 ASSESSMENT — PAIN SCALES - GENERAL: PAINLEVEL_OUTOF10: 4

## 2024-09-14 ASSESSMENT — PAIN SCALES - WONG BAKER
WONGBAKER_NUMERICALRESPONSE: HURTS A LITTLE BIT
WONGBAKER_NUMERICALRESPONSE: NO HURT
WONGBAKER_NUMERICALRESPONSE: NO HURT

## 2024-09-14 ASSESSMENT — PAIN DESCRIPTION - DESCRIPTORS: DESCRIPTORS: ACHING;DISCOMFORT

## 2024-09-14 ASSESSMENT — PAIN DESCRIPTION - LOCATION: LOCATION: HIP

## 2024-09-14 ASSESSMENT — PAIN DESCRIPTION - ORIENTATION: ORIENTATION: RIGHT

## 2024-09-16 ENCOUNTER — CARE COORDINATION (OUTPATIENT)
Dept: CARE COORDINATION | Age: 89
End: 2024-09-16

## 2024-09-17 ENCOUNTER — COMMUNITY CARE MANAGEMENT (OUTPATIENT)
Facility: CLINIC | Age: 89
End: 2024-09-17

## 2024-09-18 ENCOUNTER — HOSPITAL ENCOUNTER (OUTPATIENT)
Age: 89
Setting detail: SPECIMEN
Discharge: HOME | End: 2024-09-18

## 2024-09-18 LAB
ALBUMIN SERPL-MCNC: 3.3 G/DL (ref 3.4–5)
ALBUMIN/GLOB SERPL: 1.8 {RATIO} (ref 1.1–2.2)
ALP SERPL-CCNC: 74 U/L (ref 40–129)
ALT SERPL-CCNC: 10 U/L (ref 10–40)
ANION GAP SERPL CALCULATED.3IONS-SCNC: 10 MMOL/L (ref 9–17)
AST SERPL-CCNC: 18 U/L (ref 15–37)
BASOPHILS # BLD: 0.02 K/UL
BASOPHILS NFR BLD: 1 % (ref 0–1)
BILIRUB SERPL-MCNC: 0.3 MG/DL (ref 0–1)
BUN SERPL-MCNC: 20 MG/DL (ref 7–20)
CALCIUM SERPL-MCNC: 8 MG/DL (ref 8.3–10.6)
CHLORIDE SERPL-SCNC: 107 MMOL/L (ref 99–110)
CO2 SERPL-SCNC: 27 MMOL/L (ref 21–32)
CREAT SERPL-MCNC: 1.1 MG/DL (ref 0.6–1.2)
EOSINOPHIL # BLD: 0.1 K/UL
EOSINOPHILS RELATIVE PERCENT: 3 % (ref 0–3)
ERYTHROCYTE [DISTWIDTH] IN BLOOD BY AUTOMATED COUNT: 17.7 % (ref 11.7–14.9)
GFR, ESTIMATED: 46 ML/MIN/1.73M2
GLUCOSE SERPL-MCNC: 92 MG/DL (ref 74–99)
HCT VFR BLD AUTO: 27.5 % (ref 37–47)
HGB BLD-MCNC: 8.2 G/DL (ref 12.5–16)
IMM GRANULOCYTES # BLD AUTO: 0.01 K/UL
IMM GRANULOCYTES NFR BLD: 0 %
LYMPHOCYTES NFR BLD: 0.97 K/UL
LYMPHOCYTES RELATIVE PERCENT: 25 % (ref 24–44)
MAGNESIUM SERPL-MCNC: 1.9 MG/DL (ref 1.8–2.4)
MCH RBC QN AUTO: 28.9 PG (ref 27–31)
MCHC RBC AUTO-ENTMCNC: 29.8 G/DL (ref 32–36)
MCV RBC AUTO: 96.8 FL (ref 78–100)
MONOCYTES NFR BLD: 0.55 K/UL
MONOCYTES NFR BLD: 14 % (ref 0–4)
NEUTROPHILS NFR BLD: 57 % (ref 36–66)
NEUTS SEG NFR BLD: 2.22 K/UL
PHOSPHATE SERPL-MCNC: 3.3 MG/DL (ref 2.5–4.9)
PLATELET # BLD AUTO: 195 K/UL (ref 140–440)
PMV BLD AUTO: 10.2 FL (ref 7.5–11.1)
POTASSIUM SERPL-SCNC: 3.6 MMOL/L (ref 3.5–5.1)
PROT SERPL-MCNC: 5.1 G/DL (ref 6.4–8.2)
RBC # BLD AUTO: 2.84 M/UL (ref 4.2–5.4)
SODIUM SERPL-SCNC: 144 MMOL/L (ref 136–145)
WBC OTHER # BLD: 3.9 K/UL (ref 4–10.5)

## 2024-09-18 PROCEDURE — 85025 COMPLETE CBC W/AUTO DIFF WBC: CPT

## 2024-09-18 PROCEDURE — 84100 ASSAY OF PHOSPHORUS: CPT

## 2024-09-18 PROCEDURE — 80053 COMPREHEN METABOLIC PANEL: CPT

## 2024-09-18 PROCEDURE — 83735 ASSAY OF MAGNESIUM: CPT

## 2024-09-18 PROCEDURE — 36415 COLL VENOUS BLD VENIPUNCTURE: CPT

## 2024-09-27 ENCOUNTER — TELEPHONE (OUTPATIENT)
Dept: CARDIOLOGY CLINIC | Age: 89
End: 2024-09-27

## 2024-10-16 ENCOUNTER — TELEPHONE (OUTPATIENT)
Dept: CARDIOLOGY CLINIC | Age: 89
End: 2024-10-16

## 2024-10-17 ENCOUNTER — COMMUNITY CARE MANAGEMENT (OUTPATIENT)
Facility: CLINIC | Age: 89
End: 2024-10-17

## 2024-12-05 ENCOUNTER — TELEPHONE (OUTPATIENT)
Dept: FAMILY MEDICINE CLINIC | Age: 88
End: 2024-12-05

## 2024-12-05 NOTE — TELEPHONE ENCOUNTER
Patient's grandchild called stated patient stays up for 3-4 days at a time. Wants to know if giving patient some melatonin would be alright.

## 2024-12-10 NOTE — TELEPHONE ENCOUNTER
Spoke with granddaughter Margot(on patient's communication form) advised of Dewayne's note she verbalized understanding and had no further questions at this time.

## 2024-12-30 ENCOUNTER — HOSPITAL ENCOUNTER (EMERGENCY)
Age: 88
Discharge: HOME | End: 2024-12-31
Attending: STUDENT IN AN ORGANIZED HEALTH CARE EDUCATION/TRAINING PROGRAM
Payer: MEDICARE

## 2024-12-30 DIAGNOSIS — M25.561 ACUTE PAIN OF RIGHT KNEE: ICD-10-CM

## 2024-12-30 DIAGNOSIS — F03.90 DEMENTIA, UNSPECIFIED DEMENTIA SEVERITY, UNSPECIFIED DEMENTIA TYPE, UNSPECIFIED WHETHER BEHAVIORAL, PSYCHOTIC, OR MOOD DISTURBANCE OR ANXIETY (HCC): Primary | ICD-10-CM

## 2024-12-30 PROCEDURE — 99284 EMERGENCY DEPT VISIT MOD MDM: CPT

## 2024-12-31 ENCOUNTER — APPOINTMENT (OUTPATIENT)
Dept: CT IMAGING | Age: 88
End: 2024-12-31
Payer: MEDICARE

## 2024-12-31 ENCOUNTER — APPOINTMENT (OUTPATIENT)
Dept: GENERAL RADIOLOGY | Age: 88
End: 2024-12-31
Payer: MEDICARE

## 2024-12-31 VITALS
DIASTOLIC BLOOD PRESSURE: 114 MMHG | TEMPERATURE: 97.5 F | WEIGHT: 140 LBS | BODY MASS INDEX: 21.93 KG/M2 | SYSTOLIC BLOOD PRESSURE: 162 MMHG | RESPIRATION RATE: 27 BRPM | OXYGEN SATURATION: 97 % | HEART RATE: 82 BPM

## 2024-12-31 LAB
ANION GAP SERPL CALCULATED.3IONS-SCNC: 10 MMOL/L (ref 9–17)
B PARAP IS1001 DNA NPH QL NAA+NON-PROBE: NOT DETECTED
B PERT DNA SPEC QL NAA+PROBE: NOT DETECTED
BASOPHILS # BLD: 0.02 K/UL
BASOPHILS NFR BLD: 0 % (ref 0–1)
BILIRUB UR QL STRIP: NEGATIVE
BUN SERPL-MCNC: 22 MG/DL (ref 7–20)
C PNEUM DNA NPH QL NAA+NON-PROBE: NOT DETECTED
CALCIUM SERPL-MCNC: 9.7 MG/DL (ref 8.3–10.6)
CHLORIDE SERPL-SCNC: 99 MMOL/L (ref 99–110)
CLARITY UR: CLEAR
CO2 SERPL-SCNC: 29 MMOL/L (ref 21–32)
COLOR UR: YELLOW
COMMENT: ABNORMAL
CREAT SERPL-MCNC: 1 MG/DL (ref 0.6–1.2)
EOSINOPHIL # BLD: 0 K/UL
EOSINOPHILS RELATIVE PERCENT: 0 % (ref 0–3)
ERYTHROCYTE [DISTWIDTH] IN BLOOD BY AUTOMATED COUNT: 14.6 % (ref 11.7–14.9)
FLUAV RNA NPH QL NAA+NON-PROBE: NOT DETECTED
FLUBV RNA NPH QL NAA+NON-PROBE: NOT DETECTED
GFR, ESTIMATED: 52 ML/MIN/1.73M2
GLUCOSE SERPL-MCNC: 101 MG/DL (ref 74–99)
GLUCOSE UR STRIP-MCNC: NEGATIVE MG/DL
HADV DNA NPH QL NAA+NON-PROBE: NOT DETECTED
HCOV 229E RNA NPH QL NAA+NON-PROBE: NOT DETECTED
HCOV HKU1 RNA NPH QL NAA+NON-PROBE: NOT DETECTED
HCOV NL63 RNA NPH QL NAA+NON-PROBE: NOT DETECTED
HCOV OC43 RNA NPH QL NAA+NON-PROBE: NOT DETECTED
HCT VFR BLD AUTO: 46.5 % (ref 37–47)
HGB BLD-MCNC: 14.7 G/DL (ref 12.5–16)
HGB UR QL STRIP.AUTO: NEGATIVE
HMPV RNA NPH QL NAA+NON-PROBE: NOT DETECTED
HPIV1 RNA NPH QL NAA+NON-PROBE: NOT DETECTED
HPIV2 RNA NPH QL NAA+NON-PROBE: NOT DETECTED
HPIV3 RNA NPH QL NAA+NON-PROBE: NOT DETECTED
HPIV4 RNA NPH QL NAA+NON-PROBE: NOT DETECTED
IMM GRANULOCYTES # BLD AUTO: 0.03 K/UL
IMM GRANULOCYTES NFR BLD: 1 %
KETONES UR STRIP-MCNC: NEGATIVE MG/DL
LEUKOCYTE ESTERASE UR QL STRIP: NEGATIVE
LYMPHOCYTES NFR BLD: 0.6 K/UL
LYMPHOCYTES RELATIVE PERCENT: 11 % (ref 24–44)
M PNEUMO DNA NPH QL NAA+NON-PROBE: NOT DETECTED
MCH RBC QN AUTO: 27.1 PG (ref 27–31)
MCHC RBC AUTO-ENTMCNC: 31.6 G/DL (ref 32–36)
MCV RBC AUTO: 85.6 FL (ref 78–100)
MONOCYTES NFR BLD: 0.34 K/UL
MONOCYTES NFR BLD: 6 % (ref 0–4)
NEUTROPHILS NFR BLD: 81 % (ref 36–66)
NEUTS SEG NFR BLD: 4.35 K/UL
NITRITE UR QL STRIP: NEGATIVE
PH UR STRIP: 6.5 [PH] (ref 5–8)
PLATELET # BLD AUTO: 151 K/UL (ref 140–440)
PMV BLD AUTO: 10.6 FL (ref 7.5–11.1)
POTASSIUM SERPL-SCNC: 3.8 MMOL/L (ref 3.5–5.1)
PROT UR STRIP-MCNC: NEGATIVE MG/DL
RBC # BLD AUTO: 5.43 M/UL (ref 4.2–5.4)
RSV RNA NPH QL NAA+NON-PROBE: NOT DETECTED
RV+EV RNA NPH QL NAA+NON-PROBE: NOT DETECTED
SARS-COV-2 RNA NPH QL NAA+NON-PROBE: NOT DETECTED
SODIUM SERPL-SCNC: 138 MMOL/L (ref 136–145)
SP GR UR STRIP: 1.01 (ref 1–1.03)
SPECIMEN DESCRIPTION: NORMAL
UROBILINOGEN UR STRIP-ACNC: 2 EU/DL (ref 0–1)
WBC OTHER # BLD: 5.3 K/UL (ref 4–10.5)

## 2024-12-31 PROCEDURE — 73562 X-RAY EXAM OF KNEE 3: CPT

## 2024-12-31 PROCEDURE — 85025 COMPLETE CBC W/AUTO DIFF WBC: CPT

## 2024-12-31 PROCEDURE — 81003 URINALYSIS AUTO W/O SCOPE: CPT

## 2024-12-31 PROCEDURE — 6370000000 HC RX 637 (ALT 250 FOR IP): Performed by: EMERGENCY MEDICINE

## 2024-12-31 PROCEDURE — 80048 BASIC METABOLIC PNL TOTAL CA: CPT

## 2024-12-31 PROCEDURE — 70450 CT HEAD/BRAIN W/O DYE: CPT

## 2024-12-31 PROCEDURE — 71046 X-RAY EXAM CHEST 2 VIEWS: CPT

## 2024-12-31 PROCEDURE — 0202U NFCT DS 22 TRGT SARS-COV-2: CPT

## 2024-12-31 RX ORDER — ACETAMINOPHEN 500 MG
1000 TABLET ORAL ONCE
Status: COMPLETED | OUTPATIENT
Start: 2024-12-31 | End: 2024-12-31

## 2024-12-31 RX ADMIN — ACETAMINOPHEN 1000 MG: 500 TABLET ORAL at 08:22

## 2024-12-31 ASSESSMENT — PAIN SCALES - GENERAL
PAINLEVEL_OUTOF10: 3
PAINLEVEL_OUTOF10: 2

## 2024-12-31 ASSESSMENT — PAIN DESCRIPTION - DESCRIPTORS: DESCRIPTORS: DISCOMFORT

## 2024-12-31 ASSESSMENT — PAIN DESCRIPTION - LOCATION: LOCATION: KNEE

## 2024-12-31 ASSESSMENT — PAIN DESCRIPTION - ORIENTATION: ORIENTATION: RIGHT

## 2024-12-31 NOTE — ED TRIAGE NOTES
Pt is here by EMS from home for AMS. EMS reports that the pt woke up because she is sundowning and family was attempting to give the pt a shower because she had a bowl movement on her self and discovered that she has an AMS

## 2024-12-31 NOTE — ED NOTES
Medication History  [facility]    Patient Name: Cary Rogers 1935     Medication history has been completed by: Lupis Ahmadi CPhT    Source(s) of information: insurance claims     Primary Care Physician: Dewayne Jernigan PA     Pharmacy:     Allergies as of 12/30/2024 - Fully Reviewed 09/13/2024   Allergen Reaction Noted    Amitriptyline  05/08/2019    Aspirin  11/10/2014    Codeine  11/10/2014    Elavil [amitriptyline hcl]  05/08/2019    Hydroxychloroquine  05/08/2019    Ibuprofen  11/10/2014    Namenda [memantine]  10/19/2022    Plaquenil [hydroxychloroquine sulfate]  05/08/2019    Prevnar 13 [pneumococcal 13-naun conj vacc]  11/23/2021    Theophyllines  05/08/2019        Prior to Admission medications    Medication Sig Start Date End Date Taking? Authorizing Provider   amLODIPine (NORVASC) 2.5 MG tablet Take 1 tablet by mouth daily 9/14/24   Eduard Carrillo MD   pantoprazole (PROTONIX) 40 MG tablet Take 1 tablet by mouth 2 times daily (before meals) 9/14/24   Eduard Carrillo MD   clopidogrel (PLAVIX) 75 MG tablet Take 1 tablet by mouth daily 9/14/24   Eduard Carrillo MD   metoprolol succinate (TOPROL XL) 25 MG extended release tablet Take 1 tablet by mouth every evening 10/19/22   FABIEN Strickland MD   levothyroxine (SYNTHROID) 50 MCG tablet Take 1 tablet by mouth every morning (before breakfast) TAKE ONE TABLET BY MOUTH DAILY 10/7/22 4/11/24  Timothy Smith MD     Medications removed from list (include reason, ex. noncompliance, medication cost, therapy complete etc.):   Ergocalciferol last dispensed 10/20/2022    Comments:  Unable to assess patient.  Per claims last fill dates provided.  Per demographics patient at [facility].   Reached out to staff per facility patient discharged 10/16/24.    To my knowledge the above medication history is accurate as of 12/31/2024 9:32 AM.   Lupis Ahmadi CPhT   12/31/2024 9:32 AM

## 2024-12-31 NOTE — ED PROVIDER NOTES
[facility] EMERGENCY DEPARTMENT  TRANSFER OF CARE NOTE  EMERGENCY DEPARTMENT ENCOUNTER          Pt Name: Cary Rogers  MRN: [id number]  Birthdate 1935  Date of encounter: 12/30/2024  Physician: Lety Valle DO, FACEP      Transfer of Care Information:   Provider Signing out:   Lev Alexa Stock    Receiving Physician: Lety Valle  Sign out time: 0559      Brief history:  History of dementia from home had worsening confusion last night but was now back to baseline.  UA pending.    ED Course as of 01/01/25 1242   Tue Dec 31, 2024   0559 Hx of dementia, from home for worsening confusion for 1 day.  Back to baseline.  Workup negative. UA pending.  Can back home +/- abx. [CB]   0644 UA pending [CB]   0832 IMPRESSION:  No acute osseous findings.        Electronically signed by Ej Rojas MD        Exam Ended: 12/31/24 08:20 EST Last Resulted: 12/31/24 08:23 EST             [CB]   0942 Urinalysis [CB]   1043 Ua in process [CB]   1102 Leukocyte Esterase, Urine: NEGATIVE [CB]   1102 Nitrite, Urine: NEGATIVE [CB]   1104 IMPRESSION:  1. No acute intracranial process. If clinical concern for acute ischemia, MRI is  a more sensitive exam.  2. Chronic small vessel ischemic disease.  3. Atrophy.     Electronically signed by Johnathan Carrillo        Exam Ended: 12/31/24 01:37 EST       [CB]   1104 IMPRESSION:  Chronic blunting of the left costophrenic angle which may relate to a chronic  small left pleural effusion. No interval change.        Electronically signed by Johnathan Carrillo        Exam Ended: 12/31/24 01:47 EST       [CB]   1104 INDINGS:   No acute fracture or dislocation. Diminished osseous mineralization. Loss of  medial tibiofemoral joint space. No internalized radiopaque foreign object.  Vascular calcifications are noted.     IMPRESSION:  No acute osseous findings.   [CB]      ED Course User Index  [CB] Lety Valle DO       Items pending that need to be  checked:  DEANNA      Tentative Impression of patient:  Dementia with sundowning    Expected disposition of patient:  Pending results, discharged.        Additional Assessment and results:   I have personally performed a face to face diagnostic evaluation on this patient. The patient's initial evaluation and plan have been discussed with the prior physician who initially evaluated the patient. Nursing Notes, Past Medical Hx, Past Surgical Hx, Social Hx, Allergies, vital signs and Family Hx were all reviewed.      Vitals:    12/31/24 0813   BP: (!) 153/107   Pulse: 84   Resp: 18   Temp:    SpO2: 100%     Physical Exam  Awake alert pleasant  No respiratory distress  Normal heart rate  Has mild right knee tenderness without erythema or significant edema    Labs Reviewed   CBC WITH AUTO DIFFERENTIAL - Abnormal; Notable for the following components:       Result Value    RBC 5.43 (*)     MCHC 31.6 (*)     Neutrophils % 81 (*)     Lymphocytes % 11 (*)     Monocytes % 6 (*)     Immature Granulocytes % 1 (*)     All other components within normal limits   BASIC METABOLIC PANEL - Abnormal; Notable for the following components:    Glucose 101 (*)     BUN 22 (*)     Est, Glom Filt Rate 52 (*)     All other components within normal limits   URINALYSIS - Abnormal; Notable for the following components:    Urobilinogen, Urine 2.0 (*)     All other components within normal limits   RESPIRATORY PANEL, MOLECULAR, WITH COVID-19         Medications   acetaminophen (TYLENOL) tablet 1,000 mg (1,000 mg Oral Given 12/31/24 0822)         XR KNEE RIGHT (3 VIEWS)   Final Result   No acute osseous findings.         Electronically signed by Ej Rojas MD      XR CHEST (2 VW)   Final Result   Chronic blunting of the left costophrenic angle which may relate to a chronic   small left pleural effusion. No interval change.         Electronically signed by Johnathan Carrillo      CT HEAD WO CONTRAST   Final Result   1. No acute intracranial process. If

## 2024-12-31 NOTE — ED PROVIDER NOTES
Triage Chief Complaint:   Altered Mental Status and Extremity Weakness    [place]:  Today in the ED I had the pleasure of caring for Cary Rogers who is a 89 y.o. female that presents today to the ED for evaluation.  Patient does have a history of dementia.  Incontinence.  Patient apparently soiled herself which is not uncommon family went to clean her up.  As patient does have a history of dementia and is apparently sundowning.  However family thought the patient was more confused than usual so they brought patient here for evaluation.  Patient herself denies any concerns aside from chronic right sided knee pain.  Denies headache denies confusion denies cough.  She does state that she has had a bit of a runny nose over the last several days however.  No shortness of breath.  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    Thyroid disease     Ulcer in the past    Gastric     Past Surgical History:   Procedure Laterality Date    APPENDECTOMY      BLADDER SURGERY      CHOLECYSTECTOMY      COLONOSCOPY  11/4/14    normal, biopsy    COLONOSCOPY N/A 9/12/2024    COLONOSCOPY DIAGNOSTIC performed by Navid Dolan MD at [facility] ENDOSCOPY    EYE SURGERY      HYSTERECTOMY (CERVIX STATUS UNKNOWN)      PACEMAKER PLACEMENT Left 02/20/2020    bivi pacer upgrade-Medtronic Percepta Quad CRT-P MRI SureScan     UPPER GASTROINTESTINAL ENDOSCOPY N/A 9/7/2024    ESOPHAGOGASTRODUODENOSCOPY DIAGNOSTIC ONLY

## 2025-01-02 ENCOUNTER — CARE COORDINATION (OUTPATIENT)
Dept: CARE COORDINATION | Age: 89
End: 2025-01-02

## 2025-01-02 NOTE — CARE COORDINATION
Ambulatory Care Coordination Note     2025 8:17 AM     Patient Current Location:  Home: [address] [place], [place] [address]a [facility] [address]     This patient was received as a referral from Population health report .    ACM contacted the family Grandbrii Mcallister by telephone. Verified name and  with family as identifiers. Provided introduction to self, and explanation of the ACM role.   Family accepted care management services at this time.          ACM: Jade Jernigan RN     Challenges to be reviewed by the provider   Additional needs identified to be addressed with provider No  none               Method of communication with provider: none.    Utilization: N/A - Initial Call     Care Summary Note: ACM outreach to patient family Eugenio Mcallister, for Care Management. HIPAA verified for Eugenio Mcallister.  Family states patient is doing well, patient does not live at the [facility] anymore lives with Esvin.and spouse.  Patient medications verified with ACM at this time no issues. ACM advised the patient needs to have a FU with provider, Eugenio Mcallister stated he would talk with his wife about making a FU appointment.  Patient has had falls in the last year with no noted injuries.  ACM advised patient to call for any concerns about the patient.      Offered patient enrollment in the Remote Patient Monitoring (RPM) program for in-home monitoring: Deferred at this time because time restraint with family; will discuss at next outreach.     Assessments Completed:       2025     8:15 AM   Amb Fall Risk Assessment and TUG Test   Do you feel unsteady or are you worried about falling?  yes   2 or more falls in past year? no   Fall with injury in past year? no    ,   General Assessment    Do you have any symptoms that are causing concern?: No          Medications Reviewed:   Completed during this call    Advance Care Planning:   Not reviewed during this call     Care Planning:   Not completed

## 2025-01-05 ENCOUNTER — HOSPITAL ENCOUNTER (EMERGENCY)
Age: 89
Discharge: HOME | End: 2025-01-06
Attending: EMERGENCY MEDICINE
Payer: MEDICARE

## 2025-01-05 DIAGNOSIS — N30.01 ACUTE CYSTITIS WITH HEMATURIA: Primary | ICD-10-CM

## 2025-01-05 LAB
ALBUMIN SERPL-MCNC: 3.5 G/DL (ref 3.4–5)
ALBUMIN/GLOB SERPL: 1.4 {RATIO} (ref 1.1–2.2)
ALP SERPL-CCNC: 86 U/L (ref 40–129)
ALT SERPL-CCNC: 13 U/L (ref 10–40)
ANION GAP SERPL CALCULATED.3IONS-SCNC: 11 MMOL/L (ref 9–17)
AST SERPL-CCNC: 27 U/L (ref 15–37)
BASOPHILS # BLD: 0.04 K/UL
BASOPHILS NFR BLD: 1 % (ref 0–1)
BILIRUB SERPL-MCNC: 1 MG/DL (ref 0–1)
BILIRUB UR QL STRIP: ABNORMAL
BUN SERPL-MCNC: 36 MG/DL (ref 7–20)
CALCIUM SERPL-MCNC: 9.4 MG/DL (ref 8.3–10.6)
CHLORIDE SERPL-SCNC: 103 MMOL/L (ref 99–110)
CLARITY UR: ABNORMAL
CO2 SERPL-SCNC: 25 MMOL/L (ref 21–32)
COLOR UR: ABNORMAL
CREAT SERPL-MCNC: 1.2 MG/DL (ref 0.6–1.2)
EOSINOPHIL # BLD: 0.04 K/UL
EOSINOPHILS RELATIVE PERCENT: 1 % (ref 0–3)
ERYTHROCYTE [DISTWIDTH] IN BLOOD BY AUTOMATED COUNT: 15.2 % (ref 11.7–14.9)
GFR, ESTIMATED: 42 ML/MIN/1.73M2
GLUCOSE SERPL-MCNC: 88 MG/DL (ref 74–99)
GLUCOSE UR STRIP-MCNC: NEGATIVE MG/DL
HCT VFR BLD AUTO: 43.9 % (ref 37–47)
HGB BLD-MCNC: 13.4 G/DL (ref 12.5–16)
HGB UR QL STRIP.AUTO: ABNORMAL
IMM GRANULOCYTES # BLD AUTO: 0.03 K/UL
IMM GRANULOCYTES NFR BLD: 0 %
KETONES UR STRIP-MCNC: 15 MG/DL
LEUKOCYTE ESTERASE UR QL STRIP: ABNORMAL
LYMPHOCYTES NFR BLD: 1.23 K/UL
LYMPHOCYTES RELATIVE PERCENT: 16 % (ref 24–44)
MCH RBC QN AUTO: 27 PG (ref 27–31)
MCHC RBC AUTO-ENTMCNC: 30.5 G/DL (ref 32–36)
MCV RBC AUTO: 88.5 FL (ref 78–100)
MONOCYTES NFR BLD: 0.66 K/UL
MONOCYTES NFR BLD: 9 % (ref 0–4)
NEUTROPHILS NFR BLD: 74 % (ref 36–66)
NEUTS SEG NFR BLD: 5.56 K/UL
NITRITE UR QL STRIP: POSITIVE
PH UR STRIP: 6.5 [PH] (ref 5–8)
PLATELET # BLD AUTO: 148 K/UL (ref 140–440)
PMV BLD AUTO: 10.3 FL (ref 7.5–11.1)
POTASSIUM SERPL-SCNC: 3.9 MMOL/L (ref 3.5–5.1)
PROT SERPL-MCNC: 6 G/DL (ref 6.4–8.2)
PROT UR STRIP-MCNC: >=300 MG/DL
RBC # BLD AUTO: 4.96 M/UL (ref 4.2–5.4)
RBC #/AREA URNS HPF: 1078 /HPF (ref 0–2)
SODIUM SERPL-SCNC: 139 MMOL/L (ref 136–145)
SP GR UR STRIP: 1.02 (ref 1–1.03)
UROBILINOGEN UR STRIP-ACNC: 4 EU/DL (ref 0–1)
WBC #/AREA URNS HPF: 1836 /HPF (ref 0–5)
WBC OTHER # BLD: 7.6 K/UL (ref 4–10.5)

## 2025-01-05 PROCEDURE — 2500000003 HC RX 250 WO HCPCS: Performed by: EMERGENCY MEDICINE

## 2025-01-05 PROCEDURE — 81001 URINALYSIS AUTO W/SCOPE: CPT

## 2025-01-05 PROCEDURE — 85025 COMPLETE CBC W/AUTO DIFF WBC: CPT

## 2025-01-05 PROCEDURE — 87086 URINE CULTURE/COLONY COUNT: CPT

## 2025-01-05 PROCEDURE — 80053 COMPREHEN METABOLIC PANEL: CPT

## 2025-01-05 PROCEDURE — 87186 SC STD MICRODIL/AGAR DIL: CPT

## 2025-01-05 PROCEDURE — 6360000002 HC RX W HCPCS: Performed by: EMERGENCY MEDICINE

## 2025-01-05 PROCEDURE — 87077 CULTURE AEROBIC IDENTIFY: CPT

## 2025-01-05 PROCEDURE — 96374 THER/PROPH/DIAG INJ IV PUSH: CPT

## 2025-01-05 PROCEDURE — 99284 EMERGENCY DEPT VISIT MOD MDM: CPT

## 2025-01-05 RX ORDER — CEPHALEXIN 500 MG/1
500 CAPSULE ORAL 3 TIMES DAILY
Qty: 21 CAPSULE | Refills: 0 | Status: SHIPPED | OUTPATIENT
Start: 2025-01-05 | End: 2025-01-12

## 2025-01-05 RX ADMIN — WATER 1000 MG: 1 INJECTION INTRAMUSCULAR; INTRAVENOUS; SUBCUTANEOUS at 22:10

## 2025-01-05 ASSESSMENT — PAIN SCALES - GENERAL: PAINLEVEL_OUTOF10: 3

## 2025-01-05 ASSESSMENT — PAIN - FUNCTIONAL ASSESSMENT: PAIN_FUNCTIONAL_ASSESSMENT: 0-10

## 2025-01-05 ASSESSMENT — PAIN DESCRIPTION - LOCATION: LOCATION: ABDOMEN

## 2025-01-05 ASSESSMENT — PAIN DESCRIPTION - DESCRIPTORS: DESCRIPTORS: ACHING

## 2025-01-06 ENCOUNTER — CARE COORDINATION (OUTPATIENT)
Dept: CARE COORDINATION | Age: 89
End: 2025-01-06

## 2025-01-06 VITALS
SYSTOLIC BLOOD PRESSURE: 130 MMHG | BODY MASS INDEX: 21.97 KG/M2 | HEART RATE: 77 BPM | WEIGHT: 140 LBS | TEMPERATURE: 97.5 F | OXYGEN SATURATION: 100 % | DIASTOLIC BLOOD PRESSURE: 68 MMHG | HEIGHT: 67 IN | RESPIRATION RATE: 12 BRPM

## 2025-01-06 DIAGNOSIS — N39.42 URINARY INCONTINENCE WITHOUT SENSORY AWARENESS: ICD-10-CM

## 2025-01-06 DIAGNOSIS — N32.81 OAB (OVERACTIVE BLADDER): Primary | Chronic | ICD-10-CM

## 2025-01-06 SDOH — ECONOMIC STABILITY: INCOME INSECURITY: HOW HARD IS IT FOR YOU TO PAY FOR THE VERY BASICS LIKE FOOD, HOUSING, MEDICAL CARE, AND HEATING?: NOT HARD AT ALL

## 2025-01-06 SDOH — SOCIAL STABILITY: SOCIAL NETWORK: HOW OFTEN DO YOU ATTEND CHURCH OR RELIGIOUS SERVICES?: NEVER

## 2025-01-06 SDOH — SOCIAL STABILITY: SOCIAL NETWORK: ARE YOU MARRIED, WIDOWED, DIVORCED, SEPARATED, NEVER MARRIED, OR LIVING WITH A PARTNER?: WIDOWED

## 2025-01-06 SDOH — HEALTH STABILITY: PHYSICAL HEALTH: ON AVERAGE, HOW MANY MINUTES DO YOU ENGAGE IN EXERCISE AT THIS LEVEL?: 30 MIN

## 2025-01-06 SDOH — HEALTH STABILITY: MENTAL HEALTH
STRESS IS WHEN SOMEONE FEELS TENSE, NERVOUS, ANXIOUS, OR CAN'T SLEEP AT NIGHT BECAUSE THEIR MIND IS TROUBLED. HOW STRESSED ARE YOU?: NOT AT ALL

## 2025-01-06 SDOH — ECONOMIC STABILITY: INCOME INSECURITY: IN THE LAST 12 MONTHS, WAS THERE A TIME WHEN YOU WERE NOT ABLE TO PAY THE MORTGAGE OR RENT ON TIME?: NO

## 2025-01-06 SDOH — SOCIAL STABILITY: SOCIAL NETWORK
DO YOU BELONG TO ANY CLUBS OR ORGANIZATIONS SUCH AS CHURCH GROUPS UNIONS, FRATERNAL OR ATHLETIC GROUPS, OR SCHOOL GROUPS?: NO

## 2025-01-06 SDOH — ECONOMIC STABILITY: FOOD INSECURITY: WITHIN THE PAST 12 MONTHS, YOU WORRIED THAT YOUR FOOD WOULD RUN OUT BEFORE YOU GOT MONEY TO BUY MORE.: NEVER TRUE

## 2025-01-06 SDOH — SOCIAL STABILITY: SOCIAL NETWORK: HOW OFTEN DO YOU ATTENT MEETINGS OF THE CLUB OR ORGANIZATION YOU BELONG TO?: NEVER

## 2025-01-06 SDOH — ECONOMIC STABILITY: FOOD INSECURITY: WITHIN THE PAST 12 MONTHS, THE FOOD YOU BOUGHT JUST DIDN'T LAST AND YOU DIDN'T HAVE MONEY TO GET MORE.: NEVER TRUE

## 2025-01-06 SDOH — SOCIAL STABILITY: SOCIAL NETWORK: HOW OFTEN DO YOU GET TOGETHER WITH FRIENDS OR RELATIVES?: MORE THAN THREE TIMES A WEEK

## 2025-01-06 SDOH — SOCIAL STABILITY: SOCIAL NETWORK
IN A TYPICAL WEEK, HOW MANY TIMES DO YOU TALK ON THE PHONE WITH FAMILY, FRIENDS, OR NEIGHBORS?: MORE THAN THREE TIMES A WEEK

## 2025-01-06 SDOH — ECONOMIC STABILITY: TRANSPORTATION INSECURITY
IN THE PAST 12 MONTHS, HAS THE LACK OF TRANSPORTATION KEPT YOU FROM MEDICAL APPOINTMENTS OR FROM GETTING MEDICATIONS?: NO

## 2025-01-06 SDOH — ECONOMIC STABILITY: TRANSPORTATION INSECURITY
IN THE PAST 12 MONTHS, HAS LACK OF TRANSPORTATION KEPT YOU FROM MEETINGS, WORK, OR FROM GETTING THINGS NEEDED FOR DAILY LIVING?: NO

## 2025-01-06 SDOH — HEALTH STABILITY: PHYSICAL HEALTH: ON AVERAGE, HOW MANY DAYS PER WEEK DO YOU ENGAGE IN MODERATE TO STRENUOUS EXERCISE (LIKE A BRISK WALK)?: 3 DAYS

## 2025-01-06 NOTE — ED PROVIDER NOTES
4.96 4.20 - 5.40 m/uL    Hemoglobin 13.4 12.5 - 16.0 g/dL    Hematocrit 43.9 37.0 - 47.0 %    MCV 88.5 78.0 - 100.0 fL    MCH 27.0 27.0 - 31.0 pg    MCHC 30.5 (L) 32.0 - 36.0 g/dL    RDW 15.2 (H) 11.7 - 14.9 %    Platelets 148 140 - 440 k/uL    MPV 10.3 7.5 - 11.1 fL    Neutrophils % 74 (H) 36 - 66 %    Lymphocytes % 16 (L) 24 - 44 %    Monocytes % 9 (H) 0 - 4 %    Eosinophils % 1 0 - 3 %    Basophils % 1 0 - 1 %    Immature Granulocytes % 0 0 %    Neutrophils Absolute 5.56 k/uL    Lymphocytes Absolute 1.23 k/uL    Monocytes Absolute 0.66 k/uL    Eosinophils Absolute 0.04 k/uL    Basophils Absolute 0.04 k/uL    Immature Granulocytes Absolute 0.03 k/uL   Comprehensive Metabolic Panel   Result Value Ref Range    Sodium 139 136 - 145 mmol/L    Potassium 3.9 3.5 - 5.1 mmol/L    Chloride 103 99 - 110 mmol/L    CO2 25 21 - 32 mmol/L    Anion Gap 11 9 - 17 mmol/L    Glucose 88 74 - 99 mg/dL    BUN 36 (H) 7 - 20 mg/dL    Creatinine 1.2 0.6 - 1.2 mg/dL    Est, Glom Filt Rate 42 (L) >60 mL/min/1.73m2    Calcium 9.4 8.3 - 10.6 mg/dL    Total Protein 6.0 (L) 6.4 - 8.2 g/dL    Albumin 3.5 3.4 - 5.0 g/dL    Albumin/Globulin Ratio 1.4 1.1 - 2.2    Total Bilirubin 1.0 0.0 - 1.0 mg/dL    Alkaline Phosphatase 86 40 - 129 U/L    ALT 13 10 - 40 U/L    AST 27 15 - 37 U/L   Microscopic Urinalysis   Result Value Ref Range    WBC, UA 1836 (H) 0 - 5 /HPF    RBC, UA 1078 (H) 0 - 2 /HPF         CC/HPI Summary, DDx, ED Course, and Reassessment: Patient presents with hematuria today. Her exam is otherwise reassuring with no abdominal tenderness.    Labs without leukocytosis. Patient is mildly anemic although appears to be at baseline. Chemistries without renal dysfunction or electrolyte derangement. UA consistent with infection. Patient is given rocephin in the ED after review of prior cultures.       History from : Patient    Limitations to history : Dementia    Patient was given the following medications:  Medications   cefTRIAXone (ROCEPHIN)

## 2025-01-06 NOTE — CARE COORDINATION
Verbalizes Understanding, Demonstrated Understanding    COGNITIVE : FALLS-RISK OF, taught by Jade Jernigan RN at 1/6/2025  4:08 PM.  Learner: Caregiver, Family  Readiness: Acceptance  Method: Explanation  Response: Verbalizes Understanding, Demonstrated Understanding    Educate bathing safety, taught by Jade Jernigan RN at 1/6/2025  4:08 PM.  Learner: Caregiver, Family  Readiness: Acceptance  Method: Explanation  Response: Verbalizes Understanding, Demonstrated Understanding    Educate ambulation safety, taught by Jade Jernigan RN at 1/6/2025  4:08 PM.  Learner: Caregiver, Family  Readiness: Acceptance  Method: Explanation  Response: Verbalizes Understanding, Demonstrated Understanding    Influenza Vaccine, taught by Jade Jernigan RN at 1/6/2025  4:08 PM.  Learner: Caregiver, Family  Readiness: Acceptance  Method: Explanation  Response: Verbalizes Understanding, Demonstrated Understanding    Pneumovax Recommendation, taught by Jade Jernigan RN at 1/6/2025  4:08 PM.  Learner: Caregiver, Family  Readiness: Acceptance  Method: Explanation  Response: Verbalizes Understanding, Demonstrated Understanding    Lifestyle Changes/Goal Setting, taught by Jade Jernigan RN at 1/6/2025  4:08 PM.  Learner: Caregiver, Family  Readiness: Acceptance  Method: Explanation  Response: Verbalizes Understanding, Demonstrated Understanding    General medication information, taught by Jade Jernigan RN at 1/6/2025  4:08 PM.  Learner: Caregiver, Family  Readiness: Acceptance  Method: Explanation  Response: Verbalizes Understanding, Demonstrated Understanding    Home Health Care Services, taught by Jade Jernigan RN at 1/6/2025  4:08 PM.  Learner: Caregiver, Family  Readiness: Acceptance  Method: Explanation  Response: Verbalizes Understanding, Demonstrated Understanding    Educate reporting changes in condition, taught by Jade Jernigan RN at 1/6/2025  4:08 PM.  Learner: Caregiver, Family  Readiness:

## 2025-01-08 LAB
MICROORGANISM SPEC CULT: ABNORMAL
SERVICE CMNT-IMP: ABNORMAL
SPECIMEN DESCRIPTION: ABNORMAL

## 2025-01-08 NOTE — PROGRESS NOTES
Pharmacy Note  ED Culture Follow-up    Cary Rogers is a 89 y.o. female.     Allergies: Amitriptyline, Aspirin, Codeine, Elavil [amitriptyline hcl], Hydroxychloroquine, Ibuprofen, Namenda [memantine], Plaquenil [hydroxychloroquine sulfate], Prevnar 13 [pneumococcal 13-naun conj vacc], and Theophyllines     Current antimicrobials:   Reviewed patient's urine culture - culture positive for Proteus mirabilis >100,000 CFU/ml.  Patient was discharged on Keflex, and culture is sensitive to prescribed medication.  Antibiotic prescribed at discharge is appropriate - no changes made to antibiotic regimen.      Please call with any questions. Ext. [phone]    Nadege Mixon RPH, PharmD 8:29 AM 1/8/2025

## 2025-01-09 NOTE — ED PROVIDER NOTES
9:28 AM: Assumed care of patient at signout. For more details, please see note from same day. Briefly, Cary Rogers is a 89 y.o. female with a PMH significant for dementia, sent to the emergency department for confusion.  Here, apparently back to baseline.    ED course summary:   -Hemodynamically stable, no signs of inflammatory response, nonfocal neurological exam.  -Patient with no complaints, asymptomatic, reassuring physical exam.  -Unspecific labs, stable, including CBC and BMP.  Negative respiratory virus panel.  Negative CT brain.  No acute cardiopulmonary abnormalities on x-ray of the chest, and no abnormalities on x-ray of the right knee.    Plan at time of sign-out:   -Pending urinalysis.  Discharge if unremarkable.    ED course since sign-out:  Pending urinalysis  I signed out care to the oncoming provider at the end of my shift.    BP (!) 162/114   Pulse 82   Temp 97.5 °F (36.4 °C) (Oral)   Resp 27   Wt 63.5 kg (140 lb)   SpO2 97%   BMI 21.93 kg/m²   Labs:  Labs Reviewed   CBC WITH AUTO DIFFERENTIAL - Abnormal; Notable for the following components:       Result Value    RBC 5.43 (*)     MCHC 31.6 (*)     Neutrophils % 81 (*)     Lymphocytes % 11 (*)     Monocytes % 6 (*)     Immature Granulocytes % 1 (*)     All other components within normal limits   BASIC METABOLIC PANEL - Abnormal; Notable for the following components:    Glucose 101 (*)     BUN 22 (*)     Est, Glom Filt Rate 52 (*)     All other components within normal limits   URINALYSIS - Abnormal; Notable for the following components:    Urobilinogen, Urine 2.0 (*)     All other components within normal limits   RESPIRATORY PANEL, MOLECULAR, WITH COVID-19       Final diagnoses:   Dementia, unspecified dementia severity, unspecified dementia type, unspecified whether behavioral, psychotic, or mood disturbance or anxiety (HCC)   Acute pain of right knee         Disposition:  -Pending       Lev Davila MD  01/09/25 9760

## 2025-01-27 ENCOUNTER — CARE COORDINATION (OUTPATIENT)
Dept: CARE COORDINATION | Age: 89
End: 2025-01-27

## 2025-01-27 NOTE — CARE COORDINATION
Ambulatory Care Coordination Note     2025 3:14 PM     Patient Current Location:  Home: [address] [place] [address]     ACM contacted the family Grandbrii Mcallister by telephone. Verified name and  with family Eugenio Mcallister as identifiers.         ACM: Jade Jernigan RN     Challenges to be reviewed by the provider   Additional needs identified to be addressed with provider Yes  labs-Patient family Eugenio Mcallister and Margot are requesting  a follow up urine culture and will need to be a straight cath.   MONTY I also sent you a message. Please have your office follow up with them.                Method of communication with provider: chart routing.    Utilization: Has the patient been seen in the ED since your last call? no    Care Summary Note: ACM outreach to patient family Eugenio Mcallister HIPAA verified for Care Management.  Patient is doing good, family requesting a follow up urine culture, ACM notified Dewayne ARCINIEGA office to follow up with family.  Family advised of increased confusion, chills, foul smelling urine, fever are all signs of a urinary tract infection.  Family stated her blood sugars are WNL no concerns at this time.  Family states the patient was not having any s/sx of blood pressure issues.  Patient advised family to continue to monitor s/sx and report any changes to appropriate site of care.  Patient family advised of the red flag symptoms and to go the the ED per squad for evaluation.      Assessments Completed:   Diabetes Assessment    Medic Alert ID:  (Comment: Grand children caregiver Margot states she is not a diabetic.)      No patient-reported symptoms   Do you have hyperglycemia symptoms?: No   Do you have hypoglycemia symptoms?: No   Blood Sugar Monitoring Regimen: Not Testing   Blood Sugar Trends: No Change         ,   Hypertension - Encounter Level    Symptom course: stable      ,   General Assessment    Do you have any symptoms that are causing

## 2025-01-28 ENCOUNTER — CARE COORDINATION (OUTPATIENT)
Dept: CARE COORDINATION | Age: 89
End: 2025-01-28

## 2025-01-28 NOTE — CARE COORDINATION
ACM outreach to lab facilities to see if anyone could do a straight cath for a urine culture,  called [facility] lab, [facility] ED and [facility] and there is no one that does this.  Tried reaching out to the Grandbrii Mcallister and left a message waiting for call back.

## 2025-02-24 ENCOUNTER — CARE COORDINATION (OUTPATIENT)
Dept: CARE COORDINATION | Age: 89
End: 2025-02-24

## 2025-02-24 NOTE — CARE COORDINATION
Ambulatory Care Coordination Note     2/24/2025 12:16 PM     ACM outreach attempt by this ACM today to perform care management follow up . ACM was unable to reach the patient by telephone today;   left voice message requesting a return phone call to this ACM.     ACM: Jade Jernigan RN     Care Summary Note: ACM outreach to patient for Care Management.     PCP/Specialist follow up:       Follow Up:   Plan for next ACM outreach in approximately 2 weeks to complete:  - disease specific assessments  - goal progression.

## 2025-02-26 ENCOUNTER — APPOINTMENT (OUTPATIENT)
Dept: CT IMAGING | Age: 89
End: 2025-02-26
Payer: MEDICARE

## 2025-02-26 ENCOUNTER — APPOINTMENT (OUTPATIENT)
Dept: GENERAL RADIOLOGY | Age: 89
End: 2025-02-26
Payer: MEDICARE

## 2025-02-26 ENCOUNTER — HOSPITAL ENCOUNTER (EMERGENCY)
Age: 89
Discharge: HOME | End: 2025-02-26
Attending: STUDENT IN AN ORGANIZED HEALTH CARE EDUCATION/TRAINING PROGRAM
Payer: MEDICARE

## 2025-02-26 VITALS
HEART RATE: 64 BPM | WEIGHT: 140 LBS | SYSTOLIC BLOOD PRESSURE: 173 MMHG | HEIGHT: 67 IN | BODY MASS INDEX: 21.97 KG/M2 | RESPIRATION RATE: 17 BRPM | OXYGEN SATURATION: 96 % | DIASTOLIC BLOOD PRESSURE: 102 MMHG | TEMPERATURE: 97.6 F

## 2025-02-26 DIAGNOSIS — S01.01XA LACERATION OF SCALP, INITIAL ENCOUNTER: Primary | ICD-10-CM
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
## 2025-02-26 DIAGNOSIS — E87.6 HYPOKALEMIA: ICD-10-CM

## 2025-02-26 LAB
ANION GAP SERPL CALCULATED.3IONS-SCNC: 13 MMOL/L (ref 9–17)
BASOPHILS # BLD: 0.04 K/UL
BASOPHILS NFR BLD: 1 % (ref 0–1)
BUN SERPL-MCNC: 24 MG/DL (ref 7–20)
CALCIUM SERPL-MCNC: 8.9 MG/DL (ref 8.3–10.6)
CHLORIDE SERPL-SCNC: 102 MMOL/L (ref 99–110)
CO2 SERPL-SCNC: 23 MMOL/L (ref 21–32)
CREAT SERPL-MCNC: 1 MG/DL (ref 0.6–1.2)
EOSINOPHIL # BLD: 0.02 K/UL
EOSINOPHILS RELATIVE PERCENT: 0 % (ref 0–3)
ERYTHROCYTE [DISTWIDTH] IN BLOOD BY AUTOMATED COUNT: 15 % (ref 11.7–14.9)
GFR, ESTIMATED: 53 ML/MIN/1.73M2
GLUCOSE SERPL-MCNC: 75 MG/DL (ref 74–99)
HCT VFR BLD AUTO: 46.1 % (ref 37–47)
HGB BLD-MCNC: 14.2 G/DL (ref 12.5–16)
IMM GRANULOCYTES # BLD AUTO: 0.01 K/UL
IMM GRANULOCYTES NFR BLD: 0 %
LYMPHOCYTES NFR BLD: 1.72 K/UL
LYMPHOCYTES RELATIVE PERCENT: 35 % (ref 24–44)
MCH RBC QN AUTO: 27.8 PG (ref 27–31)
MCHC RBC AUTO-ENTMCNC: 30.8 G/DL (ref 32–36)
MCV RBC AUTO: 90.4 FL (ref 78–100)
MONOCYTES NFR BLD: 0.52 K/UL
MONOCYTES NFR BLD: 11 % (ref 0–4)
NEUTROPHILS NFR BLD: 53 % (ref 36–66)
NEUTS SEG NFR BLD: 2.6 K/UL
PLATELET, FLUORESCENCE: 145 K/UL (ref 140–440)
PMV BLD AUTO: 11.4 FL (ref 7.5–11.1)
POTASSIUM SERPL-SCNC: 3.3 MMOL/L (ref 3.5–5.1)
RBC # BLD AUTO: 5.1 M/UL (ref 4.2–5.4)
SODIUM SERPL-SCNC: 138 MMOL/L (ref 136–145)
WBC OTHER # BLD: 4.9 K/UL (ref 4–10.5)

## 2025-02-26 PROCEDURE — 6370000000 HC RX 637 (ALT 250 FOR IP): Performed by: STUDENT IN AN ORGANIZED HEALTH CARE EDUCATION/TRAINING PROGRAM

## 2025-02-26 PROCEDURE — 85025 COMPLETE CBC W/AUTO DIFF WBC: CPT

## 2025-02-26 PROCEDURE — 71045 X-RAY EXAM CHEST 1 VIEW: CPT

## 2025-02-26 PROCEDURE — 80048 BASIC METABOLIC PNL TOTAL CA: CPT

## 2025-02-26 PROCEDURE — 99285 EMERGENCY DEPT VISIT HI MDM: CPT

## 2025-02-26 PROCEDURE — 70450 CT HEAD/BRAIN W/O DYE: CPT

## 2025-02-26 PROCEDURE — 93005 ELECTROCARDIOGRAM TRACING: CPT | Performed by: STUDENT IN AN ORGANIZED HEALTH CARE EDUCATION/TRAINING PROGRAM

## 2025-02-26 PROCEDURE — 72125 CT NECK SPINE W/O DYE: CPT

## 2025-02-26 PROCEDURE — 72170 X-RAY EXAM OF PELVIS: CPT

## 2025-02-26 RX ORDER — POTASSIUM CHLORIDE 1500 MG/1
20 TABLET, EXTENDED RELEASE ORAL ONCE
Status: COMPLETED | OUTPATIENT
Start: 2025-02-26 | End: 2025-02-26

## 2025-02-26 RX ADMIN — POTASSIUM CHLORIDE 20 MEQ: 1500 TABLET, EXTENDED RELEASE ORAL at 12:27

## 2025-02-26 ASSESSMENT — PAIN - FUNCTIONAL ASSESSMENT: PAIN_FUNCTIONAL_ASSESSMENT: 0-10

## 2025-02-26 ASSESSMENT — PAIN SCALES - GENERAL: PAINLEVEL_OUTOF10: 10

## 2025-02-26 ASSESSMENT — PAIN DESCRIPTION - LOCATION: LOCATION: HEAD

## 2025-02-26 NOTE — ED PROVIDER NOTES
Basophils % 1 0 - 1 %    Immature Granulocytes % 0 0 %    Neutrophils Absolute 2.60 k/uL    Lymphocytes Absolute 1.72 k/uL    Monocytes Absolute 0.52 k/uL    Eosinophils Absolute 0.02 k/uL    Basophils Absolute 0.04 k/uL    Immature Granulocytes Absolute 0.01 k/uL   Basic Metabolic Panel   Result Value Ref Range    Sodium 138 136 - 145 mmol/L    Potassium 3.3 (L) 3.5 - 5.1 mmol/L    Chloride 102 99 - 110 mmol/L    CO2 23 21 - 32 mmol/L    Anion Gap 13 9 - 17 mmol/L    Glucose 75 74 - 99 mg/dL    BUN 24 (H) 7 - 20 mg/dL    Creatinine 1.0 0.6 - 1.2 mg/dL    Est, Glom Filt Rate 53 (L) >60 mL/min/1.73m2    Calcium 8.9 8.3 - 10.6 mg/dL         Radiologic Studies:   If obtained, pertinent radiology studies and findings discussed and MDM.    Medical Decision Making     Patient was triaged by nursing staff and I reviewed vital signs.  Available medical records were reviewed including nursing notes, past medical history, past surgical history, family history and social history.  History obtained by patient and/or family without any significant limitations.    MDM:     Exam patient resting calmly bed in no acute distress.  Patient is hypertensive but remaining vitals within normal limits.  No increased work of breathing.  Lungs are bilaterally.  Patient has dried blood on the side of her face.  No midline cervical thoracic or lumbar spine tenderness.  Patient can move all 4 extremities appropriately.  No gross deformities in the extremities.  No bony tenderness with palpation of extremities.  No obvious swelling or evidence of injury.    Overall exam reassuring.  Patient peers to be at her baseline mentation per conversation with EMS.    After cleaning dried blood off scalp there is a very small less than 1 cm laceration with minimal gaping.  There is no active bleeding and tension on the wound does not result in any bleeding or significant gaping of the wound edges. I discussed repair options with patient and ultimately

## 2025-02-26 NOTE — DISCHARGE INSTRUCTIONS
Call and schedule up appointment with patient's primary care provider for reevaluation.  Return to emergency department patient develops new or worsening symptoms.

## 2025-02-26 NOTE — ED TRIAGE NOTES
Patient presented to ED via EMS after fall. Patient hit head on a table.Patient slid forward out of chair.  Denies LOC. Patient complaining of pain in head, no other complaints of pain at this time. Unknown if patient takes blood thinners.

## 2025-02-27 ENCOUNTER — CARE COORDINATION (OUTPATIENT)
Dept: CARE COORDINATION | Age: 89
End: 2025-02-27

## 2025-02-27 LAB
EKG ATRIAL RATE: 312 BPM
EKG DIAGNOSIS: NORMAL
EKG Q-T INTERVAL: 470 MS
EKG QRS DURATION: 138 MS
EKG QTC CALCULATION (BAZETT): 473 MS
EKG R AXIS: -66 DEGREES
EKG T AXIS: 110 DEGREES
EKG VENTRICULAR RATE: 61 BPM

## 2025-02-27 PROCEDURE — 93010 ELECTROCARDIOGRAM REPORT: CPT | Performed by: INTERNAL MEDICINE

## 2025-02-27 NOTE — CARE COORDINATION
Ambulatory Care Coordination Note     2025 8:22 AM     Patient Current Location:  Home: [address] [place] [address]     ACM contacted the family Esvin by telephone. Verified name and  with family Esvin as identifiers.         ACM: Jade Jernigan RN     Utilization: Has the patient been seen in the ED since your last call? Yes,   Discharge Date: 25   Discharge Facility: [facility]  Reason for ED Visit: Fall with head wound  Visit Diagnosis: CLINICAL IMPRESSION:  1. Laceration of scalp, initial encounter    2. Fall, initial encounter    3. Hypokalemia        Number of ED visits in the last 6 months: 3      Do you have any ongoing symptoms? No  Did you call your PCP prior to going to the ED? No, did not call the PCP office.     Review of Discharge Instructions:   [x] AVS discharge instructions  [x] Right Care, Right Place, Right Time document  [] Medication changes  [] Follow up appointments  [] Referral follow up   [x] N/a       Care Summary Note: ACM outreach to the patient son Esvin HIPAA verified for Care Management.  Esvin stated patient is doing well with no issues, no bleeding, just some bruising around laceration.  ACM advised the son Esvin to watch for increased confusion, HA, bleeding or any changes in the patient and go get evaluated.  Patient blood pressure was high in the ED but son Esvin stated there is no changes in condition at this time no HA, blurred vision, slurred speech, numbness/tingling, CP or palpitations.  Patient son Esvin Mcallister is advised of the red flag symptoms and to go to the ED for evaluation if any occur.     Assessments Completed:       2025     8:15 AM   Amb Fall Risk Assessment and TUG Test   Do you feel unsteady or are you worried about falling?  yes   2 or more falls in past year? yes   Fall with injury in past year? yes    ,   Hypertension - Encounter Level    Symptom course: stable      ,   General

## 2025-03-13 ENCOUNTER — APPOINTMENT (OUTPATIENT)
Dept: GENERAL RADIOLOGY | Age: 89
End: 2025-03-13
Payer: MEDICARE

## 2025-03-13 ENCOUNTER — HOSPITAL ENCOUNTER (EMERGENCY)
Age: 89
Discharge: HOME | End: 2025-03-13
Attending: EMERGENCY MEDICINE
Payer: MEDICARE

## 2025-03-13 VITALS
HEART RATE: 76 BPM | DIASTOLIC BLOOD PRESSURE: 68 MMHG | OXYGEN SATURATION: 91 % | TEMPERATURE: 98.4 F | RESPIRATION RATE: 16 BRPM | SYSTOLIC BLOOD PRESSURE: 115 MMHG

## 2025-03-13 DIAGNOSIS — J10.1 INFLUENZA A: Primary | ICD-10-CM

## 2025-03-13 LAB
ANION GAP SERPL CALCULATED.3IONS-SCNC: 9 MMOL/L (ref 9–17)
BASOPHILS # BLD: 0.02 K/UL
BASOPHILS NFR BLD: 1 % (ref 0–1)
BUN SERPL-MCNC: 26 MG/DL (ref 7–20)
CALCIUM SERPL-MCNC: 8.8 MG/DL (ref 8.3–10.6)
CHLORIDE SERPL-SCNC: 99 MMOL/L (ref 99–110)
CO2 SERPL-SCNC: 28 MMOL/L (ref 21–32)
CREAT SERPL-MCNC: 1.2 MG/DL (ref 0.6–1.2)
EOSINOPHIL # BLD: 0 K/UL
EOSINOPHILS RELATIVE PERCENT: 0 % (ref 0–3)
ERYTHROCYTE [DISTWIDTH] IN BLOOD BY AUTOMATED COUNT: 14.9 % (ref 11.7–14.9)
GFR, ESTIMATED: 43 ML/MIN/1.73M2
GLUCOSE SERPL-MCNC: 102 MG/DL (ref 74–99)
HCT VFR BLD AUTO: 40.8 % (ref 37–47)
HGB BLD-MCNC: 13 G/DL (ref 12.5–16)
IMM GRANULOCYTES # BLD AUTO: 0.01 K/UL
IMM GRANULOCYTES NFR BLD: 0 %
INFLUENZA A BY PCR: DETECTED
INFLUENZA B BY PCR: NOT DETECTED
LYMPHOCYTES NFR BLD: 0.56 K/UL
LYMPHOCYTES RELATIVE PERCENT: 17 % (ref 24–44)
MCH RBC QN AUTO: 28.1 PG (ref 27–31)
MCHC RBC AUTO-ENTMCNC: 31.9 G/DL (ref 32–36)
MCV RBC AUTO: 88.3 FL (ref 78–100)
MONOCYTES NFR BLD: 0.57 K/UL
MONOCYTES NFR BLD: 17 % (ref 0–4)
NEUTROPHILS NFR BLD: 65 % (ref 36–66)
NEUTS SEG NFR BLD: 2.18 K/UL
PLATELET # BLD AUTO: 147 K/UL (ref 140–440)
PMV BLD AUTO: 10.9 FL (ref 7.5–11.1)
POTASSIUM SERPL-SCNC: 4.1 MMOL/L (ref 3.5–5.1)
RBC # BLD AUTO: 4.62 M/UL (ref 4.2–5.4)
SARS-COV-2 RDRP RESP QL NAA+PROBE: NOT DETECTED
SODIUM SERPL-SCNC: 137 MMOL/L (ref 136–145)
SPECIMEN DESCRIPTION: NORMAL
WBC OTHER # BLD: 3.3 K/UL (ref 4–10.5)

## 2025-03-13 PROCEDURE — 87635 SARS-COV-2 COVID-19 AMP PRB: CPT

## 2025-03-13 PROCEDURE — 80048 BASIC METABOLIC PNL TOTAL CA: CPT

## 2025-03-13 PROCEDURE — 99285 EMERGENCY DEPT VISIT HI MDM: CPT

## 2025-03-13 PROCEDURE — 71045 X-RAY EXAM CHEST 1 VIEW: CPT

## 2025-03-13 PROCEDURE — 87502 INFLUENZA DNA AMP PROBE: CPT

## 2025-03-13 PROCEDURE — 85025 COMPLETE CBC W/AUTO DIFF WBC: CPT

## 2025-03-13 PROCEDURE — 93005 ELECTROCARDIOGRAM TRACING: CPT | Performed by: EMERGENCY MEDICINE

## 2025-03-13 ASSESSMENT — PAIN - FUNCTIONAL ASSESSMENT
PAIN_FUNCTIONAL_ASSESSMENT: NONE - DENIES PAIN
PAIN_FUNCTIONAL_ASSESSMENT: NONE - DENIES PAIN

## 2025-03-13 NOTE — CARE COORDINATION
BALDO called [facility] to make transportation arrangement for pt to return home. [facility] ETA is 730 pm.     BALDO called family to inform them of pt's departure time.   Family thanked BALDO.

## 2025-03-13 NOTE — CARE COORDINATION
Room TR-1--HUNTER  spoke to Ms Rogers , a very pleasant pt at bedside -called for contact Esvin -wife and stated caretaker Margot took call  for transportation request (not knowing Jocelyn ROMEO has made previous call) said she could not arrange transportation -agreeable to [facility] transport . She then mentioned the last time pt was here she came home she was soiled and demanded that we change pt-and became increasingly agitated with a raised voice at  .  Jocelyn ROMEO confirmed transport arrangement home.

## 2025-03-14 ENCOUNTER — CARE COORDINATION (OUTPATIENT)
Dept: CARE COORDINATION | Age: 89
End: 2025-03-14

## 2025-03-14 LAB
EKG DIAGNOSIS: NORMAL
EKG Q-T INTERVAL: 502 MS
EKG QRS DURATION: 136 MS
EKG QTC CALCULATION (BAZETT): 502 MS
EKG R AXIS: -75 DEGREES
EKG T AXIS: 98 DEGREES
EKG VENTRICULAR RATE: 60 BPM

## 2025-03-14 PROCEDURE — 93010 ELECTROCARDIOGRAM REPORT: CPT | Performed by: INTERNAL MEDICINE

## 2025-03-14 NOTE — CARE COORDINATION
Ambulatory Care Coordination Note     3/14/2025 9:28 AM     ACM outreach attempt by this ACM today to perform care management follow up . ACM was unable to reach the patient by telephone today;   left voice message requesting a return phone call to this ACM.     ACM: Jade Jernigan RN     Care Summary Note: ACM outreach to patient or family for Care Management ED follow up visit.     PCP/Specialist follow up:       Follow Up:   Plan for next ACM outreach in approximately 1 week to complete:  ED follow up for influenza A .

## 2025-03-24 ENCOUNTER — CARE COORDINATION (OUTPATIENT)
Dept: CARE COORDINATION | Age: 89
End: 2025-03-24

## 2025-03-24 DIAGNOSIS — R62.7 FAILURE TO THRIVE IN ADULT: ICD-10-CM

## 2025-03-24 DIAGNOSIS — R53.1 GENERALIZED WEAKNESS: Primary | ICD-10-CM

## 2025-03-24 NOTE — CARE COORDINATION
Ambulatory Care Coordination Note     3/24/2025 12:20 PM     Patient Current Location:  Home: [address] [place] [address]     ACM contacted the family Son Esvin by telephone. Verified name and  with family son Esvin as identifiers.         ACM: Jade Jernigan RN     Challenges to be reviewed by the provider   Additional needs identified to be addressed with provider Yes  DME-Patient in need of hospital bed at home, and over the bed table.  Patient is extremely weak, hardly gets out of bed, and has poor nutrition at this time.  Patient is also incontinent of bowel/bladder. Advised the family to make a follow up appointment so you could evaluate her status and any recommendations for care.                Method of communication with provider: chart routing.    Utilization: Patient has not had any utilization since our last call.     Care Summary Note: ACM outreach to patient family tiffani Mcallister HIPAA verified for Care Management.  Tiffani Mcallister states patient is very weak, hardly gets out of bed, and her nutrition is poor, also is incontinent of bowel/bladder. ACM advised the PCP of these issues.  Patient tiffani states he does not check patients blood sugars due to she is not diabetic.  Patient is not having any issues with her blood pressure at this time no HA,blurred vision, slurred speech, numbness/tingling, CP or palpitations.  Patient tiffani states the patient needs a hospital bed and a over the bed tray table.  Patient and family have been advised per this ACM to continue to monitor s/sx and report any changes to appropriate site of care.  ACM advised the patient and family of the red flag issues and to go to the ED for any occurrence.  ACM advised the PCP of the need for a follow up appointment as soon as possible.      Offered patient enrollment in the Remote Patient Monitoring (RPM) program for in-home monitoring: Yes, but did not enroll at this time: declined to enroll

## 2025-03-26 ENCOUNTER — CARE COORDINATION (OUTPATIENT)
Dept: CARE COORDINATION | Age: 89
End: 2025-03-26

## 2025-03-26 DIAGNOSIS — N39.42 URINARY INCONTINENCE WITHOUT SENSORY AWARENESS: Primary | ICD-10-CM

## 2025-03-26 NOTE — CARE COORDINATION
Patient granddaughter called this ACM patient is in need for the DME equipment orders needed faxed to the [facility] equipment on [facility] in [place].  Also patient needs a wellness  check appointment and [facility] will send a message to the  for this request.

## 2025-03-27 RX ORDER — ETOH/EUC OIL/MENTH/PEP/WINTERG
1 SPRAY, NON-AEROSOL (ML) MUCOUS MEMBRANE PRN
Qty: 320 EACH | Refills: 0 | Status: SHIPPED | OUTPATIENT
Start: 2025-03-27

## 2025-03-27 NOTE — TELEPHONE ENCOUNTER
Faxed order for hospital bed, bed table and incontinence pad order  to [facility]

## 2025-03-28 ENCOUNTER — CARE COORDINATION (OUTPATIENT)
Dept: CARE COORDINATION | Age: 89
End: 2025-03-28

## 2025-03-28 NOTE — CARE COORDINATION
Date of referral: 3/26/25  Referral received from: DAVID Jernigan  Reason for referral: DME - hospital bed, table    Attempted phone call to Pt's grandson, emergency contact, Esvin. No answer, vm message left intro  Attempted phone all to Margot, not a valid number.    BALDO plan of care: SW will make next outreach attempt on 4/1 to complete initial assessment

## 2025-04-01 ENCOUNTER — CARE COORDINATION (OUTPATIENT)
Dept: CARE COORDINATION | Age: 89
End: 2025-04-01

## 2025-04-01 ENCOUNTER — TELEPHONE (OUTPATIENT)
Dept: FAMILY MEDICINE CLINIC | Age: 89
End: 2025-04-01

## 2025-04-01 NOTE — TELEPHONE ENCOUNTER
Spoke with pt's daughter, Margot(on communication) and she advised that pt's insurance is denying her hospital bed d/t the Dx's codes. Advised granddaughter that I will look into this and see what they need. Granddaughter verbalized understanding    As soon as this nurse hung up the phone [employer] called requesting pt's last office note be faxed so they can see if there is something on there that they can use to get it approved. Faxed last office note(almost from a year ago) to [employer]

## 2025-04-01 NOTE — CARE COORDINATION
Outreach deferred. BALDO did conduct chart review and identified that PCP did send office visit note for hospital bed to [facility] on this date. Pt's daughter had called PCP office stating the hospital bed was declined due to dx codes.     BALDO plan of care: BALDO will make next outreach for follow up on 4/4 regarding hospital bed.

## 2025-04-04 ENCOUNTER — CARE COORDINATION (OUTPATIENT)
Dept: CARE COORDINATION | Age: 89
End: 2025-04-04

## 2025-04-04 NOTE — CARE COORDINATION
Attempted phone call to Pt's granddaughter, Margot, number is no longer in service.    Phone call to Pt's home number and her grandson answered confirming the hospital bed was delivered Wednesday.     Pt's grandson denied any other needs or concerns at this time.     SW plan of care: SW will resolve from SW services

## 2025-04-07 ENCOUNTER — CARE COORDINATION (OUTPATIENT)
Dept: CARE COORDINATION | Age: 89
End: 2025-04-07

## 2025-04-07 NOTE — CARE COORDINATION
Ambulatory Care Coordination Note     4/7/2025 1:20 PM     ACM outreach attempt by this ACM today to perform care management follow up . ACM was unable to reach the patient by telephone today;   left voice message requesting a return phone call to this ACM.     ACM: Jade Jernigan RN     Care Summary Note: ACM outreach to patient for Care Management.     PCP/Specialist follow up:       Follow Up:   Plan for next ACM outreach in approximately 2 weeks to complete:  - disease specific assessments  - goal progression.

## 2025-04-17 ENCOUNTER — TELEPHONE (OUTPATIENT)
Dept: FAMILY MEDICINE CLINIC | Age: 89
End: 2025-04-17

## 2025-04-21 ENCOUNTER — CARE COORDINATION (OUTPATIENT)
Dept: CARE COORDINATION | Age: 89
End: 2025-04-21

## 2025-04-21 NOTE — CARE COORDINATION
Ambulatory Care Coordination Note     2025 12:05 PM     Patient Current Location:  Home: [address] [place] [address]     ACM contacted the family tiffani Mcallister by telephone. Verified name and  with family as identifiers.     Patient graduated from the High Risk Care Management program on 2025.  Family verbalizes confidence in the ability to self-manage at this time. has the ability to self-manage at this time..  Care management goals have been completed. No further Ambulatory Care Manager follow up scheduled.      ACM: Jade Jernigan RN     Challenges to be reviewed by the provider   Additional needs identified to be addressed with provider No  none               Method of communication with provider: none.    Utilization: Patient has not had any utilization since our last call.     Care Summary Note: ACM outreach to the patient tiffani Mcallister HIPAA verified for Care Management.  Patient tiffani states doing better sleeping in the hospital bed did not like at first but is getting used to the bed has a mattress over lay on bed.  Moved the recliner out of the room so patient will do better in the bed.  Patient tiffani is waiting on a over the bed table he ordered from [employer] for the patient.  Patient tiffani states no signs or symptoms no HA, blurred vision, slurred speech, numbness/tingling, CP or Palpitations. Patient advised by the ACM to continue to monitor s/sx and report any changes to appropriate site of care.  Patient is also advised of the red flag s/sx and to go to the ED for evaluation if any should occur.  Patient grandson VU.  Patient tiffani stated he would call with any issues as needed.     Offered patient enrollment in the Remote Patient Monitoring (RPM) program for in-home monitoring: Deferred at this time because graduated Care Management; will discuss at next outreach.     Assessments Completed:   No changes since last call    Medications Reviewed:

## 2025-04-25 ENCOUNTER — TELEPHONE (OUTPATIENT)
Dept: FAMILY MEDICINE CLINIC | Age: 89
End: 2025-04-25

## 2025-04-25 ENCOUNTER — TELEMEDICINE (OUTPATIENT)
Dept: FAMILY MEDICINE CLINIC | Age: 89
End: 2025-04-25

## 2025-04-25 DIAGNOSIS — I48.20 CHRONIC ATRIAL FIBRILLATION (HCC): ICD-10-CM

## 2025-04-25 DIAGNOSIS — F01.B4 MODERATE VASCULAR DEMENTIA WITH ANXIETY (HCC): ICD-10-CM

## 2025-04-25 DIAGNOSIS — I95.1 ORTHOSTATIC HYPOTENSION: ICD-10-CM

## 2025-04-25 DIAGNOSIS — E11.42 TYPE 2 DIABETES MELLITUS WITH DIABETIC POLYNEUROPATHY, WITHOUT LONG-TERM CURRENT USE OF INSULIN (HCC): ICD-10-CM

## 2025-04-25 DIAGNOSIS — I42.8 OTHER CARDIOMYOPATHIES (HCC): Primary | ICD-10-CM

## 2025-04-25 DIAGNOSIS — F03.B2 MODERATE DEMENTIA WITH PSYCHOTIC DISTURBANCE, UNSPECIFIED DEMENTIA TYPE (HCC): ICD-10-CM

## 2025-04-25 DIAGNOSIS — N18.30 STAGE 3 CHRONIC KIDNEY DISEASE, UNSPECIFIED WHETHER STAGE 3A OR 3B CKD (HCC): ICD-10-CM

## 2025-04-25 ASSESSMENT — PATIENT HEALTH QUESTIONNAIRE - PHQ9: DEPRESSION UNABLE TO ASSESS: FUNCTIONAL CAPACITY MOTIVATION LIMITS ACCURACY

## 2025-04-25 NOTE — PROGRESS NOTES
4/25/2025    Cary Rogers    Chief Complaint   Patient presents with    Annual Exam     Hospital bed       HPI  History was obtained from pt and granddaughter in law.  Cary is a 89 y.o. female with a PMHx as listed below     History of Present Illness      Has been up a night and asleep during the day recently but hasn't been to sleep yet today. They're hoping she will sleep good tonight    The hospital bed is necessary to help her circulation and leg swelling, which has resolved with its use. She has no pressure injuries at all since she can change positions. It also helps her breathing- she has no issues currently and her safety is protected with the rails.     1. Other cardiomyopathies ([facility])    2. Moderate dementia with psychotic disturbance, unspecified dementia type ([facility])    3. Chronic atrial fibrillation ([facility])    4. Moderate episode of recurrent major depressive disorder ([facility])    5. Type 2 diabetes mellitus with diabetic polyneuropathy, without long-term current use of insulin ([facility])    6. Stage 3 chronic kidney disease, unspecified whether stage 3a or 3b CKD ([facility])    7. Orthostatic hypotension    8. 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    Thyroid disease     Ulcer in the past    Gastric       FAMILY HISTORY  Family History   Problem Relation Age of Onset    Cancer Mother     Cancer Father     Heart Disease Father     Cancer Sister     Cancer Brother        SOCIAL HISTORY  Social History     Socioeconomic History    Marital

## 2025-05-05 NOTE — DISCHARGE SUMMARY
Jeanine Guzman is a 69 y.o. female in for follow up of Cancer of sigmoid    History of Present Illness  The patient is a 69-year-old female who was admitted a few weeks ago with what ended up being a sigmoid mass and ischemic colitis. She was treated with antibiotics and discharged.    She reports feeling well overall but experiences occasional fatigue. She expresses concern about the potential need for an ostomy bag post-surgery and queries about the possibility of using sutures instead of staples. She also questions the likelihood of staple dislodgement. She seeks clarification on the differences between a colostomy and an ileostomy and inquires about the probability of requiring an ileostomy. She recalls a previous colonoscopy performed by Dr. Glass a few years ago, during which precancerous cells were identified, and a non-malignant polyp was found. She expresses interest in home health care post-discharge due to her inability to manage her condition at home. She is on Medicare and has a supplement. She inquires about the necessity of covering the ostomy bag during showers and whether she can shower immediately upon returning home. She also questions the stage of her tumor and the potential need for chemotherapy. She expresses concern about the side effects of chemotherapy, including nausea and hair loss.     Past Medical History:   Diagnosis Date    Allergic     Alergic to Morphine & latex    Anxiety     Colon cancer 04/2025    Colon polyp 11/11/2022    Diverticulitis of colon 2010    Diverticulosis 2010    GERD (gastroesophageal reflux disease) 2016    Glaucoma 2018    Headache     Hyperlipidemia October 2022    Hypertension     Inflammatory bowel disease 04/11/25    Ischemic colitis    Irritable bowel syndrome 2017    PONV (postoperative nausea and vomiting) 1990    Rectal bleeding 2024     Past Surgical History:   Procedure Laterality Date    APPENDECTOMY  1958    COLONOSCOPY  11/11/22    COLONOSCOPY N/A  "04/11/2025    Procedure: COLONOSCOPY TO CECUM WITH BIOPSY AND SPOT INK MARKER; POLYPECTOMY ( COLD SNARE);  Surgeon: Juan Jose Horner MD;  Location: [facility] ENDOSCOPY;  Service: Gastroenterology;  Laterality: N/A;  COLITIS; DIARRHEA  POST:HEMORRHOIDS; DIVERTICULOSIS; SIGMOID MASS; COLITIS; COLON POLYP    EYE SURGERY  2019    Cataracts    FRACTURE SURGERY  2010    Broken wrist    HYSTERECTOMY      WRIST SURGERY Left 2010       /60 (BP Location: Left arm, Patient Position: Sitting, Cuff Size: Adult)   Pulse 71   Ht 162.6 cm (64\")   Wt 70 kg (154 lb 6.4 oz)   LMP  (LMP Unknown)   SpO2 97%   Breastfeeding No   BMI 26.50 kg/m²   Body mass index is 26.5 kg/m².      PE:   Physical Exam    Physical Exam  Constitutional:       General: She is not in acute distress.     Appearance: She is well-developed.   HENT:      Head: Normocephalic and atraumatic.   Abdominal:      General: There is no distension.      Palpations: Abdomen is soft.   Musculoskeletal:         General: Normal range of motion.   Neurological:      Mental Status: She is alert.   Psychiatric:         Thought Content: Thought content normal.             Assessment & Plan  1. Sigmoid mass.  The CT scan done on 04/29/2025 shows a mass at the rectosigmoid and some suspicious lymph nodes, but no metastatic disease. The patient was previously treated with antibiotics for ischemic colitis, which has resolved. A detailed discussion was held regarding the surgical procedure, including the removal of the tumor and surrounding lymph nodes, the use of staples versus sutures, and the potential need for a temporary ileostomy. The surgery, known as a low anterior resection, typically lasts 4 to 5 hours, with a hospital stay of 2 to 5 days. Postoperative care includes walking four times daily, sitting upright in a chair for most of the day, and avoiding forceful eating. Pain management will involve a transabdominal pain block and a combination of Tylenol, " Celebrex, and gabapentin. The patient was advised to avoid driving and lifting more than 15 pounds post-surgery. A prescription for an antibiotic will be provided to take with the pre-surgery prep. The patient was informed about the possibility of needing chemotherapy post-surgery, depending on the lymph node status.         1. Cancer of sigmoid           Patient or patient representative verbalized consent for the use of Ambient Listening during the visit with  Marcos Rowe MD for chart documentation. 5/9/2025  16:41 EDT      CERVICAL SPINE WO CONTRAST CLINICAL HISTORY: Fall TECHNIQUE:  Serial axial unenhanced images were obtained from the  vertex to the foramen magnum.  Spiral, high resolution axial unenhanced images were obtained from the skull base to the cervicothoracic junction with sagittal and coronal planar reconstructions. COMPARISON: Multiple CTs of the brain most recently 11/2/2023 RESULT: BRAIN:  Acute change:   No evidence of an acute contusion or other acute parenchymal process.   Hemorrhage:    No evidence of acute intracranial hemorrhage.   Mass lesion / Mass effect:   There is no evidence of an intracranial mass or extraaxial fluid collection.  No significant mass effect.   Chronic change:   Extensive confluent foci of low attenuation are present in the supratentorial white matter which is nonspecifice but likely represents extensive microvascular ischemia. Parenchyma:  There is moderate generalized volume loss. Sequela of prior left corona radiata infarct. Ventricles:   Ventricular enlargement concordant with the degree of parenchymal volume loss. Paranasal sinuses and skull base:  The visualized paranasal sinuses are grossly clear.  The skull base and visualized extracranial soft tissues are grossly normal. Bilateral lens replacements. CERVICAL: Counting reference:  Craniocervical junction.   Anatomic Variants:  None. Alignment:    Alignment is anatomic. Craniocervical junction:    Craniocervical junction is normal. Osseous structures/fracture:    No evidence of a lytic or blastic process in the visualized spine.  No evidence of acute or chronic fracture. Cervical soft tissues:    The paraspinal soft tissues planes are maintained. Degenerative changes: Moderate multilevel degenerative changes.     Brain: Mild microvasular ischemic changes. No evidence of acute intracranial processes.   Cervical spine: No acute fracture or traumatic malalignment. Degenerative changes without high-grade canal stenosis or neuroforaminal

## 2025-05-30 ENCOUNTER — HOSPITAL ENCOUNTER (EMERGENCY)
Age: 89
Discharge: HOME | End: 2025-05-31
Attending: STUDENT IN AN ORGANIZED HEALTH CARE EDUCATION/TRAINING PROGRAM
Payer: MEDICARE

## 2025-05-30 DIAGNOSIS — L89.301 PRESSURE INJURY OF BUTTOCK, STAGE 1, UNSPECIFIED LATERALITY: ICD-10-CM

## 2025-05-30 DIAGNOSIS — F01.50 VASCULAR DEMENTIA, UNSPECIFIED DEMENTIA SEVERITY, UNSPECIFIED WHETHER BEHAVIORAL, PSYCHOTIC, OR MOOD DISTURBANCE OR ANXIETY (HCC): Primary | ICD-10-CM

## 2025-05-30 DIAGNOSIS — N39.0 URINARY TRACT INFECTION WITH HEMATURIA, SITE UNSPECIFIED: ICD-10-CM

## 2025-05-30 DIAGNOSIS — R31.9 URINARY TRACT INFECTION WITH HEMATURIA, SITE UNSPECIFIED: ICD-10-CM

## 2025-05-30 LAB
ALBUMIN SERPL-MCNC: 3.4 G/DL (ref 3.4–5)
ALBUMIN/GLOB SERPL: 1.4 {RATIO} (ref 1.1–2.2)
ALP SERPL-CCNC: 103 U/L (ref 40–129)
ALT SERPL-CCNC: 20 U/L (ref 10–40)
ANION GAP SERPL CALCULATED.3IONS-SCNC: 10 MMOL/L (ref 9–17)
AST SERPL-CCNC: 30 U/L (ref 15–37)
BASOPHILS # BLD: 0.04 K/UL
BASOPHILS NFR BLD: 1 % (ref 0–1)
BILIRUB SERPL-MCNC: 0.4 MG/DL (ref 0–1)
BILIRUB UR QL STRIP: NEGATIVE
BUN SERPL-MCNC: 29 MG/DL (ref 7–20)
CALCIUM SERPL-MCNC: 9 MG/DL (ref 8.3–10.6)
CHARACTER UR: ABNORMAL
CHLORIDE SERPL-SCNC: 104 MMOL/L (ref 99–110)
CLARITY UR: ABNORMAL
CO2 SERPL-SCNC: 27 MMOL/L (ref 21–32)
COLOR UR: YELLOW
CREAT SERPL-MCNC: 1.1 MG/DL (ref 0.6–1.2)
EOSINOPHIL # BLD: 0.01 K/UL
EOSINOPHILS RELATIVE PERCENT: 0 % (ref 0–3)
EPI CELLS #/AREA URNS HPF: 4 /HPF
ERYTHROCYTE [DISTWIDTH] IN BLOOD BY AUTOMATED COUNT: 15.9 % (ref 11.7–14.9)
GFR, ESTIMATED: 46 ML/MIN/1.73M2
GLUCOSE SERPL-MCNC: 119 MG/DL (ref 74–99)
GLUCOSE UR STRIP-MCNC: NEGATIVE MG/DL
HCT VFR BLD AUTO: 39 % (ref 37–47)
HGB BLD-MCNC: 12.4 G/DL (ref 12.5–16)
HGB UR QL STRIP.AUTO: ABNORMAL
IMM GRANULOCYTES # BLD AUTO: 0.01 K/UL
IMM GRANULOCYTES NFR BLD: 0 %
KETONES UR STRIP-MCNC: ABNORMAL MG/DL
LEUKOCYTE ESTERASE UR QL STRIP: ABNORMAL
LYMPHOCYTES NFR BLD: 1.05 K/UL
LYMPHOCYTES RELATIVE PERCENT: 21 % (ref 24–44)
MCH RBC QN AUTO: 28.7 PG (ref 27–31)
MCHC RBC AUTO-ENTMCNC: 31.8 G/DL (ref 32–36)
MCV RBC AUTO: 90.3 FL (ref 78–100)
MONOCYTES NFR BLD: 0.57 K/UL
MONOCYTES NFR BLD: 11 % (ref 0–5)
MUCOUS THREADS URNS QL MICRO: ABNORMAL
NEUTROPHILS NFR BLD: 66 % (ref 36–66)
NEUTS SEG NFR BLD: 3.3 K/UL
NITRITE UR QL STRIP: NEGATIVE
PH UR STRIP: 6 [PH] (ref 5–8)
PLATELET # BLD AUTO: 202 K/UL (ref 140–440)
PMV BLD AUTO: 10.1 FL (ref 7.5–11.1)
POTASSIUM SERPL-SCNC: 3.8 MMOL/L (ref 3.5–5.1)
PROT SERPL-MCNC: 5.9 G/DL (ref 6.4–8.2)
PROT UR STRIP-MCNC: 100 MG/DL
RBC # BLD AUTO: 4.32 M/UL (ref 4.2–5.4)
RBC #/AREA URNS HPF: 78 /HPF (ref 0–2)
SODIUM SERPL-SCNC: 140 MMOL/L (ref 136–145)
SP GR UR STRIP: 1.02 (ref 1–1.03)
UROBILINOGEN UR STRIP-ACNC: 4 EU/DL (ref 0–1)
WBC #/AREA URNS HPF: 1650 /HPF (ref 0–5)
WBC OTHER # BLD: 5 K/UL (ref 4–10.5)

## 2025-05-30 PROCEDURE — 85025 COMPLETE CBC W/AUTO DIFF WBC: CPT

## 2025-05-30 PROCEDURE — 81001 URINALYSIS AUTO W/SCOPE: CPT

## 2025-05-30 PROCEDURE — 87086 URINE CULTURE/COLONY COUNT: CPT

## 2025-05-30 PROCEDURE — 6360000002 HC RX W HCPCS: Performed by: STUDENT IN AN ORGANIZED HEALTH CARE EDUCATION/TRAINING PROGRAM

## 2025-05-30 PROCEDURE — 36415 COLL VENOUS BLD VENIPUNCTURE: CPT

## 2025-05-30 PROCEDURE — 2500000003 HC RX 250 WO HCPCS: Performed by: STUDENT IN AN ORGANIZED HEALTH CARE EDUCATION/TRAINING PROGRAM

## 2025-05-30 PROCEDURE — 99284 EMERGENCY DEPT VISIT MOD MDM: CPT

## 2025-05-30 PROCEDURE — 87077 CULTURE AEROBIC IDENTIFY: CPT

## 2025-05-30 PROCEDURE — 80053 COMPREHEN METABOLIC PANEL: CPT

## 2025-05-30 PROCEDURE — 87186 SC STD MICRODIL/AGAR DIL: CPT

## 2025-05-30 PROCEDURE — 96374 THER/PROPH/DIAG INJ IV PUSH: CPT

## 2025-05-30 RX ORDER — ZINC OXIDE
OINTMENT (GRAM) TOPICAL
Qty: 1 EACH | Refills: 0 | Status: SHIPPED | OUTPATIENT
Start: 2025-05-30

## 2025-05-30 RX ORDER — CEPHALEXIN 500 MG/1
500 CAPSULE ORAL 4 TIMES DAILY
Qty: 28 CAPSULE | Refills: 0 | Status: SHIPPED | OUTPATIENT
Start: 2025-05-30 | End: 2025-06-06

## 2025-05-30 RX ADMIN — CEFTRIAXONE 1000 MG: 1 INJECTION, POWDER, FOR SOLUTION INTRAMUSCULAR; INTRAVENOUS at 21:46

## 2025-05-30 NOTE — ED TRIAGE NOTES
Pt was brought in by EMS. EMS states that the pt endorses blood in stool and burning with urination. Pt has hx of dementia and her confusion is baseline for her.

## 2025-05-30 NOTE — ED PROVIDER NOTES
of Times Moved in the Last Year: 0     Homeless in the Last Year: No     No current facility-administered medications for this encounter.     Current Outpatient Medications   Medication Sig Dispense Refill    zinc oxide (DESITIN) 40 % ointment Apply topically as needed. 1 each 0    cephALEXin (KEFLEX) 500 MG capsule Take 1 capsule by mouth 4 times daily for 7 days 28 capsule 0    Incontinence Supply Disposable (UNDERPADS REGULAR) MISC 1 each by Does not apply route as needed (incontinence) 320 each 0    Incontinence Supplies MISC 1 each by Does not apply route as needed (incontinence) 180 each 5    amLODIPine (NORVASC) 2.5 MG tablet Take 1 tablet by mouth daily 30 tablet 3    pantoprazole (PROTONIX) 40 MG tablet Take 1 tablet by mouth 2 times daily (before meals) 60 tablet 5    clopidogrel (PLAVIX) 75 MG tablet Take 1 tablet by mouth daily 30 tablet 3    metoprolol succinate (TOPROL XL) 25 MG extended release tablet Take 1 tablet by mouth every evening 30 tablet 0    levothyroxine (SYNTHROID) 50 MCG tablet Take 1 tablet by mouth every morning (before breakfast) TAKE ONE TABLET BY MOUTH DAILY 90 tablet 1         Nursing Notes Reviewed        PHYSICAL EXAM     ED Triage Vitals   BP Systolic BP Percentile Diastolic BP Percentile Temp Temp src Pulse Respirations SpO2   05/1936 -- -- 05/30/25 1933 -- 05/30/25 1933 05/30/25 1933 05/30/25 1933   (!) 155/83   97.9 °F (36.6 °C)  60 18 99 %      Height Weight         -- --                     Triage VS:    ED Triage Vitals   Encounter Vitals Group      BP 05/1936 (!) 155/83      Systolic BP Percentile --       Diastolic BP Percentile --       Pulse 05/30/25 1933 60      Respirations 05/30/25 1933 18      Temp 05/30/25 1933 97.9 °F (36.6 °C)      Temp src --       SpO2 05/30/25 1933 99 %      Weight --       Height --       Head Circumference --       Peak Flow --       Pain Score --       Pain Loc --       Pain Education --       Exclude from Growth Chart --           Labs  Abx  discharge        Brief Summary of Patient Presentation:    Patient is a 89 y.o. female with a past medical history of CKD vascular dementia DM fibromyalgia depression who was seen and evaluated in the emergency department for rectal bleeding.  Patient denies any current symptoms but it sounds like she was sent in over concern for blood in her stool.  Family were not present during my evaluation but the nurse was able to talk with family who arrived later and they said that they washed her in the shower and there was some blood in her stool.  She denies any current dysuria but has had it \"from time to time \".  She is otherwise hemodynamically stable.  Does have a history of dementia so history is a bit limited.  Will check some basic lab work as well as a urine sample.  No abdominal tenderness palpation and no pain so do not think she needs intra-abdominal imaging at this point.  Family reports she is at baseline mental status.   Performed a AMANDA at bedside with RN perez present, no gross blood, occult testing was negative.  She does have some small superficial scattered wounds which is probably where the bleeding that family notes is coming from, but no acute signs of infection and they are very superficial.  CBC CMP were reassuring.  UA showed 1650 WBCs 78 RBCs, no bacteria but given significant mount of WBCs this is suspicious for potential sterile pyuria.  Did give a dose of Rocephin here.  Otherwise well-appearing, at her baseline mental status and without significant findings concerning for sepsis so I think she is appropriate discharge with outpatient follow-up and she was agreeable with this.  Will discharge on antibiotics and also will prescribe Desitin for her very superficial decubitus ulcers.  She was agreeable with this plan.  Otherwise well-appearing.  To be discharged.              Chronic conditions affecting care: CKD vascular dementia DM fibromyalgia depression   Previous

## 2025-05-31 VITALS
HEART RATE: 60 BPM | SYSTOLIC BLOOD PRESSURE: 155 MMHG | OXYGEN SATURATION: 100 % | RESPIRATION RATE: 16 BRPM | TEMPERATURE: 97.9 F | DIASTOLIC BLOOD PRESSURE: 75 MMHG

## 2025-05-31 NOTE — DISCHARGE INSTRUCTIONS
Take the full course of antibiotics  You can use the Desitin to help with the bedsores  Call and follow-up with your family doctor in the next 1-3 days  Return to the ED if your symptoms worsen or you feel you need to be reevaluated

## 2025-05-31 NOTE — ED NOTES
[facility] eta 0900 due to family stating they will not be home to let crew inside until 0900.  is at Baseball tournament out of town and wife works night shift. P/U rescheduled from 0140 to 0900 per family request.

## 2025-05-31 NOTE — ED NOTES
Initially, pt's grandchildren stated that they would not be home to unlock the house for the pt until midnight because they were at a baseball game. Pt's grandchild called back and states they now won't be able to unlock the house until 9am.

## 2025-06-01 LAB
MICROORGANISM SPEC CULT: ABNORMAL
SERVICE CMNT-IMP: ABNORMAL
SPECIMEN DESCRIPTION: ABNORMAL

## 2025-06-02 ENCOUNTER — HOSPITAL ENCOUNTER (INPATIENT)
Age: 89
LOS: 1 days | Discharge: HOSPICE | DRG: 304 | End: 2025-06-06
Attending: STUDENT IN AN ORGANIZED HEALTH CARE EDUCATION/TRAINING PROGRAM | Admitting: STUDENT IN AN ORGANIZED HEALTH CARE EDUCATION/TRAINING PROGRAM
Payer: MEDICARE

## 2025-06-02 ENCOUNTER — RESULTS FOLLOW-UP (OUTPATIENT)
Dept: EMERGENCY DEPT | Age: 89
End: 2025-06-02

## 2025-06-02 ENCOUNTER — APPOINTMENT (OUTPATIENT)
Dept: CT IMAGING | Age: 89
DRG: 304 | End: 2025-06-02
Payer: MEDICARE

## 2025-06-02 ENCOUNTER — APPOINTMENT (OUTPATIENT)
Dept: GENERAL RADIOLOGY | Age: 89
DRG: 304 | End: 2025-06-02
Payer: MEDICARE

## 2025-06-02 DIAGNOSIS — I63.89 CEREBROVASCULAR ACCIDENT (CVA) DUE TO OTHER MECHANISM (HCC): ICD-10-CM

## 2025-06-02 DIAGNOSIS — I63.9 ACUTE ISCHEMIC STROKE (HCC): Primary | ICD-10-CM

## 2025-06-02 PROBLEM — I65.01 VERTEBRAL ARTERY OCCLUSION, RIGHT: Status: ACTIVE | Noted: 2025-06-02

## 2025-06-02 LAB
ALBUMIN SERPL-MCNC: 3.9 G/DL (ref 3.4–5)
ALBUMIN/GLOB SERPL: 1.7 {RATIO} (ref 1.1–2.2)
ALP SERPL-CCNC: 117 U/L (ref 40–129)
ALT SERPL-CCNC: 21 U/L (ref 10–40)
ANION GAP SERPL CALCULATED.3IONS-SCNC: 13 MMOL/L (ref 9–17)
AST SERPL-CCNC: 24 U/L (ref 15–37)
BASOPHILS # BLD: 0.05 K/UL
BASOPHILS NFR BLD: 1 % (ref 0–1)
BILIRUB SERPL-MCNC: 0.6 MG/DL (ref 0–1)
BUN SERPL-MCNC: 31 MG/DL (ref 7–20)
CALCIUM SERPL-MCNC: 9 MG/DL (ref 8.3–10.6)
CHLORIDE SERPL-SCNC: 103 MMOL/L (ref 99–110)
CO2 SERPL-SCNC: 24 MMOL/L (ref 21–32)
CREAT SERPL-MCNC: 1 MG/DL (ref 0.6–1.2)
EKG DIAGNOSIS: NORMAL
EKG Q-T INTERVAL: 488 MS
EKG QRS DURATION: 136 MS
EKG QTC CALCULATION (BAZETT): 491 MS
EKG R AXIS: -70 DEGREES
EKG T AXIS: 98 DEGREES
EKG VENTRICULAR RATE: 61 BPM
EOSINOPHIL # BLD: 0 K/UL
EOSINOPHILS RELATIVE PERCENT: 0 % (ref 0–3)
ERYTHROCYTE [DISTWIDTH] IN BLOOD BY AUTOMATED COUNT: 15.8 % (ref 11.7–14.9)
GFR, ESTIMATED: 50 ML/MIN/1.73M2
GLUCOSE BLD-MCNC: 118 MG/DL (ref 74–99)
GLUCOSE SERPL-MCNC: 111 MG/DL (ref 74–99)
HCT VFR BLD AUTO: 41.8 % (ref 37–47)
HGB BLD-MCNC: 13.3 G/DL (ref 12.5–16)
IMM GRANULOCYTES # BLD AUTO: 0.01 K/UL
IMM GRANULOCYTES NFR BLD: 0 %
INR PPP: 1
LYMPHOCYTES NFR BLD: 1.27 K/UL
LYMPHOCYTES RELATIVE PERCENT: 19 % (ref 24–44)
MCH RBC QN AUTO: 28.5 PG (ref 27–31)
MCHC RBC AUTO-ENTMCNC: 31.8 G/DL (ref 32–36)
MCV RBC AUTO: 89.5 FL (ref 78–100)
MICROORGANISM SPEC CULT: ABNORMAL
MONOCYTES NFR BLD: 0.57 K/UL
MONOCYTES NFR BLD: 8 % (ref 0–5)
NEUTROPHILS NFR BLD: 72 % (ref 36–66)
NEUTS SEG NFR BLD: 4.96 K/UL
PARTIAL THROMBOPLASTIN TIME: 30.5 SEC (ref 25.1–37.1)
PLATELET # BLD AUTO: 221 K/UL (ref 140–440)
PMV BLD AUTO: 10 FL (ref 7.5–11.1)
POTASSIUM SERPL-SCNC: 4.2 MMOL/L (ref 3.5–5.1)
PROT SERPL-MCNC: 6.2 G/DL (ref 6.4–8.2)
PROTHROMBIN TIME: 13.5 SEC (ref 11.7–14.5)
RBC # BLD AUTO: 4.67 M/UL (ref 4.2–5.4)
SERVICE CMNT-IMP: ABNORMAL
SODIUM SERPL-SCNC: 139 MMOL/L (ref 136–145)
SPECIMEN DESCRIPTION: ABNORMAL
TROPONIN I SERPL HS-MCNC: 61 NG/L (ref 0–14)
TROPONIN I SERPL HS-MCNC: 64 NG/L (ref 0–14)
WBC OTHER # BLD: 6.9 K/UL (ref 4–10.5)

## 2025-06-02 PROCEDURE — 71045 X-RAY EXAM CHEST 1 VIEW: CPT

## 2025-06-02 PROCEDURE — 70450 CT HEAD/BRAIN W/O DYE: CPT

## 2025-06-02 PROCEDURE — 96372 THER/PROPH/DIAG INJ SC/IM: CPT

## 2025-06-02 PROCEDURE — 80053 COMPREHEN METABOLIC PANEL: CPT

## 2025-06-02 PROCEDURE — 93010 ELECTROCARDIOGRAM REPORT: CPT | Performed by: INTERNAL MEDICINE

## 2025-06-02 PROCEDURE — 84484 ASSAY OF TROPONIN QUANT: CPT

## 2025-06-02 PROCEDURE — 36415 COLL VENOUS BLD VENIPUNCTURE: CPT

## 2025-06-02 PROCEDURE — 82962 GLUCOSE BLOOD TEST: CPT

## 2025-06-02 PROCEDURE — 6360000002 HC RX W HCPCS: Performed by: STUDENT IN AN ORGANIZED HEALTH CARE EDUCATION/TRAINING PROGRAM

## 2025-06-02 PROCEDURE — 99285 EMERGENCY DEPT VISIT HI MDM: CPT

## 2025-06-02 PROCEDURE — 99204 OFFICE O/P NEW MOD 45 MIN: CPT | Performed by: NURSE PRACTITIONER

## 2025-06-02 PROCEDURE — 94761 N-INVAS EAR/PLS OXIMETRY MLT: CPT

## 2025-06-02 PROCEDURE — 85610 PROTHROMBIN TIME: CPT

## 2025-06-02 PROCEDURE — 93005 ELECTROCARDIOGRAM TRACING: CPT | Performed by: STUDENT IN AN ORGANIZED HEALTH CARE EDUCATION/TRAINING PROGRAM

## 2025-06-02 PROCEDURE — 6360000004 HC RX CONTRAST MEDICATION: Performed by: STUDENT IN AN ORGANIZED HEALTH CARE EDUCATION/TRAINING PROGRAM

## 2025-06-02 PROCEDURE — 85025 COMPLETE CBC W/AUTO DIFF WBC: CPT

## 2025-06-02 PROCEDURE — 70496 CT ANGIOGRAPHY HEAD: CPT

## 2025-06-02 PROCEDURE — 85730 THROMBOPLASTIN TIME PARTIAL: CPT

## 2025-06-02 PROCEDURE — 2500000003 HC RX 250 WO HCPCS: Performed by: STUDENT IN AN ORGANIZED HEALTH CARE EDUCATION/TRAINING PROGRAM

## 2025-06-02 PROCEDURE — G0378 HOSPITAL OBSERVATION PER HR: HCPCS

## 2025-06-02 RX ORDER — METOPROLOL SUCCINATE 25 MG/1
25 TABLET, EXTENDED RELEASE ORAL EVERY EVENING
Status: DISCONTINUED | OUTPATIENT
Start: 2025-06-02 | End: 2025-06-06 | Stop reason: HOSPADM

## 2025-06-02 RX ORDER — SODIUM CHLORIDE 0.9 % (FLUSH) 0.9 %
5-40 SYRINGE (ML) INJECTION PRN
Status: DISCONTINUED | OUTPATIENT
Start: 2025-06-02 | End: 2025-06-06 | Stop reason: HOSPADM

## 2025-06-02 RX ORDER — ATORVASTATIN CALCIUM 40 MG/1
80 TABLET, FILM COATED ORAL NIGHTLY
Status: DISCONTINUED | OUTPATIENT
Start: 2025-06-02 | End: 2025-06-06 | Stop reason: HOSPADM

## 2025-06-02 RX ORDER — POLYETHYLENE GLYCOL 3350 17 G/17G
17 POWDER, FOR SOLUTION ORAL DAILY PRN
Status: DISCONTINUED | OUTPATIENT
Start: 2025-06-02 | End: 2025-06-06 | Stop reason: HOSPADM

## 2025-06-02 RX ORDER — IOPAMIDOL 755 MG/ML
75 INJECTION, SOLUTION INTRAVASCULAR
Status: COMPLETED | OUTPATIENT
Start: 2025-06-02 | End: 2025-06-02

## 2025-06-02 RX ORDER — CLOPIDOGREL BISULFATE 75 MG/1
75 TABLET ORAL DAILY
Status: DISCONTINUED | OUTPATIENT
Start: 2025-06-02 | End: 2025-06-06 | Stop reason: HOSPADM

## 2025-06-02 RX ORDER — SODIUM CHLORIDE 0.9 % (FLUSH) 0.9 %
5-40 SYRINGE (ML) INJECTION EVERY 12 HOURS SCHEDULED
Status: DISCONTINUED | OUTPATIENT
Start: 2025-06-02 | End: 2025-06-06 | Stop reason: HOSPADM

## 2025-06-02 RX ORDER — AMLODIPINE BESYLATE 5 MG/1
2.5 TABLET ORAL DAILY
Status: DISCONTINUED | OUTPATIENT
Start: 2025-06-02 | End: 2025-06-06 | Stop reason: HOSPADM

## 2025-06-02 RX ORDER — ONDANSETRON 2 MG/ML
4 INJECTION INTRAMUSCULAR; INTRAVENOUS EVERY 6 HOURS PRN
Status: DISCONTINUED | OUTPATIENT
Start: 2025-06-02 | End: 2025-06-06 | Stop reason: HOSPADM

## 2025-06-02 RX ORDER — ONDANSETRON 4 MG/1
4 TABLET, ORALLY DISINTEGRATING ORAL EVERY 8 HOURS PRN
Status: DISCONTINUED | OUTPATIENT
Start: 2025-06-02 | End: 2025-06-06 | Stop reason: HOSPADM

## 2025-06-02 RX ORDER — PANTOPRAZOLE SODIUM 40 MG/1
40 TABLET, DELAYED RELEASE ORAL
Status: DISCONTINUED | OUTPATIENT
Start: 2025-06-02 | End: 2025-06-06 | Stop reason: HOSPADM

## 2025-06-02 RX ORDER — SODIUM CHLORIDE 9 MG/ML
INJECTION, SOLUTION INTRAVENOUS PRN
Status: DISCONTINUED | OUTPATIENT
Start: 2025-06-02 | End: 2025-06-06 | Stop reason: HOSPADM

## 2025-06-02 RX ORDER — LEVOTHYROXINE SODIUM 50 UG/1
50 TABLET ORAL
Status: DISCONTINUED | OUTPATIENT
Start: 2025-06-03 | End: 2025-06-06 | Stop reason: HOSPADM

## 2025-06-02 RX ORDER — ENOXAPARIN SODIUM 100 MG/ML
40 INJECTION SUBCUTANEOUS DAILY
Status: DISCONTINUED | OUTPATIENT
Start: 2025-06-02 | End: 2025-06-05

## 2025-06-02 RX ADMIN — ENOXAPARIN SODIUM 40 MG: 100 INJECTION SUBCUTANEOUS at 22:07

## 2025-06-02 RX ADMIN — IOPAMIDOL 75 ML: 755 INJECTION, SOLUTION INTRAVENOUS at 12:06

## 2025-06-02 RX ADMIN — SODIUM CHLORIDE, PRESERVATIVE FREE 10 ML: 5 INJECTION INTRAVENOUS at 22:07

## 2025-06-02 ASSESSMENT — LIFESTYLE VARIABLES
HOW MANY STANDARD DRINKS CONTAINING ALCOHOL DO YOU HAVE ON A TYPICAL DAY: PATIENT DOES NOT DRINK
HOW OFTEN DO YOU HAVE A DRINK CONTAINING ALCOHOL: NEVER

## 2025-06-02 ASSESSMENT — PAIN SCALES - WONG BAKER: WONGBAKER_NUMERICALRESPONSE: HURTS A LITTLE BIT

## 2025-06-02 NOTE — ED NOTES
Patient's grand daughter arrived.  LKW 0000 when she last gave her medication last night. Per granddaughter patient lives with them and has dementia, incontinent, does not get out of bed much.      Dr. Kong determined patient NOT a candidate for thrombectomy   Ling Jerry NP explained to granddaughter at bedside and granddaughter understandable

## 2025-06-02 NOTE — ED PROVIDER NOTES
EMERGENCY DEPARTMENT HISTORY AND PHYSICAL EXAM      Patient Name: Cary Rogers  MRN: [id number]  : 3/12/1935  Date of Evaluation: 2025  ED Provider:  Argelia Glasgow DO    History of Presenting Illness     Chief Complaint: No chief complaint on file.      HPI: Patient is a 89 y.o. female with past medical history of vascular dementia, diabetes, A-fib, and coronary artery disease and who is presenting to the emergency department via EMS from home given concerns of possible stroke.  Patient reportedly woke up this morning with severe weakness in her left upper extremity.  Patient has facial droop with weakness in the lower extremity.  No reports of fall or trauma.  When EMS arrived patient was in her bed.      Past History     Past Medical History:   Past Medical History:   Diagnosis Date    A-fib (HCC)     Arthritis     CAD (coronary artery disease)     Eye abnormality     Left Eye - Hx torn Retina and Cyst; Partial Blindness in Left Eye    Fibromyalgia     GERD (gastroesophageal reflux disease)     Hx of Doppler echocardiogram 2020    EF 45%     MI (myocardial infarction) ([facility])     No Chest Pains - MI with collapse and pacemaker insertion.    Thyroid disease     Ulcer in the past    Gastric     Surgical History:   Past Surgical History:   Procedure Laterality Date    APPENDECTOMY      BLADDER SURGERY      CHOLECYSTECTOMY      COLONOSCOPY  14    normal, biopsy    COLONOSCOPY N/A 2024    COLONOSCOPY DIAGNOSTIC performed by Navid Dolan MD at [facility] ENDOSCOPY    EYE SURGERY      HYSTERECTOMY (CERVIX STATUS UNKNOWN)      PACEMAKER PLACEMENT Left 2020    bivi pacer upgrade-Medtronic Percepta Quad CRT-P MRI SureScan     UPPER GASTROINTESTINAL ENDOSCOPY N/A 2024    ESOPHAGOGASTRODUODENOSCOPY DIAGNOSTIC ONLY performed by Navid Dolan MD at [facility] ENDOSCOPY    VENTRICULAR CARDIAC PACEMAKER INSERTION      Dr. JULI Shoemaker - following MI     Family History:   Family History   Problem

## 2025-06-02 NOTE — CONSULTS
Neurology Service Consult Note  [facility]   Patient Name: Cary Rogers  : 3/12/1935        Subjective:   Reason for consult: Stroke  89 y.o.  female with history of A-fib, CAD, fibromyalgia, MI, thyroid disease, dementia presenting to [facility] from home with concern for stroke.  Patient's family noted left sided weakness this morning when attempting to wake her to eat.  No family present at bedside during time of exam to help provide history.  On presentation, CT head was completed with no significant findings.  CTA head and neck does show LVO.  Patient was evaluated by neurointerventional unfortunately was not a candidate for thrombectomy.  Patient was last known well last night and was out of the window for TNK.  Per chart review, patient does have advanced dementia and is bedbound at this time.  She is incontinent of urine regularly and has minimal verbal interactions.  It is unclear if the patient has been taking or medications.  Does have a history of A-fib but is not anticoagulated due to history of GI bleed and fall risk.  She has been on Plavix and statin for secondary stroke prevention.  Patient was hypertensive 216/122 on admission and remains hypertensive at time of exam.      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    Thyroid disease     Ulcer in the past    Gastric    :   Past Surgical History:   Procedure Laterality Date    APPENDECTOMY      BLADDER SURGERY      CHOLECYSTECTOMY      COLONOSCOPY  14    normal, biopsy    COLONOSCOPY N/A 2024    COLONOSCOPY DIAGNOSTIC performed by  (36.6 °C) (Oral)   03/13/25 98.4 °F (36.9 °C) (Oral)     BP Readings from Last 3 Encounters:   06/02/25 (!) 208/114   05/31/25 (!) 155/75   03/13/25 115/68     Pulse Readings from Last 3 Encounters:   06/02/25 84   05/31/25 60   03/13/25 76        Gen: Drowsy, does open eyes to voice, does not follow commands  HEENT: NC/AT, right gaze preference unable to cross midline, PERRL, mmm,  neck supple, no meningeal signs  Heart: V-paced  Lungs: Respirations even and unlabored  Ext: no edema, no calf tenderness b/l  Psych: Calm, cooperative t  Skin: no rashes or lesions    NEUROLOGIC EXAM:    Mental Status: Drowsy but does open eyes to voice.  Unable to evaluate speech as she is fully aphasic, does not follow commands but is able to mimic.    Cranial Nerve Exam:   CN II-XII: PERRL, blink to threat in left side of visual field but not right, no nystagmus, right gaze preference,  muscles of facial expression symmetric; hearing intact to conversational tone    Motor Exam:       Strength able to hold right upper and lower extremity to antigravity, left upper and lower extremity flaccid  Tone and bulk normal     Deep Tendon Reflexes: 1/4 biceps, triceps, brachioradialis, patellar, and achilles b/l; flexor plantar responses b/l    Sensation: Withdraws from pain in right upper and lower extremities    Coordination/Cerebellum:       Tremors--none      Rapidly alternating movements: MARLENA, patient unable to follow commands             Heel-to-Shin: MARLENA, patient unable to follow commands        Finger-to-Nose: MARLENA, patient unable to follow commands      Gait and stance:      Gait: deferred      LABS:     Recent Labs     05/30/25 2000 06/02/25  1150   WBC 5.0 6.9    139   K 3.8 4.2    103   CO2 27 24   BUN 29* 31*   CREATININE 1.1 1.0   GLUCOSE 119* 111*   ALBUMIN 3.4 3.9   INR  --  1.0         IMAGING:    CT head w/o contrast:  IMPRESSION:  1.  No evidence for acute intracranial findings.  2.  Chronic small vessel ischemic  pending  PT/OT/ST as recommended  Will continue to follow pending clinical course    Follow up: To be determined    Thank you for allowing us to participate in the care of your patient.  If there are any questions regarding evaluation please feel free to contact us.     TYLER Figueroa CNP, 6/2/2025     I have discussed this patient with the mid-level provider.  I have personally seen and examined the patient and reviewed the diagnostic testing and any changes in the history or physical exam are documented above.  I agree with the plan of care as documented.  I have evaluated/treated an acute/chronic illness/injury that poses threat to life or bodily function and/or Chronic illness with severe exacerbation, or treatment of side effect in this encounter. I have performed a substantive portion of the medical decision making for this shared visit.       Patient with acute left sided weakness and right gaze preference. She is nonverbal currently. Not a candidate for TNK due to outside the window. Not a candidate for intervention. Not on anticoagulation due to GI bleed. Bed bound at home due to dementia. She is at increased risk for herniation but I would not recommend neurosurgical intervention due to advanced dementia and poor baseline/prognosis. Hospice would be recommended.     Attending Note:  I have rounded on this patient with [place] YOUNG. I have reviewed the chart and we have discussed this case in detail. The patient was seen and examined by myself. Pertinent labs and imaging have been personally reviewed.   Our findings and impressions were discussed with the patient. I concur with the Nurse Practioner's assessment and plan.    Linn Larson,

## 2025-06-02 NOTE — ED NOTES
Medication History  [facility]    Patient Name: Cary Rogers 3/12/1935     Medication history has been completed by: Lupis Ahmadi CPhT    Source(s) of information: insurance claims and retail pharmacy     Primary Care Physician: Dewayne Jernigan PA     Pharmacy: [facility]    Allergies as of 06/02/2025 - Fully Reviewed 06/02/2025   Allergen Reaction Noted    Amitriptyline  05/08/2019    Aspirin  11/10/2014    Codeine  11/10/2014    Elavil [amitriptyline hcl]  05/08/2019    Hydroxychloroquine  05/08/2019    Ibuprofen  11/10/2014    Namenda [memantine]  10/19/2022    Plaquenil [hydroxychloroquine sulfate]  05/08/2019    Prevnar 13 [pneumococcal 13-naun conj vacc]  11/23/2021    Theophyllines  05/08/2019        Prior to Admission medications    Medication Sig Start Date End Date Taking? Authorizing Provider   cephALEXin (KEFLEX) 500 MG capsule Take 1 capsule by mouth 4 times daily for 7 days 5/30/25 6/6/25 Yes Johnny Everett MD   zinc oxide (DESITIN) 40 % ointment Apply topically as needed. 5/30/25   Johnny Everett MD   Incontinence Supply Disposable (UNDERPADS REGULAR) MISC 1 each by Does not apply route as needed (incontinence) 3/27/25   Dewayne Jernigan PA   Incontinence Supplies MISC 1 each by Does not apply route as needed (incontinence) 1/8/25   Dewayne Jernigan PA   amLODIPine (NORVASC) 2.5 MG tablet Take 1 tablet by mouth daily 9/14/24   Eduard Carrillo MD   pantoprazole (PROTONIX) 40 MG tablet Take 1 tablet by mouth 2 times daily (before meals) 9/14/24   Eduard Carrillo MD   clopidogrel (PLAVIX) 75 MG tablet Take 1 tablet by mouth daily 9/14/24   Eduard Carrillo MD   metoprolol succinate (TOPROL XL) 25 MG extended release tablet Take 1 tablet by mouth every evening 10/19/22   FABIEN Strickland MD   levothyroxine (SYNTHROID) 50 MCG tablet Take 1 tablet by mouth every morning (before breakfast) TAKE ONE TABLET BY MOUTH DAILY 10/7/22 4/11/24  Timothy Smith MD

## 2025-06-02 NOTE — PROGRESS NOTES
[facility]  DEPARTMENT OF SPEECH/LANGUAGE PATHOLOGY  ATTEMPT NOTE  Cary Rogers  6/2/2025  [id number]     SLP attempted to see Cary Rogers for dysphagia evaluation. RN Diana consulted prior to visit. Oral care performed revealing thick mucus in posterior oral cavity. One ice chip attempted with weak cough response. Further trials deferred d/t reduced mentation and concern for reduced secretion management. SLP to re-attempt at a later date/time.     Mer Novak MA, CCC-SLP 6/2/2025

## 2025-06-02 NOTE — CONSULTS
Vascular/Interventional Neurology Service Consult Note  [facility]   Patient Name: Cary Rogers  : 3/12/1935        Subjective:   Reason for consult:   Cary Rogers is a 89-year-old female with a past medical history of vascular dementia, frequent falls, GI bleed, A-fib not on anticoagulation, pacemaker, GERD, HTN, HLD, CAD presenting to [facility] acute left-sided weakness.  Information obtained by patient's granddaughter Margot.  The patient's LKW midnight when she gave her medications.  When she went into give her breakfast today at 10:55 AM she was unable to speak or move her left side.  Per the patient's granddaughter the patient lives with them and she has a history of dementia, she is bedridden however able to get into the shower with assistance.  NIHSS 19.  Not a candidate for TNK due to presenting outside the window.  CT head nonacute.  Viz. AI alerted LVO.  CTA personally reviewed with Dr. Kong with right M1 occlusion.  Patient is not a candidate for thrombectomy given advanced age, dementia, arch tortuosity with prestroke mRS 4.  This was discussed with the patient's granddaughter at bedside.  She verbalized understanding.       Past Medical History:   Diagnosis Date    A-fib ([facility])     Arthritis     CAD (coronary artery disease)     Eye abnormality     Left Eye - Hx torn Retina and Cyst; Partial Blindness in Left Eye    Fibromyalgia     GERD (gastroesophageal reflux disease)     Hx of Doppler echocardiogram 2020    EF 45%     MI (myocardial infarction) ([facility])     No Chest Pains - MI with collapse and pacemaker insertion.    Thyroid disease     Ulcer in the past    Gastric    :   Past Surgical History:   Procedure Laterality Date    APPENDECTOMY      BLADDER SURGERY      CHOLECYSTECTOMY      COLONOSCOPY  14    normal, biopsy    COLONOSCOPY N/A 2024    COLONOSCOPY DIAGNOSTIC performed by Navid Dolan MD at [facility] ENDOSCOPY    EYE SURGERY       intracranial findings.  2.  Chronic small vessel ischemic changes    Dictated and Electronically Signed By:   Tammie Beckford MD   [facility]   6/2/2025 12:16     CTA head and neck    Images personally reviewed with Dr. Kong.  Right M1 occlusion      ASSESSMENT/PLAN:     89-year-old female presenting with left facial droop, slurred speech, and left sided weakness concerning for right MCA territory ischemic stroke.  LKW midnight.  CTA head and neck shows right M1 occlusion.     -NIHSS 19  -Discussed and reviewed with Dr. Kong.  The patient is not a candidate for thrombectomy given advanced age, dementia, arch tortuosity with prestroke mRS 4.  -Not a candidate for TNK due to presenting outside the window  -Continue Plavix and statin therapy for secondary prevention of stroke.  -History A-fib, not on anticoagulation due to history of GI bleed and fall risk.  -PT/OT/ST as able    Discussed with Dr. Medel, ED physician    Pertinent images discussed/reviewed with Dr. Kong who has fully participated in the care of this patient and agrees with plan.      Thank you for allowing us to participate in the care of your patient.  If there are any questions regarding evaluation please feel free to contact us.     TYLER Mccarty - CNP, 6/2/2025

## 2025-06-02 NOTE — ED NOTES
ED TO INPATIENT SBAR HANDOFF    Patient Name: Cary Rogers   :  3/12/1935  89 y.o.   Preferred Name  Cary   Family/Caregiver Present no   Restraints no   C-SSRS: Risk of Suicide: No Risk  Sitter no   Sepsis Risk Score Sepsis V2 Risk Score: 35.3      Situation  Chief Complaint   Patient presents with    Extremity Weakness     Left arm weakness    Facial Droop     Brief Description of Patient's Condition: Patient presented to ED with complaints of extremity weakness and facial droop. Patient has dementia and is incontinent at baseline and does not get out of bed much per her granddaughter.   Mental Status: oriented  Arrived from: home    Imaging:   XR CHEST PORTABLE   Final Result      CTA HEAD NECK W CONTRAST   Final Result      CT HEAD WO CONTRAST   Final Result        Abnormal labs:   Abnormal Labs Reviewed   CBC WITH AUTO DIFFERENTIAL - Abnormal; Notable for the following components:       Result Value    MCHC 31.8 (*)     RDW 15.8 (*)     Neutrophils % 72 (*)     Lymphocytes % 19 (*)     Monocytes % 8 (*)     All other components within normal limits   COMPREHENSIVE METABOLIC PANEL - Abnormal; Notable for the following components:    Glucose 111 (*)     BUN 31 (*)     Est, Glom Filt Rate 50 (*)     Total Protein 6.2 (*)     All other components within normal limits   TROPONIN - Abnormal; Notable for the following components:    Troponin, High Sensitivity 61 (*)     All other components within normal limits   POCT GLUCOSE - Abnormal; Notable for the following components:    POC Glucose 118 (*)     All other components within normal limits        Background  History:   Past Medical History:   Diagnosis Date    A-fib ([facility])     Arthritis     CAD (coronary artery disease)     Eye abnormality     Left Eye - Hx torn Retina and Cyst; Partial Blindness in Left Eye    Fibromyalgia     GERD (gastroesophageal reflux disease)     Hx of Doppler echocardiogram 2020    EF 45%     MI (myocardial infarction) ([facility])

## 2025-06-02 NOTE — PROGRESS NOTES
4 Eyes Skin Assessment     NAME:  Cary Rogers  YOB: 1935  MEDICAL RECORD NUMBER:  [id number]    The patient is being assessed for  Admission    I agree that at least one RN has performed a thorough Head to Toe Skin Assessment on the patient. ALL assessment sites listed below have been assessed.      Areas assessed by both nurses:    Head, Face, Ears, Shoulders, Back, Chest, Arms, Elbows, Hands, Sacrum. Buttock, Coccyx, Ischium, Legs. Feet and Heels, and Under Medical Devices         Does the Patient have a Wound? Yes wound(s) were present on assessment. LDA wound assessment was Initiated and completed by RN       Erlin Prevention initiated by RN: Yes  Wound Care Orders initiated by RN: Yes    Pressure Injury (Stage 3,4, Unstageable, DTI, NWPT, and Complex wounds) if present, place Wound referral order by RN under : No    New Ostomies, if present place, Ostomy referral order under : No     Nurse 1 eSignature: Electronically signed by Leisa Groves RN on 6/2/25 at 4:25 PM EDT    **SHARE this note so that the co-signing nurse can place an eSignature**    Nurse 2 eSignature: Electronically signed by Diana Gutierrez LPN on 6/2/25 at 6:09 PM EDT

## 2025-06-02 NOTE — H&P
History and Physical 25        NAME: Cary Rogers  : 3/12/1935  MRN: [id number]      Assessment/Plan:  Cary Rogers is a 89 y.o. female with a history of vascular dementia, chronic A-fib, depression, type 2 diabetes, stage III CKD who presented to [facility] 2025 with left-sided facial droop left-sided paresis new onset stroke    Acute right vertebral artery M2 segment occlusion  Leading to left-sided stroke with left facial droop  Plavix statin started  Neurology consulted  PT OT consulted  Speech and swallow eval  2D echo ordered  Permissive hypertension    Chronic medical conditions include:  Paroxysmal atrial fibrillation on Toprol not on anticoagulation due to recurrent blood loss anemia  Hypothyroidism on Synthroid  Advanced dementia with behavioral changes  CHB s/p BiV PPM in 2022   Moderate protein calorie malnutrition    DVT Prophylaxis: Enoxaparin  Code Status/Surrogate Decision Maker: Full code      Current living situation: Home with grandson  Expected Disposition: TBD  Estimated discharge date: TBD      Chief Complaint:    New onset stroke    History of Present Illness:    89-year-old female with known history of vascular dementia frequent falls GI bleed A-fib not on anticoagulation pacemaker GERD hypertension hyperlipidemia CAD presented to the hospital with left-sided weakness and left facial droop noticed by granddaughter upon wakening.  Last well-known was midnight upon presentation to the emergency room patient had an NIH score of 19 CT head done did not show any acute changes CTA head and neck done that showed occlusion of the M2 segment of the right vertebral artery with robust reconstitution no other CTA findings of the large vessel occlusion seen neuro IR consulted patient not a TNK candidate due to being out of the window after discussion between neuro IR and family no intervention was decided.  Patient has been started on statin and Plavix Home meds resumed permissive  09:18 PM    RBCUA 20 08/28/2024 01:55 AM    MUCUS RARE 05/30/2025 09:18 PM    TRICHOMONAS NONE SEEN 08/28/2024 01:55 AM    YEAST OCCASIONAL 08/28/2024 01:55 AM    BACTERIA FEW 08/28/2024 01:55 AM    CLARITYU CLEAR 09/06/2024 04:24 AM    SPECGRAV 1.010 10/10/2023 04:25 PM    LEUKOCYTESUR MODERATE 05/30/2025 09:18 PM    UROBILINOGEN 4.0 05/30/2025 09:18 PM    BILIRUBINUR NEGATIVE 05/30/2025 09:18 PM    BLOODU NEGATIVE 09/06/2024 04:24 AM    GLUCOSEU NEGATIVE 05/30/2025 09:18 PM    KETUA TRACE 05/30/2025 09:18 PM     Urine Cultures:   Lab Results   Component Value Date/Time    LABURIN  06/04/2019 03:55 PM     <10,000 CFU/ml mixed skin/urogenital ange. No further workup    LABURIN  06/04/2019 03:55 PM     25,000 CFU/ml  Susceptibility testing of penicillin and other beta-lactams is  not necessary for beta hemolytic Streptococci since resistant  strains have not been identified. (CLSI M100)       Blood Cultures: No results found for: \"BC\"  No results found for: \"BLOODCULT2\"  Organism:   Lab Results   Component Value Date/Time    ORG BHS Group B (Strep agalacticae) 06/04/2019 03:55 PM             Electronically signed by Isabel Proctor MD on 6/2/2025 at 1:31 PM

## 2025-06-03 ENCOUNTER — APPOINTMENT (OUTPATIENT)
Dept: NON INVASIVE DIAGNOSTICS | Age: 89
DRG: 304 | End: 2025-06-03
Attending: STUDENT IN AN ORGANIZED HEALTH CARE EDUCATION/TRAINING PROGRAM
Payer: MEDICARE

## 2025-06-03 PROBLEM — I63.9 ACUTE ISCHEMIC STROKE (HCC): Status: ACTIVE | Noted: 2025-06-03

## 2025-06-03 LAB
ANION GAP SERPL CALCULATED.3IONS-SCNC: 12 MMOL/L (ref 9–17)
BUN SERPL-MCNC: 25 MG/DL (ref 7–20)
CALCIUM SERPL-MCNC: 9 MG/DL (ref 8.3–10.6)
CHLORIDE SERPL-SCNC: 100 MMOL/L (ref 99–110)
CHOLEST SERPL-MCNC: 148 MG/DL (ref 125–199)
CO2 SERPL-SCNC: 24 MMOL/L (ref 21–32)
CREAT SERPL-MCNC: 0.9 MG/DL (ref 0.6–1.2)
ECHO AO ROOT DIAM: 3.3 CM
ECHO AO ROOT INDEX: 1.9 CM/M2
ECHO AR MAX VEL PISA: 6.7 M/S
ECHO AV AREA PEAK VELOCITY: 1.3 CM2
ECHO AV AREA VTI: 1.5 CM2
ECHO AV AREA/BSA PEAK VELOCITY: 0.7 CM2/M2
ECHO AV AREA/BSA VTI: 0.9 CM2/M2
ECHO AV MEAN GRADIENT: 13 MMHG
ECHO AV MEAN VELOCITY: 1.7 M/S
ECHO AV PEAK GRADIENT: 21 MMHG
ECHO AV PEAK VELOCITY: 2.3 M/S
ECHO AV REGURGITANT PHT: 553 MS
ECHO AV VELOCITY RATIO: 0.39
ECHO AV VTI: 39 CM
ECHO BSA: 1.73 M2
ECHO EST RA PRESSURE: 3 MMHG
ECHO IVC PROX: 1.6 CM
ECHO LA AREA 4C: 21.5 CM2
ECHO LA DIAMETER INDEX: 1.67 CM/M2
ECHO LA DIAMETER: 2.9 CM
ECHO LA MAJOR AXIS: 6.8 CM
ECHO LA TO AORTIC ROOT RATIO: 0.88
ECHO LA VOL MOD A4C: 45 ML (ref 22–52)
ECHO LA VOLUME INDEX MOD A4C: 26 ML/M2 (ref 16–34)
ECHO LV E' LATERAL VELOCITY: 6.1 CM/S
ECHO LV E' SEPTAL VELOCITY: 5.2 CM/S
ECHO LV EDV A4C: 52 ML
ECHO LV EDV INDEX A4C: 30 ML/M2
ECHO LV EF PHYSICIAN: 60 %
ECHO LV EJECTION FRACTION A4C: 65 %
ECHO LV ESV A4C: 18 ML
ECHO LV ESV INDEX A4C: 10 ML/M2
ECHO LV FRACTIONAL SHORTENING: 36 % (ref 28–44)
ECHO LV INTERNAL DIMENSION DIASTOLE INDEX: 2.53 CM/M2
ECHO LV INTERNAL DIMENSION DIASTOLIC: 4.4 CM (ref 3.9–5.3)
ECHO LV INTERNAL DIMENSION SYSTOLIC INDEX: 1.61 CM/M2
ECHO LV INTERNAL DIMENSION SYSTOLIC: 2.8 CM
ECHO LV IVSD: 1.1 CM (ref 0.6–0.9)
ECHO LV MASS 2D: 227.5 G (ref 67–162)
ECHO LV MASS INDEX 2D: 130.8 G/M2 (ref 43–95)
ECHO LV POSTERIOR WALL DIASTOLIC: 1.6 CM (ref 0.6–0.9)
ECHO LV RELATIVE WALL THICKNESS RATIO: 0.73
ECHO LVOT AREA: 3.5 CM2
ECHO LVOT AV VTI INDEX: 0.44
ECHO LVOT DIAM: 2.1 CM
ECHO LVOT MEAN GRADIENT: 2 MMHG
ECHO LVOT PEAK GRADIENT: 3 MMHG
ECHO LVOT PEAK VELOCITY: 0.9 M/S
ECHO LVOT STROKE VOLUME INDEX: 34.2 ML/M2
ECHO LVOT SV: 59.5 ML
ECHO LVOT VTI: 17.2 CM
ECHO MV A VELOCITY: 0.25 M/S
ECHO MV E VELOCITY: 0.85 M/S
ECHO MV E/A RATIO: 3.4
ECHO MV E/E' LATERAL: 13.93
ECHO MV E/E' RATIO (AVERAGED): 15.14
ECHO MV E/E' SEPTAL: 16.35
ECHO RIGHT VENTRICULAR SYSTOLIC PRESSURE (RVSP): 27 MMHG
ECHO RV MID DIMENSION: 2 CM
ECHO TV REGURGITANT MAX VELOCITY: 2.44 M/S
ECHO TV REGURGITANT PEAK GRADIENT: 24 MMHG
ERYTHROCYTE [DISTWIDTH] IN BLOOD BY AUTOMATED COUNT: 15.8 % (ref 11.7–14.9)
EST. AVERAGE GLUCOSE BLD GHB EST-MCNC: 93 MG/DL
GFR, ESTIMATED: 58 ML/MIN/1.73M2
GLUCOSE SERPL-MCNC: 94 MG/DL (ref 74–99)
HBA1C MFR BLD: 4.9 % (ref 4.2–6.3)
HCT VFR BLD AUTO: 43 % (ref 37–47)
HDLC SERPL-MCNC: 50 MG/DL
HGB BLD-MCNC: 13.8 G/DL (ref 12.5–16)
LDLC SERPL CALC-MCNC: 86 MG/DL
MCH RBC QN AUTO: 29 PG (ref 27–31)
MCHC RBC AUTO-ENTMCNC: 32.1 G/DL (ref 32–36)
MCV RBC AUTO: 90.3 FL (ref 78–100)
PLATELET # BLD AUTO: 210 K/UL (ref 140–440)
PMV BLD AUTO: 10.4 FL (ref 7.5–11.1)
POTASSIUM SERPL-SCNC: 3.8 MMOL/L (ref 3.5–5.1)
RBC # BLD AUTO: 4.76 M/UL (ref 4.2–5.4)
SODIUM SERPL-SCNC: 136 MMOL/L (ref 136–145)
TRIGL SERPL-MCNC: 57 MG/DL
WBC OTHER # BLD: 7.4 K/UL (ref 4–10.5)

## 2025-06-03 PROCEDURE — 93306 TTE W/DOPPLER COMPLETE: CPT | Performed by: INTERNAL MEDICINE

## 2025-06-03 PROCEDURE — 80061 LIPID PANEL: CPT

## 2025-06-03 PROCEDURE — G0378 HOSPITAL OBSERVATION PER HR: HCPCS

## 2025-06-03 PROCEDURE — 2500000003 HC RX 250 WO HCPCS: Performed by: STUDENT IN AN ORGANIZED HEALTH CARE EDUCATION/TRAINING PROGRAM

## 2025-06-03 PROCEDURE — 83036 HEMOGLOBIN GLYCOSYLATED A1C: CPT

## 2025-06-03 PROCEDURE — 97530 THERAPEUTIC ACTIVITIES: CPT

## 2025-06-03 PROCEDURE — 99232 SBSQ HOSP IP/OBS MODERATE 35: CPT | Performed by: NURSE PRACTITIONER

## 2025-06-03 PROCEDURE — 85027 COMPLETE CBC AUTOMATED: CPT

## 2025-06-03 PROCEDURE — 97166 OT EVAL MOD COMPLEX 45 MIN: CPT

## 2025-06-03 PROCEDURE — 6360000002 HC RX W HCPCS

## 2025-06-03 PROCEDURE — 6360000002 HC RX W HCPCS: Performed by: STUDENT IN AN ORGANIZED HEALTH CARE EDUCATION/TRAINING PROGRAM

## 2025-06-03 PROCEDURE — 99231 SBSQ HOSP IP/OBS SF/LOW 25: CPT | Performed by: NURSE PRACTITIONER

## 2025-06-03 PROCEDURE — 96374 THER/PROPH/DIAG INJ IV PUSH: CPT

## 2025-06-03 PROCEDURE — 94761 N-INVAS EAR/PLS OXIMETRY MLT: CPT

## 2025-06-03 PROCEDURE — 93306 TTE W/DOPPLER COMPLETE: CPT

## 2025-06-03 PROCEDURE — 92610 EVALUATE SWALLOWING FUNCTION: CPT

## 2025-06-03 PROCEDURE — 97112 NEUROMUSCULAR REEDUCATION: CPT

## 2025-06-03 PROCEDURE — 36415 COLL VENOUS BLD VENIPUNCTURE: CPT

## 2025-06-03 PROCEDURE — 80048 BASIC METABOLIC PNL TOTAL CA: CPT

## 2025-06-03 PROCEDURE — 97162 PT EVAL MOD COMPLEX 30 MIN: CPT

## 2025-06-03 PROCEDURE — 96372 THER/PROPH/DIAG INJ SC/IM: CPT

## 2025-06-03 RX ORDER — HYDRALAZINE HYDROCHLORIDE 20 MG/ML
5 INJECTION INTRAMUSCULAR; INTRAVENOUS EVERY 6 HOURS PRN
Status: DISCONTINUED | OUTPATIENT
Start: 2025-06-03 | End: 2025-06-04

## 2025-06-03 RX ADMIN — HYDRALAZINE HYDROCHLORIDE 5 MG: 20 INJECTION INTRAMUSCULAR; INTRAVENOUS at 17:49

## 2025-06-03 RX ADMIN — SODIUM CHLORIDE, PRESERVATIVE FREE 10 ML: 5 INJECTION INTRAVENOUS at 20:09

## 2025-06-03 RX ADMIN — SODIUM CHLORIDE, PRESERVATIVE FREE 10 ML: 5 INJECTION INTRAVENOUS at 10:25

## 2025-06-03 RX ADMIN — ENOXAPARIN SODIUM 40 MG: 100 INJECTION SUBCUTANEOUS at 10:25

## 2025-06-03 ASSESSMENT — PAIN SCALES - WONG BAKER
WONGBAKER_NUMERICALRESPONSE: NO HURT
WONGBAKER_NUMERICALRESPONSE: NO HURT

## 2025-06-03 NOTE — PROGRESS NOTES
Comprehensive Nutrition Assessment    Type and Reason for Visit:  Initial, Positive nutrition screen, Wound    Nutrition Recommendations/Plan:   Resume diet when appropriate as per speech therapy  Offer oral nutrition supplement when able to tolerate diet   Will continue to follow up during stay      Malnutrition Assessment:  Malnutrition Status:  At risk for malnutrition (06/03/25 0947)    Context:  Social/Environmental Circumstances       Nutrition Assessment:    Admit with weakness, eval for CVA. Currently aphasic, mostly bedbound prior to admit, hx vascular dementia. Followed by wound care for stage 1 and 2 pressure injuries. NPO at this time with swallow eval pending. Will follow at high nutrition risk with concern for adequate intake.    Nutrition Related Findings:    resting in bed, reaching towards window, aphasic LITA in room      hx DM, dementia, Afib, CAD       neuro recommending consideration for hospice Wound Type: Pressure Injury, Stage I, Stage II (sacrum, butt)       Current Nutrition Intake & Therapies:    Average Meal Intake: NPO  Average Supplements Intake: NPO      Anthropometric Measures:  Height: 170.2 cm (5' 7\")  Ideal Body Weight (IBW): 135 lbs (61 kg)    Admission Body Weight: 63.5 kg (139 lb 15.9 oz)  Current Body Weight: 63.5 kg (139 lb 15.9 oz), 103.7 % IBW. Weight Source: Stated  Current BMI (kg/m2): 21.9  Usual Body Weight: 65 kg (143 lb 4.8 oz) (9/13/24,  140# 2/26/25)     % Weight Change (Calculated): -2.3  Weight Adjustment For: No Adjustment                 BMI Categories: Underweight (BMI less than 22) age over 65    Estimated Daily Nutrient Needs:  Energy Requirements Based On: Kcal/kg  Weight Used for Energy Requirements: Current  Energy (kcal/day): 7503-4669 (25-30 bessie/kg)  Weight Used for Protein Requirements: Current  Protein (g/day): 64-76 (1-1.2 g/kg)  Method Used for Fluid Requirements: 1 ml/kcal  Fluid (ml/day): 7607-1076    Nutrition Diagnosis:   Inadequate oral intake  related to cognitive or neurological impairment, swallowing difficulty as evidenced by NPO or clear liquid status due to medical condition    Nutrition Interventions:   Food and/or Nutrient Delivery: Continue NPO (resume diet when/if appropriate per SLP)  Nutrition Education/Counseling: Education/Counseling not appropriate  Coordination of Nutrition Care: Continue to monitor while inpatient, Speech Therapy  Plan of Care discussed with: n/a    Goals:  Goals: Initiation of nutrition, within 2 days  Type of Goal: New goal  Previous Goal Met: New Goal    Nutrition Monitoring and Evaluation:   Behavioral-Environmental Outcomes: None Identified  Food/Nutrient Intake Outcomes: Diet Advancement/Tolerance  Physical Signs/Symptoms Outcomes: Biochemical Data, Chewing or Swallowing, Skin, Weight, Nutrition Focused Physical Findings, Meal Time Behavior    Discharge Planning:    Too soon to determine     Mary Grace Gant RD, LD  Contact: [phone]

## 2025-06-03 NOTE — CONSULTS
[facility] ACUTE CARE PHYSICAL THERAPY EVALUATION  Cary Rogers, 3/12/1935, 4128/4128-A, 6/3/2025    History  [place]:  The primary encounter diagnosis was Acute ischemic stroke (HCC). A diagnosis of Cerebrovascular accident (CVA) due to other mechanism (HCC) was also pertinent to this visit.  Patient  has a past medical history of A-fib (HCC), Arthritis, CAD (coronary artery disease), Eye abnormality, Fibromyalgia, GERD (gastroesophageal reflux disease), Hx of Doppler echocardiogram, MI (myocardial infarction) (HCC), Thyroid disease, and Ulcer.  Patient  has a past surgical history that includes Cholecystectomy; Bladder surgery; Eye surgery; Hysterectomy; Appendectomy; Ventricular cardiac pacer insertion (2011); Colonoscopy (11/4/14); pacemaker placement (Left, 02/20/2020); Upper gastrointestinal endoscopy (N/A, 9/7/2024); and Colonoscopy (N/A, 9/12/2024).    Discharge Recommendation: Facility for moderate post-acute rehabilitation, anticipate 1-2 hours per day and 5 days per week.     Equipment: TBD at next level of care    Subjective:    Patient states:  pt mostly nods yes/no      Pain:  shakes head yes to pain and points to neck when asked where.      Communication with other providers:  Handoff to RN, OT    Restrictions: general precautions, fall risk    Home Setup/Prior level of function  Unable to obtain, per chart review lives with granddaughter and mostly is bedbound    Examination of body systems (includes body structures/functions, activity/participation limitations):  Observation:  pt supine in bed upon arrival and agreeable to therapy  Vision:  R Eye impaired  Hearing:  WFL  Cardiopulmonary:  no O2 needs  Cognition: oriented to name, see OT/SLP note for further evaluation.    Musculoskeletal  ROM R/L:  WFL.    Strength R LE 0/5, L LE: 2-/5; difficult to formally test as pt with intermittent command following; significant impairment in function and endurance.    Neuro:  hx dementia; L UE with  decreased strength, R LE with decreased strength, balance impaired, speech impaired, coordination impaired      Mobility:  Rolling L/R:  mod A  Supine <-> sit:  mod A x2 with assist at bilateral LEs, hips, and trunk. Cues provided for sequencing  Transfers: deferred due to safety concerns  Sitting balance:  poor, pt sat EOB max A with retropulsion ~10 minutes.    Standing balance:  NT.    Gait: NT    [facility] 6 Clicks Inpatient Mobility:  AM-PAC Inpatient Mobility Raw Score : 8    Safety: patient left supine in bed with alarm on, call light within reach, RN notified, gait belt used.    Assessment:  Pt is a 89 y.o. female admitted to the hospital for vertebral artery occlusion. CT negative for acute findings and pt unable to have MRI performed. Pt's PLOF is unsure as pt is a poor historian and no family present. Pt is currently performing bed mobility mod A x2, sitting balance max A, and unable to transfer or ambulate this date. Pt is presenting with decreased endurance, impaired transfers, impaired balance, impaired gait, impaired cognition, impaired strength. Pt would benefit from continued acute care PT as well as Facility for moderate post-acute rehabilitation, anticipate 1-2 hours per day and 5 days per week.     Complexity: moderate    Prognosis: Good, no significant barriers to participation at this time.     General Plan: 3-5 times per week         Goals:  Short Term Goals  Time Frame for Short Term Goals: 1 week  Short Term Goal 1: pt to complete all bed mobility mod A  Short Term Goal 2: pt to sit EOB 10 minutes min A  Short Term Goal 3: pt to complete stand pivot transfer max A       Treatment plan:  Bed mobility, transfers, balance, gait, TA, TX    Recommendations for NURSING mobility: Marco A    Time:   Time in: 1205  Time out: 1223  Timed treatment minutes: 8  Total time: 18    Electronically signed by:    Nat Jasso, PT  6/3/2025, 2:39 PM

## 2025-06-03 NOTE — PROGRESS NOTES
V2.0  [facility] Hospitalist Progress Note      Name:  Cary Rogers /Age/Sex: 3/12/1935  (89 y.o. female)   MRN & CSN:  [id number] & [id number] Encounter Date/Time: 6/3/2025 9:26 AM EDT    Location:  [address] [place] [address] PCP: Dewayne Jernigan PA       Hospital Day: 2    Assessment and Plan:   Cary Rogers is a 89 y.o. female with pmh as noted below who presents with Vertebral artery occlusion, right      Plan:  Acute right vertebral artery M2 segment occlusion  Leading to left-sided stroke with left facial droop  Plavix statin started  Neurology consulted, noted MRI was not ordered, however regardless patient is aphasic at time of exam, would not be able to verbalize if there is pain at the pacer site, so likely would not be able to undergo MRI.  Neurointervention is following for LVO, not a candidate for thrombectomy.  Recommend continued Plavix, statin for secondary stroke prevention.  Recommending SBP goal 140-160.  PT OT consulted  Speech and swallow eval  2D echo, completed, results pending  -- Will discuss with family regarding goals of care, and possible hospice consultation.  According to record review, on admission, patient family did discuss with admitting provider, and made patient DNR CC CODE STATUS     Chronic medical conditions include:    Paroxysmal atrial fibrillation on Toprol not on anticoagulation due to recurrent blood loss anemia    Hypothyroidism on Synthroid    Advanced dementia with behavioral changes  CHB s/p BiV PPM in 2022     Moderate protein calorie malnutrition    Diet Diet NPO   DVT Prophylaxis [] Lovenox, []  Heparin, [] SCDs, [] Ambulation,  [] Eliquis, [] Xarelto  [] Coumadin   Code Status DNR-CC   Disposition From: Home  Expected Disposition: TBD  Estimated Date of Discharge: TBD  Patient requires continued admission due to hypertension, DC planning   Surrogate Decision Maker/ GORGE Grissom     Subjective:     Chief Complaint: Vertebral artery occlusion, right     Cary Rogers  chloride       PRN Meds: sodium chloride flush, 5-40 mL, PRN  sodium chloride, , PRN  ondansetron, 4 mg, Q8H PRN   Or  ondansetron, 4 mg, Q6H PRN  polyethylene glycol, 17 g, Daily PRN        Labs      Recent Results (from the past 24 hours)   POCT Glucose    Collection Time: 06/02/25 11:46 AM   Result Value Ref Range    POC Glucose 118 (H) 74 - 99 mg/dL   CBC with Auto Differential    Collection Time: 06/02/25 11:50 AM   Result Value Ref Range    WBC 6.9 4.0 - 10.5 k/uL    RBC 4.67 4.20 - 5.40 m/uL    Hemoglobin 13.3 12.5 - 16.0 g/dL    Hematocrit 41.8 37.0 - 47.0 %    MCV 89.5 78.0 - 100.0 fL    MCH 28.5 27.0 - 31.0 pg    MCHC 31.8 (L) 32.0 - 36.0 g/dL    RDW 15.8 (H) 11.7 - 14.9 %    Platelets 221 140 - 440 k/uL    MPV 10.0 7.5 - 11.1 fL    Neutrophils % 72 (H) 36 - 66 %    Lymphocytes % 19 (L) 24 - 44 %    Monocytes % 8 (H) 0 - 5 %    Eosinophils % 0 0 - 3 %    Basophils % 1 0 - 1 %    Immature Granulocytes % 0 0 %    Neutrophils Absolute 4.96 k/uL    Lymphocytes Absolute 1.27 k/uL    Monocytes Absolute 0.57 k/uL    Eosinophils Absolute 0.00 k/uL    Basophils Absolute 0.05 k/uL    Immature Granulocytes Absolute 0.01 k/uL   Comprehensive Metabolic Panel    Collection Time: 06/02/25 11:50 AM   Result Value Ref Range    Sodium 139 136 - 145 mmol/L    Potassium 4.2 3.5 - 5.1 mmol/L    Chloride 103 99 - 110 mmol/L    CO2 24 21 - 32 mmol/L    Anion Gap 13 9 - 17 mmol/L    Glucose 111 (H) 74 - 99 mg/dL    BUN 31 (H) 7 - 20 mg/dL    Creatinine 1.0 0.6 - 1.2 mg/dL    Est, Glom Filt Rate 50 (L) >60 mL/min/1.73m2    Calcium 9.0 8.3 - 10.6 mg/dL    Total Protein 6.2 (L) 6.4 - 8.2 g/dL    Albumin 3.9 3.4 - 5.0 g/dL    Albumin/Globulin Ratio 1.7 1.1 - 2.2    Total Bilirubin 0.6 0.0 - 1.0 mg/dL    Alkaline Phosphatase 117 40 - 129 U/L    ALT 21 10 - 40 U/L    AST 24 15 - 37 U/L   Troponin Now and Q 1 Hour    Collection Time: 06/02/25 11:50 AM   Result Value Ref Range    Troponin, High Sensitivity 61 (HH) 0 - 14 ng/L

## 2025-06-03 NOTE — CARE COORDINATION
06/03/25 1003   Service Assessment   Patient Orientation Unresponsive  (Patient has eyes closed- picking at the air.)   Cognition Dementia / Early Alzheimer's   History Provided By Medical Record   Primary Caregiver Family  (Granddaughter Margot)   Support Systems Family Members   Patient's Healthcare Decision Maker is: Legal Next of Kin   PCP Verified by CM Yes   Prior Functional Level Assistance with the following:;Bathing;Dressing;Toileting;Feeding;Cooking;Housework;Shopping;Mobility  (Completely dependent)   Current Functional Level Assistance with the following:;Bathing;Dressing;Toileting;Feeding;Mobility   Can patient return to prior living arrangement Unknown at present   Ability to make needs known: Unable   Family able to assist with home care needs: Other (comment)  (No family present)   Would you like for me to discuss the discharge plan with any other family members/significant others, and if so, who? Yes  (Necessary)   Financial Resources Medicare; (VA)  ()   Social/Functional History   Lives With Other (Comment)  (Granddaughter)   Type of Home House   Home Equipment Hospital bed     Met with patient for discharge planning. No family present. Patient has eyes closed- picking at the air. She does not respond to voice. Attempted to contact granddayadiel Frost (who she lives with); no capacity for VM at this time. Lupis Huynh, RN     0109 No family present. Lupis Huynh RN

## 2025-06-03 NOTE — CONSULTS
[facility] ACUTE CARE OCCUPATIONAL THERAPY EVALUATION  Cary Rogers, 3/12/1935, 4128/4128-A, 6/3/2025    Discharge Recommendation: Facility for moderate post-acute rehabilitation, anticipate 1-2 hours per day and 5 days per week.    History  [place]:  The primary encounter diagnosis was Acute ischemic stroke (HCC). A diagnosis of Cerebrovascular accident (CVA) due to other mechanism (HCC) was also pertinent to this visit.  Patient  has a past medical history of A-fib (HCC), Arthritis, CAD (coronary artery disease), Eye abnormality, Fibromyalgia, GERD (gastroesophageal reflux disease), Hx of Doppler echocardiogram, MI (myocardial infarction) (HCC), Thyroid disease, and Ulcer.  Patient  has a past surgical history that includes Cholecystectomy; Bladder surgery; Eye surgery; Hysterectomy; Appendectomy; Ventricular cardiac pacer insertion (2011); Colonoscopy (11/4/14); pacemaker placement (Left, 02/20/2020); Upper gastrointestinal endoscopy (N/A, 9/7/2024); and Colonoscopy (N/A, 9/12/2024).    Subjective:  Patient states:  pt non-verbal throughout session, would mouth words but not voice, able to communicate with head nods   Pain:  shakes head yes to pain and points to neck when asked where.    Communication: RN, CM, co-eval with PT Olamide for safety and activity tolerance   Restrictions: general precautions, fall risk    Home Setup/Prior level of function  Unable to obtain, per chart review lives with granddaughter and mostly is bed bound. Will reassess with family as able    Examination of body systems (includes body structures/functions, activity/participation limitations):  Observation:  Supine in bed upon arrival, agreeable to therapy with head nod  Vision:  R eye appears impaired   Hearing:  WFL  Cardiopulmonary:  On room air      Body Systems and functions:  ROM R/L:  L UE 0d AROM, L UE PROM WFL, R UE WFL  Strength R/L:  RUE 4+/5,  LUE 0/5, R  5/5, L  0/5  Sensation: appears impaired in L  options for orientation as pt was nonverbal during evaluation), attention impaired but attends to cues to redirect, delayed processing, decreased insight into deficits, and is able to follow one-step commands w/ significant repetition and increased time, Hx of dementia   Affect: Pleasant, non-verbal     Mobility:  Supine to sit:  Mod A x2 for balance safety, trunk control, LE maneuvering, and hip pivot, HOB elevated, Mod verbal cues for sequencing   Sit to supine: Mod A x2 for balance safety, trunk control, LE maneuvering, and hip pivot, HOB elevated, Mod verbal cues for sequencing   Sit to stand: DNT d/t safety concerns   Functional Mobility: DNT d/t safety concerns  Toilet Transfers: DNT d/t safety concerns    Balance:   Sitting balance: Pt sat EOB with Max A to maintain upright position d/t retropulsion, R hand on bed/rail for support and L hand placed in WB'ing, Mod verbal/tactile cues for anterior weight shift   Standing balance: DNT d/t safety concerns     Pt educated on role of therapy, POC, and d/c recommendations     Treatment:  Neuro-Muscular re-education:  Cues were given for position, posture, kinesthetic sense, safety, recruitment, and rationale.  Cues were verbal and/or tactile.      Safety: Patient left in bed with alarm on, call light within reach, RN notified, gait belt used.     Assessment:  Pt is a 89 y/o female admitted for The primary encounter diagnosis was Acute ischemic stroke (HCC). A diagnosis of Cerebrovascular accident (CVA) due to other mechanism (HCC) was also pertinent to this visit.. Pt at baseline is unclear at this time but per chart review pt is primarily bed bound and lives with granddaughter. Pt currently presents w/ deficits relating to ADLs, IADLs, UE strength/ROM, functional activity tolerance, cognition, safety awareness, and functional mobility. Pt would benefit from continued acute care OT services     Complexity: Moderate   Prognosis: fair, level of deconditioning may

## 2025-06-03 NOTE — PLAN OF CARE
Problem: Chronic Conditions and Co-morbidities  Goal: Patient's chronic conditions and co-morbidity symptoms are monitored and maintained or improved  Outcome: Progressing     Problem: Discharge Planning  Goal: Discharge to home or other facility with appropriate resources  Outcome: Progressing     Problem: Pain  Goal: Verbalizes/displays adequate comfort level or baseline comfort level  Outcome: Progressing     Problem: Safety - Adult  Goal: Free from fall injury  Outcome: Progressing     Problem: Skin/Tissue Integrity  Goal: Skin integrity remains intact  Description: 1.  Monitor for areas of redness and/or skin breakdown2.  Assess vascular access sites hourly3.  Every 4-6 hours minimum:  Change oxygen saturation probe site4.  Every 4-6 hours:  If on nasal continuous positive airway pressure, respiratory therapy assess nares and determine need for appliance change or resting period  Outcome: Progressing     Problem: ABCDS Injury Assessment  Goal: Absence of physical injury  Outcome: Progressing

## 2025-06-03 NOTE — PROGRESS NOTES
Neurology Service Progress Note  [facility]   Patient Name: Cary Rogers  : 3/12/1935        Subjective:   Reason for consult: Stroke  Chart was reviewed, patient was seen and examined. She is awake, remains non verbal. Left side remains flaccid. Patient is able to overcome right gaze preference for brief periods. No family at bedside.       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    Thyroid disease     Ulcer in the past    Gastric    :   Past Surgical History:   Procedure Laterality Date    APPENDECTOMY      BLADDER SURGERY      CHOLECYSTECTOMY      COLONOSCOPY  14    normal, biopsy    COLONOSCOPY N/A 2024    COLONOSCOPY DIAGNOSTIC performed by Navid Dolan MD at [facility] ENDOSCOPY    EYE SURGERY      HYSTERECTOMY (CERVIX STATUS UNKNOWN)      PACEMAKER PLACEMENT Left 2020    bivi pacer upgrade-Medtronic Percepta Quad CRT-P MRI SureScan     UPPER GASTROINTESTINAL ENDOSCOPY N/A 2024    ESOPHAGOGASTRODUODENOSCOPY DIAGNOSTIC ONLY performed by Navid Dolan MD at [facility] ENDOSCOPY    VENTRICULAR CARDIAC PACEMAKER INSERTION      Dr. JULI Shoemaker - following MI     Medications:  Scheduled Meds:   sodium chloride flush  5-40 mL IntraVENous 2 times per day    enoxaparin  40 mg SubCUTAneous Daily    atorvastatin  80 mg Oral Nightly    clopidogrel  75 mg Oral Daily    levothyroxine  50 mcg Oral QAM AC    pantoprazole  40 mg Oral BID AC    [Held by provider] metoprolol succinate  25 mg Oral QPM    [Held by provider] amLODIPine  2.5 mg Oral Daily     Continuous Infusions:   sodium chloride       PRN Meds:.hydrALAZINE, sodium  Panel [NHANES]     Frequency of Communication with Friends and Family: More than three times a week     Frequency of Social Gatherings with Friends and Family: More than three times a week     Attends Zoroastrianism Services: Never     Active Member of Clubs or Organizations: No     Attends Club or Organization Meetings: Never     Marital Status:    Intimate Partner Violence: Not on file   Housing Stability: Low Risk  (6/2/2025)    Housing Stability Vital Sign     Unable to Pay for Housing in the Last Year: No     Number of Times Moved in the Last Year: 0     Homeless in the Last Year: No      Family History   Problem Relation Age of Onset    Cancer Mother     Cancer Father     Heart Disease Father     Cancer Sister     Cancer Brother          ROS (10 systems)  Nonverbal, unable to answer questions    Physical Exam:       Wt Readings from Last 3 Encounters:   06/02/25 63.5 kg (140 lb)   02/26/25 63.5 kg (140 lb)   01/05/25 63.5 kg (140 lb)     Temp Readings from Last 3 Encounters:   06/03/25 97.9 °F (36.6 °C) (Oral)   05/31/25 97.9 °F (36.6 °C) (Oral)   03/13/25 98.4 °F (36.9 °C) (Oral)     BP Readings from Last 3 Encounters:   06/03/25 (!) 181/89   05/31/25 (!) 155/75   03/13/25 115/68     Pulse Readings from Last 3 Encounters:   06/03/25 60   05/31/25 60   03/13/25 76        Gen: Alert, does not follow commands, non verbal  HEENT: NC/AT, right gaze preference briefly able to cross midline, PERRL, mmm,  neck supple, no meningeal signs  Heart: V-paced  Lungs: Respirations even and unlabored  Ext: no edema, no calf tenderness b/l  Psych: Calm, cooperative   Skin: no rashes or lesions    NEUROLOGIC EXAM:    Mental Status: Alert.  Unable to evaluate speech as she is fully aphasic, does not follow    Cranial Nerve Exam:   CN II-XII: PERRL, blink to threat in left side of visual field but not right, no nystagmus, right gaze preference which she is able to overcome for brief periods today,  muscles of facial expression  Septum: No ASD present. No PFO present; Bubble study negative for right to left shunt.    Pericardium: No pericardial effusion.    All imaging was personally reviewed    ASSESSMENT/PLAN:   Exam remains essentially unchanged today. She can overcome right gaze for a short period of time. Remains aphasic and can not follow commands. Is moving right extremities. More prevalent left facial weakness today. No family at bedside.   Acute left-sided weakness with right gaze preference likely secondary to acute stroke superimposed on hypertensive emergency  Neuroimaging as above  CT head with no acute findings  CTA head and neck with occlusion of the M2 segment of the right vertebral artery  MRI brain not ordered, unclear if patient has MRI compatible pacemaker.  Regardless as patient is aphasic at this time and would likely not be able to undergo MRI she would not be able to verbalize pain at pacer site.  Neurointervention following for LVO, not a candidate for thrombectomy  Patient was not a candidate for TNK as she was out of the therapeutic window  Continue Plavix, atorvastatin 80 mg for secondary stroke prevention  Lipid panel pending  From neurology standpoint, do not recommend continued hypertension with SBP in the 200s.  Would recommend SBP goal 140-160  Echocardiogram as above - non contributory  PT/OT/ST as recommended  Feel patient would benefit from hospice care at this time  Will continue to follow loosely    Follow up: To be determined    Patient was discussed with attending neurologist Dr. Larson     35 minutes of time spent included chart review, obtaining history, patient examination, developing plan of care, and documentation.     Thank you for allowing us to participate in the care of your patient.  If there are any questions regarding evaluation please feel free to contact us.     [place] العلي, TYLER - CNP, 6/3/2025

## 2025-06-03 NOTE — PROGRESS NOTES
Facility/Department: [address]   CLINICAL BEDSIDE SWALLOW EVALUATION    NAME: Cary Rogers  : 3/12/1935  MRN: [id number]    ADMISSION DATE: 2025  ADMITTING DIAGNOSIS: has Type 2 diabetes mellitus with diabetic polyneuropathy, without long-term current use of insulin ([facility]); A-fib ([facility]); S/P biventricular cardiac pacemaker procedure; OAB (overactive bladder); SOB (shortness of breath); Other specified hypothyroidism; Fibromyalgia; Moderate episode of recurrent major depressive disorder ([facility]); Other cardiomyopathies ([facility]); Syncope and collapse; Lupus; Senile degeneration of brain; Chronic renal disease, stage III ([facility]) [[id number]]; Hypertensive emergency; Hypertensive encephalopathy; Generalized weakness; Gait disturbance; Uncontrolled pain; Closed fracture of upper end of right fibula; Orthostatic hypotension; Acute kidney injury ([place]) with acute tubular necrosis (ATN); Hypokalemia; Moderate vascular dementia with anxiety ([facility]); Syncope, vasovagal; GI bleed; Acute blood loss anemia; Proctitis; and Vertebral artery occlusion, right on their problem list.  ONSET DATE: this admission    Recent Chest Xray/CT of Chest: Chest xray on 25 reveals \"No acute cardiopulmonary disease.\"     Date of Eval: 6/3/2025  Evaluating Therapist: TATY Santoro    Impression: Cary Rogers was seen today for a clinical bedside swallow evaluation following admission to [facility] with acute ischemic stroke. Head CTA on 25 reveals \"1. Occlusion of the M2 segment of the right vertebral artery with robust reconstitution. 2. No other CTA evidence of a large vessel occlusion or hemodynamically significant stenosis.\" Relevant medical hx includes vascular dementia, chronic A-fib, depression, type 2 diabetes, stage III CKD.     Cary Rogers was positioned upright in bed for the current visit. Pt agreeable and cooperative, appears fatigued. Speech is severely reduced in intelligibility with s/sx of dysarthria. Accurately affirms/denies

## 2025-06-03 NOTE — PLAN OF CARE
Problem: Chronic Conditions and Co-morbidities  Goal: Patient's chronic conditions and co-morbidity symptoms are monitored and maintained or improved  6/3/2025 0657 by Kiel Fermin RN  Outcome: Progressing  6/3/2025 0656 by Kiel Fermin RN  Outcome: Progressing     Problem: Discharge Planning  Goal: Discharge to home or other facility with appropriate resources  6/3/2025 0657 by Kiel Fermin RN  Outcome: Progressing  6/3/2025 0656 by Kiel Fermin RN  Outcome: Progressing     Problem: Pain  Goal: Verbalizes/displays adequate comfort level or baseline comfort level  6/3/2025 0657 by Kiel Fermin RN  Outcome: Progressing  6/3/2025 0656 by Kiel Fermin RN  Outcome: Progressing     Problem: Safety - Adult  Goal: Free from fall injury  6/3/2025 0657 by Kiel Fermin RN  Outcome: Progressing  6/3/2025 0656 by Kiel Fermin RN  Outcome: Progressing     Problem: Skin/Tissue Integrity  Goal: Skin integrity remains intact  Description: 1.  Monitor for areas of redness and/or skin breakdown2.  Assess vascular access sites hourly3.  Every 4-6 hours minimum:  Change oxygen saturation probe site4.  Every 4-6 hours:  If on nasal continuous positive airway pressure, respiratory therapy assess nares and determine need for appliance change or resting period  6/3/2025 0657 by Kiel Fermin RN  Outcome: Progressing  6/3/2025 0656 by Kiel Fermin RN  Outcome: Progressing     Problem: ABCDS Injury Assessment  Goal: Absence of physical injury  6/3/2025 0657 by Kiel Fermin RN  Outcome: Progressing  6/3/2025 0656 by Kiel Fermin RN  Outcome: Progressing

## 2025-06-03 NOTE — PROGRESS NOTES
Vascular/Interventional Neurology Progress Note  [facility]  Patient Name: Cary Rogers     : 3/12/1935      Subjective:     The patient was seen and examined.  Chart reviewed.  The patient is resting in bed with her eyes closed.  She opens her eyes to verbal stimuli.  She is able to hold my hand with the right.  Still with left hemiparesis.  Attempts to speak.  Discussed with grandson and daughter at bedside.  Answered questions as able.      Objective:   Scheduled Meds:   sodium chloride flush  5-40 mL IntraVENous 2 times per day    enoxaparin  40 mg SubCUTAneous Daily    atorvastatin  80 mg Oral Nightly    clopidogrel  75 mg Oral Daily    levothyroxine  50 mcg Oral QAM AC    pantoprazole  40 mg Oral BID AC    [Held by provider] metoprolol succinate  25 mg Oral QPM    [Held by provider] amLODIPine  2.5 mg Oral Daily     Continuous Infusions:   sodium chloride       PRN Meds:.sodium chloride flush, sodium chloride, ondansetron **OR** ondansetron, polyethylene glycol    Vital Signs:  Vitals:    25 0216 25 0800 25 0931 25 1015   BP: (!) 194/104 (!) 174/94  (!) 174/94   Pulse: 65 59  59   Resp: 18 18  18   Temp: 97.5 °F (36.4 °C) 97.9 °F (36.6 °C)  97.9 °F (36.6 °C)   TempSrc: Oral Oral  Oral   SpO2: 98% 93%  93%   Weight:       Height:   1.702 m (5' 7\")         General: Drowsy, opens eyes to voice.  Attempts to speak  HEENT: NC/AT, PERRL, right gaze preference, no blink to threat on the left  Extremities: no edema, no calf tenderness b/l    Neurological Exam:         Mental Status: Drowsy, opens eyes to voice.  Attempts to speak follow simple commands with the right arm     Cranial Nerves: PERRL, right gaze preference, no blink to threat on the left, left facial weakness, able to stick her tongue out symmetric      Sensation/ Motor: No movement to the left upper or lower extremity.  Able to hold upper extremity to antigravity.  Able to wiggle toes on the right foot.      Coordination:       Tremors-- None      Gait/Station: Deferred      Labs:   Recent Labs     06/02/25  1150 06/03/25  0129   WBC 6.9 7.4    136   K 4.2 3.8    100   CO2 24 24   BUN 31* 25*   CREATININE 1.0 0.9   GLUCOSE 111* 94   INR 1.0  --    GETLIPIDS@      Imaging Studies:     CT head  IMPRESSION:  1.  No evidence for acute intracranial findings.  2.  Chronic small vessel ischemic changes     Dictated and Electronically Signed By:   Tammie Beckford MD   [facility]   6/2/2025 12:16      CTA head and neck     Images personally reviewed with Dr. Kong.  Right M1 occlusion    Assessment/Plan:      89-year-old female presenting with left facial droop, slurred speech, and left sided weakness concerning for acute right MCA territory ischemic stroke.      -Not a candidate for TNK due to presenting outside the window  - Not a candidate for thrombectomy given advanced age, dementia, and poor functional status  -Unclear if able to have MRI due to pacemaker compatibility  -Continue Plavix and statin for secondary prevention of stroke  -History A-fib not on anticoagulation due to history of GI bleed and fall risk  -PT/OT/ST as able.    Pertinent images discussed/reviewed with Dr. Kong who has fully participated in the care of this patient and agrees with plan.      Electronically signed by TYLER Mccarty CNP on 6/3/2025 at 11:00 AM   6/3/2025

## 2025-06-03 NOTE — CONSULTS
[facility] Nurse  Consult Note       Cary Rogers  AGE: 89 y.o.   GENDER: female  : 3/12/1935  TODAY'S DATE:  6/3/2025    Subjective:     Reason for  Evaluation and Assessment: wound care eval.      Cary Rogers is a 89 y.o. 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    Thyroid disease     Ulcer in the past    Gastric       PAST SURGICAL HISTORY    Past Surgical History:   Procedure Laterality Date    APPENDECTOMY      BLADDER SURGERY      CHOLECYSTECTOMY      COLONOSCOPY  14    normal, biopsy    COLONOSCOPY N/A 2024    COLONOSCOPY DIAGNOSTIC performed by Navid Dolan MD at [facility] ENDOSCOPY    EYE SURGERY      HYSTERECTOMY (CERVIX STATUS UNKNOWN)      PACEMAKER PLACEMENT Left 2020    bivi pacer upgrade-Medtronic Percepta Quad CRT-P MRI SureScan     UPPER GASTROINTESTINAL ENDOSCOPY N/A 2024    ESOPHAGOGASTRODUODENOSCOPY DIAGNOSTIC ONLY performed by Navid Dolan MD at [facility] ENDOSCOPY    VENTRICULAR CARDIAC PACEMAKER INSERTION      Dr. JULI Shoemaker - following MI       FAMILY HISTORY    Family History   Problem Relation Age of Onset    Cancer Mother     Cancer Father     Heart Disease Father     Cancer Sister     Cancer Brother        SOCIAL HISTORY    Social History     Tobacco Use    Smoking status: Former     Current packs/day: 0.00     Types: Cigarettes     Quit date: 1977     Years  06/03/25 1327   Margins Defined edges 06/03/25 1327   Wound Thickness Description not for Pressure Injury Full thickness 06/03/25 1327   Number of days: 0       Wound 06/02/25 Vagina (Active)   Wound Image   06/03/25 1327   Wound Etiology Other 06/03/25 1327   Dressing Status Other (Comment) 06/03/25 0800   Wound Cleansed Cleansed with saline 06/03/25 1327   Dressing/Treatment Moisture barrier 06/03/25 1327   Wound Length (cm) 1 cm 06/03/25 1327   Wound Width (cm) 0.3 cm 06/03/25 1327   Wound Depth (cm) 0.1 cm 06/03/25 1327   Wound Surface Area (cm^2) 0.3 cm^2 06/03/25 1327   Wound Volume (cm^3) 0.03 cm^3 06/03/25 1327   Distance Tunneling (cm) 0 cm 06/03/25 1327   Tunneling Position ___ O'Clock 0 06/03/25 1327   Undermining Starts ___ O'Clock 0 06/03/25 1327   Undermining Ends___ O'Clock 0 06/03/25 1327   Undermining Maxium Distance (cm) 0 06/03/25 1327   Wound Assessment Pink/red 06/03/25 1327   Drainage Amount Small (< 25%) 06/03/25 1327   Drainage Description Serosanguinous 06/03/25 1327   Odor None 06/03/25 1327   Lisseth-wound Assessment Excoriated 06/03/25 1327   Margins Defined edges 06/03/25 1327   Wound Thickness Description not for Pressure Injury Partial thickness 06/03/25 1327   Number of days: 0       Response to treatment:  Well tolerated by patient.     Pain Assessment:  Severity:  none  Quality of pain:   Wound Pain Timing/Severity:   Premedicated: no    Plan:     Plan of Care: Wound 06/02/25 Sacrum open area-Dressing/Treatment: Collagen, Silicone border  Wound 06/21/24 Buttocks Right cluster-Dressing/Treatment: Silicone border  Wound 06/02/25 Vagina-Dressing/Treatment: Moisture barrier    Patient in bed is non-verbal. Wound care eval for sacral wound. Has a pressure injury unstageable with dry eschar and a vaginal wound. Measured and pictured. Applied collagen and foam border to sacrum, applied moisture barrier cream to vaginal area. Pt is incontinent of urine. Lisseth care done. Turned to lt side. Heels

## 2025-06-04 LAB
ANION GAP SERPL CALCULATED.3IONS-SCNC: 16 MMOL/L (ref 9–17)
BASOPHILS # BLD: 0.02 K/UL
BASOPHILS NFR BLD: 0 % (ref 0–1)
BUN SERPL-MCNC: 38 MG/DL (ref 7–20)
CALCIUM SERPL-MCNC: 9.4 MG/DL (ref 8.3–10.6)
CHLORIDE SERPL-SCNC: 99 MMOL/L (ref 99–110)
CO2 SERPL-SCNC: 23 MMOL/L (ref 21–32)
CREAT SERPL-MCNC: 1 MG/DL (ref 0.6–1.2)
EOSINOPHIL # BLD: 0 K/UL
EOSINOPHILS RELATIVE PERCENT: 0 % (ref 0–3)
ERYTHROCYTE [DISTWIDTH] IN BLOOD BY AUTOMATED COUNT: 15.9 % (ref 11.7–14.9)
GFR, ESTIMATED: 54 ML/MIN/1.73M2
GLUCOSE SERPL-MCNC: 115 MG/DL (ref 74–99)
HCT VFR BLD AUTO: 46.5 % (ref 37–47)
HGB BLD-MCNC: 14.2 G/DL (ref 12.5–16)
IMM GRANULOCYTES # BLD AUTO: 0.11 K/UL
IMM GRANULOCYTES NFR BLD: 1 %
LYMPHOCYTES NFR BLD: 0.41 K/UL
LYMPHOCYTES RELATIVE PERCENT: 4 % (ref 24–44)
MCH RBC QN AUTO: 28.7 PG (ref 27–31)
MCHC RBC AUTO-ENTMCNC: 30.5 G/DL (ref 32–36)
MCV RBC AUTO: 93.9 FL (ref 78–100)
MONOCYTES NFR BLD: 0.84 K/UL
MONOCYTES NFR BLD: 8 % (ref 0–5)
NEUTROPHILS NFR BLD: 87 % (ref 36–66)
NEUTS SEG NFR BLD: 8.83 K/UL
PLATELET # BLD AUTO: 212 K/UL (ref 140–440)
PMV BLD AUTO: 10.4 FL (ref 7.5–11.1)
POTASSIUM SERPL-SCNC: 3.9 MMOL/L (ref 3.5–5.1)
RBC # BLD AUTO: 4.95 M/UL (ref 4.2–5.4)
SODIUM SERPL-SCNC: 137 MMOL/L (ref 136–145)
WBC OTHER # BLD: 10.2 K/UL (ref 4–10.5)

## 2025-06-04 PROCEDURE — G0378 HOSPITAL OBSERVATION PER HR: HCPCS

## 2025-06-04 PROCEDURE — 6360000002 HC RX W HCPCS: Performed by: STUDENT IN AN ORGANIZED HEALTH CARE EDUCATION/TRAINING PROGRAM

## 2025-06-04 PROCEDURE — 36415 COLL VENOUS BLD VENIPUNCTURE: CPT

## 2025-06-04 PROCEDURE — 96361 HYDRATE IV INFUSION ADD-ON: CPT

## 2025-06-04 PROCEDURE — 92526 ORAL FUNCTION THERAPY: CPT

## 2025-06-04 PROCEDURE — 97530 THERAPEUTIC ACTIVITIES: CPT

## 2025-06-04 PROCEDURE — 80048 BASIC METABOLIC PNL TOTAL CA: CPT

## 2025-06-04 PROCEDURE — 96376 TX/PRO/DX INJ SAME DRUG ADON: CPT

## 2025-06-04 PROCEDURE — 97535 SELF CARE MNGMENT TRAINING: CPT

## 2025-06-04 PROCEDURE — 2580000003 HC RX 258

## 2025-06-04 PROCEDURE — 2500000003 HC RX 250 WO HCPCS: Performed by: STUDENT IN AN ORGANIZED HEALTH CARE EDUCATION/TRAINING PROGRAM

## 2025-06-04 PROCEDURE — 96372 THER/PROPH/DIAG INJ SC/IM: CPT

## 2025-06-04 PROCEDURE — 94761 N-INVAS EAR/PLS OXIMETRY MLT: CPT

## 2025-06-04 PROCEDURE — 85025 COMPLETE CBC W/AUTO DIFF WBC: CPT

## 2025-06-04 PROCEDURE — 6360000002 HC RX W HCPCS

## 2025-06-04 RX ORDER — HYDRALAZINE HYDROCHLORIDE 20 MG/ML
10 INJECTION INTRAMUSCULAR; INTRAVENOUS ONCE
Status: COMPLETED | OUTPATIENT
Start: 2025-06-04 | End: 2025-06-04

## 2025-06-04 RX ORDER — SODIUM CHLORIDE, SODIUM LACTATE, POTASSIUM CHLORIDE, CALCIUM CHLORIDE 600; 310; 30; 20 MG/100ML; MG/100ML; MG/100ML; MG/100ML
INJECTION, SOLUTION INTRAVENOUS CONTINUOUS
Status: DISPENSED | OUTPATIENT
Start: 2025-06-04 | End: 2025-06-04

## 2025-06-04 RX ADMIN — SODIUM CHLORIDE, PRESERVATIVE FREE 10 ML: 5 INJECTION INTRAVENOUS at 21:58

## 2025-06-04 RX ADMIN — HYDRALAZINE HYDROCHLORIDE 5 MG: 20 INJECTION INTRAMUSCULAR; INTRAVENOUS at 02:24

## 2025-06-04 RX ADMIN — SODIUM CHLORIDE, SODIUM LACTATE, POTASSIUM CHLORIDE, AND CALCIUM CHLORIDE: .6; .31; .03; .02 INJECTION, SOLUTION INTRAVENOUS at 13:19

## 2025-06-04 RX ADMIN — ENOXAPARIN SODIUM 40 MG: 100 INJECTION SUBCUTANEOUS at 10:15

## 2025-06-04 RX ADMIN — SODIUM CHLORIDE, PRESERVATIVE FREE 10 ML: 5 INJECTION INTRAVENOUS at 10:15

## 2025-06-04 RX ADMIN — HYDRALAZINE HYDROCHLORIDE 10 MG: 20 INJECTION INTRAMUSCULAR; INTRAVENOUS at 09:46

## 2025-06-04 ASSESSMENT — PAIN SCALES - WONG BAKER
WONGBAKER_NUMERICALRESPONSE: NO HURT
WONGBAKER_NUMERICALRESPONSE: HURTS EVEN MORE

## 2025-06-04 NOTE — PROGRESS NOTES
Brief Neurology Note:  Chart was reviewed, patient exam remains stable. Hospice consult has been placed. We will sign off at this time. Please contact us with any new concerns.   TYLER Figueroa - CNP  Neurology

## 2025-06-04 NOTE — PROGRESS NOTES
Physical Therapy  Name: aCry Rogers MRN: [id number] :   3/12/1935   Date:  2025   Admission Date: 2025 Room:  [address] [place], [place] [address]A   Restrictions/Precautions:         general precautions, fall risk   Communication with other providers:  jolie COBB states pt is ok to see for therapy  Subjective:  Patient states:  did not speak but gesticulated consent   Pain:   Location, Type, Intensity (0/10 to 10/10):  indicated pain in R hip flexor when extended   Objective:    Observation:  pt in side lying in bed    Treatment, including education/measures:  Agreeable to therapy. Co treatment with ROMMEL Simmons for safety and high assist level. Pt given cues and time to complete, removing bed clothing and rolling to side in preparation for supine> EOB transfer. Ultimately required dep to remove bedclothes, maxA to move legs and feet to EOB, and MaxA x2 to complete trasnfer to sitting.  L lateral lean in sitting, with MaxA to remain upright in sitting with additional knee block. She was able to sit for aprox 12m. When asked if she was doing ok she nodded in consent. When asked if she was in pain she nodded no. L hand placed on bed and attempted to bear weight but she was not able to maintain position. Placed left hand on bed rails to improve stability but unable to maintain . RUE able to support weight leaning on bed, and then grasp bedrails. Upon completion she returned to supine with MaxA x2. She was scooted up with DEP and positioned on her back with pillows under her arm and heels. Repositioned bed for comfort with hip flexors. Left with all needs met and nurse preparing for med pass. Noted large cyst looking bulge on R triceps. Nurse made aware.        Transfers with line management of pocket tele  Rolling: MaxA  Supine to sit :MaxA x2  Sit to supine :MaxA x2  Scooting :DEP  Sit to stand :DNT  Stand to sit :DNT  SPT:DNT      Safety  Patient left safely in the bed, with call light/phone in reach with alarm applied. Gait  belt was used for transfers and gait.    Assessment / Impression:     Patient's tolerance of treatment:  fair   Adverse Reaction: no  Significant change in status and impact:  no  Barriers to improvement:  activity tolerance limitations, strength impairments    Plan for Next Session:    Will cont to work towards pt's goals per patient tolerance  Time in:  12:46  Time out:  1:11  Timed treatment minutes:  25  Total treatment time:  25  Previously filed items:  Social/Functional History  Lives With: Other (Comment) (Granddaughter)  Type of Home: House  Home Equipment: Hospital bed  Short Term Goals  Time Frame for Short Term Goals: 1 week  Short Term Goal 1: pt to complete all bed mobility mod A  Short Term Goal 2: pt to sit EOB 10 minutes min A  Short Term Goal 3: pt to complete stand pivot transfer max A     Electronically signed by:    Jackie Fitch PTA PTA  6/4/2025, 1:17 PM

## 2025-06-04 NOTE — PLAN OF CARE
Problem: Chronic Conditions and Co-morbidities  Goal: Patient's chronic conditions and co-morbidity symptoms are monitored and maintained or improved  Outcome: Progressing     Problem: Discharge Planning  Goal: Discharge to home or other facility with appropriate resources  Outcome: Progressing     Problem: Pain  Goal: Verbalizes/displays adequate comfort level or baseline comfort level  Outcome: Progressing     Problem: Safety - Adult  Goal: Free from fall injury  Outcome: Progressing     Problem: Skin/Tissue Integrity  Goal: Skin integrity remains intact  Description: 1.  Monitor for areas of redness and/or skin breakdown2.  Assess vascular access sites hourly3.  Every 4-6 hours minimum:  Change oxygen saturation probe site4.  Every 4-6 hours:  If on nasal continuous positive airway pressure, respiratory therapy assess nares and determine need for appliance change or resting period  Outcome: Progressing     Problem: ABCDS Injury Assessment  Goal: Absence of physical injury  Outcome: Progressing     Problem: Nutrition Deficit:  Goal: Optimize nutritional status  6/3/2025 2246 by Orin Jackson RN  Outcome: Progressing

## 2025-06-04 NOTE — PROGRESS NOTES
Occupational Therapy  .  Occupational Therapy Treatment Note    Name: Cary Rogers MRN: [id number] :   3/12/1935   Date:  2025   Admission Date: 2025 Room:  [address] [place] [address]-A     Primary Problem:  The primary encounter diagnosis was Acute ischemic stroke (HCC). A diagnosis of Cerebrovascular accident (CVA) due to other mechanism (HCC) was also pertinent to this visit.  Patient  has a past medical history of A-fib (HCC), Arthritis, CAD (coronary artery disease), Eye abnormality, Fibromyalgia, GERD (gastroesophageal reflux disease), Hx of Doppler echocardiogram, MI (myocardial infarction) (HCC), Thyroid disease, and Ulcer.  Patient  has a past surgical history that includes Cholecystectomy; Bladder surgery; Eye surgery; Hysterectomy; Appendectomy; Ventricular cardiac pacer insertion (); Colonoscopy (14); pacemaker placement (Left, 2020); Upper gastrointestinal endoscopy (N/A, 2024); and Colonoscopy (N/A, 2024)    Restrictions/Precautions:          General precautions, fall risk      Communication with other providers: cotx with PTA for safety and assist. RN    Subjective:  Patient states:  non verbal.   Pain:   Location, Type, Intensity (0/10 to 10/10):  did not state but groaned when sitting upright    Objective:    Observation: patient is asleep and easily aroused. She is non verbal at this time.  Noted a large lump on patients R UE. RN notified  Objective Measures:  pocket tele    Treatment, including education:    ADL activity training was instructed today. Cues were given for safety, sequence, UE/LE placement, visual cues, and balance.    Activities performed today included dressing, toileting, hand hygiene, and grooming.        Grooming:  Facial hygiene-DEP at this time. Patient would not grab wash cloth despite cues and encouragement.           Therapeutic activity training was instructed today.  Cues were given for safety, sequence, UE/LE placement, awareness, and balance.     Activities performed today included bed mobility training, sup-sit, sit-stand, SPT.    Supine to EOB-Max x2    EOB scooting hips forward-Max A + Max At trunk     EOB sitting balance-Max at trunk with additional knee block d/t patient slight posterior lean and safety. Tolerated x12  on EOB. Noted L lateral lean and posterior with Max  for correction. Unable to use Bues for support despite tcs. Multiple attempts to place Bues into correct position for support.     Return supine Max x2    Bed mobility- Max x2      Patient educated on role of OT , benefits of OT and rationale for therapeutic intervention.   Benefit of OOB/EOB activities, benefit of movement. AE/AD, WS/EC,     All therapeutic intervention performed c emphasis on trunk strengthening, bed mobility and supine<>sit to maintain / increase strength for functional tasks / transfers.    Patient left safely in bed at end of session, with call light in reach, alarm on and nursing aware. Gait belt was used for func transfers / mobility.            Assessment / Impression:    Patient's tolerance of treatment: poor  Adverse Reaction: None  Significant change in status and impact: Improved from initial evaluation  Barriers to improvement: weakness, decreased activity tolerance and endurance.       Plan for Next Session:    Continue with OT POC      Time in:  1246  Time out:  112  Timed treatment minutes:  26  Total treatment time:  26      Electronically signed by:    CARLOS Hutton COTA/L [id number]    6/4/2025, 1:16 PM

## 2025-06-04 NOTE — PROGRESS NOTES
[facility]  DEPARTMENT OF SPEECH/LANGUAGE PATHOLOGY  DAILY PROGRESS NOTE  Cary Rogers  6/4/2025  [id number]  Acute ischemic stroke (HCC) [I63.9]  Vertebral artery occlusion, right [I65.01]  Allergies   Allergen Reactions    Amitriptyline     Aspirin     Codeine     Elavil [Amitriptyline Hcl]     Hydroxychloroquine     Ibuprofen     Namenda [Memantine]      Possible hypotension/orthostasis    Plaquenil [Hydroxychloroquine Sulfate]     Prevnar 13 [Pneumococcal 13-Kristal Conj Vacc]     Theophyllines          Pt was seen this date for dysphagia treatment.       IMPRESSION AND RECOMMENDATIONS: Cary Rogers was seen today for dysphagia treatment and ongoing evaluation. Pt seen for initial BSE yesterday 6/3/25, which recommended NPO with ongoing evaluation. Cary Rogers was positioned upright in bed for the current visit. Pt cooperative, but lethargic. JORDYN Choudhary present for majority of visit. She is on room air. Pt answers yes/no questions inconsistently using gestures. Pt follows simple commands in 3/4 trials. Would benefit from further speech-language evaluation as appropriate. Oral care was performed at start of visit.     Cary Rogers consumed trials of ice chips x1 and half tsp of honey thick liquid x1 during this visit. Pt presents with evidence of oropharyngeal dysphagia characterized by reduced oral acceptance, reduced labial seal, minimal/prolonged bolus preparation, slow/absent a/p transit, and suspected delay in pharyngeal swallow initiation. SLP provided total feeding assistance. Pt with minimal mastication/manipulation of ice chip despite verbal/tactile cues. Swallow noted x2 following time delay with immediate, weak cough response. Attempted single trial of honey thick liquid via half tsp with anterior loss and no attempt at bolus manipulation. Bolus suctioned from oral cavity and further trials deferred. SLP provided education to pt and JORDYN Choudhary regarding recommendations/POC. Pt  needs ongoing education.     Recommend Cary Rogers remain NPO with alternative nutrition as appropriate based on goals of care. Administer meds via alternative route. Frequent oral care. SLP to follow for ongoing evaluation including MBSS as clinically indicated.     GOALS (current status in bold):  Short-term Goals  Timeframe for Short-term Goals: LOS or until goals are met  Goal 1: Pt will participate in ongoing evaluation to determine readiness for PO diet initiation Ongoing; Continue  Goal 2: Pt will participate in a modified barium swallow study to further evaluation oropharyngeal swallow function DNT; Continue, pt not appropriate for participation at this time  Goal 3: Pt/caregivers will demonstrate understanding of SLP recommendations/POC Ongoing; Continue          EDUCATION: Recommendations/POC    PAIN RATING (0-10 Scale): 0  Time in/Time out: SLP Individual Minutes  Time In: 0917  Time Out: 0929  Minutes: 12    Visit number: 2    Mer Novak MA, CCC-SLP 6/4/2025

## 2025-06-04 NOTE — CARE COORDINATION
Chart reviewed and attempted to call patients grand daughter Margot at number listed in chart and received message that number has been disconnected. Call to patients other grand child listed, Esvin. CM introduced self and explained role. CM explained that the number listed for Margot is incorrect and received correct number, CM added to chart at this time. Per Esvin, Margot is his wife and patient lives with them. Esvin stated that patient does have living will, CM requested that Esvin or Margot bring in copy of patients living will to be added to chart. Esvin stated that he is aware that referral needs to be made to hospice for information regarding a feeding tube and the prognosis. Esvin stated that he is not familiar with any hospice companies. CM discussed list of hospice choices. Esvin would like referral to [facility].     Referral called to [facility].     4:11 PM Received update from [facility] nurse Tresa, who stated that at this time, patient does not meet GIP qualifications. Tresa stated that she will check on patient tomorrow for any changes and that she has spoken with patients grandson regarding information.

## 2025-06-04 NOTE — PROGRESS NOTES
V2.0  [facility] Hospitalist Progress Note      Name:  Cary Rogers /Age/Sex: 3/12/1935  (89 y.o. female)   MRN & CSN:  [id number] & [id number] Encounter Date/Time: 2025 1135 AM EDT    Location:  [address] [place] [address] PCP: Dewayne Jernigan PA       Hospital Day: 3    Assessment and Plan:   Cary Rogers is a 89 y.o. female with pmh as noted below who presents with Vertebral artery occlusion, right      Plan:  Acute right vertebral artery M2 segment occlusion  Leading to left-sided stroke with left facial droop  Plavix statin started  Neurology consulted, noted MRI was not ordered, however regardless patient is aphasic at time of exam, would not be able to verbalize if there is pain at the pacer site, so likely would not be able to undergo MRI.  Neurointervention is following for LVO, not a candidate for thrombectomy.  Recommend continued Plavix, statin for secondary stroke prevention.  Recommending SBP goal 140-160.  PT OT consulted  Speech and swallow eval  2D echo, completed, results pending  -- Will discuss with family regarding goals of care, and possible hospice consultation.  According to record review, on admission, patient family did discuss with admitting provider, and made patient DNR CC CODE STATUS  -- discussed with patient's son via telephone regarding current plan of care, and goals of care.  Patient's son is agreeable to speaking with hospice.  Discussed with case management, hospice consult ordered.  --Patient seen by speech therapy, recommended n.p.o. status.  Will start on IV fluids.  Did discuss plan of care with patient's family, regarding n.p.o., nutrition status, family did have questions about PEG tube versus not, and decided to discuss with hospice for further information.    Chronic medical conditions include:    Paroxysmal atrial fibrillation on Toprol not on anticoagulation due to recurrent blood loss anemia    Hypothyroidism on Synthroid    Advanced dementia with behavioral changes  CHB s/p  CONTRAST  Result Date: 6/2/2025  PROCEDURE: CT HEAD WO CONTRAST DATE OF EXAM:  6/2/2025 12:05 DEMOGRAPHICS: 89 years old Female INDICATION: LUE weakness COMPARISON: 2/26/2025 TECHNIQUE: Contiguous axial slices of the head were submitted without IV contrast. DOSE OPTIMIZATION: CT radiation dose optimization techniques (automated exposure  control, and use of iterative reconstruction techniques, or adjustment of the mA and/or kV according to patient size) were used to limit patient radiation dose. FINDINGS: Ventricles, cisterns and sulci are within normal limits. There is no hydrocephalus or atrophy. There is no acute intra-axial or extra-axial hemorrhage, parenchymal edema, mass effect or midline shift. Patchy areas of hypoattenuation in the periventricular and subcortical white matter. The paranasal sinuses and mastoid air cells are clear. The calvarium is intact. IMPRESSION: 1.  No evidence for acute intracranial findings. 2.  Chronic small vessel ischemic changes This dictation was created with voice recognition software.  While attempts have  been made to review the dictation as it is transcribed, on occasion the spoken word can be misinterpreted by the technology leading to omissions or inappropriate words, phrases or sentences.  Dictated and Electronically Signed By: Tammie Beckford MD [facility] 6/2/2025 12:16          Electronically signed by TYLER Lopez CNP on 6/4/2025 at 3:21 PM

## 2025-06-05 PROBLEM — I65.9: Status: ACTIVE | Noted: 2025-06-05

## 2025-06-05 PROCEDURE — 94761 N-INVAS EAR/PLS OXIMETRY MLT: CPT

## 2025-06-05 PROCEDURE — 2580000003 HC RX 258

## 2025-06-05 PROCEDURE — 2500000003 HC RX 250 WO HCPCS: Performed by: STUDENT IN AN ORGANIZED HEALTH CARE EDUCATION/TRAINING PROGRAM

## 2025-06-05 PROCEDURE — 1200000000 HC SEMI PRIVATE

## 2025-06-05 PROCEDURE — 96361 HYDRATE IV INFUSION ADD-ON: CPT

## 2025-06-05 PROCEDURE — 92526 ORAL FUNCTION THERAPY: CPT

## 2025-06-05 PROCEDURE — 6360000002 HC RX W HCPCS: Performed by: STUDENT IN AN ORGANIZED HEALTH CARE EDUCATION/TRAINING PROGRAM

## 2025-06-05 PROCEDURE — G0378 HOSPITAL OBSERVATION PER HR: HCPCS

## 2025-06-05 RX ORDER — ENOXAPARIN SODIUM 100 MG/ML
30 INJECTION SUBCUTANEOUS DAILY
Status: DISCONTINUED | OUTPATIENT
Start: 2025-06-06 | End: 2025-06-06 | Stop reason: HOSPADM

## 2025-06-05 RX ORDER — SODIUM CHLORIDE, SODIUM LACTATE, POTASSIUM CHLORIDE, CALCIUM CHLORIDE 600; 310; 30; 20 MG/100ML; MG/100ML; MG/100ML; MG/100ML
INJECTION, SOLUTION INTRAVENOUS CONTINUOUS
Status: DISPENSED | OUTPATIENT
Start: 2025-06-05 | End: 2025-06-06

## 2025-06-05 RX ADMIN — SODIUM CHLORIDE, PRESERVATIVE FREE 10 ML: 5 INJECTION INTRAVENOUS at 11:07

## 2025-06-05 RX ADMIN — SODIUM CHLORIDE, SODIUM LACTATE, POTASSIUM CHLORIDE, AND CALCIUM CHLORIDE: .6; .31; .03; .02 INJECTION, SOLUTION INTRAVENOUS at 15:57

## 2025-06-05 RX ADMIN — ENOXAPARIN SODIUM 40 MG: 100 INJECTION SUBCUTANEOUS at 11:06

## 2025-06-05 RX ADMIN — SODIUM CHLORIDE, PRESERVATIVE FREE 10 ML: 5 INJECTION INTRAVENOUS at 21:11

## 2025-06-05 ASSESSMENT — PAIN SCALES - WONG BAKER
WONGBAKER_NUMERICALRESPONSE: NO HURT
WONGBAKER_NUMERICALRESPONSE: NO HURT

## 2025-06-05 NOTE — PROGRESS NOTES
V2.0  [facility] Hospitalist Progress Note      Name:  Cary Rogers /Age/Sex: 3/12/1935  (89 y.o. female)   MRN & CSN:  [id number] & [id number] Encounter Date/Time: 2025 11:53 AM EDT    Location:  [address] [place] [address] PCP: Dewayne Jernigan PA       Hospital Day: 4    Assessment and Plan:   Cary Rogers is a 89 y.o. female with pmh as noted below who presents with Vertebral artery occlusion, right      Plan:  Acute right vertebral artery M2 segment occlusion  Leading to left-sided stroke with left facial droop  Plavix statin started  Neurology consulted, noted MRI was not ordered, however regardless patient is aphasic at time of exam, would not be able to verbalize if there is pain at the pacer site, so likely would not be able to undergo MRI.  Neurointervention is following for LVO, not a candidate for thrombectomy.  Recommend continued Plavix, statin for secondary stroke prevention.  Recommending SBP goal 140-160.  PT OT consulted  Speech and swallow eval  2D echo, completed, results pending  -- Will discuss with family regarding goals of care, and possible hospice consultation.  According to record review, on admission, patient family did discuss with admitting provider, and made patient DNR CC CODE STATUS  -- discussed with patient's son via telephone regarding current plan of care, and goals of care.  Patient's son is agreeable to speaking with hospice.  Discussed with case management, hospice consult ordered.  --Patient seen by speech therapy, recommended n.p.o. status.  Will start on IV fluids.  Did discuss plan of care with patient's family, regarding n.p.o., nutrition status, family did have questions about PEG tube versus not, and decided to discuss with hospice for further information.  -- hospice consult underway, initially family decided on [facility] hospice, discussed with case management, and hospice director.  Patient may not be a candidate for inpatient hospice according to discussion, however plan

## 2025-06-05 NOTE — PLAN OF CARE
Problem: Chronic Conditions and Co-morbidities  Goal: Patient's chronic conditions and co-morbidity symptoms are monitored and maintained or improved  6/5/2025 0213 by Kiel Fermin RN  Outcome: Progressing  6/4/2025 1520 by Funmi Rayo LPN  Outcome: Progressing     Problem: Discharge Planning  Goal: Discharge to home or other facility with appropriate resources  6/5/2025 0213 by Kiel Fermin RN  Outcome: Progressing  6/4/2025 1520 by Funmi Rayo LPN  Outcome: Progressing     Problem: Pain  Goal: Verbalizes/displays adequate comfort level or baseline comfort level  6/5/2025 0213 by Kiel Fermin RN  Outcome: Progressing  6/4/2025 1520 by Funmi Rayo LPN  Outcome: Not Progressing     Problem: Safety - Adult  Goal: Free from fall injury  6/5/2025 0213 by Kiel Fermin RN  Outcome: Progressing  6/4/2025 1520 by Funmi Rayo LPN  Outcome: Progressing     Problem: Skin/Tissue Integrity  Goal: Skin integrity remains intact  Description: 1.  Monitor for areas of redness and/or skin breakdown2.  Assess vascular access sites hourly3.  Every 4-6 hours minimum:  Change oxygen saturation probe site4.  Every 4-6 hours:  If on nasal continuous positive airway pressure, respiratory therapy assess nares and determine need for appliance change or resting period  6/5/2025 0213 by Kiel Fermin RN  Outcome: Progressing  6/4/2025 1520 by Funmi Rayo LPN  Outcome: Progressing     Problem: ABCDS Injury Assessment  Goal: Absence of physical injury  6/5/2025 0213 by Kiel Fermin RN  Outcome: Progressing  6/4/2025 1520 by Funmi Rayo LPN  Outcome: Progressing     Problem: Nutrition Deficit:  Goal: Optimize nutritional status  6/5/2025 0213 by Kiel Fermin RN  Outcome: Progressing  6/4/2025 1520 by Funmi Rayo LPN  Outcome: Progressing     Problem: Pain  Goal: Verbalizes/displays adequate comfort level or baseline comfort level  6/5/2025 0213 by Kiel Fermin RN  Outcome:  Progressing  6/4/2025 1520 by Funmi Rayo LPN  Outcome: Not Progressing

## 2025-06-05 NOTE — CONSULTS
LOVENOX PROPHYLAXIS EVALUATION  (Populations not addressed in this protocol: trauma, obstetrics, or COVID-19)    Wt Readings from Last 3 Encounters:   06/04/25 50.7 kg (111 lb 12.4 oz)   02/26/25 63.5 kg (140 lb)   01/05/25 63.5 kg (140 lb)       Estimated Creatinine Clearance: 30 mL/min (based on SCr of 1 mg/dL).  Recent Labs     06/03/25  0129 06/04/25  1300   BUN 25* 38*   CREATININE 0.9 1.0    212   HGB 13.8 14.2   HCT 43.0 46.5       Weight Range: 50.9 kg and below    CRCL = 30 or greater    [x]  50.9 kg   and below     []  .9  kg   []  101-150.9 kg   []  151-174.9  kg   []  175 kg  or greater     30mg subq  daily     40mg subq daily    (or 30mg subq BID orthopedic)     30mg subq  BID   40mg subq  BID   60mg subq BID       Per P/T protocol for appropriate subq anticoagulation by weight and CRCL change to:    Enoxparin 30mg subq daily      Pito Cruz [facility]  2:53 PM  06/05/25

## 2025-06-05 NOTE — CARE COORDINATION
Call to patients tiffani Mcallister, his wife Margot answered the phone. CM introduced self and explained role. CM explained that CM was following up on information regarding hospice meeting yesterday. CM explained that per [facility], patient was not appropriate for [facility] at this time. CM offered to call another hospice company. Margot voiced agreement and would like referral to [facility]. Referral called to [facility] at this time.     2:40 PM Call to [facility] to check if meeting has been arranged with patients family. CM spoke with Anisha, who stated that meeting is scheduled for tomorrow at 11am.

## 2025-06-05 NOTE — PROGRESS NOTES
[facility]  DEPARTMENT OF SPEECH/LANGUAGE PATHOLOGY  DAILY PROGRESS NOTE  Cary Rogers  6/5/2025  [id number]  Acute ischemic stroke (HCC) [I63.9]  Vertebral artery occlusion, right [I65.01]  Allergies   Allergen Reactions    Amitriptyline     Aspirin     Codeine     Elavil [Amitriptyline Hcl]     Hydroxychloroquine     Ibuprofen     Namenda [Memantine]      Possible hypotension/orthostasis    Plaquenil [Hydroxychloroquine Sulfate]     Prevnar 13 [Pneumococcal 13-Kristal Conj Vacc]     Theophyllines          Pt was seen this date for dysphagia treatment.       IMPRESSION AND RECOMMENDATIONS: Cary Rogers was seen today for dysphagia treatment and ongoing evaluation. JORDYN Choudhary contacted prior to SLP visit. Per chart review, family considering hospice. Cary Rogers was positioned upright in bed for the current visit. Pt awake/alert and agreeable to trials. Pt with congested, audible breath sounds. Attempts to mouth words noted (I.e., \"water), intelligibility is limited. Pt on room air. Oral care performed prior to PO trials. Pt with viscous white/yellow secretions adhered to the hard and soft palate cascading to the base of tongue. SLP cleared as able with toothette and suction. Concern for reduced secretion management.     Cary Rogers consumed trials of ice chips x1, thin liquid via half tsp x2, and honey thick liquid via half tsp x2 during this visit. Pt presents with evidence of oropharyngeal dysphagia characterized by reduced labial seal, prolonged/disorganized bolus preparation, slow a/p transit with reduced attention to bolus, and suspected delay in pharyngeal swallow initiation. SLP provided total feeding assistance. Pt noted to swallow immediately upon bolus preparation, prior to manipulation. Multiple, effortful attempts to initiate swallow following bolus manipulation noted with delayed initiation. Immediate cough response following thin liquid via half tsp x2 and honey thick liquid  via half tsp x2. Cough is congested and reduced in strength. Frequent suction provided during visit in attempt to clear oral secretions. Further trials deferred d/t high concern for aspiration.     Recommend Cary Rogers remain NPO with alternative nutrition as appropriate based on goals of care. Administer meds via alternative route. Frequent oral care. SLP contacted NP, Shirley Grijalva, regarding potential for modified barium swallow study today if appropriate based on goals of care. Discussed guarded prognosis d/t bedside presentation. NP reports she plans to speak with pt's family regarding hospice consult. SLP to follow and complete MBSS as appropriate based on goals of care.     GOALS (current status in bold):  Short-term Goals  Timeframe for Short-term Goals: LOS or until goals are met  Goal 1: Pt will participate in ongoing evaluation to determine readiness for PO diet initiation Ongoing; Continue  Goal 2: Pt will participate in a modified barium swallow study to further evaluation oropharyngeal swallow function Ongoing; Continue, PerfectServe sent to NP regarding potential MBSS today  Goal 3: Pt/caregivers will demonstrate understanding of SLP recommendations/POC Ongoing; Continue          EDUCATION: Recommendations/POC    PAIN RATING (0-10 Scale): 0  Time in/Time out: SLP Individual Minutes  Time In: 0929  Time Out: 0944  Minutes: 15    Visit number: 3    Mer Novak MA, CCC-SLP 6/5/2025

## 2025-06-06 VITALS
RESPIRATION RATE: 18 BRPM | HEART RATE: 58 BPM | WEIGHT: 111.77 LBS | SYSTOLIC BLOOD PRESSURE: 157 MMHG | OXYGEN SATURATION: 99 % | DIASTOLIC BLOOD PRESSURE: 76 MMHG | HEIGHT: 67 IN | TEMPERATURE: 97.3 F | BODY MASS INDEX: 17.54 KG/M2

## 2025-06-06 LAB
ALBUMIN SERPL-MCNC: 3.5 G/DL (ref 3.4–5)
ALBUMIN/GLOB SERPL: 1.4 {RATIO} (ref 1.1–2.2)
ALP SERPL-CCNC: 102 U/L (ref 40–129)
ALT SERPL-CCNC: 20 U/L (ref 10–40)
ANION GAP SERPL CALCULATED.3IONS-SCNC: 14 MMOL/L (ref 9–17)
AST SERPL-CCNC: 30 U/L (ref 15–37)
BASOPHILS # BLD: 0.02 K/UL
BASOPHILS NFR BLD: 0 % (ref 0–1)
BILIRUB SERPL-MCNC: 0.9 MG/DL (ref 0–1)
BUN SERPL-MCNC: 41 MG/DL (ref 7–20)
CALCIUM SERPL-MCNC: 8.9 MG/DL (ref 8.3–10.6)
CHLORIDE SERPL-SCNC: 103 MMOL/L (ref 99–110)
CO2 SERPL-SCNC: 22 MMOL/L (ref 21–32)
CREAT SERPL-MCNC: 0.7 MG/DL (ref 0.6–1.2)
EOSINOPHIL # BLD: 0 K/UL
EOSINOPHILS RELATIVE PERCENT: 0 % (ref 0–3)
ERYTHROCYTE [DISTWIDTH] IN BLOOD BY AUTOMATED COUNT: 15.8 % (ref 11.7–14.9)
GFR, ESTIMATED: 75 ML/MIN/1.73M2
GLUCOSE SERPL-MCNC: 82 MG/DL (ref 74–99)
HCT VFR BLD AUTO: 39.3 % (ref 37–47)
HGB BLD-MCNC: 12.8 G/DL (ref 12.5–16)
IMM GRANULOCYTES # BLD AUTO: 0.02 K/UL
IMM GRANULOCYTES NFR BLD: 0 %
LYMPHOCYTES NFR BLD: 0.99 K/UL
LYMPHOCYTES RELATIVE PERCENT: 16 % (ref 24–44)
MAGNESIUM SERPL-MCNC: 2 MG/DL (ref 1.8–2.4)
MCH RBC QN AUTO: 28.4 PG (ref 27–31)
MCHC RBC AUTO-ENTMCNC: 32.6 G/DL (ref 32–36)
MCV RBC AUTO: 87.3 FL (ref 78–100)
MONOCYTES NFR BLD: 0.77 K/UL
MONOCYTES NFR BLD: 12 % (ref 0–5)
NEUTROPHILS NFR BLD: 72 % (ref 36–66)
NEUTS SEG NFR BLD: 4.49 K/UL
PLATELET # BLD AUTO: 184 K/UL (ref 140–440)
PMV BLD AUTO: 10.3 FL (ref 7.5–11.1)
POTASSIUM SERPL-SCNC: 3.5 MMOL/L (ref 3.5–5.1)
PROT SERPL-MCNC: 6 G/DL (ref 6.4–8.2)
RBC # BLD AUTO: 4.5 M/UL (ref 4.2–5.4)
SODIUM SERPL-SCNC: 138 MMOL/L (ref 136–145)
WBC OTHER # BLD: 6.3 K/UL (ref 4–10.5)

## 2025-06-06 PROCEDURE — 83735 ASSAY OF MAGNESIUM: CPT

## 2025-06-06 PROCEDURE — 85025 COMPLETE CBC W/AUTO DIFF WBC: CPT

## 2025-06-06 PROCEDURE — 6360000002 HC RX W HCPCS: Performed by: STUDENT IN AN ORGANIZED HEALTH CARE EDUCATION/TRAINING PROGRAM

## 2025-06-06 PROCEDURE — 2500000003 HC RX 250 WO HCPCS: Performed by: STUDENT IN AN ORGANIZED HEALTH CARE EDUCATION/TRAINING PROGRAM

## 2025-06-06 PROCEDURE — 36415 COLL VENOUS BLD VENIPUNCTURE: CPT

## 2025-06-06 PROCEDURE — 6360000002 HC RX W HCPCS

## 2025-06-06 PROCEDURE — 80053 COMPREHEN METABOLIC PANEL: CPT

## 2025-06-06 PROCEDURE — 94761 N-INVAS EAR/PLS OXIMETRY MLT: CPT

## 2025-06-06 RX ORDER — HYDRALAZINE HYDROCHLORIDE 20 MG/ML
5 INJECTION INTRAMUSCULAR; INTRAVENOUS ONCE
Status: COMPLETED | OUTPATIENT
Start: 2025-06-06 | End: 2025-06-06

## 2025-06-06 RX ORDER — ATORVASTATIN CALCIUM 40 MG/1
40 TABLET, FILM COATED ORAL DAILY
Qty: 30 TABLET | Refills: 1
Start: 2025-06-06

## 2025-06-06 RX ADMIN — ENOXAPARIN SODIUM 30 MG: 100 INJECTION SUBCUTANEOUS at 09:35

## 2025-06-06 RX ADMIN — HYDRALAZINE HYDROCHLORIDE 5 MG: 20 INJECTION INTRAMUSCULAR; INTRAVENOUS at 09:00

## 2025-06-06 RX ADMIN — SODIUM CHLORIDE, PRESERVATIVE FREE 10 ML: 5 INJECTION INTRAVENOUS at 10:55

## 2025-06-06 ASSESSMENT — PAIN SCALES - WONG BAKER: WONGBAKER_NUMERICALRESPONSE: NO HURT

## 2025-06-06 NOTE — PLAN OF CARE
Problem: Chronic Conditions and Co-morbidities  Goal: Patient's chronic conditions and co-morbidity symptoms are monitored and maintained or improved  Outcome: Progressing     Problem: Discharge Planning  Goal: Discharge to home or other facility with appropriate resources  Outcome: Progressing     Problem: Pain  Goal: Verbalizes/displays adequate comfort level or baseline comfort level  Outcome: Progressing     Problem: Safety - Adult  Goal: Free from fall injury  Outcome: Progressing     Problem: Skin/Tissue Integrity  Goal: Skin integrity remains intact  Description: 1.  Monitor for areas of redness and/or skin breakdown2.  Assess vascular access sites hourly3.  Every 4-6 hours minimum:  Change oxygen saturation probe site4.  Every 4-6 hours:  If on nasal continuous positive airway pressure, respiratory therapy assess nares and determine need for appliance change or resting period  Outcome: Progressing     Problem: ABCDS Injury Assessment  Goal: Absence of physical injury  Outcome: Progressing     Problem: Nutrition Deficit:  Goal: Optimize nutritional status  Outcome: Progressing

## 2025-06-06 NOTE — CARE COORDINATION
Received update from [facility] RN that patient has been accepted to hospice house and transport has been arranged for [id number] pickup. Perfect serve sent to Shirley AREVALO with information.

## 2025-06-06 NOTE — PROGRESS NOTES
[facility]  DEPARTMENT OF SPEECH/LANGUAGE PATHOLOGY  ATTEMPT NOTE  Cary Rogers  6/6/2025  [id number]    SLP has been following pt for ongoing dysphagia evaluation. Per chart review and discussion with NP/RN, pt's family meeting with hospice today. SLP to monitor chart for needs. Please consult SLP team if any needs arise.     Mer Novak MA, CCC-SLP 6/6/2025

## 2025-06-06 NOTE — PROGRESS NOTES
Occupational Therapy  Attempted to see pt on this date for OT session. Per RN pt meeting with hospice this date.  Will attempt as able and appropriate.    Wanda CARNEY/L

## 2025-06-06 NOTE — DISCHARGE SUMMARY
V2.0  Discharge Summary    Name:  Cary Rogers /Age/Sex: 3/12/1935 (89 y.o. female)   Admit Date: 2025  Discharge Date: 25    MRN & CSN:  [id number] & [id number] Encounter Date and Time 25 2:12 PM EDT    Attending:  Bernard Márquez MD Discharging Provider: Shirley Grijalva APRN - CNP       Hospital Course:     Brief HPI: Cayr Rogers is a 89 y.o. female who presented with ***    Brief Problem Based Course:   ***      The patient expressed appropriate understanding of, and agreement with the discharge recommendations, medications, and plan.     Consults this admission:  IP CONSULT TO NEUROLOGY  IP CONSULT TO CASE MANAGEMENT  IP CONSULT TO HOSPICE    Discharge Diagnosis:   Vertebral artery occlusion, right    ***    Discharge Instruction:   Follow up appointments: ***  Primary care physician: Dewayne Jernigan PA within 2 weeks  Diet: {diet:90235}   Activity: {discharge activity:21297}  Disposition: Discharged to:   []Home, [][facility], []SNF, []Acute Rehab, []Other Acute Care Facility, []Hospice   Condition on discharge: Stable  Labs and Tests to be Followed up as an outpatient by PCP or Specialist: ***    Discharge Medications:        Medication List        START taking these medications      atorvastatin 40 MG tablet  Commonly known as: LIPITOR  Take 1 tablet by mouth daily            CONTINUE taking these medications      Incontinence Supplies Misc  1 each by Does not apply route as needed (incontinence)     levothyroxine 50 MCG tablet  Commonly known as: SYNTHROID  Take 1 tablet by mouth every morning (before breakfast) TAKE ONE TABLET BY MOUTH DAILY     metoprolol succinate 25 MG extended release tablet  Commonly known as: TOPROL XL  Take 1 tablet by mouth every evening     Underpads Regular Misc  1 each by Does not apply route as needed (incontinence)     zinc oxide 40 % ointment  Commonly known as: DESITIN  Apply topically as needed.            STOP taking these medications      cephALEXin 500  significant abnormalities in the arterial phase. Soft Tissues:  No significant soft tissue findings in the neck or upper chest. Upper Lungs:  No nodules or other significant lung pathology. Aortic Arch:  Classic origin of the brachiocephalic vessels. No significant abnormalities in the common carotid or subclavian arteries. Right Cervical Carotid:  Atherosclerotic changes at the carotid bifurcation. No evidence of significant stenosis or dissection. Left Cervical Carotid:  Atherosclerotic changes at the carotid bifurcation. No evidence of significant stenosis or dissection. Vertebrals:  Patent vessels with no evidence of significant stenosis. The vertebral arteries show co-dominance. Intracranial Vasculature: Calcific plaque in the bilateral internal carotid arteries is seen which is not hemodynamically significant. Occlusion of the anterior temporal branch of the M2 segment of the right middle cerebral artery is seen with robust distal reconstitution. Fetal origin of the left posterior cerebral artery is seen. No evidence of aneurysm, vascular malformation, or any other major branch occlusion. IMPRESSION: 1. Occlusion of the M2 segment of the right vertebral artery with robust reconstitution. 2. No other CTA evidence of a large vessel occlusion or hemodynamically significant stenosis. The findings were discussed Dr. Glasgow at the following date and time: 6/2/2025 12:40. This dictation was created with voice recognition software.  While attempts have  been made to review the dictation as it is transcribed, on occasion the spoken word can be misinterpreted by the technology leading to omissions or inappropriate words, phrases or sentences.  Dictated and Electronically Signed By: Louis Nails MD [facility] 6/2/2025 12:42        XR CHEST PORTABLE  Result Date: 6/2/2025  PROCEDURE: XR CHEST PORTABLE DATE OF EXAM:  6/2/2025 12:21 DEMOGRAPHICS: 89 years old Female INDICATION: Chest Pain COMPARISON:

## 2025-06-06 NOTE — PROGRESS NOTES
Comprehensive Nutrition Assessment    Type and Reason for Visit:  Initial, Positive nutrition screen, Wound    Nutrition Recommendations/Plan:    If aggressive means of nutrition intervention are desired, recommend placing NGT or consider PEG placement for enteral feeds- consult RD to order/manage  Otherwise, provide frequent oral care for comfort  Monitor weights, labs, skin, POC     Malnutrition Assessment:  Malnutrition Status:  At risk for malnutrition (06/03/25 0929)    Context:  Social/Environmental Circumstances       Nutrition Assessment:    Pt remains NPO per SLP recs, no NGT placed, hospice consulted. Updated measured wt reflects a significant loss of 20% over the past 4mo. Likely malnourished. If aggressive means of nutrition intervention are desired, recommend placing NGT or consider PEG placement for enteral feeds- consult RD to order/manage. Continue to follow at high nutrition risk.    Nutrition Related Findings:    meds, labs reviewed. family was planning on meeting w/ hospice today. Wound Type: Pressure Injury, Stage I, Stage II (sacrum, butt)       Current Nutrition Intake & Therapies:    Average Meal Intake: NPO  Average Supplements Intake: NPO  Diet NPO    Anthropometric Measures:  Height: 170.2 cm (5' 7\")  Ideal Body Weight (IBW): 135 lbs (61 kg)    Admission Body Weight: 63.5 kg (139 lb 15.9 oz)  Current Body Weight: 63.5 kg (139 lb 15.9 oz), 103.7 % IBW. Weight Source: Stated  Current BMI (kg/m2): 21.9  Usual Body Weight: 65 kg (143 lb 4.8 oz) (9/13/24,  140# 2/26/25)     % Weight Change (Calculated): -2.3  Weight Adjustment For: No Adjustment                 BMI Categories: Underweight (BMI less than 22) age over 65    Estimated Daily Nutrient Needs:  Energy Requirements Based On: Kcal/kg  Weight Used for Energy Requirements: Current  Energy (kcal/day): 3245-4680 (25-30 bessie/kg)  Weight Used for Protein Requirements: Current  Protein (g/day): 64-76 (1-1.2 g/kg)  Method Used for Fluid

## 2025-06-10 ENCOUNTER — TELEPHONE (OUTPATIENT)
Dept: FAMILY MEDICINE CLINIC | Age: 89
End: 2025-06-10

## 2025-06-10 NOTE — TELEPHONE ENCOUNTER
Care Transitions Initial Follow Up Call    Outreach made within 2 business days of discharge: Yes    Patient: Cary Rogers Patient : 3/12/1935   MRN: [id number]  Reason for Admission: Vertebral artery occlusion  Discharge Date: 25       Spoke with: line would not ring    Discharge department/facility: [place] [facility] Interactive Patient Contact:  Was patient able to fill all prescriptions: Yes  Was patient instructed to bring all medications to the follow-up visit: Yes  Is patient taking all medications as directed in the discharge summary? Yes  Does patient understand their discharge instructions: Yes  Does patient have questions or concerns that need addressed prior to 7-14 day follow up office visit: no    Additional needs identified to be addressed with provider  No needs identified             Scheduled appointment with PCP within 7-14 days    Follow Up  No future appointments.    Emanuel Seymour LPN

## 2025-06-20 NOTE — PROGRESS NOTES
Physician Progress Note      PATIENT:               KINJAL LUKE  CSN #:                  [id number]  :                       3/12/1935  ADMIT DATE:       2025 11:40 AM  DISCH DATE:        2025 6:00 PM  RESPONDING  PROVIDER #:        Bernard Márquez MD          QUERY TEXT:    Dear Dr. Márquez,  New onset stroke is documented in the medical record. Please clarify the   status of this condition:    The clinical indicators include:  89 yo f presents with severe weakness l upper extremity, facial droop and   weakness lower extremity  -vascular dementia Afib, DM 2  -Per CTA head and neck imaging, Occlusion of the M2 segment of the right   vertebral artery with robust reconstitution.  -Noted documentation of H/P dated  of, 'eft-sided facial droop left-sided   paresis new onset stroke, Acute right vertebral artery M2 segment occlusion,   leading to left-sided stroke with left facial droop'  -Noted documentation of Neuro Interventional consult note dated  of,' left   facial droop, slurred speech, and left sided weakness concerning for right MCA   territory ischemic stroke'.  -plavix, neuro consult OT/PT speech and swallow eval, ECHO, CTA imaging    Thank you, in advance,  Laura Chavez RN BSN CRCR  Clinical   [email or web address]  Options provided:  -- Patient has this condition of CVA  -- Condition of CVA was ruled out after study, Please include corresponding   diagnosis for patient?s clinical picture and treatment.  -- Other - I will add my own diagnosis  -- Disagree - Not applicable / Not valid  -- Disagree - Clinically unable to determine / Unknown  -- Refer to Clinical Documentation Reviewer    PROVIDER RESPONSE TEXT:    The patient has this condition. of CVA    Query created by: Laura Chavez on 6/10/2025 10:06 AM      Electronically signed by:  Bernard Márquez MD 2025 1:17 PM

## (undated) DEVICE — ENDOSCOPY KIT: Brand: MEDLINE INDUSTRIES, INC.